# Patient Record
Sex: MALE | Race: WHITE | Employment: FULL TIME | URBAN - METROPOLITAN AREA
[De-identification: names, ages, dates, MRNs, and addresses within clinical notes are randomized per-mention and may not be internally consistent; named-entity substitution may affect disease eponyms.]

---

## 2021-01-01 ENCOUNTER — APPOINTMENT (OUTPATIENT)
Dept: GENERAL RADIOLOGY | Age: 66
DRG: 871 | End: 2021-01-01

## 2021-01-01 ENCOUNTER — HOSPITAL ENCOUNTER (INPATIENT)
Age: 66
LOS: 30 days | DRG: 871 | End: 2021-10-15
Attending: INTERNAL MEDICINE | Admitting: INTERNAL MEDICINE
Payer: OTHER GOVERNMENT

## 2021-01-01 ENCOUNTER — ANESTHESIA EVENT (OUTPATIENT)
Dept: CARDIOVASCULAR ICU | Age: 66
DRG: 871 | End: 2021-01-01

## 2021-01-01 ENCOUNTER — APPOINTMENT (OUTPATIENT)
Dept: CT IMAGING | Age: 66
DRG: 871 | End: 2021-01-01

## 2021-01-01 ENCOUNTER — ANESTHESIA (OUTPATIENT)
Dept: ICU | Age: 66
DRG: 871 | End: 2021-01-01

## 2021-01-01 ENCOUNTER — APPOINTMENT (OUTPATIENT)
Dept: ULTRASOUND IMAGING | Age: 66
DRG: 871 | End: 2021-01-01

## 2021-01-01 ENCOUNTER — APPOINTMENT (OUTPATIENT)
Dept: INTERVENTIONAL RADIOLOGY/VASCULAR | Age: 66
DRG: 871 | End: 2021-01-01

## 2021-01-01 ENCOUNTER — ANESTHESIA EVENT (OUTPATIENT)
Dept: ICU | Age: 66
DRG: 871 | End: 2021-01-01

## 2021-01-01 ENCOUNTER — ANESTHESIA (OUTPATIENT)
Dept: CARDIOVASCULAR ICU | Age: 66
DRG: 871 | End: 2021-01-01

## 2021-01-01 VITALS
DIASTOLIC BLOOD PRESSURE: 58 MMHG | OXYGEN SATURATION: 81 % | HEIGHT: 69 IN | RESPIRATION RATE: 15 BRPM | SYSTOLIC BLOOD PRESSURE: 82 MMHG | WEIGHT: 238.54 LBS | BODY MASS INDEX: 35.33 KG/M2 | TEMPERATURE: 95.7 F

## 2021-01-01 DIAGNOSIS — U07.1 COVID-19: Primary | ICD-10-CM

## 2021-01-01 DIAGNOSIS — J12.82 PNEUMONIA DUE TO COVID-19 VIRUS: ICD-10-CM

## 2021-01-01 DIAGNOSIS — U07.1 PNEUMONIA DUE TO COVID-19 VIRUS: ICD-10-CM

## 2021-01-01 DIAGNOSIS — R09.02 HYPOXIA: ICD-10-CM

## 2021-01-01 LAB
1,3 BETA-D-GLUCAN INTERP: POSITIVE
1,3 BETA-D-GLUCAN: 103 PG/ML
ALBUMIN SERPL-MCNC: 2.7 GM/DL (ref 3.4–5)
ALBUMIN SERPL-MCNC: 2.9 GM/DL (ref 3.4–5)
ALBUMIN SERPL-MCNC: 3 GM/DL (ref 3.4–5)
ALBUMIN SERPL-MCNC: 3 GM/DL (ref 3.4–5)
ALBUMIN SERPL-MCNC: 3.1 GM/DL (ref 3.4–5)
ALBUMIN SERPL-MCNC: 3.1 GM/DL (ref 3.4–5)
ALBUMIN SERPL-MCNC: 3.2 GM/DL (ref 3.4–5)
ALBUMIN SERPL-MCNC: 3.2 GM/DL (ref 3.4–5)
ALBUMIN SERPL-MCNC: 3.3 GM/DL (ref 3.4–5)
ALBUMIN SERPL-MCNC: 3.4 GM/DL (ref 3.4–5)
ALBUMIN SERPL-MCNC: 3.6 GM/DL (ref 3.4–5)
ALBUMIN SERPL-MCNC: 3.7 GM/DL (ref 3.4–5)
ALBUMIN SERPL-MCNC: 3.8 GM/DL (ref 3.4–5)
ALBUMIN SERPL-MCNC: 3.9 GM/DL (ref 3.4–5)
ALBUMIN SERPL-MCNC: 3.9 GM/DL (ref 3.4–5)
ALBUMIN SERPL-MCNC: 4 GM/DL (ref 3.4–5)
ALBUMIN SERPL-MCNC: 4 GM/DL (ref 3.4–5)
ALBUMIN SERPL-MCNC: 4.1 GM/DL (ref 3.4–5)
ALP BLD-CCNC: 106 IU/L (ref 40–129)
ALP BLD-CCNC: 108 IU/L (ref 40–129)
ALP BLD-CCNC: 118 IU/L (ref 40–128)
ALP BLD-CCNC: 122 IU/L (ref 40–128)
ALP BLD-CCNC: 128 IU/L (ref 40–128)
ALP BLD-CCNC: 135 IU/L (ref 40–128)
ALP BLD-CCNC: 159 IU/L (ref 40–129)
ALP BLD-CCNC: 160 IU/L (ref 40–129)
ALP BLD-CCNC: 164 IU/L (ref 40–129)
ALP BLD-CCNC: 171 IU/L (ref 40–129)
ALP BLD-CCNC: 174 IU/L (ref 40–128)
ALP BLD-CCNC: 177 IU/L (ref 40–128)
ALP BLD-CCNC: 178 IU/L (ref 40–128)
ALP BLD-CCNC: 179 IU/L (ref 40–129)
ALP BLD-CCNC: 180 IU/L (ref 40–128)
ALP BLD-CCNC: 181 IU/L (ref 40–128)
ALP BLD-CCNC: 182 IU/L (ref 40–128)
ALP BLD-CCNC: 183 IU/L (ref 40–129)
ALP BLD-CCNC: 185 IU/L (ref 40–128)
ALP BLD-CCNC: 186 IU/L (ref 40–128)
ALP BLD-CCNC: 189 IU/L (ref 40–129)
ALP BLD-CCNC: 190 IU/L (ref 40–128)
ALP BLD-CCNC: 192 IU/L (ref 40–128)
ALP BLD-CCNC: 195 IU/L (ref 40–128)
ALP BLD-CCNC: 196 IU/L (ref 40–129)
ALP BLD-CCNC: 197 IU/L (ref 40–128)
ALP BLD-CCNC: 204 IU/L (ref 40–128)
ALP BLD-CCNC: 210 IU/L (ref 40–129)
ALP BLD-CCNC: 214 IU/L (ref 40–128)
ALP BLD-CCNC: 229 IU/L (ref 40–128)
ALT SERPL-CCNC: 1024 U/L (ref 10–40)
ALT SERPL-CCNC: 1134 U/L (ref 10–40)
ALT SERPL-CCNC: 1164 U/L (ref 10–40)
ALT SERPL-CCNC: 1339 U/L (ref 10–40)
ALT SERPL-CCNC: 1441 U/L (ref 10–40)
ALT SERPL-CCNC: 1627 U/L (ref 10–40)
ALT SERPL-CCNC: 1686 U/L (ref 10–40)
ALT SERPL-CCNC: 1767 U/L (ref 10–40)
ALT SERPL-CCNC: 184 U/L (ref 10–40)
ALT SERPL-CCNC: 19 U/L (ref 10–40)
ALT SERPL-CCNC: 19 U/L (ref 10–40)
ALT SERPL-CCNC: 244 U/L (ref 10–40)
ALT SERPL-CCNC: 246 U/L (ref 10–40)
ALT SERPL-CCNC: 248 U/L (ref 10–40)
ALT SERPL-CCNC: 256 U/L (ref 10–40)
ALT SERPL-CCNC: 267 U/L (ref 10–40)
ALT SERPL-CCNC: 30 U/L (ref 10–40)
ALT SERPL-CCNC: 301 U/L (ref 10–40)
ALT SERPL-CCNC: 311 U/L (ref 10–40)
ALT SERPL-CCNC: 334 U/L (ref 10–40)
ALT SERPL-CCNC: 358 U/L (ref 10–40)
ALT SERPL-CCNC: 389 U/L (ref 10–40)
ALT SERPL-CCNC: 423 U/L (ref 10–40)
ALT SERPL-CCNC: 48 U/L (ref 10–40)
ALT SERPL-CCNC: 521 U/L (ref 10–40)
ALT SERPL-CCNC: 53 U/L (ref 10–40)
ALT SERPL-CCNC: 559 U/L (ref 10–40)
ALT SERPL-CCNC: 640 U/L (ref 10–40)
ALT SERPL-CCNC: 68 U/L (ref 10–40)
ALT SERPL-CCNC: 788 U/L (ref 10–40)
ANION GAP SERPL CALCULATED.3IONS-SCNC: 12 MMOL/L (ref 4–16)
ANION GAP SERPL CALCULATED.3IONS-SCNC: 13 MMOL/L (ref 4–16)
ANION GAP SERPL CALCULATED.3IONS-SCNC: 14 MMOL/L (ref 4–16)
ANION GAP SERPL CALCULATED.3IONS-SCNC: 15 MMOL/L (ref 4–16)
ANION GAP SERPL CALCULATED.3IONS-SCNC: 16 MMOL/L (ref 4–16)
ANION GAP SERPL CALCULATED.3IONS-SCNC: 17 MMOL/L (ref 4–16)
ANION GAP SERPL CALCULATED.3IONS-SCNC: 18 MMOL/L (ref 4–16)
ANION GAP SERPL CALCULATED.3IONS-SCNC: 19 MMOL/L (ref 4–16)
ANION GAP SERPL CALCULATED.3IONS-SCNC: 21 MMOL/L (ref 4–16)
ANISOCYTOSIS: ABNORMAL
ANISOCYTOSIS: ABNORMAL
ASPERGILLUS GALACTO AG: NEGATIVE
ASPERGILLUS GALACTO INDEX: 0.06
AST SERPL-CCNC: 100 IU/L (ref 15–37)
AST SERPL-CCNC: 104 IU/L (ref 15–37)
AST SERPL-CCNC: 116 IU/L (ref 15–37)
AST SERPL-CCNC: 116 IU/L (ref 15–37)
AST SERPL-CCNC: 128 IU/L (ref 15–37)
AST SERPL-CCNC: 165 IU/L (ref 15–37)
AST SERPL-CCNC: 167 IU/L (ref 15–37)
AST SERPL-CCNC: 204 IU/L (ref 15–37)
AST SERPL-CCNC: 223 IU/L (ref 15–37)
AST SERPL-CCNC: 236 IU/L (ref 15–37)
AST SERPL-CCNC: 306 IU/L (ref 15–37)
AST SERPL-CCNC: 338 IU/L (ref 15–37)
AST SERPL-CCNC: 34 IU/L (ref 15–37)
AST SERPL-CCNC: 343 IU/L (ref 15–37)
AST SERPL-CCNC: 39 IU/L (ref 15–37)
AST SERPL-CCNC: 401 IU/L (ref 15–37)
AST SERPL-CCNC: 423 IU/L (ref 15–37)
AST SERPL-CCNC: 503 IU/L (ref 15–37)
AST SERPL-CCNC: 528 IU/L (ref 15–37)
AST SERPL-CCNC: 53 IU/L (ref 15–37)
AST SERPL-CCNC: 53 IU/L (ref 15–37)
AST SERPL-CCNC: 54 IU/L (ref 15–37)
AST SERPL-CCNC: 57 IU/L (ref 15–37)
AST SERPL-CCNC: 80 IU/L (ref 15–37)
AST SERPL-CCNC: 81 IU/L (ref 15–37)
AST SERPL-CCNC: 84 IU/L (ref 15–37)
AST SERPL-CCNC: 85 IU/L (ref 15–37)
AST SERPL-CCNC: 88 IU/L (ref 15–37)
AST SERPL-CCNC: 93 IU/L (ref 15–37)
AST SERPL-CCNC: 95 IU/L (ref 15–37)
BACTERIA: ABNORMAL /HPF
BANDED NEUTROPHILS ABSOLUTE COUNT: 0.37 K/CU MM
BANDED NEUTROPHILS ABSOLUTE COUNT: 0.39 K/CU MM
BANDED NEUTROPHILS ABSOLUTE COUNT: 0.43 K/CU MM
BANDED NEUTROPHILS ABSOLUTE COUNT: 0.47 K/CU MM
BANDED NEUTROPHILS ABSOLUTE COUNT: 0.67 K/CU MM
BANDED NEUTROPHILS ABSOLUTE COUNT: 1.11 K/CU MM
BANDED NEUTROPHILS ABSOLUTE COUNT: 1.26 K/CU MM
BANDED NEUTROPHILS ABSOLUTE COUNT: 1.34 K/CU MM
BANDED NEUTROPHILS ABSOLUTE COUNT: 1.64 K/CU MM
BANDED NEUTROPHILS ABSOLUTE COUNT: 1.98 K/CU MM
BANDED NEUTROPHILS ABSOLUTE COUNT: 2.34 K/CU MM
BANDED NEUTROPHILS ABSOLUTE COUNT: 2.59 K/CU MM
BANDED NEUTROPHILS RELATIVE PERCENT: 10 % (ref 5–11)
BANDED NEUTROPHILS RELATIVE PERCENT: 12 % (ref 5–11)
BANDED NEUTROPHILS RELATIVE PERCENT: 13 % (ref 5–11)
BANDED NEUTROPHILS RELATIVE PERCENT: 16 % (ref 5–11)
BANDED NEUTROPHILS RELATIVE PERCENT: 2 % (ref 5–11)
BANDED NEUTROPHILS RELATIVE PERCENT: 3 % (ref 5–11)
BANDED NEUTROPHILS RELATIVE PERCENT: 3 % (ref 5–11)
BANDED NEUTROPHILS RELATIVE PERCENT: 4 % (ref 5–11)
BANDED NEUTROPHILS RELATIVE PERCENT: 5 % (ref 5–11)
BANDED NEUTROPHILS RELATIVE PERCENT: 8 % (ref 5–11)
BANDED NEUTROPHILS RELATIVE PERCENT: 8 % (ref 5–11)
BANDED NEUTROPHILS RELATIVE PERCENT: 9 % (ref 5–11)
BASE EXCESS MIXED: 0 (ref 0–1.2)
BASE EXCESS MIXED: 4 (ref 0–1.2)
BASE EXCESS: 4 (ref 0–3.3)
BASE EXCESS: 4 (ref 0–3.3)
BASE EXCESS: 5 (ref 0–3.3)
BASE EXCESS: 6 (ref 0–3.3)
BASE EXCESS: 7 (ref 0–3.3)
BASE EXCESS: 7 (ref 0–3.3)
BASE EXCESS: 8 (ref 0–3.3)
BASE EXCESS: 9 (ref 0–3.3)
BASE EXCESS: 9 (ref 0–3.3)
BASOPHILIC STIPPLING: PRESENT
BASOPHILS ABSOLUTE: 0 K/CU MM
BASOPHILS ABSOLUTE: 0.1 K/CU MM
BASOPHILS RELATIVE PERCENT: 0.1 % (ref 0–1)
BASOPHILS RELATIVE PERCENT: 0.2 % (ref 0–1)
BASOPHILS RELATIVE PERCENT: 0.3 % (ref 0–1)
BASOPHILS RELATIVE PERCENT: 0.6 % (ref 0–1)
BILIRUB SERPL-MCNC: 0.3 MG/DL (ref 0–1)
BILIRUB SERPL-MCNC: 0.5 MG/DL (ref 0–1)
BILIRUB SERPL-MCNC: 0.5 MG/DL (ref 0–1)
BILIRUB SERPL-MCNC: 0.6 MG/DL (ref 0–1)
BILIRUB SERPL-MCNC: 0.7 MG/DL (ref 0–1)
BILIRUB SERPL-MCNC: 0.8 MG/DL (ref 0–1)
BILIRUB SERPL-MCNC: 0.8 MG/DL (ref 0–1)
BILIRUB SERPL-MCNC: 0.9 MG/DL (ref 0–1)
BILIRUB SERPL-MCNC: 1 MG/DL (ref 0–1)
BILIRUB SERPL-MCNC: 1.1 MG/DL (ref 0–1)
BILIRUB SERPL-MCNC: 1.1 MG/DL (ref 0–1)
BILIRUB SERPL-MCNC: 1.2 MG/DL (ref 0–1)
BILIRUB SERPL-MCNC: 1.3 MG/DL (ref 0–1)
BILIRUB SERPL-MCNC: 1.4 MG/DL (ref 0–1)
BILIRUB SERPL-MCNC: 1.4 MG/DL (ref 0–1)
BILIRUB SERPL-MCNC: 1.5 MG/DL (ref 0–1)
BILIRUB SERPL-MCNC: 1.8 MG/DL (ref 0–1)
BILIRUB SERPL-MCNC: 2.5 MG/DL (ref 0–1)
BILIRUB SERPL-MCNC: 2.8 MG/DL (ref 0–1)
BILIRUBIN URINE: NEGATIVE MG/DL
BLOOD, URINE: ABNORMAL
BUN BLDV-MCNC: 101 MG/DL (ref 6–23)
BUN BLDV-MCNC: 12 MG/DL (ref 6–23)
BUN BLDV-MCNC: 120 MG/DL (ref 6–23)
BUN BLDV-MCNC: 121 MG/DL (ref 6–23)
BUN BLDV-MCNC: 13 MG/DL (ref 6–23)
BUN BLDV-MCNC: 21 MG/DL (ref 6–23)
BUN BLDV-MCNC: 23 MG/DL (ref 6–23)
BUN BLDV-MCNC: 27 MG/DL (ref 6–23)
BUN BLDV-MCNC: 31 MG/DL (ref 6–23)
BUN BLDV-MCNC: 31 MG/DL (ref 6–23)
BUN BLDV-MCNC: 34 MG/DL (ref 6–23)
BUN BLDV-MCNC: 34 MG/DL (ref 6–23)
BUN BLDV-MCNC: 37 MG/DL (ref 6–23)
BUN BLDV-MCNC: 37 MG/DL (ref 6–23)
BUN BLDV-MCNC: 38 MG/DL (ref 6–23)
BUN BLDV-MCNC: 38 MG/DL (ref 6–23)
BUN BLDV-MCNC: 39 MG/DL (ref 6–23)
BUN BLDV-MCNC: 39 MG/DL (ref 6–23)
BUN BLDV-MCNC: 42 MG/DL (ref 6–23)
BUN BLDV-MCNC: 44 MG/DL (ref 6–23)
BUN BLDV-MCNC: 44 MG/DL (ref 6–23)
BUN BLDV-MCNC: 45 MG/DL (ref 6–23)
BUN BLDV-MCNC: 45 MG/DL (ref 6–23)
BUN BLDV-MCNC: 47 MG/DL (ref 6–23)
BUN BLDV-MCNC: 48 MG/DL (ref 6–23)
BUN BLDV-MCNC: 50 MG/DL (ref 6–23)
BUN BLDV-MCNC: 51 MG/DL (ref 6–23)
BUN BLDV-MCNC: 56 MG/DL (ref 6–23)
BUN BLDV-MCNC: 57 MG/DL (ref 6–23)
BUN BLDV-MCNC: 59 MG/DL (ref 6–23)
BUN BLDV-MCNC: 63 MG/DL (ref 6–23)
BUN BLDV-MCNC: 82 MG/DL (ref 6–23)
C-REACTIVE PROTEIN, HIGH SENSITIVITY: 103.3 MG/L
C-REACTIVE PROTEIN, HIGH SENSITIVITY: 48.9 MG/L
CALCIUM IONIZED: 4.56 MG/DL (ref 4.48–5.28)
CALCIUM IONIZED: 4.68 MG/DL (ref 4.48–5.28)
CALCIUM IONIZED: 4.76 MG/DL (ref 4.48–5.28)
CALCIUM IONIZED: 4.84 MG/DL (ref 4.48–5.28)
CALCIUM SERPL-MCNC: 8.4 MG/DL (ref 8.3–10.6)
CALCIUM SERPL-MCNC: 8.4 MG/DL (ref 8.3–10.6)
CALCIUM SERPL-MCNC: 8.5 MG/DL (ref 8.3–10.6)
CALCIUM SERPL-MCNC: 8.5 MG/DL (ref 8.3–10.6)
CALCIUM SERPL-MCNC: 8.6 MG/DL (ref 8.3–10.6)
CALCIUM SERPL-MCNC: 8.6 MG/DL (ref 8.3–10.6)
CALCIUM SERPL-MCNC: 8.7 MG/DL (ref 8.3–10.6)
CALCIUM SERPL-MCNC: 8.9 MG/DL (ref 8.3–10.6)
CALCIUM SERPL-MCNC: 9 MG/DL (ref 8.3–10.6)
CALCIUM SERPL-MCNC: 9.1 MG/DL (ref 8.3–10.6)
CALCIUM SERPL-MCNC: 9.2 MG/DL (ref 8.3–10.6)
CALCIUM SERPL-MCNC: 9.3 MG/DL (ref 8.3–10.6)
CALCIUM SERPL-MCNC: 9.6 MG/DL (ref 8.3–10.6)
CARBON MONOXIDE, BLOOD: 1.8 % (ref 0–5)
CARBON MONOXIDE, BLOOD: 2 % (ref 0–5)
CARBON MONOXIDE, BLOOD: 2.1 % (ref 0–5)
CARBON MONOXIDE, BLOOD: 2.1 % (ref 0–5)
CARBON MONOXIDE, BLOOD: 2.2 % (ref 0–5)
CARBON MONOXIDE, BLOOD: 2.3 % (ref 0–5)
CARBON MONOXIDE, BLOOD: 2.3 % (ref 0–5)
CARBON MONOXIDE, BLOOD: 2.4 % (ref 0–5)
CARBON MONOXIDE, BLOOD: 2.5 % (ref 0–5)
CARBON MONOXIDE, BLOOD: 2.6 % (ref 0–5)
CHLORIDE BLD-SCNC: 100 MMOL/L (ref 99–110)
CHLORIDE BLD-SCNC: 101 MMOL/L (ref 99–110)
CHLORIDE BLD-SCNC: 102 MMOL/L (ref 99–110)
CHLORIDE BLD-SCNC: 103 MMOL/L (ref 99–110)
CHLORIDE BLD-SCNC: 104 MMOL/L (ref 99–110)
CHLORIDE BLD-SCNC: 104 MMOL/L (ref 99–110)
CHLORIDE BLD-SCNC: 108 MMOL/L (ref 99–110)
CHLORIDE BLD-SCNC: 108 MMOL/L (ref 99–110)
CHLORIDE BLD-SCNC: 89 MMOL/L (ref 99–110)
CHLORIDE BLD-SCNC: 93 MMOL/L (ref 99–110)
CHLORIDE BLD-SCNC: 94 MMOL/L (ref 99–110)
CHLORIDE BLD-SCNC: 95 MMOL/L (ref 99–110)
CHLORIDE BLD-SCNC: 95 MMOL/L (ref 99–110)
CHLORIDE BLD-SCNC: 96 MMOL/L (ref 99–110)
CHLORIDE BLD-SCNC: 97 MMOL/L (ref 99–110)
CHLORIDE BLD-SCNC: 98 MMOL/L (ref 99–110)
CHLORIDE BLD-SCNC: 98 MMOL/L (ref 99–110)
CHLORIDE BLD-SCNC: 99 MMOL/L (ref 99–110)
CHLORIDE URINE RANDOM: 10 MMOL/L (ref 43–210)
CLARITY: ABNORMAL
CMV DNA QUANTATATIVE INTERPRETATION: NOT DETECTED
CMV IGM: <8 AU/ML
CMV QUANT IU/ML: <227 IU/ML
CMV QUANT LOG IU/ML: <2.4 LOG IU/ML
CMV QUANT LOG IU/ML: <2.6 LOG CPY/ML
CMVQ COPY/ML: <390 CPY/ML
CO2 CONTENT: 17.7 MMOL/L (ref 19–24)
CO2 CONTENT: 19.7 MMOL/L (ref 19–24)
CO2 CONTENT: 20 MMOL/L (ref 19–24)
CO2 CONTENT: 20.7 MMOL/L (ref 19–24)
CO2 CONTENT: 23.3 MMOL/L (ref 19–24)
CO2 CONTENT: 23.4 MMOL/L (ref 19–24)
CO2 CONTENT: 23.5 MMOL/L (ref 19–24)
CO2 CONTENT: 25.2 MMOL/L (ref 19–24)
CO2 CONTENT: 25.4 MMOL/L (ref 19–24)
CO2 CONTENT: 28.7 MMOL/L (ref 19–24)
CO2: 16 MMOL/L (ref 21–32)
CO2: 16 MMOL/L (ref 21–32)
CO2: 17 MMOL/L (ref 21–32)
CO2: 17 MMOL/L (ref 21–32)
CO2: 18 MMOL/L (ref 21–32)
CO2: 19 MMOL/L (ref 21–32)
CO2: 20 MMOL/L (ref 21–32)
CO2: 21 MMOL/L (ref 21–32)
CO2: 22 MMOL/L (ref 21–32)
CO2: 23 MMOL/L (ref 21–32)
CO2: 24 MMOL/L (ref 21–32)
CO2: 24 MMOL/L (ref 21–32)
CO2: 25 MMOL/L (ref 21–32)
CO2: 25 MMOL/L (ref 21–32)
CO2: 27 MMOL/L (ref 21–32)
CO2: 27 MMOL/L (ref 21–32)
COLOR: YELLOW
COMMENT: ABNORMAL
CREAT SERPL-MCNC: 0.4 MG/DL (ref 0.9–1.3)
CREAT SERPL-MCNC: 0.5 MG/DL (ref 0.9–1.3)
CREAT SERPL-MCNC: 0.6 MG/DL (ref 0.9–1.3)
CREAT SERPL-MCNC: 0.7 MG/DL (ref 0.9–1.3)
CREAT SERPL-MCNC: 0.8 MG/DL (ref 0.9–1.3)
CREAT SERPL-MCNC: 1.1 MG/DL (ref 0.9–1.3)
CREAT SERPL-MCNC: 1.3 MG/DL (ref 0.9–1.3)
CREAT SERPL-MCNC: 1.3 MG/DL (ref 0.9–1.3)
CREAT SERPL-MCNC: 1.4 MG/DL (ref 0.9–1.3)
CREAT SERPL-MCNC: 1.6 MG/DL (ref 0.9–1.3)
CREAT SERPL-MCNC: 1.7 MG/DL (ref 0.9–1.3)
CREAT SERPL-MCNC: 2.1 MG/DL (ref 0.9–1.3)
CREAT SERPL-MCNC: 2.1 MG/DL (ref 0.9–1.3)
CREAT SERPL-MCNC: 2.7 MG/DL (ref 0.9–1.3)
CREATININE URINE: 69.4 MG/DL (ref 39–259)
CULTURE: NORMAL
CULTURE: NORMAL
CYTOMEGALOVIRUS IGG ANTIBODY: >10 U/ML
D DIMER: 307 NG/ML(DDU)
D DIMER: 332 NG/ML(DDU)
D DIMER: 615 NG/ML(DDU)
D DIMER: 686 NG/ML(DDU)
DIFFERENTIAL TYPE: ABNORMAL
DOHLE BODIES: PRESENT
DOHLE BODIES: PRESENT
DOSE AMOUNT: NORMAL
DOSE TIME: NORMAL
EKG ATRIAL RATE: 109 BPM
EKG ATRIAL RATE: 111 BPM
EKG ATRIAL RATE: 162 BPM
EKG ATRIAL RATE: 91 BPM
EKG ATRIAL RATE: 94 BPM
EKG DIAGNOSIS: NORMAL
EKG P AXIS: 12 DEGREES
EKG P AXIS: 25 DEGREES
EKG P AXIS: 3 DEGREES
EKG P AXIS: 52 DEGREES
EKG P-R INTERVAL: 144 MS
EKG P-R INTERVAL: 162 MS
EKG P-R INTERVAL: 202 MS
EKG P-R INTERVAL: 208 MS
EKG Q-T INTERVAL: 280 MS
EKG Q-T INTERVAL: 296 MS
EKG Q-T INTERVAL: 298 MS
EKG Q-T INTERVAL: 334 MS
EKG Q-T INTERVAL: 362 MS
EKG QRS DURATION: 102 MS
EKG QRS DURATION: 104 MS
EKG QRS DURATION: 106 MS
EKG QRS DURATION: 92 MS
EKG QRS DURATION: 94 MS
EKG QTC CALCULATION (BAZETT): 398 MS
EKG QTC CALCULATION (BAZETT): 410 MS
EKG QTC CALCULATION (BAZETT): 452 MS
EKG QTC CALCULATION (BAZETT): 459 MS
EKG QTC CALCULATION (BAZETT): 461 MS
EKG R AXIS: -10 DEGREES
EKG R AXIS: -13 DEGREES
EKG R AXIS: -14 DEGREES
EKG R AXIS: -14 DEGREES
EKG R AXIS: 19 DEGREES
EKG T AXIS: 175 DEGREES
EKG T AXIS: 178 DEGREES
EKG T AXIS: 184 DEGREES
EKG T AXIS: 185 DEGREES
EKG T AXIS: 6 DEGREES
EKG VENTRICULAR RATE: 109 BPM
EKG VENTRICULAR RATE: 144 BPM
EKG VENTRICULAR RATE: 162 BPM
EKG VENTRICULAR RATE: 91 BPM
EKG VENTRICULAR RATE: 94 BPM
EOSINOPHILS ABSOLUTE: 0 K/CU MM
EOSINOPHILS ABSOLUTE: 0.1 K/CU MM
EOSINOPHILS ABSOLUTE: 0.2 K/CU MM
EOSINOPHILS ABSOLUTE: 0.2 K/CU MM
EOSINOPHILS ABSOLUTE: 0.3 K/CU MM
EOSINOPHILS RELATIVE PERCENT: 0 % (ref 0–3)
EOSINOPHILS RELATIVE PERCENT: 0.1 % (ref 0–3)
EOSINOPHILS RELATIVE PERCENT: 0.2 % (ref 0–3)
EOSINOPHILS RELATIVE PERCENT: 0.2 % (ref 0–3)
EOSINOPHILS RELATIVE PERCENT: 0.3 % (ref 0–3)
EOSINOPHILS RELATIVE PERCENT: 0.4 % (ref 0–3)
EOSINOPHILS RELATIVE PERCENT: 0.4 % (ref 0–3)
EOSINOPHILS RELATIVE PERCENT: 1 % (ref 0–3)
EOSINOPHILS RELATIVE PERCENT: 1 % (ref 0–3)
EOSINOPHILS RELATIVE PERCENT: 1.4 % (ref 0–3)
EOSINOPHILS RELATIVE PERCENT: 1.5 % (ref 0–3)
EOSINOPHILS RELATIVE PERCENT: 2 % (ref 0–3)
EOSINOPHILS RELATIVE PERCENT: 2 % (ref 0–3)
ERYTHROCYTE SEDIMENTATION RATE: 53 MM/HR (ref 0–20)
FIBRINOGEN LEVEL: 582 MG/DL (ref 196.9–442.1)
GFR AFRICAN AMERICAN: 29 ML/MIN/1.73M2
GFR AFRICAN AMERICAN: 39 ML/MIN/1.73M2
GFR AFRICAN AMERICAN: 39 ML/MIN/1.73M2
GFR AFRICAN AMERICAN: 50 ML/MIN/1.73M2
GFR AFRICAN AMERICAN: 53 ML/MIN/1.73M2
GFR AFRICAN AMERICAN: >60 ML/MIN/1.73M2
GFR NON-AFRICAN AMERICAN: 24 ML/MIN/1.73M2
GFR NON-AFRICAN AMERICAN: 32 ML/MIN/1.73M2
GFR NON-AFRICAN AMERICAN: 32 ML/MIN/1.73M2
GFR NON-AFRICAN AMERICAN: 41 ML/MIN/1.73M2
GFR NON-AFRICAN AMERICAN: 44 ML/MIN/1.73M2
GFR NON-AFRICAN AMERICAN: 51 ML/MIN/1.73M2
GFR NON-AFRICAN AMERICAN: 56 ML/MIN/1.73M2
GFR NON-AFRICAN AMERICAN: 56 ML/MIN/1.73M2
GFR NON-AFRICAN AMERICAN: >60 ML/MIN/1.73M2
GIANT PLATELETS: PRESENT
GLUCOSE BLD-MCNC: 101 MG/DL (ref 70–99)
GLUCOSE BLD-MCNC: 102 MG/DL (ref 70–99)
GLUCOSE BLD-MCNC: 106 MG/DL (ref 70–99)
GLUCOSE BLD-MCNC: 116 MG/DL (ref 70–99)
GLUCOSE BLD-MCNC: 122 MG/DL (ref 70–99)
GLUCOSE BLD-MCNC: 125 MG/DL (ref 70–99)
GLUCOSE BLD-MCNC: 126 MG/DL (ref 70–99)
GLUCOSE BLD-MCNC: 126 MG/DL (ref 70–99)
GLUCOSE BLD-MCNC: 127 MG/DL (ref 70–99)
GLUCOSE BLD-MCNC: 127 MG/DL (ref 70–99)
GLUCOSE BLD-MCNC: 130 MG/DL (ref 70–99)
GLUCOSE BLD-MCNC: 132 MG/DL (ref 70–99)
GLUCOSE BLD-MCNC: 135 MG/DL (ref 70–99)
GLUCOSE BLD-MCNC: 140 MG/DL (ref 70–99)
GLUCOSE BLD-MCNC: 140 MG/DL (ref 70–99)
GLUCOSE BLD-MCNC: 148 MG/DL (ref 70–99)
GLUCOSE BLD-MCNC: 149 MG/DL (ref 70–99)
GLUCOSE BLD-MCNC: 151 MG/DL (ref 70–99)
GLUCOSE BLD-MCNC: 154 MG/DL (ref 70–99)
GLUCOSE BLD-MCNC: 157 MG/DL (ref 70–99)
GLUCOSE BLD-MCNC: 158 MG/DL (ref 70–99)
GLUCOSE BLD-MCNC: 168 MG/DL (ref 70–99)
GLUCOSE BLD-MCNC: 169 MG/DL (ref 70–99)
GLUCOSE BLD-MCNC: 169 MG/DL (ref 70–99)
GLUCOSE BLD-MCNC: 170 MG/DL (ref 70–99)
GLUCOSE BLD-MCNC: 170 MG/DL (ref 70–99)
GLUCOSE BLD-MCNC: 171 MG/DL (ref 70–99)
GLUCOSE BLD-MCNC: 180 MG/DL (ref 70–99)
GLUCOSE BLD-MCNC: 181 MG/DL (ref 70–99)
GLUCOSE BLD-MCNC: 206 MG/DL (ref 70–99)
GLUCOSE BLD-MCNC: 224 MG/DL (ref 70–99)
GLUCOSE BLD-MCNC: 385 MG/DL (ref 70–99)
GLUCOSE BLD-MCNC: 92 MG/DL (ref 70–99)
GLUCOSE, URINE: NEGATIVE MG/DL
HAV IGM SER IA-ACNC: NON REACTIVE
HBV SURFACE AB TITR SER: <3.5 {TITER}
HCO3 ARTERIAL: 16.7 MMOL/L (ref 18–23)
HCO3 ARTERIAL: 18.8 MMOL/L (ref 18–23)
HCO3 ARTERIAL: 18.8 MMOL/L (ref 18–23)
HCO3 ARTERIAL: 19.3 MMOL/L (ref 18–23)
HCO3 ARTERIAL: 21.4 MMOL/L (ref 18–23)
HCO3 ARTERIAL: 21.7 MMOL/L (ref 18–23)
HCO3 ARTERIAL: 21.9 MMOL/L (ref 18–23)
HCO3 ARTERIAL: 23.5 MMOL/L (ref 18–23)
HCO3 ARTERIAL: 23.8 MMOL/L (ref 18–23)
HCO3 ARTERIAL: 27.6 MMOL/L (ref 18–23)
HCO3 VENOUS: 24.8 MMOL/L (ref 19–25)
HCT VFR BLD CALC: 40.6 % (ref 42–52)
HCT VFR BLD CALC: 42.6 % (ref 42–52)
HCT VFR BLD CALC: 43 % (ref 42–52)
HCT VFR BLD CALC: 43.1 % (ref 42–52)
HCT VFR BLD CALC: 43.8 % (ref 42–52)
HCT VFR BLD CALC: 44.1 % (ref 42–52)
HCT VFR BLD CALC: 44.4 % (ref 42–52)
HCT VFR BLD CALC: 45.1 % (ref 42–52)
HCT VFR BLD CALC: 45.3 % (ref 42–52)
HCT VFR BLD CALC: 45.4 % (ref 42–52)
HCT VFR BLD CALC: 45.8 % (ref 42–52)
HCT VFR BLD CALC: 45.9 % (ref 42–52)
HCT VFR BLD CALC: 46.2 % (ref 42–52)
HCT VFR BLD CALC: 46.5 % (ref 42–52)
HCT VFR BLD CALC: 46.6 % (ref 42–52)
HCT VFR BLD CALC: 46.7 % (ref 42–52)
HCT VFR BLD CALC: 47 % (ref 42–52)
HCT VFR BLD CALC: 47.1 % (ref 42–52)
HCT VFR BLD CALC: 47.2 % (ref 42–52)
HCT VFR BLD CALC: 47.6 % (ref 42–52)
HCT VFR BLD CALC: 48.2 % (ref 42–52)
HCT VFR BLD CALC: 48.2 % (ref 42–52)
HCT VFR BLD CALC: 48.3 % (ref 42–52)
HCT VFR BLD CALC: 48.4 % (ref 42–52)
HCT VFR BLD CALC: 49 % (ref 42–52)
HCT VFR BLD CALC: 49.9 % (ref 42–52)
HCT VFR BLD CALC: 50 % (ref 42–52)
HCT VFR BLD CALC: 50.2 % (ref 42–52)
HCT VFR BLD CALC: 50.5 % (ref 42–52)
HCT VFR BLD CALC: 51 % (ref 42–52)
HCT VFR BLD CALC: 51.1 % (ref 42–52)
HCT VFR BLD CALC: 51.8 % (ref 42–52)
HCT VFR BLD CALC: 54.1 % (ref 42–52)
HCT VFR BLD CALC: 56.1 % (ref 42–52)
HEMOGLOBIN: 13.1 GM/DL (ref 13.5–18)
HEMOGLOBIN: 13.3 GM/DL (ref 13.5–18)
HEMOGLOBIN: 13.5 GM/DL (ref 13.5–18)
HEMOGLOBIN: 13.6 GM/DL (ref 13.5–18)
HEMOGLOBIN: 13.6 GM/DL (ref 13.5–18)
HEMOGLOBIN: 13.8 GM/DL (ref 13.5–18)
HEMOGLOBIN: 14.1 GM/DL (ref 13.5–18)
HEMOGLOBIN: 14.4 GM/DL (ref 13.5–18)
HEMOGLOBIN: 14.4 GM/DL (ref 13.5–18)
HEMOGLOBIN: 14.6 GM/DL (ref 13.5–18)
HEMOGLOBIN: 14.6 GM/DL (ref 13.5–18)
HEMOGLOBIN: 14.8 GM/DL (ref 13.5–18)
HEMOGLOBIN: 14.8 GM/DL (ref 13.5–18)
HEMOGLOBIN: 14.9 GM/DL (ref 13.5–18)
HEMOGLOBIN: 15 GM/DL (ref 13.5–18)
HEMOGLOBIN: 15 GM/DL (ref 13.5–18)
HEMOGLOBIN: 15.1 GM/DL (ref 13.5–18)
HEMOGLOBIN: 15.2 GM/DL (ref 13.5–18)
HEMOGLOBIN: 15.3 GM/DL (ref 13.5–18)
HEMOGLOBIN: 15.5 GM/DL (ref 13.5–18)
HEMOGLOBIN: 15.5 GM/DL (ref 13.5–18)
HEMOGLOBIN: 15.6 GM/DL (ref 13.5–18)
HEMOGLOBIN: 15.7 GM/DL (ref 13.5–18)
HEMOGLOBIN: 15.7 GM/DL (ref 13.5–18)
HEMOGLOBIN: 16 GM/DL (ref 13.5–18)
HEMOGLOBIN: 16 GM/DL (ref 13.5–18)
HEMOGLOBIN: 16.1 GM/DL (ref 13.5–18)
HEMOGLOBIN: 16.2 GM/DL (ref 13.5–18)
HEMOGLOBIN: 16.4 GM/DL (ref 13.5–18)
HEMOGLOBIN: 16.5 GM/DL (ref 13.5–18)
HEMOGLOBIN: 16.6 GM/DL (ref 13.5–18)
HEMOGLOBIN: 17.5 GM/DL (ref 13.5–18)
HEMOGLOBIN: 17.5 GM/DL (ref 13.5–18)
HEPATITIS B CORE IGM ANTIBODY: NON REACTIVE
HEPATITIS B SURFACE ANTIGEN: NON REACTIVE
HEPATITIS C ANTIBODY: NON REACTIVE
HIGH SENSITIVE C-REACTIVE PROTEIN: 100.8 MG/L
HIGH SENSITIVE C-REACTIVE PROTEIN: 102 MG/L
HIGH SENSITIVE C-REACTIVE PROTEIN: 32.1 MG/L
HIGH SENSITIVE C-REACTIVE PROTEIN: 39.2 MG/L
HIGH SENSITIVE C-REACTIVE PROTEIN: 60.7 MG/L
HIGH SENSITIVE C-REACTIVE PROTEIN: 66.7 MG/L
HSV 1 GLYCOPROTEIN G AB IGG: 48.6 IV
HSV 2 GLYCOPROTEIN G AB IGG: 2.5 IV
HSV I/II IGG: >22.4 IV
HSVI/II COMB AB IGM: 0.38 IV
HYALINE CASTS: 2 /LPF
HYPERSEGMENTED NEUTROPHILS: PRESENT
IGA: 296 MG/DL (ref 69–382)
IGG,SERUM: 1082 MG/DL (ref 723–1685)
IGM,SERUM: 66 MG/DL (ref 62–277)
IMMATURE NEUTROPHIL %: 0.5 % (ref 0–0.43)
IMMATURE NEUTROPHIL %: 0.6 % (ref 0–0.43)
IMMATURE NEUTROPHIL %: 0.7 % (ref 0–0.43)
IMMATURE NEUTROPHIL %: 0.7 % (ref 0–0.43)
IMMATURE NEUTROPHIL %: 1.1 % (ref 0–0.43)
IMMATURE NEUTROPHIL %: 1.4 % (ref 0–0.43)
IMMATURE NEUTROPHIL %: 1.6 % (ref 0–0.43)
IMMATURE NEUTROPHIL %: 1.7 % (ref 0–0.43)
IMMATURE NEUTROPHIL %: 1.8 % (ref 0–0.43)
IMMATURE NEUTROPHIL %: 1.8 % (ref 0–0.43)
IMMATURE NEUTROPHIL %: 2 % (ref 0–0.43)
IMMATURE NEUTROPHIL %: 2.1 % (ref 0–0.43)
IMMATURE NEUTROPHIL %: 2.4 % (ref 0–0.43)
IMMATURE NEUTROPHIL %: 2.7 % (ref 0–0.43)
IMMATURE NEUTROPHIL %: 2.7 % (ref 0–0.43)
IMMATURE NEUTROPHIL %: 3.7 % (ref 0–0.43)
INR BLD: 0.93 INDEX
INR BLD: 0.95 INDEX
INR BLD: 0.97 INDEX
INR BLD: 0.97 INDEX
IONIZED CA: 1.14 MMOL/L (ref 1.12–1.32)
IONIZED CA: 1.17 MMOL/L (ref 1.12–1.32)
IONIZED CA: 1.19 MMOL/L (ref 1.12–1.32)
IONIZED CA: 1.21 MMOL/L (ref 1.12–1.32)
KETONES, URINE: NEGATIVE MG/DL
LACTATE: 1.5 MMOL/L (ref 0.4–2)
LACTATE: 2.1 MMOL/L (ref 0.4–2)
LEGIONELLA URINARY AG: NEGATIVE
LEUKOCYTE ESTERASE, URINE: NEGATIVE
LIPASE: 35 IU/L (ref 13–60)
LYMPHOCYTES ABSOLUTE: 0.4 K/CU MM
LYMPHOCYTES ABSOLUTE: 0.5 K/CU MM
LYMPHOCYTES ABSOLUTE: 0.6 K/CU MM
LYMPHOCYTES ABSOLUTE: 0.7 K/CU MM
LYMPHOCYTES ABSOLUTE: 0.8 K/CU MM
LYMPHOCYTES ABSOLUTE: 0.9 K/CU MM
LYMPHOCYTES ABSOLUTE: 0.9 K/CU MM
LYMPHOCYTES ABSOLUTE: 1 K/CU MM
LYMPHOCYTES ABSOLUTE: 1 K/CU MM
LYMPHOCYTES ABSOLUTE: 1.1 K/CU MM
LYMPHOCYTES ABSOLUTE: 1.3 K/CU MM
LYMPHOCYTES ABSOLUTE: 1.6 K/CU MM
LYMPHOCYTES RELATIVE PERCENT: 10 % (ref 24–44)
LYMPHOCYTES RELATIVE PERCENT: 14.5 % (ref 24–44)
LYMPHOCYTES RELATIVE PERCENT: 15.4 % (ref 24–44)
LYMPHOCYTES RELATIVE PERCENT: 16.7 % (ref 24–44)
LYMPHOCYTES RELATIVE PERCENT: 2.4 % (ref 24–44)
LYMPHOCYTES RELATIVE PERCENT: 2.5 % (ref 24–44)
LYMPHOCYTES RELATIVE PERCENT: 2.8 % (ref 24–44)
LYMPHOCYTES RELATIVE PERCENT: 2.9 % (ref 24–44)
LYMPHOCYTES RELATIVE PERCENT: 3 % (ref 24–44)
LYMPHOCYTES RELATIVE PERCENT: 3.1 % (ref 24–44)
LYMPHOCYTES RELATIVE PERCENT: 3.5 % (ref 24–44)
LYMPHOCYTES RELATIVE PERCENT: 3.8 % (ref 24–44)
LYMPHOCYTES RELATIVE PERCENT: 4 % (ref 24–44)
LYMPHOCYTES RELATIVE PERCENT: 4.2 % (ref 24–44)
LYMPHOCYTES RELATIVE PERCENT: 4.3 % (ref 24–44)
LYMPHOCYTES RELATIVE PERCENT: 5 % (ref 24–44)
LYMPHOCYTES RELATIVE PERCENT: 5 % (ref 24–44)
LYMPHOCYTES RELATIVE PERCENT: 5.1 % (ref 24–44)
LYMPHOCYTES RELATIVE PERCENT: 5.6 % (ref 24–44)
LYMPHOCYTES RELATIVE PERCENT: 5.8 % (ref 24–44)
LYMPHOCYTES RELATIVE PERCENT: 6 % (ref 24–44)
LYMPHOCYTES RELATIVE PERCENT: 6.4 % (ref 24–44)
LYMPHOCYTES RELATIVE PERCENT: 6.6 % (ref 24–44)
LYMPHOCYTES RELATIVE PERCENT: 7 % (ref 24–44)
LYMPHOCYTES RELATIVE PERCENT: 8 % (ref 24–44)
LYMPHOCYTES RELATIVE PERCENT: 8 % (ref 24–44)
Lab: NORMAL
Lab: NORMAL
MAGNESIUM: 1.9 MG/DL (ref 1.8–2.4)
MAGNESIUM: 2 MG/DL (ref 1.8–2.4)
MAGNESIUM: 2.1 MG/DL (ref 1.8–2.4)
MAGNESIUM: 2.1 MG/DL (ref 1.8–2.4)
MAGNESIUM: 2.2 MG/DL (ref 1.8–2.4)
MAGNESIUM: 2.3 MG/DL (ref 1.8–2.4)
MAGNESIUM: 2.4 MG/DL (ref 1.8–2.4)
MAGNESIUM: 2.5 MG/DL (ref 1.8–2.4)
MAGNESIUM: 2.5 MG/DL (ref 1.8–2.4)
MAGNESIUM: 2.6 MG/DL (ref 1.8–2.4)
MAGNESIUM: 2.6 MG/DL (ref 1.8–2.4)
MAGNESIUM: 2.9 MG/DL (ref 1.8–2.4)
MCH RBC QN AUTO: 27.5 PG (ref 27–31)
MCH RBC QN AUTO: 27.9 PG (ref 27–31)
MCH RBC QN AUTO: 28 PG (ref 27–31)
MCH RBC QN AUTO: 28.1 PG (ref 27–31)
MCH RBC QN AUTO: 28.2 PG (ref 27–31)
MCH RBC QN AUTO: 28.3 PG (ref 27–31)
MCH RBC QN AUTO: 28.3 PG (ref 27–31)
MCH RBC QN AUTO: 28.4 PG (ref 27–31)
MCH RBC QN AUTO: 28.5 PG (ref 27–31)
MCH RBC QN AUTO: 28.6 PG (ref 27–31)
MCH RBC QN AUTO: 28.7 PG (ref 27–31)
MCH RBC QN AUTO: 28.7 PG (ref 27–31)
MCHC RBC AUTO-ENTMCNC: 31.1 % (ref 32–36)
MCHC RBC AUTO-ENTMCNC: 31.2 % (ref 32–36)
MCHC RBC AUTO-ENTMCNC: 31.3 % (ref 32–36)
MCHC RBC AUTO-ENTMCNC: 31.4 % (ref 32–36)
MCHC RBC AUTO-ENTMCNC: 31.7 % (ref 32–36)
MCHC RBC AUTO-ENTMCNC: 31.8 % (ref 32–36)
MCHC RBC AUTO-ENTMCNC: 31.9 % (ref 32–36)
MCHC RBC AUTO-ENTMCNC: 32 % (ref 32–36)
MCHC RBC AUTO-ENTMCNC: 32 % (ref 32–36)
MCHC RBC AUTO-ENTMCNC: 32.1 % (ref 32–36)
MCHC RBC AUTO-ENTMCNC: 32.2 % (ref 32–36)
MCHC RBC AUTO-ENTMCNC: 32.3 % (ref 32–36)
MCHC RBC AUTO-ENTMCNC: 32.4 % (ref 32–36)
MCHC RBC AUTO-ENTMCNC: 32.5 % (ref 32–36)
MCHC RBC AUTO-ENTMCNC: 32.7 % (ref 32–36)
MCHC RBC AUTO-ENTMCNC: 32.8 % (ref 32–36)
MCHC RBC AUTO-ENTMCNC: 32.8 % (ref 32–36)
MCV RBC AUTO: 85.5 FL (ref 78–100)
MCV RBC AUTO: 86.1 FL (ref 78–100)
MCV RBC AUTO: 86.5 FL (ref 78–100)
MCV RBC AUTO: 86.6 FL (ref 78–100)
MCV RBC AUTO: 86.8 FL (ref 78–100)
MCV RBC AUTO: 87 FL (ref 78–100)
MCV RBC AUTO: 87.1 FL (ref 78–100)
MCV RBC AUTO: 87.1 FL (ref 78–100)
MCV RBC AUTO: 87.2 FL (ref 78–100)
MCV RBC AUTO: 87.4 FL (ref 78–100)
MCV RBC AUTO: 87.4 FL (ref 78–100)
MCV RBC AUTO: 87.5 FL (ref 78–100)
MCV RBC AUTO: 87.6 FL (ref 78–100)
MCV RBC AUTO: 87.7 FL (ref 78–100)
MCV RBC AUTO: 87.8 FL (ref 78–100)
MCV RBC AUTO: 87.9 FL (ref 78–100)
MCV RBC AUTO: 87.9 FL (ref 78–100)
MCV RBC AUTO: 88.1 FL (ref 78–100)
MCV RBC AUTO: 88.2 FL (ref 78–100)
MCV RBC AUTO: 88.4 FL (ref 78–100)
MCV RBC AUTO: 88.8 FL (ref 78–100)
MCV RBC AUTO: 89.1 FL (ref 78–100)
MCV RBC AUTO: 89.2 FL (ref 78–100)
MCV RBC AUTO: 89.7 FL (ref 78–100)
MCV RBC AUTO: 90.7 FL (ref 78–100)
METAMYELOCYTES ABSOLUTE COUNT: 0.1 K/CU MM
METAMYELOCYTES ABSOLUTE COUNT: 0.14 K/CU MM
METAMYELOCYTES ABSOLUTE COUNT: 0.16 K/CU MM
METAMYELOCYTES ABSOLUTE COUNT: 0.17 K/CU MM
METAMYELOCYTES ABSOLUTE COUNT: 0.18 K/CU MM
METAMYELOCYTES ABSOLUTE COUNT: 0.33 K/CU MM
METAMYELOCYTES ABSOLUTE COUNT: 0.49 K/CU MM
METAMYELOCYTES ABSOLUTE COUNT: 0.53 K/CU MM
METAMYELOCYTES PERCENT: 1 %
METAMYELOCYTES PERCENT: 2 %
METAMYELOCYTES PERCENT: 3 %
METAMYELOCYTES PERCENT: 4 %
METHEMOGLOBIN ARTERIAL: 1 %
METHEMOGLOBIN ARTERIAL: 1.3 %
METHEMOGLOBIN ARTERIAL: 1.4 %
METHEMOGLOBIN ARTERIAL: 1.6 %
METHEMOGLOBIN ARTERIAL: 1.6 %
METHEMOGLOBIN ARTERIAL: 1.9 %
MONOCYTES ABSOLUTE: 0.2 K/CU MM
MONOCYTES ABSOLUTE: 0.2 K/CU MM
MONOCYTES ABSOLUTE: 0.3 K/CU MM
MONOCYTES ABSOLUTE: 0.4 K/CU MM
MONOCYTES ABSOLUTE: 0.5 K/CU MM
MONOCYTES ABSOLUTE: 0.6 K/CU MM
MONOCYTES ABSOLUTE: 0.7 K/CU MM
MONOCYTES ABSOLUTE: 0.7 K/CU MM
MONOCYTES ABSOLUTE: 0.9 K/CU MM
MONOCYTES ABSOLUTE: 1 K/CU MM
MONOCYTES ABSOLUTE: 1.1 K/CU MM
MONOCYTES RELATIVE PERCENT: 1 % (ref 0–4)
MONOCYTES RELATIVE PERCENT: 1 % (ref 0–4)
MONOCYTES RELATIVE PERCENT: 1.6 % (ref 0–4)
MONOCYTES RELATIVE PERCENT: 1.7 % (ref 0–4)
MONOCYTES RELATIVE PERCENT: 2 % (ref 0–4)
MONOCYTES RELATIVE PERCENT: 2.3 % (ref 0–4)
MONOCYTES RELATIVE PERCENT: 2.5 % (ref 0–4)
MONOCYTES RELATIVE PERCENT: 2.6 % (ref 0–4)
MONOCYTES RELATIVE PERCENT: 2.7 % (ref 0–4)
MONOCYTES RELATIVE PERCENT: 2.7 % (ref 0–4)
MONOCYTES RELATIVE PERCENT: 2.8 % (ref 0–4)
MONOCYTES RELATIVE PERCENT: 3 % (ref 0–4)
MONOCYTES RELATIVE PERCENT: 3 % (ref 0–4)
MONOCYTES RELATIVE PERCENT: 3.2 % (ref 0–4)
MONOCYTES RELATIVE PERCENT: 3.4 % (ref 0–4)
MONOCYTES RELATIVE PERCENT: 4 % (ref 0–4)
MONOCYTES RELATIVE PERCENT: 4.7 % (ref 0–4)
MONOCYTES RELATIVE PERCENT: 5 % (ref 0–4)
MONOCYTES RELATIVE PERCENT: 5.8 % (ref 0–4)
MONOCYTES RELATIVE PERCENT: 6 % (ref 0–4)
MONOCYTES RELATIVE PERCENT: 6 % (ref 0–4)
MONOCYTES RELATIVE PERCENT: 7 % (ref 0–4)
MONOCYTES RELATIVE PERCENT: 7.2 % (ref 0–4)
MONOCYTES RELATIVE PERCENT: 7.4 % (ref 0–4)
MONOCYTES RELATIVE PERCENT: 8.2 % (ref 0–4)
MONOCYTES RELATIVE PERCENT: 9.5 % (ref 0–4)
MONOCYTES RELATIVE PERCENT: 9.6 % (ref 0–4)
MUCUS: ABNORMAL HPF
MYELOCYTE PERCENT: 1 %
MYELOCYTE PERCENT: 1 %
MYELOCYTE PERCENT: 2 %
MYELOCYTES ABSOLUTE COUNT: 0.13 K/CU MM
MYELOCYTES ABSOLUTE COUNT: 0.17 K/CU MM
MYELOCYTES ABSOLUTE COUNT: 0.29 K/CU MM
NITRITE URINE, QUANTITATIVE: NEGATIVE
NUCLEATED RBC %: 0 %
NUCLEATED RBC %: 0.2 %
NUCLEATED RED BLOOD CELLS: 1
NUCLEATED RED BLOOD CELLS: 5
O2 SAT, VEN: 95.8 % (ref 50–70)
O2 SATURATION: 85 % (ref 96–97)
O2 SATURATION: 87.4 % (ref 96–97)
O2 SATURATION: 89.5 % (ref 96–97)
O2 SATURATION: 89.7 % (ref 96–97)
O2 SATURATION: 90.9 % (ref 96–97)
O2 SATURATION: 91.5 % (ref 96–97)
O2 SATURATION: 93.9 % (ref 96–97)
O2 SATURATION: 94.4 % (ref 96–97)
O2 SATURATION: 95 % (ref 96–97)
O2 SATURATION: 95.9 % (ref 96–97)
PCO2 ARTERIAL: 29 MMHG (ref 32–45)
PCO2 ARTERIAL: 34 MMHG (ref 32–45)
PCO2 ARTERIAL: 37 MMHG (ref 32–45)
PCO2 ARTERIAL: 39 MMHG (ref 32–45)
PCO2 ARTERIAL: 45 MMHG (ref 32–45)
PCO2 ARTERIAL: 50 MMHG (ref 32–45)
PCO2 ARTERIAL: 53 MMHG (ref 32–45)
PCO2 ARTERIAL: 53 MMHG (ref 32–45)
PCO2 ARTERIAL: 56 MMHG (ref 32–45)
PCO2 ARTERIAL: 69 MMHG (ref 32–45)
PCO2, VEN: 40 MMHG (ref 38–52)
PDW BLD-RTO: 14.6 % (ref 11.7–14.9)
PDW BLD-RTO: 14.6 % (ref 11.7–14.9)
PDW BLD-RTO: 14.7 % (ref 11.7–14.9)
PDW BLD-RTO: 14.7 % (ref 11.7–14.9)
PDW BLD-RTO: 14.8 % (ref 11.7–14.9)
PDW BLD-RTO: 14.9 % (ref 11.7–14.9)
PDW BLD-RTO: 14.9 % (ref 11.7–14.9)
PDW BLD-RTO: 15 % (ref 11.7–14.9)
PDW BLD-RTO: 15 % (ref 11.7–14.9)
PDW BLD-RTO: 15.1 % (ref 11.7–14.9)
PDW BLD-RTO: 15.2 % (ref 11.7–14.9)
PDW BLD-RTO: 15.2 % (ref 11.7–14.9)
PDW BLD-RTO: 15.3 % (ref 11.7–14.9)
PDW BLD-RTO: 15.3 % (ref 11.7–14.9)
PDW BLD-RTO: 15.4 % (ref 11.7–14.9)
PDW BLD-RTO: 15.4 % (ref 11.7–14.9)
PDW BLD-RTO: 15.8 % (ref 11.7–14.9)
PDW BLD-RTO: 15.9 % (ref 11.7–14.9)
PDW BLD-RTO: 15.9 % (ref 11.7–14.9)
PDW BLD-RTO: 16.5 % (ref 11.7–14.9)
PDW BLD-RTO: 17.5 % (ref 11.7–14.9)
PDW BLD-RTO: 17.6 % (ref 11.7–14.9)
PH BLOOD: 7.1 (ref 7.34–7.45)
PH BLOOD: 7.22 (ref 7.34–7.45)
PH BLOOD: 7.23 (ref 7.34–7.45)
PH BLOOD: 7.24 (ref 7.34–7.45)
PH BLOOD: 7.25 (ref 7.34–7.45)
PH BLOOD: 7.26 (ref 7.34–7.45)
PH BLOOD: 7.29 (ref 7.34–7.45)
PH BLOOD: 7.3 (ref 7.34–7.45)
PH BLOOD: 7.42 (ref 7.34–7.45)
PH BLOOD: 7.48 (ref 7.34–7.45)
PH VENOUS: 7.4 (ref 7.32–7.42)
PH, URINE: 5 (ref 5–8)
PHOSPHORUS: 3.4 MG/DL (ref 2.5–4.9)
PHOSPHORUS: 3.5 MG/DL (ref 2.5–4.9)
PHOSPHORUS: 4.3 MG/DL (ref 2.5–4.9)
PHOSPHORUS: 7.6 MG/DL (ref 2.5–4.9)
PLATELET # BLD: 141 K/CU MM (ref 140–440)
PLATELET # BLD: 154 K/CU MM (ref 140–440)
PLATELET # BLD: 160 K/CU MM (ref 140–440)
PLATELET # BLD: 165 K/CU MM (ref 140–440)
PLATELET # BLD: 172 K/CU MM (ref 140–440)
PLATELET # BLD: 174 K/CU MM (ref 140–440)
PLATELET # BLD: 175 K/CU MM (ref 140–440)
PLATELET # BLD: 176 K/CU MM (ref 140–440)
PLATELET # BLD: 185 K/CU MM (ref 140–440)
PLATELET # BLD: 185 K/CU MM (ref 140–440)
PLATELET # BLD: 197 K/CU MM (ref 140–440)
PLATELET # BLD: 200 K/CU MM (ref 140–440)
PLATELET # BLD: 200 K/CU MM (ref 140–440)
PLATELET # BLD: 206 K/CU MM (ref 140–440)
PLATELET # BLD: 215 K/CU MM (ref 140–440)
PLATELET # BLD: 223 K/CU MM (ref 140–440)
PLATELET # BLD: 259 K/CU MM (ref 140–440)
PLATELET # BLD: 270 K/CU MM (ref 140–440)
PLATELET # BLD: 279 K/CU MM (ref 140–440)
PLATELET # BLD: 289 K/CU MM (ref 140–440)
PLATELET # BLD: 302 K/CU MM (ref 140–440)
PLATELET # BLD: 304 K/CU MM (ref 140–440)
PLATELET # BLD: 311 K/CU MM (ref 140–440)
PLATELET # BLD: 311 K/CU MM (ref 140–440)
PLATELET # BLD: 337 K/CU MM (ref 140–440)
PLATELET # BLD: 345 K/CU MM (ref 140–440)
PLATELET # BLD: 346 K/CU MM (ref 140–440)
PLATELET # BLD: 367 K/CU MM (ref 140–440)
PLATELET # BLD: 381 K/CU MM (ref 140–440)
PLATELET # BLD: 386 K/CU MM (ref 140–440)
PLT MORPHOLOGY: ABNORMAL
PMV BLD AUTO: 11.3 FL (ref 7.5–11.1)
PMV BLD AUTO: 11.6 FL (ref 7.5–11.1)
PMV BLD AUTO: 11.8 FL (ref 7.5–11.1)
PMV BLD AUTO: 11.8 FL (ref 7.5–11.1)
PMV BLD AUTO: 11.9 FL (ref 7.5–11.1)
PMV BLD AUTO: 11.9 FL (ref 7.5–11.1)
PMV BLD AUTO: 12.3 FL (ref 7.5–11.1)
PMV BLD AUTO: 12.5 FL (ref 7.5–11.1)
PMV BLD AUTO: 12.7 FL (ref 7.5–11.1)
PMV BLD AUTO: 12.8 FL (ref 7.5–11.1)
PMV BLD AUTO: 12.9 FL (ref 7.5–11.1)
PMV BLD AUTO: 13 FL (ref 7.5–11.1)
PMV BLD AUTO: 13.2 FL (ref 7.5–11.1)
PMV BLD AUTO: 13.2 FL (ref 7.5–11.1)
PMV BLD AUTO: 13.4 FL (ref 7.5–11.1)
PMV BLD AUTO: 13.6 FL (ref 7.5–11.1)
PMV BLD AUTO: 13.7 FL (ref 7.5–11.1)
PMV BLD AUTO: 13.7 FL (ref 7.5–11.1)
PMV BLD AUTO: 13.9 FL (ref 7.5–11.1)
PMV BLD AUTO: 13.9 FL (ref 7.5–11.1)
PMV BLD AUTO: 14 FL (ref 7.5–11.1)
PMV BLD AUTO: 14 FL (ref 7.5–11.1)
PMV BLD AUTO: 14.3 FL (ref 7.5–11.1)
PMV BLD AUTO: 14.4 FL (ref 7.5–11.1)
PMV BLD AUTO: 14.8 FL (ref 7.5–11.1)
PMV BLD AUTO: 15.4 FL (ref 7.5–11.1)
PO2 ARTERIAL: 189 MMHG (ref 75–100)
PO2 ARTERIAL: 46 MMHG (ref 75–100)
PO2 ARTERIAL: 50 MMHG (ref 75–100)
PO2 ARTERIAL: 57 MMHG (ref 75–100)
PO2 ARTERIAL: 60 MMHG (ref 75–100)
PO2 ARTERIAL: 62 MMHG (ref 75–100)
PO2 ARTERIAL: 63 MMHG (ref 75–100)
PO2 ARTERIAL: 76 MMHG (ref 75–100)
PO2 ARTERIAL: 79 MMHG (ref 75–100)
PO2 ARTERIAL: 90 MMHG (ref 75–100)
PO2, VEN: 165 MMHG (ref 28–48)
POLYCHROMASIA: ABNORMAL
POLYCHROMASIA: ABNORMAL
POTASSIUM SERPL-SCNC: 3.7 MMOL/L (ref 3.5–5.1)
POTASSIUM SERPL-SCNC: 4 MMOL/L (ref 3.5–5.1)
POTASSIUM SERPL-SCNC: 4.3 MMOL/L (ref 3.5–5.1)
POTASSIUM SERPL-SCNC: 4.4 MMOL/L (ref 3.5–5.1)
POTASSIUM SERPL-SCNC: 4.4 MMOL/L (ref 3.5–5.1)
POTASSIUM SERPL-SCNC: 4.5 MMOL/L (ref 3.5–5.1)
POTASSIUM SERPL-SCNC: 4.7 MMOL/L (ref 3.5–5.1)
POTASSIUM SERPL-SCNC: 4.8 MMOL/L (ref 3.5–5.1)
POTASSIUM SERPL-SCNC: 4.9 MMOL/L (ref 3.5–5.1)
POTASSIUM SERPL-SCNC: 5 MMOL/L (ref 3.5–5.1)
POTASSIUM SERPL-SCNC: 5 MMOL/L (ref 3.5–5.1)
POTASSIUM SERPL-SCNC: 5.1 MMOL/L (ref 3.5–5.1)
POTASSIUM SERPL-SCNC: 5.2 MMOL/L (ref 3.5–5.1)
POTASSIUM SERPL-SCNC: 5.3 MMOL/L (ref 3.5–5.1)
POTASSIUM SERPL-SCNC: 5.3 MMOL/L (ref 3.5–5.1)
POTASSIUM SERPL-SCNC: 5.5 MMOL/L (ref 3.5–5.1)
POTASSIUM, UR: 38.3 MMOL/L (ref 22–119)
PRO-BNP: 356.9 PG/ML
PRO-BNP: 377.7 PG/ML
PRO-BNP: 401.9 PG/ML
PRO-BNP: 459.5 PG/ML
PRO-BNP: 657.1 PG/ML
PRO-BNP: 752.8 PG/ML
PROCALCITONIN: 0.19
PROCALCITONIN: 0.2
PROCALCITONIN: 0.24
PROCALCITONIN: 0.25
PROCALCITONIN: 0.38
PROCALCITONIN: 0.84
PROT/CREAT RATIO, UR: 0.9
PROTEIN UA: 30 MG/DL
PROTHROMBIN TIME: 12 SECONDS (ref 11.7–14.5)
PROTHROMBIN TIME: 12.2 SECONDS (ref 11.7–14.5)
PROTHROMBIN TIME: 12.5 SECONDS (ref 11.7–14.5)
PROTHROMBIN TIME: 12.5 SECONDS (ref 11.7–14.5)
QUANTI TB1 MINUS NIL: 0 IU/ML (ref 0–0.34)
QUANTI TB2 MINUS NIL: 0 IU/ML (ref 0–0.34)
QUANTIFERON (R) TB GOLD (INCUBATED): NEGATIVE IU/ML
QUANTIFERON MITOGEN MINUS NIL: 2.16 IU/ML
QUANTIFERON NIL: 0.03 IU/ML
RBC # BLD: 4.75 M/CU MM (ref 4.6–6.2)
RBC # BLD: 5.06 M/CU MM (ref 4.6–6.2)
RBC # BLD: 5.14 M/CU MM (ref 4.6–6.2)
RBC # BLD: 5.16 M/CU MM (ref 4.6–6.2)
RBC # BLD: 5.17 M/CU MM (ref 4.6–6.2)
RBC # BLD: 5.18 M/CU MM (ref 4.6–6.2)
RBC # BLD: 5.2 M/CU MM (ref 4.6–6.2)
RBC # BLD: 5.22 M/CU MM (ref 4.6–6.2)
RBC # BLD: 5.3 M/CU MM (ref 4.6–6.2)
RBC # BLD: 5.34 M/CU MM (ref 4.6–6.2)
RBC # BLD: 5.35 M/CU MM (ref 4.6–6.2)
RBC # BLD: 5.37 M/CU MM (ref 4.6–6.2)
RBC # BLD: 5.39 M/CU MM (ref 4.6–6.2)
RBC # BLD: 5.39 M/CU MM (ref 4.6–6.2)
RBC # BLD: 5.42 M/CU MM (ref 4.6–6.2)
RBC # BLD: 5.43 M/CU MM (ref 4.6–6.2)
RBC # BLD: 5.43 M/CU MM (ref 4.6–6.2)
RBC # BLD: 5.54 M/CU MM (ref 4.6–6.2)
RBC # BLD: 5.61 M/CU MM (ref 4.6–6.2)
RBC # BLD: 5.69 M/CU MM (ref 4.6–6.2)
RBC # BLD: 5.7 M/CU MM (ref 4.6–6.2)
RBC # BLD: 5.73 M/CU MM (ref 4.6–6.2)
RBC # BLD: 5.76 M/CU MM (ref 4.6–6.2)
RBC # BLD: 5.77 M/CU MM (ref 4.6–6.2)
RBC # BLD: 5.82 M/CU MM (ref 4.6–6.2)
RBC # BLD: 5.93 M/CU MM (ref 4.6–6.2)
RBC # BLD: 6.23 M/CU MM (ref 4.6–6.2)
RBC # BLD: 6.36 M/CU MM (ref 4.6–6.2)
RBC # BLD: ABNORMAL 10*6/UL
RBC URINE: 4 /HPF (ref 0–3)
SARS-COV-2, NAAT: DETECTED
SEGMENTED NEUTROPHILS ABSOLUTE COUNT: 11.1 K/CU MM
SEGMENTED NEUTROPHILS ABSOLUTE COUNT: 11.5 K/CU MM
SEGMENTED NEUTROPHILS ABSOLUTE COUNT: 11.7 K/CU MM
SEGMENTED NEUTROPHILS ABSOLUTE COUNT: 11.8 K/CU MM
SEGMENTED NEUTROPHILS ABSOLUTE COUNT: 12.2 K/CU MM
SEGMENTED NEUTROPHILS ABSOLUTE COUNT: 12.3 K/CU MM
SEGMENTED NEUTROPHILS ABSOLUTE COUNT: 12.5 K/CU MM
SEGMENTED NEUTROPHILS ABSOLUTE COUNT: 12.5 K/CU MM
SEGMENTED NEUTROPHILS ABSOLUTE COUNT: 12.6 K/CU MM
SEGMENTED NEUTROPHILS ABSOLUTE COUNT: 13 K/CU MM
SEGMENTED NEUTROPHILS ABSOLUTE COUNT: 13.1 K/CU MM
SEGMENTED NEUTROPHILS ABSOLUTE COUNT: 13.7 K/CU MM
SEGMENTED NEUTROPHILS ABSOLUTE COUNT: 14 K/CU MM
SEGMENTED NEUTROPHILS ABSOLUTE COUNT: 14 K/CU MM
SEGMENTED NEUTROPHILS ABSOLUTE COUNT: 14.2 K/CU MM
SEGMENTED NEUTROPHILS ABSOLUTE COUNT: 14.4 K/CU MM
SEGMENTED NEUTROPHILS ABSOLUTE COUNT: 14.5 K/CU MM
SEGMENTED NEUTROPHILS ABSOLUTE COUNT: 14.8 K/CU MM
SEGMENTED NEUTROPHILS ABSOLUTE COUNT: 15.4 K/CU MM
SEGMENTED NEUTROPHILS ABSOLUTE COUNT: 16 K/CU MM
SEGMENTED NEUTROPHILS ABSOLUTE COUNT: 16.6 K/CU MM
SEGMENTED NEUTROPHILS ABSOLUTE COUNT: 17.2 K/CU MM
SEGMENTED NEUTROPHILS ABSOLUTE COUNT: 2.6 K/CU MM
SEGMENTED NEUTROPHILS ABSOLUTE COUNT: 3.2 K/CU MM
SEGMENTED NEUTROPHILS ABSOLUTE COUNT: 4.6 K/CU MM
SEGMENTED NEUTROPHILS ABSOLUTE COUNT: 5.8 K/CU MM
SEGMENTED NEUTROPHILS ABSOLUTE COUNT: 8 K/CU MM
SEGMENTED NEUTROPHILS ABSOLUTE COUNT: 8.2 K/CU MM
SEGMENTED NEUTROPHILS ABSOLUTE COUNT: 8.8 K/CU MM
SEGMENTED NEUTROPHILS ABSOLUTE COUNT: 9.7 K/CU MM
SEGMENTED NEUTROPHILS RELATIVE PERCENT: 72.6 % (ref 36–66)
SEGMENTED NEUTROPHILS RELATIVE PERCENT: 73 % (ref 36–66)
SEGMENTED NEUTROPHILS RELATIVE PERCENT: 73 % (ref 36–66)
SEGMENTED NEUTROPHILS RELATIVE PERCENT: 74 % (ref 36–66)
SEGMENTED NEUTROPHILS RELATIVE PERCENT: 75.2 % (ref 36–66)
SEGMENTED NEUTROPHILS RELATIVE PERCENT: 76 % (ref 36–66)
SEGMENTED NEUTROPHILS RELATIVE PERCENT: 76 % (ref 36–66)
SEGMENTED NEUTROPHILS RELATIVE PERCENT: 76.3 % (ref 36–66)
SEGMENTED NEUTROPHILS RELATIVE PERCENT: 80 % (ref 36–66)
SEGMENTED NEUTROPHILS RELATIVE PERCENT: 80 % (ref 36–66)
SEGMENTED NEUTROPHILS RELATIVE PERCENT: 84 % (ref 36–66)
SEGMENTED NEUTROPHILS RELATIVE PERCENT: 84.6 % (ref 36–66)
SEGMENTED NEUTROPHILS RELATIVE PERCENT: 84.9 % (ref 36–66)
SEGMENTED NEUTROPHILS RELATIVE PERCENT: 86 % (ref 36–66)
SEGMENTED NEUTROPHILS RELATIVE PERCENT: 87 % (ref 36–66)
SEGMENTED NEUTROPHILS RELATIVE PERCENT: 87 % (ref 36–66)
SEGMENTED NEUTROPHILS RELATIVE PERCENT: 87.1 % (ref 36–66)
SEGMENTED NEUTROPHILS RELATIVE PERCENT: 87.6 % (ref 36–66)
SEGMENTED NEUTROPHILS RELATIVE PERCENT: 89.2 % (ref 36–66)
SEGMENTED NEUTROPHILS RELATIVE PERCENT: 89.3 % (ref 36–66)
SEGMENTED NEUTROPHILS RELATIVE PERCENT: 89.3 % (ref 36–66)
SEGMENTED NEUTROPHILS RELATIVE PERCENT: 90 % (ref 36–66)
SEGMENTED NEUTROPHILS RELATIVE PERCENT: 91.3 % (ref 36–66)
SEGMENTED NEUTROPHILS RELATIVE PERCENT: 91.4 % (ref 36–66)
SEGMENTED NEUTROPHILS RELATIVE PERCENT: 91.5 % (ref 36–66)
SEGMENTED NEUTROPHILS RELATIVE PERCENT: 91.8 % (ref 36–66)
SEGMENTED NEUTROPHILS RELATIVE PERCENT: 92.1 % (ref 36–66)
SEGMENTED NEUTROPHILS RELATIVE PERCENT: 92.9 % (ref 36–66)
SEGMENTED NEUTROPHILS RELATIVE PERCENT: 93 % (ref 36–66)
SEGMENTED NEUTROPHILS RELATIVE PERCENT: 94 % (ref 36–66)
SODIUM BLD-SCNC: 129 MMOL/L (ref 135–145)
SODIUM BLD-SCNC: 130 MMOL/L (ref 135–145)
SODIUM BLD-SCNC: 132 MMOL/L (ref 135–145)
SODIUM BLD-SCNC: 133 MMOL/L (ref 135–145)
SODIUM BLD-SCNC: 134 MMOL/L (ref 135–145)
SODIUM BLD-SCNC: 135 MMOL/L (ref 135–145)
SODIUM BLD-SCNC: 135 MMOL/L (ref 135–145)
SODIUM BLD-SCNC: 136 MMOL/L (ref 135–145)
SODIUM BLD-SCNC: 136 MMOL/L (ref 135–145)
SODIUM BLD-SCNC: 137 MMOL/L (ref 135–145)
SODIUM BLD-SCNC: 138 MMOL/L (ref 135–145)
SODIUM BLD-SCNC: 139 MMOL/L (ref 135–145)
SODIUM BLD-SCNC: 140 MMOL/L (ref 135–145)
SODIUM BLD-SCNC: 140 MMOL/L (ref 135–145)
SODIUM BLD-SCNC: 141 MMOL/L (ref 135–145)
SODIUM BLD-SCNC: 142 MMOL/L (ref 135–145)
SODIUM BLD-SCNC: 145 MMOL/L (ref 135–145)
SODIUM URINE: 18 MMOL/L (ref 35–167)
SOURCE: ABNORMAL
SPECIFIC GRAVITY UA: 1.02 (ref 1–1.03)
SPECIMEN: NORMAL
SPECIMEN: NORMAL
SQUAMOUS EPITHELIAL: <1 /HPF
STREP PNEUMONIAE ANTIGEN: NORMAL
TOTAL IMMATURE NEUTOROPHIL: 0.02 K/CU MM
TOTAL IMMATURE NEUTOROPHIL: 0.02 K/CU MM
TOTAL IMMATURE NEUTOROPHIL: 0.04 K/CU MM
TOTAL IMMATURE NEUTOROPHIL: 0.05 K/CU MM
TOTAL IMMATURE NEUTOROPHIL: 0.11 K/CU MM
TOTAL IMMATURE NEUTOROPHIL: 0.13 K/CU MM
TOTAL IMMATURE NEUTOROPHIL: 0.25 K/CU MM
TOTAL IMMATURE NEUTOROPHIL: 0.26 K/CU MM
TOTAL IMMATURE NEUTOROPHIL: 0.27 K/CU MM
TOTAL IMMATURE NEUTOROPHIL: 0.28 K/CU MM
TOTAL IMMATURE NEUTOROPHIL: 0.3 K/CU MM
TOTAL IMMATURE NEUTOROPHIL: 0.31 K/CU MM
TOTAL IMMATURE NEUTOROPHIL: 0.44 K/CU MM
TOTAL IMMATURE NEUTOROPHIL: 0.44 K/CU MM
TOTAL IMMATURE NEUTOROPHIL: 0.47 K/CU MM
TOTAL IMMATURE NEUTOROPHIL: 0.49 K/CU MM
TOTAL NUCLEATED RBC: 0 K/CU MM
TOTAL PROTEIN: 5.6 GM/DL (ref 6.4–8.2)
TOTAL PROTEIN: 6.2 GM/DL (ref 6.4–8.2)
TOTAL PROTEIN: 6.2 GM/DL (ref 6.4–8.2)
TOTAL PROTEIN: 6.3 GM/DL (ref 6.4–8.2)
TOTAL PROTEIN: 6.3 GM/DL (ref 6.4–8.2)
TOTAL PROTEIN: 6.4 GM/DL (ref 6.4–8.2)
TOTAL PROTEIN: 6.5 GM/DL (ref 6.4–8.2)
TOTAL PROTEIN: 6.6 GM/DL (ref 6.4–8.2)
TOTAL PROTEIN: 6.7 GM/DL (ref 6.4–8.2)
TOTAL PROTEIN: 6.8 GM/DL (ref 6.4–8.2)
TOTAL PROTEIN: 6.9 GM/DL (ref 6.4–8.2)
TOTAL PROTEIN: 7 GM/DL (ref 6.4–8.2)
TOTAL PROTEIN: 7 GM/DL (ref 6.4–8.2)
TOTAL PROTEIN: 7.1 GM/DL (ref 6.4–8.2)
TOTAL PROTEIN: 7.1 GM/DL (ref 6.4–8.2)
TOTAL PROTEIN: 7.2 GM/DL (ref 6.4–8.2)
TOTAL PROTEIN: 7.3 GM/DL (ref 6.4–8.2)
TOTAL PROTEIN: 7.3 GM/DL (ref 6.4–8.2)
TOTAL PROTEIN: 7.5 GM/DL (ref 6.4–8.2)
TOXIC GRANULATION: PRESENT
TRICHOMONAS: ABNORMAL /HPF
TROPONIN T: <0.01 NG/ML
URINE TOTAL PROTEIN: 64.1 MG/DL
UROBILINOGEN, URINE: NEGATIVE MG/DL (ref 0.2–1)
VANCOMYCIN RANDOM: 15.8 UG/ML
VANCOMYCIN RANDOM: 15.9 UG/ML
VANCOMYCIN RANDOM: 39.9 UG/ML
VANCOMYCIN RANDOM: 48.3 UG/ML
VANCOMYCIN RANDOM: 6.5 UG/ML
VANCOMYCIN TROUGH: 13.6 UG/ML (ref 10–20)
VITAMIN D 25-HYDROXY: 14.03 NG/ML
WBC # BLD: 10.3 K/CU MM (ref 4–10.5)
WBC # BLD: 13.3 K/CU MM (ref 4–10.5)
WBC # BLD: 13.3 K/CU MM (ref 4–10.5)
WBC # BLD: 13.4 K/CU MM (ref 4–10.5)
WBC # BLD: 13.6 K/CU MM (ref 4–10.5)
WBC # BLD: 13.9 K/CU MM (ref 4–10.5)
WBC # BLD: 14.3 K/CU MM (ref 4–10.5)
WBC # BLD: 14.7 K/CU MM (ref 4–10.5)
WBC # BLD: 14.9 K/CU MM (ref 4–10.5)
WBC # BLD: 15.2 K/CU MM (ref 4–10.5)
WBC # BLD: 15.3 K/CU MM (ref 4–10.5)
WBC # BLD: 15.6 K/CU MM (ref 4–10.5)
WBC # BLD: 15.8 K/CU MM (ref 4–10.5)
WBC # BLD: 15.9 K/CU MM (ref 4–10.5)
WBC # BLD: 15.9 K/CU MM (ref 4–10.5)
WBC # BLD: 16.2 K/CU MM (ref 4–10.5)
WBC # BLD: 16.2 K/CU MM (ref 4–10.5)
WBC # BLD: 16.4 K/CU MM (ref 4–10.5)
WBC # BLD: 16.5 K/CU MM (ref 4–10.5)
WBC # BLD: 17.2 K/CU MM (ref 4–10.5)
WBC # BLD: 17.5 K/CU MM (ref 4–10.5)
WBC # BLD: 18 K/CU MM (ref 4–10.5)
WBC # BLD: 18.2 K/CU MM (ref 4–10.5)
WBC # BLD: 18.3 K/CU MM (ref 4–10.5)
WBC # BLD: 3.6 K/CU MM (ref 4–10.5)
WBC # BLD: 4.3 K/CU MM (ref 4–10.5)
WBC # BLD: 6 K/CU MM (ref 4–10.5)
WBC # BLD: 6.9 K/CU MM (ref 4–10.5)
WBC # BLD: 9.1 K/CU MM (ref 4–10.5)
WBC # BLD: 9.8 K/CU MM (ref 4–10.5)
WBC # BLD: ABNORMAL 10*3/UL
WBC UA: 5 /HPF (ref 0–2)

## 2021-01-01 PROCEDURE — 85384 FIBRINOGEN ACTIVITY: CPT

## 2021-01-01 PROCEDURE — 99255 IP/OBS CONSLTJ NEW/EST HI 80: CPT | Performed by: INTERNAL MEDICINE

## 2021-01-01 PROCEDURE — 6360000002 HC RX W HCPCS: Performed by: INTERNAL MEDICINE

## 2021-01-01 PROCEDURE — 2500000003 HC RX 250 WO HCPCS: Performed by: INTERNAL MEDICINE

## 2021-01-01 PROCEDURE — 94640 AIRWAY INHALATION TREATMENT: CPT

## 2021-01-01 PROCEDURE — 6370000000 HC RX 637 (ALT 250 FOR IP): Performed by: INTERNAL MEDICINE

## 2021-01-01 PROCEDURE — 99233 SBSQ HOSP IP/OBS HIGH 50: CPT | Performed by: INTERNAL MEDICINE

## 2021-01-01 PROCEDURE — 85025 COMPLETE CBC W/AUTO DIFF WBC: CPT

## 2021-01-01 PROCEDURE — 71045 X-RAY EXAM CHEST 1 VIEW: CPT

## 2021-01-01 PROCEDURE — 83735 ASSAY OF MAGNESIUM: CPT

## 2021-01-01 PROCEDURE — 94660 CPAP INITIATION&MGMT: CPT

## 2021-01-01 PROCEDURE — 84145 PROCALCITONIN (PCT): CPT

## 2021-01-01 PROCEDURE — 6370000000 HC RX 637 (ALT 250 FOR IP): Performed by: NURSE PRACTITIONER

## 2021-01-01 PROCEDURE — 2580000003 HC RX 258: Performed by: NURSE PRACTITIONER

## 2021-01-01 PROCEDURE — 85007 BL SMEAR W/DIFF WBC COUNT: CPT

## 2021-01-01 PROCEDURE — 6360000002 HC RX W HCPCS: Performed by: OBSTETRICS & GYNECOLOGY

## 2021-01-01 PROCEDURE — 36415 COLL VENOUS BLD VENIPUNCTURE: CPT

## 2021-01-01 PROCEDURE — 2700000000 HC OXYGEN THERAPY PER DAY

## 2021-01-01 PROCEDURE — 85379 FIBRIN DEGRADATION QUANT: CPT

## 2021-01-01 PROCEDURE — 6360000002 HC RX W HCPCS: Performed by: NURSE PRACTITIONER

## 2021-01-01 PROCEDURE — 84133 ASSAY OF URINE POTASSIUM: CPT

## 2021-01-01 PROCEDURE — 93005 ELECTROCARDIOGRAM TRACING: CPT | Performed by: STUDENT IN AN ORGANIZED HEALTH CARE EDUCATION/TRAINING PROGRAM

## 2021-01-01 PROCEDURE — 76937 US GUIDE VASCULAR ACCESS: CPT

## 2021-01-01 PROCEDURE — 2000000000 HC ICU R&B

## 2021-01-01 PROCEDURE — 2580000003 HC RX 258: Performed by: INTERNAL MEDICINE

## 2021-01-01 PROCEDURE — 99231 SBSQ HOSP IP/OBS SF/LOW 25: CPT | Performed by: SPECIALIST

## 2021-01-01 PROCEDURE — 94761 N-INVAS EAR/PLS OXIMETRY MLT: CPT

## 2021-01-01 PROCEDURE — 83880 ASSAY OF NATRIURETIC PEPTIDE: CPT

## 2021-01-01 PROCEDURE — 76705 ECHO EXAM OF ABDOMEN: CPT

## 2021-01-01 PROCEDURE — 82803 BLOOD GASES ANY COMBINATION: CPT

## 2021-01-01 PROCEDURE — 89220 SPUTUM SPECIMEN COLLECTION: CPT

## 2021-01-01 PROCEDURE — 82306 VITAMIN D 25 HYDROXY: CPT

## 2021-01-01 PROCEDURE — 84100 ASSAY OF PHOSPHORUS: CPT

## 2021-01-01 PROCEDURE — 6370000000 HC RX 637 (ALT 250 FOR IP): Performed by: PHYSICIAN ASSISTANT

## 2021-01-01 PROCEDURE — 80053 COMPREHEN METABOLIC PANEL: CPT

## 2021-01-01 PROCEDURE — 6360000002 HC RX W HCPCS: Performed by: SPECIALIST

## 2021-01-01 PROCEDURE — 2500000003 HC RX 250 WO HCPCS: Performed by: NURSE PRACTITIONER

## 2021-01-01 PROCEDURE — 2500000003 HC RX 250 WO HCPCS: Performed by: STUDENT IN AN ORGANIZED HEALTH CARE EDUCATION/TRAINING PROGRAM

## 2021-01-01 PROCEDURE — 84300 ASSAY OF URINE SODIUM: CPT

## 2021-01-01 PROCEDURE — 36556 INSERT NON-TUNNEL CV CATH: CPT

## 2021-01-01 PROCEDURE — 96375 TX/PRO/DX INJ NEW DRUG ADDON: CPT

## 2021-01-01 PROCEDURE — 93005 ELECTROCARDIOGRAM TRACING: CPT | Performed by: NURSE PRACTITIONER

## 2021-01-01 PROCEDURE — 80069 RENAL FUNCTION PANEL: CPT

## 2021-01-01 PROCEDURE — 85027 COMPLETE CBC AUTOMATED: CPT

## 2021-01-01 PROCEDURE — 05HB33Z INSERTION OF INFUSION DEVICE INTO RIGHT BASILIC VEIN, PERCUTANEOUS APPROACH: ICD-10-PCS | Performed by: INTERNAL MEDICINE

## 2021-01-01 PROCEDURE — 36600 WITHDRAWAL OF ARTERIAL BLOOD: CPT

## 2021-01-01 PROCEDURE — 82330 ASSAY OF CALCIUM: CPT

## 2021-01-01 PROCEDURE — 87449 NOS EACH ORGANISM AG IA: CPT

## 2021-01-01 PROCEDURE — 82784 ASSAY IGA/IGD/IGG/IGM EACH: CPT

## 2021-01-01 PROCEDURE — 84484 ASSAY OF TROPONIN QUANT: CPT

## 2021-01-01 PROCEDURE — 83690 ASSAY OF LIPASE: CPT

## 2021-01-01 PROCEDURE — 86141 C-REACTIVE PROTEIN HS: CPT

## 2021-01-01 PROCEDURE — 37799 UNLISTED PX VASCULAR SURGERY: CPT

## 2021-01-01 PROCEDURE — 86480 TB TEST CELL IMMUN MEASURE: CPT

## 2021-01-01 PROCEDURE — 2060000000 HC ICU INTERMEDIATE R&B

## 2021-01-01 PROCEDURE — 99254 IP/OBS CNSLTJ NEW/EST MOD 60: CPT | Performed by: INTERNAL MEDICINE

## 2021-01-01 PROCEDURE — 2580000003 HC RX 258

## 2021-01-01 PROCEDURE — 85014 HEMATOCRIT: CPT

## 2021-01-01 PROCEDURE — 94003 VENT MGMT INPAT SUBQ DAY: CPT

## 2021-01-01 PROCEDURE — 6360000002 HC RX W HCPCS: Performed by: STUDENT IN AN ORGANIZED HEALTH CARE EDUCATION/TRAINING PROGRAM

## 2021-01-01 PROCEDURE — 2580000003 HC RX 258: Performed by: STUDENT IN AN ORGANIZED HEALTH CARE EDUCATION/TRAINING PROGRAM

## 2021-01-01 PROCEDURE — 83605 ASSAY OF LACTIC ACID: CPT

## 2021-01-01 PROCEDURE — 81001 URINALYSIS AUTO W/SCOPE: CPT

## 2021-01-01 PROCEDURE — 82570 ASSAY OF URINE CREATININE: CPT

## 2021-01-01 PROCEDURE — 94664 DEMO&/EVAL PT USE INHALER: CPT

## 2021-01-01 PROCEDURE — 85018 HEMOGLOBIN: CPT

## 2021-01-01 PROCEDURE — 86140 C-REACTIVE PROTEIN: CPT

## 2021-01-01 PROCEDURE — 99222 1ST HOSP IP/OBS MODERATE 55: CPT | Performed by: INTERNAL MEDICINE

## 2021-01-01 PROCEDURE — 36620 INSERTION CATHETER ARTERY: CPT

## 2021-01-01 PROCEDURE — 80202 ASSAY OF VANCOMYCIN: CPT

## 2021-01-01 PROCEDURE — 36620 INSERTION CATHETER ARTERY: CPT | Performed by: ANESTHESIOLOGY

## 2021-01-01 PROCEDURE — 70490 CT SOFT TISSUE NECK W/O DYE: CPT

## 2021-01-01 PROCEDURE — 93005 ELECTROCARDIOGRAM TRACING: CPT | Performed by: PHYSICIAN ASSISTANT

## 2021-01-01 PROCEDURE — C1894 INTRO/SHEATH, NON-LASER: HCPCS

## 2021-01-01 PROCEDURE — 6360000002 HC RX W HCPCS

## 2021-01-01 PROCEDURE — 99232 SBSQ HOSP IP/OBS MODERATE 35: CPT | Performed by: INTERNAL MEDICINE

## 2021-01-01 PROCEDURE — 85610 PROTHROMBIN TIME: CPT

## 2021-01-01 PROCEDURE — 86694 HERPES SIMPLEX NES ANTBDY: CPT

## 2021-01-01 PROCEDURE — 93010 ELECTROCARDIOGRAM REPORT: CPT | Performed by: INTERNAL MEDICINE

## 2021-01-01 PROCEDURE — C1752 CATH,HEMODIALYSIS,SHORT-TERM: HCPCS

## 2021-01-01 PROCEDURE — C1751 CATH, INF, PER/CENT/MIDLINE: HCPCS

## 2021-01-01 PROCEDURE — 71275 CT ANGIOGRAPHY CHEST: CPT

## 2021-01-01 PROCEDURE — 93308 TTE F-UP OR LMTD: CPT

## 2021-01-01 PROCEDURE — 6370000000 HC RX 637 (ALT 250 FOR IP): Performed by: HOSPITALIST

## 2021-01-01 PROCEDURE — 86696 HERPES SIMPLEX TYPE 2 TEST: CPT

## 2021-01-01 PROCEDURE — 2100000000 HC CCU R&B

## 2021-01-01 PROCEDURE — 74018 RADEX ABDOMEN 1 VIEW: CPT

## 2021-01-01 PROCEDURE — 87305 ASPERGILLUS AG IA: CPT

## 2021-01-01 PROCEDURE — 82962 GLUCOSE BLOOD TEST: CPT

## 2021-01-01 PROCEDURE — 31500 INSERT EMERGENCY AIRWAY: CPT | Performed by: ANESTHESIOLOGY

## 2021-01-01 PROCEDURE — 36410 VNPNXR 3YR/> PHY/QHP DX/THER: CPT

## 2021-01-01 PROCEDURE — APPSS60 APP SPLIT SHARED TIME 46-60 MINUTES: Performed by: NURSE PRACTITIONER

## 2021-01-01 PROCEDURE — 0BH17EZ INSERTION OF ENDOTRACHEAL AIRWAY INTO TRACHEA, VIA NATURAL OR ARTIFICIAL OPENING: ICD-10-PCS | Performed by: INTERNAL MEDICINE

## 2021-01-01 PROCEDURE — 87040 BLOOD CULTURE FOR BACTERIA: CPT

## 2021-01-01 PROCEDURE — C1769 GUIDE WIRE: HCPCS

## 2021-01-01 PROCEDURE — 2709999900 HC NON-CHARGEABLE SUPPLY

## 2021-01-01 PROCEDURE — 36569 INSJ PICC 5 YR+ W/O IMAGING: CPT

## 2021-01-01 PROCEDURE — C9113 INJ PANTOPRAZOLE SODIUM, VIA: HCPCS | Performed by: SPECIALIST

## 2021-01-01 PROCEDURE — 99285 EMERGENCY DEPT VISIT HI MDM: CPT

## 2021-01-01 PROCEDURE — 87899 AGENT NOS ASSAY W/OPTIC: CPT

## 2021-01-01 PROCEDURE — 99222 1ST HOSP IP/OBS MODERATE 55: CPT | Performed by: OBSTETRICS & GYNECOLOGY

## 2021-01-01 PROCEDURE — 3E1M39Z IRRIGATION OF PERITONEAL CAVITY USING DIALYSATE, PERCUTANEOUS APPROACH: ICD-10-PCS | Performed by: INTERNAL MEDICINE

## 2021-01-01 PROCEDURE — 2580000003 HC RX 258: Performed by: HOSPITALIST

## 2021-01-01 PROCEDURE — 2500000003 HC RX 250 WO HCPCS

## 2021-01-01 PROCEDURE — 6360000004 HC RX CONTRAST MEDICATION: Performed by: PHYSICIAN ASSISTANT

## 2021-01-01 PROCEDURE — P9045 ALBUMIN (HUMAN), 5%, 250 ML: HCPCS | Performed by: INTERNAL MEDICINE

## 2021-01-01 PROCEDURE — 80074 ACUTE HEPATITIS PANEL: CPT

## 2021-01-01 PROCEDURE — 96374 THER/PROPH/DIAG INJ IV PUSH: CPT

## 2021-01-01 PROCEDURE — 84156 ASSAY OF PROTEIN URINE: CPT

## 2021-01-01 PROCEDURE — 93005 ELECTROCARDIOGRAM TRACING: CPT | Performed by: INTERNAL MEDICINE

## 2021-01-01 PROCEDURE — 90945 DIALYSIS ONE EVALUATION: CPT

## 2021-01-01 PROCEDURE — 87635 SARS-COV-2 COVID-19 AMP PRB: CPT

## 2021-01-01 PROCEDURE — 86695 HERPES SIMPLEX TYPE 1 TEST: CPT

## 2021-01-01 PROCEDURE — XW0DXM6 INTRODUCTION OF BARICITINIB INTO MOUTH AND PHARYNX, EXTERNAL APPROACH, NEW TECHNOLOGY GROUP 6: ICD-10-PCS | Performed by: INTERNAL MEDICINE

## 2021-01-01 PROCEDURE — 86644 CMV ANTIBODY: CPT

## 2021-01-01 PROCEDURE — 82436 ASSAY OF URINE CHLORIDE: CPT

## 2021-01-01 PROCEDURE — 85652 RBC SED RATE AUTOMATED: CPT

## 2021-01-01 PROCEDURE — 02HV33Z INSERTION OF INFUSION DEVICE INTO SUPERIOR VENA CAVA, PERCUTANEOUS APPROACH: ICD-10-PCS | Performed by: INTERNAL MEDICINE

## 2021-01-01 PROCEDURE — 82805 BLOOD GASES W/O2 SATURATION: CPT

## 2021-01-01 PROCEDURE — 2580000003 HC RX 258: Performed by: PHYSICIAN ASSISTANT

## 2021-01-01 PROCEDURE — 94002 VENT MGMT INPAT INIT DAY: CPT

## 2021-01-01 PROCEDURE — 86645 CMV ANTIBODY IGM: CPT

## 2021-01-01 PROCEDURE — 5A1945Z RESPIRATORY VENTILATION, 24-96 CONSECUTIVE HOURS: ICD-10-PCS | Performed by: INTERNAL MEDICINE

## 2021-01-01 PROCEDURE — 86706 HEP B SURFACE ANTIBODY: CPT

## 2021-01-01 PROCEDURE — 6360000002 HC RX W HCPCS: Performed by: PHYSICIAN ASSISTANT

## 2021-01-01 RX ORDER — GUAIFENESIN/DEXTROMETHORPHAN 100-10MG/5
5 SYRUP ORAL EVERY 4 HOURS PRN
Status: DISCONTINUED | OUTPATIENT
Start: 2021-01-01 | End: 2021-01-01 | Stop reason: HOSPADM

## 2021-01-01 RX ORDER — ALBUMIN, HUMAN INJ 5% 5 %
25 SOLUTION INTRAVENOUS ONCE
Status: COMPLETED | OUTPATIENT
Start: 2021-01-01 | End: 2021-01-01

## 2021-01-01 RX ORDER — ASCORBIC ACID 500 MG
1000 TABLET ORAL 2 TIMES DAILY
Status: DISCONTINUED | OUTPATIENT
Start: 2021-01-01 | End: 2021-01-01 | Stop reason: HOSPADM

## 2021-01-01 RX ORDER — ONDANSETRON 4 MG/1
4 TABLET, ORALLY DISINTEGRATING ORAL EVERY 8 HOURS PRN
Status: DISCONTINUED | OUTPATIENT
Start: 2021-01-01 | End: 2021-01-01 | Stop reason: HOSPADM

## 2021-01-01 RX ORDER — MONTELUKAST SODIUM 10 MG/1
10 TABLET ORAL EVERY EVENING
Status: DISCONTINUED | OUTPATIENT
Start: 2021-01-01 | End: 2021-01-01 | Stop reason: HOSPADM

## 2021-01-01 RX ORDER — ACETAMINOPHEN 500 MG
1000 TABLET ORAL ONCE
Status: COMPLETED | OUTPATIENT
Start: 2021-01-01 | End: 2021-01-01

## 2021-01-01 RX ORDER — METHOCARBAMOL 500 MG/1
1 TABLET, FILM COATED ORAL EVERY 8 HOURS PRN
COMMUNITY
Start: 2021-01-01

## 2021-01-01 RX ORDER — ADENOSINE 3 MG/ML
INJECTION, SOLUTION INTRAVENOUS
Status: COMPLETED | OUTPATIENT
Start: 2021-01-01 | End: 2021-01-01

## 2021-01-01 RX ORDER — VITAMIN B COMPLEX
1000 TABLET ORAL DAILY
Status: DISCONTINUED | OUTPATIENT
Start: 2021-01-01 | End: 2021-01-01 | Stop reason: HOSPADM

## 2021-01-01 RX ORDER — LORAZEPAM 2 MG/ML
1 INJECTION INTRAMUSCULAR EVERY 6 HOURS PRN
Status: DISCONTINUED | OUTPATIENT
Start: 2021-01-01 | End: 2021-01-01 | Stop reason: HOSPADM

## 2021-01-01 RX ORDER — SODIUM CHLORIDE 9 MG/ML
25 INJECTION, SOLUTION INTRAVENOUS PRN
Status: DISCONTINUED | OUTPATIENT
Start: 2021-01-01 | End: 2021-01-01 | Stop reason: HOSPADM

## 2021-01-01 RX ORDER — DILTIAZEM HYDROCHLORIDE 5 MG/ML
25 INJECTION INTRAVENOUS ONCE
Status: COMPLETED | OUTPATIENT
Start: 2021-01-01 | End: 2021-01-01

## 2021-01-01 RX ORDER — SODIUM CHLORIDE 0.9 % (FLUSH) 0.9 %
5-40 SYRINGE (ML) INJECTION PRN
Status: DISCONTINUED | OUTPATIENT
Start: 2021-01-01 | End: 2021-01-01 | Stop reason: HOSPADM

## 2021-01-01 RX ORDER — DIVALPROEX SODIUM 250 MG/1
1 TABLET, EXTENDED RELEASE ORAL DAILY
COMMUNITY
Start: 2021-01-01

## 2021-01-01 RX ORDER — VANCOMYCIN 1.75 G/350ML
1250 INJECTION, SOLUTION INTRAVENOUS EVERY 12 HOURS
Status: DISCONTINUED | OUTPATIENT
Start: 2021-01-01 | End: 2021-01-01 | Stop reason: DRUGHIGH

## 2021-01-01 RX ORDER — LIDOCAINE HYDROCHLORIDE 10 MG/ML
5 INJECTION, SOLUTION EPIDURAL; INFILTRATION; INTRACAUDAL; PERINEURAL SEE ADMIN INSTRUCTIONS
Status: DISCONTINUED | OUTPATIENT
Start: 2021-01-01 | End: 2021-01-01 | Stop reason: HOSPADM

## 2021-01-01 RX ORDER — CALCIUM GLUCONATE 20 MG/ML
1000 INJECTION, SOLUTION INTRAVENOUS PRN
Status: DISCONTINUED | OUTPATIENT
Start: 2021-01-01 | End: 2021-01-01 | Stop reason: HOSPADM

## 2021-01-01 RX ORDER — PANTOPRAZOLE SODIUM 40 MG/10ML
40 INJECTION, POWDER, LYOPHILIZED, FOR SOLUTION INTRAVENOUS 2 TIMES DAILY
Status: DISCONTINUED | OUTPATIENT
Start: 2021-01-01 | End: 2021-01-01 | Stop reason: HOSPADM

## 2021-01-01 RX ORDER — DILTIAZEM HYDROCHLORIDE 5 MG/ML
25 INJECTION INTRAVENOUS
Status: ACTIVE | OUTPATIENT
Start: 2021-01-01 | End: 2021-01-01

## 2021-01-01 RX ORDER — MAGNESIUM CARB/ALUMINUM HYDROX 105-160MG
30 TABLET,CHEWABLE ORAL ONCE
Status: COMPLETED | OUTPATIENT
Start: 2021-01-01 | End: 2021-01-01

## 2021-01-01 RX ORDER — DEXAMETHASONE SODIUM PHOSPHATE 10 MG/ML
10 INJECTION, SOLUTION INTRAMUSCULAR; INTRAVENOUS ONCE
Status: COMPLETED | OUTPATIENT
Start: 2021-01-01 | End: 2021-01-01

## 2021-01-01 RX ORDER — FUROSEMIDE 10 MG/ML
40 INJECTION INTRAMUSCULAR; INTRAVENOUS 2 TIMES DAILY
Status: DISCONTINUED | OUTPATIENT
Start: 2021-01-01 | End: 2021-01-01

## 2021-01-01 RX ORDER — VANCOMYCIN 1.75 G/350ML
1250 INJECTION, SOLUTION INTRAVENOUS ONCE
Status: COMPLETED | OUTPATIENT
Start: 2021-01-01 | End: 2021-01-01

## 2021-01-01 RX ORDER — SODIUM CHLORIDE 0.9 % (FLUSH) 0.9 %
5-40 SYRINGE (ML) INJECTION EVERY 12 HOURS SCHEDULED
Status: DISCONTINUED | OUTPATIENT
Start: 2021-01-01 | End: 2021-01-01 | Stop reason: HOSPADM

## 2021-01-01 RX ORDER — DEXAMETHASONE 4 MG/1
6 TABLET ORAL EVERY 12 HOURS SCHEDULED
Status: DISCONTINUED | OUTPATIENT
Start: 2021-01-01 | End: 2021-01-01

## 2021-01-01 RX ORDER — MORPHINE SULFATE 2 MG/ML
2 INJECTION, SOLUTION INTRAMUSCULAR; INTRAVENOUS EVERY 4 HOURS PRN
Status: DISCONTINUED | OUTPATIENT
Start: 2021-01-01 | End: 2021-01-01 | Stop reason: HOSPADM

## 2021-01-01 RX ORDER — METHYLPREDNISOLONE SODIUM SUCCINATE 40 MG/ML
40 INJECTION, POWDER, LYOPHILIZED, FOR SOLUTION INTRAMUSCULAR; INTRAVENOUS EVERY 12 HOURS
Status: DISCONTINUED | OUTPATIENT
Start: 2021-01-01 | End: 2021-01-01

## 2021-01-01 RX ORDER — BACITRACIN, NEOMYCIN, POLYMYXIN B 400; 3.5; 5 [USP'U]/G; MG/G; [USP'U]/G
OINTMENT TOPICAL 2 TIMES DAILY
Status: DISCONTINUED | OUTPATIENT
Start: 2021-01-01 | End: 2021-01-01 | Stop reason: HOSPADM

## 2021-01-01 RX ORDER — DIVALPROEX SODIUM 250 MG/1
250 TABLET, EXTENDED RELEASE ORAL DAILY
Status: DISCONTINUED | OUTPATIENT
Start: 2021-01-01 | End: 2021-01-01 | Stop reason: HOSPADM

## 2021-01-01 RX ORDER — METHOCARBAMOL 500 MG/1
500 TABLET, FILM COATED ORAL EVERY 8 HOURS PRN
Status: DISCONTINUED | OUTPATIENT
Start: 2021-01-01 | End: 2021-01-01 | Stop reason: HOSPADM

## 2021-01-01 RX ORDER — BUSPIRONE HYDROCHLORIDE 5 MG/1
5 TABLET ORAL 2 TIMES DAILY
Status: DISCONTINUED | OUTPATIENT
Start: 2021-01-01 | End: 2021-01-01 | Stop reason: HOSPADM

## 2021-01-01 RX ORDER — CARVEDILOL 12.5 MG/1
0.5 TABLET ORAL 2 TIMES DAILY
COMMUNITY
Start: 2021-01-01

## 2021-01-01 RX ORDER — FUROSEMIDE 10 MG/ML
20 INJECTION INTRAMUSCULAR; INTRAVENOUS 2 TIMES DAILY
Status: DISCONTINUED | OUTPATIENT
Start: 2021-01-01 | End: 2021-01-01

## 2021-01-01 RX ORDER — ALBUTEROL SULFATE 90 UG/1
2 AEROSOL, METERED RESPIRATORY (INHALATION) 4 TIMES DAILY
Status: DISCONTINUED | OUTPATIENT
Start: 2021-01-01 | End: 2021-01-01 | Stop reason: HOSPADM

## 2021-01-01 RX ORDER — DICLOFENAC SODIUM AND MISOPROSTOL 75; 200 MG/1; UG/1
1 TABLET, DELAYED RELEASE ORAL 2 TIMES DAILY
Status: DISCONTINUED | OUTPATIENT
Start: 2021-01-01 | End: 2021-01-01 | Stop reason: HOSPADM

## 2021-01-01 RX ORDER — FUROSEMIDE 10 MG/ML
40 INJECTION INTRAMUSCULAR; INTRAVENOUS DAILY
Status: DISCONTINUED | OUTPATIENT
Start: 2021-01-01 | End: 2021-01-01

## 2021-01-01 RX ORDER — GUAIFENESIN/DEXTROMETHORPHAN 100-10MG/5
5 SYRUP ORAL ONCE
Status: COMPLETED | OUTPATIENT
Start: 2021-01-01 | End: 2021-01-01

## 2021-01-01 RX ORDER — SENNA PLUS 8.6 MG/1
1 TABLET ORAL 2 TIMES DAILY PRN
Status: DISCONTINUED | OUTPATIENT
Start: 2021-01-01 | End: 2021-01-01 | Stop reason: HOSPADM

## 2021-01-01 RX ORDER — ERGOCALCIFEROL 1.25 MG/1
50000 CAPSULE ORAL WEEKLY
Status: DISCONTINUED | OUTPATIENT
Start: 2021-01-01 | End: 2021-01-01 | Stop reason: HOSPADM

## 2021-01-01 RX ORDER — ACETAMINOPHEN 650 MG/1
650 SUPPOSITORY RECTAL EVERY 6 HOURS PRN
Status: DISCONTINUED | OUTPATIENT
Start: 2021-01-01 | End: 2021-01-01 | Stop reason: HOSPADM

## 2021-01-01 RX ORDER — 0.9 % SODIUM CHLORIDE 0.9 %
1000 INTRAVENOUS SOLUTION INTRAVENOUS ONCE
Status: COMPLETED | OUTPATIENT
Start: 2021-01-01 | End: 2021-01-01

## 2021-01-01 RX ORDER — DICLOFENAC SODIUM AND MISOPROSTOL 75; 200 MG/1; UG/1
1 TABLET, DELAYED RELEASE ORAL 2 TIMES DAILY
Status: DISCONTINUED | OUTPATIENT
Start: 2021-01-01 | End: 2021-01-01 | Stop reason: CLARIF

## 2021-01-01 RX ORDER — PRAVASTATIN SODIUM 40 MG
1 TABLET ORAL
COMMUNITY
Start: 2021-01-01

## 2021-01-01 RX ORDER — DILTIAZEM HYDROCHLORIDE 5 MG/ML
INJECTION INTRAVENOUS
Status: COMPLETED
Start: 2021-01-01 | End: 2021-01-01

## 2021-01-01 RX ORDER — ACETAMINOPHEN AND CODEINE PHOSPHATE 300; 30 MG/1; MG/1
1 TABLET ORAL 2 TIMES DAILY
Status: DISCONTINUED | OUTPATIENT
Start: 2021-01-01 | End: 2021-01-01 | Stop reason: CLARIF

## 2021-01-01 RX ORDER — DICLOFENAC SODIUM AND MISOPROSTOL 75; 200 MG/1; UG/1
1 TABLET, DELAYED RELEASE ORAL 2 TIMES DAILY
COMMUNITY
Start: 2021-01-01

## 2021-01-01 RX ORDER — VANCOMYCIN 1.5 G/300ML
1500 INJECTION, SOLUTION INTRAVENOUS EVERY 12 HOURS
Status: DISCONTINUED | OUTPATIENT
Start: 2021-01-01 | End: 2021-01-01

## 2021-01-01 RX ORDER — MONTELUKAST SODIUM 10 MG/1
1 TABLET ORAL EVERY EVENING
COMMUNITY
Start: 2021-01-01

## 2021-01-01 RX ORDER — METOPROLOL TARTRATE 5 MG/5ML
2.5 INJECTION INTRAVENOUS ONCE
Status: COMPLETED | OUTPATIENT
Start: 2021-01-01 | End: 2021-01-01

## 2021-01-01 RX ORDER — MAGNESIUM SULFATE 1 G/100ML
1000 INJECTION INTRAVENOUS PRN
Status: DISCONTINUED | OUTPATIENT
Start: 2021-01-01 | End: 2021-01-01 | Stop reason: HOSPADM

## 2021-01-01 RX ORDER — METOPROLOL TARTRATE 5 MG/5ML
2.5 INJECTION INTRAVENOUS EVERY 4 HOURS PRN
Status: DISCONTINUED | OUTPATIENT
Start: 2021-01-01 | End: 2021-01-01

## 2021-01-01 RX ORDER — BENZONATATE 100 MG/1
100 CAPSULE ORAL 3 TIMES DAILY PRN
Status: DISCONTINUED | OUTPATIENT
Start: 2021-01-01 | End: 2021-01-01 | Stop reason: HOSPADM

## 2021-01-01 RX ORDER — FUROSEMIDE 10 MG/ML
40 INJECTION INTRAMUSCULAR; INTRAVENOUS ONCE
Status: DISCONTINUED | OUTPATIENT
Start: 2021-01-01 | End: 2021-01-01

## 2021-01-01 RX ORDER — HYDROCODONE BITARTRATE AND ACETAMINOPHEN 5; 325 MG/1; MG/1
1 TABLET ORAL EVERY 12 HOURS
Status: DISCONTINUED | OUTPATIENT
Start: 2021-01-01 | End: 2021-01-01

## 2021-01-01 RX ORDER — SODIUM CHLORIDE 9 MG/ML
INJECTION, SOLUTION INTRAVENOUS CONTINUOUS
Status: DISCONTINUED | OUTPATIENT
Start: 2021-01-01 | End: 2021-01-01

## 2021-01-01 RX ORDER — DEXAMETHASONE 4 MG/1
6 TABLET ORAL DAILY
Status: DISCONTINUED | OUTPATIENT
Start: 2021-01-01 | End: 2021-01-01

## 2021-01-01 RX ORDER — DEXAMETHASONE SODIUM PHOSPHATE 10 MG/ML
10 INJECTION, SOLUTION INTRAMUSCULAR; INTRAVENOUS DAILY
Status: DISCONTINUED | OUTPATIENT
Start: 2021-01-01 | End: 2021-01-01

## 2021-01-01 RX ORDER — CALCIUM GLUCONATE 20 MG/ML
2000 INJECTION, SOLUTION INTRAVENOUS PRN
Status: DISCONTINUED | OUTPATIENT
Start: 2021-01-01 | End: 2021-01-01 | Stop reason: HOSPADM

## 2021-01-01 RX ORDER — LISINOPRIL 5 MG/1
5 TABLET ORAL DAILY
Status: DISCONTINUED | OUTPATIENT
Start: 2021-01-01 | End: 2021-01-01

## 2021-01-01 RX ORDER — ACETAMINOPHEN 325 MG/1
650 TABLET ORAL EVERY 6 HOURS PRN
Status: DISCONTINUED | OUTPATIENT
Start: 2021-01-01 | End: 2021-01-01 | Stop reason: HOSPADM

## 2021-01-01 RX ORDER — PANTOPRAZOLE SODIUM 40 MG/1
40 TABLET, DELAYED RELEASE ORAL
Status: DISCONTINUED | OUTPATIENT
Start: 2021-01-01 | End: 2021-01-01

## 2021-01-01 RX ORDER — NOREPINEPHRINE BIT/0.9 % NACL 16MG/250ML
INFUSION BOTTLE (ML) INTRAVENOUS
Status: COMPLETED
Start: 2021-01-01 | End: 2021-01-01

## 2021-01-01 RX ORDER — DIGOXIN 0.25 MG/ML
250 INJECTION INTRAMUSCULAR; INTRAVENOUS ONCE
Status: COMPLETED | OUTPATIENT
Start: 2021-01-01 | End: 2021-01-01

## 2021-01-01 RX ORDER — PROPOFOL 10 MG/ML
5-50 INJECTION, EMULSION INTRAVENOUS
Status: DISCONTINUED | OUTPATIENT
Start: 2021-01-01 | End: 2021-01-01 | Stop reason: HOSPADM

## 2021-01-01 RX ORDER — FLUTICASONE PROPIONATE 110 UG/1
2 AEROSOL, METERED RESPIRATORY (INHALATION) 2 TIMES DAILY
Status: DISCONTINUED | OUTPATIENT
Start: 2021-01-01 | End: 2021-01-01 | Stop reason: HOSPADM

## 2021-01-01 RX ORDER — ZINC SULFATE 50(220)MG
50 CAPSULE ORAL 2 TIMES DAILY
Status: DISCONTINUED | OUTPATIENT
Start: 2021-01-01 | End: 2021-01-01 | Stop reason: HOSPADM

## 2021-01-01 RX ORDER — DEXAMETHASONE SODIUM PHOSPHATE 10 MG/ML
6 INJECTION, SOLUTION INTRAMUSCULAR; INTRAVENOUS DAILY
Status: DISCONTINUED | OUTPATIENT
Start: 2021-01-01 | End: 2021-01-01 | Stop reason: HOSPADM

## 2021-01-01 RX ORDER — LORAZEPAM 1 MG/1
1 TABLET ORAL EVERY 6 HOURS PRN
Status: DISCONTINUED | OUTPATIENT
Start: 2021-01-01 | End: 2021-01-01 | Stop reason: HOSPADM

## 2021-01-01 RX ORDER — BUMETANIDE 0.25 MG/ML
0.5 INJECTION, SOLUTION INTRAMUSCULAR; INTRAVENOUS 2 TIMES DAILY
Status: DISCONTINUED | OUTPATIENT
Start: 2021-01-01 | End: 2021-01-01

## 2021-01-01 RX ORDER — ONDANSETRON 2 MG/ML
4 INJECTION INTRAMUSCULAR; INTRAVENOUS EVERY 6 HOURS PRN
Status: DISCONTINUED | OUTPATIENT
Start: 2021-01-01 | End: 2021-01-01 | Stop reason: HOSPADM

## 2021-01-01 RX ORDER — ALBUTEROL SULFATE 90 UG/1
2 AEROSOL, METERED RESPIRATORY (INHALATION) 4 TIMES DAILY
Status: DISCONTINUED | OUTPATIENT
Start: 2021-01-01 | End: 2021-01-01

## 2021-01-01 RX ORDER — METHYLPREDNISOLONE SODIUM SUCCINATE 125 MG/2ML
80 INJECTION, POWDER, LYOPHILIZED, FOR SOLUTION INTRAMUSCULAR; INTRAVENOUS DAILY
Status: DISCONTINUED | OUTPATIENT
Start: 2021-01-01 | End: 2021-01-01

## 2021-01-01 RX ORDER — SODIUM POLYSTYRENE SULFONATE 15 G/60ML
15 SUSPENSION ORAL; RECTAL ONCE
Status: COMPLETED | OUTPATIENT
Start: 2021-01-01 | End: 2021-01-01

## 2021-01-01 RX ORDER — KETOROLAC TROMETHAMINE 30 MG/ML
30 INJECTION, SOLUTION INTRAMUSCULAR; INTRAVENOUS ONCE
Status: COMPLETED | OUTPATIENT
Start: 2021-01-01 | End: 2021-01-01

## 2021-01-01 RX ORDER — ALBUTEROL SULFATE 90 UG/1
2 AEROSOL, METERED RESPIRATORY (INHALATION) 4 TIMES DAILY
COMMUNITY
Start: 2021-01-01

## 2021-01-01 RX ORDER — POLYVINYL ALCOHOL 14 MG/ML
1 SOLUTION/ DROPS OPHTHALMIC
Status: DISCONTINUED | OUTPATIENT
Start: 2021-01-01 | End: 2021-01-01 | Stop reason: HOSPADM

## 2021-01-01 RX ORDER — NOREPINEPHRINE BIT/0.9 % NACL 16MG/250ML
2-100 INFUSION BOTTLE (ML) INTRAVENOUS CONTINUOUS
Status: DISCONTINUED | OUTPATIENT
Start: 2021-01-01 | End: 2021-01-01 | Stop reason: HOSPADM

## 2021-01-01 RX ORDER — OMEPRAZOLE 20 MG/1
1 CAPSULE, DELAYED RELEASE ORAL DAILY
COMMUNITY
Start: 2021-01-01

## 2021-01-01 RX ORDER — FUROSEMIDE 20 MG/1
20 TABLET ORAL DAILY
Status: DISCONTINUED | OUTPATIENT
Start: 2021-01-01 | End: 2021-01-01

## 2021-01-01 RX ORDER — ONDANSETRON 2 MG/ML
4 INJECTION INTRAMUSCULAR; INTRAVENOUS ONCE
Status: COMPLETED | OUTPATIENT
Start: 2021-01-01 | End: 2021-01-01

## 2021-01-01 RX ORDER — POLYETHYLENE GLYCOL 3350 17 G/17G
17 POWDER, FOR SOLUTION ORAL DAILY PRN
Status: DISCONTINUED | OUTPATIENT
Start: 2021-01-01 | End: 2021-01-01 | Stop reason: HOSPADM

## 2021-01-01 RX ORDER — CARVEDILOL 6.25 MG/1
6.25 TABLET ORAL 2 TIMES DAILY
Status: DISCONTINUED | OUTPATIENT
Start: 2021-01-01 | End: 2021-01-01

## 2021-01-01 RX ORDER — POTASSIUM CHLORIDE 29.8 MG/ML
20 INJECTION INTRAVENOUS PRN
Status: DISCONTINUED | OUTPATIENT
Start: 2021-01-01 | End: 2021-01-01 | Stop reason: HOSPADM

## 2021-01-01 RX ORDER — LISINOPRIL 2.5 MG/1
2.5 TABLET ORAL DAILY
Status: DISCONTINUED | OUTPATIENT
Start: 2021-01-01 | End: 2021-01-01

## 2021-01-01 RX ORDER — PRAVASTATIN SODIUM 40 MG
40 TABLET ORAL
Status: DISCONTINUED | OUTPATIENT
Start: 2021-01-01 | End: 2021-01-01 | Stop reason: HOSPADM

## 2021-01-01 RX ORDER — ALBUTEROL SULFATE 90 UG/1
2 AEROSOL, METERED RESPIRATORY (INHALATION) EVERY 4 HOURS PRN
Status: DISCONTINUED | OUTPATIENT
Start: 2021-01-01 | End: 2021-01-01 | Stop reason: HOSPADM

## 2021-01-01 RX ORDER — ALBUTEROL SULFATE 90 UG/1
2 AEROSOL, METERED RESPIRATORY (INHALATION) ONCE
Status: COMPLETED | OUTPATIENT
Start: 2021-01-01 | End: 2021-01-01

## 2021-01-01 RX ORDER — FUROSEMIDE 20 MG/1
1 TABLET ORAL DAILY
COMMUNITY
Start: 2021-01-01

## 2021-01-01 RX ADMIN — SODIUM CHLORIDE 25 ML: 9 INJECTION, SOLUTION INTRAVENOUS at 00:00

## 2021-01-01 RX ADMIN — AMIODARONE HYDROCHLORIDE 0.5 MG/MIN: 50 INJECTION, SOLUTION INTRAVENOUS at 09:04

## 2021-01-01 RX ADMIN — CARVEDILOL 6.25 MG: 6.25 TABLET, FILM COATED ORAL at 09:41

## 2021-01-01 RX ADMIN — Medication 15 MCG/MIN: at 07:31

## 2021-01-01 RX ADMIN — FLUCONAZOLE 100 MG: 2 INJECTION, SOLUTION INTRAVENOUS at 15:23

## 2021-01-01 RX ADMIN — CEFEPIME HYDROCHLORIDE 2000 MG: 2 INJECTION, POWDER, FOR SOLUTION INTRAVENOUS at 04:34

## 2021-01-01 RX ADMIN — FLUCONAZOLE 100 MG: 2 INJECTION, SOLUTION INTRAVENOUS at 16:44

## 2021-01-01 RX ADMIN — ALBUTEROL SULFATE 2 PUFF: 90 AEROSOL, METERED RESPIRATORY (INHALATION) at 16:15

## 2021-01-01 RX ADMIN — AMIODARONE HYDROCHLORIDE 1 MG/MIN: 50 INJECTION, SOLUTION INTRAVENOUS at 03:28

## 2021-01-01 RX ADMIN — MONTELUKAST 10 MG: 10 TABLET, FILM COATED ORAL at 21:58

## 2021-01-01 RX ADMIN — ALBUTEROL SULFATE 2 PUFF: 90 AEROSOL, METERED RESPIRATORY (INHALATION) at 08:40

## 2021-01-01 RX ADMIN — ALBUTEROL SULFATE 2 PUFF: 90 AEROSOL, METERED RESPIRATORY (INHALATION) at 21:25

## 2021-01-01 RX ADMIN — Medication 16 MCG/MIN: at 11:42

## 2021-01-01 RX ADMIN — ZINC SULFATE 220 MG (50 MG) CAPSULE 50 MG: CAPSULE at 08:35

## 2021-01-01 RX ADMIN — SODIUM CHLORIDE, PRESERVATIVE FREE 10 ML: 5 INJECTION INTRAVENOUS at 10:26

## 2021-01-01 RX ADMIN — Medication 2 PUFF: at 07:23

## 2021-01-01 RX ADMIN — FUROSEMIDE 40 MG: 10 INJECTION, SOLUTION INTRAMUSCULAR; INTRAVENOUS at 17:16

## 2021-01-01 RX ADMIN — CARVEDILOL 6.25 MG: 6.25 TABLET, FILM COATED ORAL at 09:45

## 2021-01-01 RX ADMIN — Medication: at 10:52

## 2021-01-01 RX ADMIN — SODIUM CHLORIDE, PRESERVATIVE FREE 10 ML: 5 INJECTION INTRAVENOUS at 21:17

## 2021-01-01 RX ADMIN — MONTELUKAST 10 MG: 10 TABLET, FILM COATED ORAL at 20:14

## 2021-01-01 RX ADMIN — DIVALPROEX SODIUM 250 MG: 250 TABLET, FILM COATED, EXTENDED RELEASE ORAL at 08:07

## 2021-01-01 RX ADMIN — MONTELUKAST 10 MG: 10 TABLET, FILM COATED ORAL at 20:19

## 2021-01-01 RX ADMIN — TIOTROPIUM BROMIDE AND OLODATEROL 2 PUFF: 3.124; 2.736 SPRAY, METERED RESPIRATORY (INHALATION) at 20:30

## 2021-01-01 RX ADMIN — SODIUM CHLORIDE, PRESERVATIVE FREE 10 ML: 5 INJECTION INTRAVENOUS at 10:13

## 2021-01-01 RX ADMIN — Medication 50 MCG/HR: at 11:50

## 2021-01-01 RX ADMIN — Medication 2 PUFF: at 19:39

## 2021-01-01 RX ADMIN — DIVALPROEX SODIUM 250 MG: 250 TABLET, FILM COATED, EXTENDED RELEASE ORAL at 08:34

## 2021-01-01 RX ADMIN — ZINC SULFATE 220 MG (50 MG) CAPSULE 50 MG: CAPSULE at 21:26

## 2021-01-01 RX ADMIN — LORAZEPAM 1 MG: 2 INJECTION INTRAMUSCULAR; INTRAVENOUS at 02:53

## 2021-01-01 RX ADMIN — FUROSEMIDE 40 MG: 10 INJECTION, SOLUTION INTRAMUSCULAR; INTRAVENOUS at 17:21

## 2021-01-01 RX ADMIN — PANTOPRAZOLE SODIUM 40 MG: 40 TABLET, DELAYED RELEASE ORAL at 08:25

## 2021-01-01 RX ADMIN — Medication 1000 UNITS: at 15:59

## 2021-01-01 RX ADMIN — ENOXAPARIN SODIUM 30 MG: 30 INJECTION SUBCUTANEOUS at 08:33

## 2021-01-01 RX ADMIN — OXYCODONE HYDROCHLORIDE AND ACETAMINOPHEN 1000 MG: 500 TABLET ORAL at 16:00

## 2021-01-01 RX ADMIN — CARVEDILOL 6.25 MG: 6.25 TABLET, FILM COATED ORAL at 08:08

## 2021-01-01 RX ADMIN — PANTOPRAZOLE SODIUM 40 MG: 40 TABLET, DELAYED RELEASE ORAL at 06:07

## 2021-01-01 RX ADMIN — CARVEDILOL 6.25 MG: 6.25 TABLET, FILM COATED ORAL at 19:38

## 2021-01-01 RX ADMIN — ALBUTEROL SULFATE 2 PUFF: 90 AEROSOL, METERED RESPIRATORY (INHALATION) at 15:34

## 2021-01-01 RX ADMIN — SODIUM CHLORIDE 25 ML: 9 INJECTION, SOLUTION INTRAVENOUS at 04:52

## 2021-01-01 RX ADMIN — SODIUM CHLORIDE, PRESERVATIVE FREE 10 ML: 5 INJECTION INTRAVENOUS at 20:45

## 2021-01-01 RX ADMIN — ZINC SULFATE 220 MG (50 MG) CAPSULE 50 MG: CAPSULE at 22:04

## 2021-01-01 RX ADMIN — VASOPRESSIN 0.05 UNITS/MIN: 20 INJECTION INTRAVENOUS at 02:10

## 2021-01-01 RX ADMIN — ENOXAPARIN SODIUM 30 MG: 30 INJECTION SUBCUTANEOUS at 20:51

## 2021-01-01 RX ADMIN — Medication 75 MCG/HR: at 02:25

## 2021-01-01 RX ADMIN — Medication 2 PUFF: at 08:28

## 2021-01-01 RX ADMIN — OXYCODONE HYDROCHLORIDE AND ACETAMINOPHEN 1000 MG: 500 TABLET ORAL at 19:45

## 2021-01-01 RX ADMIN — ALBUTEROL SULFATE 2 PUFF: 90 AEROSOL, METERED RESPIRATORY (INHALATION) at 07:40

## 2021-01-01 RX ADMIN — HYDROCODONE BITARTRATE AND ACETAMINOPHEN 1 TABLET: 5; 325 TABLET ORAL at 09:10

## 2021-01-01 RX ADMIN — CARVEDILOL 6.25 MG: 6.25 TABLET, FILM COATED ORAL at 08:32

## 2021-01-01 RX ADMIN — DILTIAZEM HYDROCHLORIDE 25 MG: 5 INJECTION INTRAVENOUS at 23:45

## 2021-01-01 RX ADMIN — ZINC SULFATE 220 MG (50 MG) CAPSULE 50 MG: CAPSULE at 15:09

## 2021-01-01 RX ADMIN — GUAIFENESIN SYRUP AND DEXTROMETHORPHAN 5 ML: 100; 10 SYRUP ORAL at 10:04

## 2021-01-01 RX ADMIN — ENOXAPARIN SODIUM 30 MG: 30 INJECTION SUBCUTANEOUS at 09:18

## 2021-01-01 RX ADMIN — SODIUM CHLORIDE, PRESERVATIVE FREE 10 ML: 5 INJECTION INTRAVENOUS at 08:37

## 2021-01-01 RX ADMIN — Medication 30 MCG/MIN: at 20:17

## 2021-01-01 RX ADMIN — ENOXAPARIN SODIUM 30 MG: 30 INJECTION SUBCUTANEOUS at 21:52

## 2021-01-01 RX ADMIN — Medication 2 PUFF: at 07:20

## 2021-01-01 RX ADMIN — ALBUTEROL SULFATE 2 PUFF: 90 AEROSOL, METERED RESPIRATORY (INHALATION) at 12:41

## 2021-01-01 RX ADMIN — LORAZEPAM 1 MG: 2 INJECTION INTRAMUSCULAR; INTRAVENOUS at 10:07

## 2021-01-01 RX ADMIN — MONTELUKAST 10 MG: 10 TABLET, FILM COATED ORAL at 20:54

## 2021-01-01 RX ADMIN — SODIUM CHLORIDE, PRESERVATIVE FREE 10 ML: 5 INJECTION INTRAVENOUS at 20:28

## 2021-01-01 RX ADMIN — ALBUTEROL SULFATE 2 PUFF: 90 AEROSOL, METERED RESPIRATORY (INHALATION) at 20:38

## 2021-01-01 RX ADMIN — Medication 1000 UNITS: at 18:49

## 2021-01-01 RX ADMIN — LORAZEPAM 1 MG: 2 INJECTION INTRAMUSCULAR; INTRAVENOUS at 10:58

## 2021-01-01 RX ADMIN — ACYCLOVIR SODIUM 700 MG: 50 INJECTION, SOLUTION INTRAVENOUS at 03:09

## 2021-01-01 RX ADMIN — ADENOSINE 12 MG: 3 INJECTION, SOLUTION INTRAVENOUS at 23:30

## 2021-01-01 RX ADMIN — ALBUTEROL SULFATE 2 PUFF: 90 AEROSOL, METERED RESPIRATORY (INHALATION) at 19:21

## 2021-01-01 RX ADMIN — Medication 2 PUFF: at 08:14

## 2021-01-01 RX ADMIN — ALBUTEROL SULFATE 2 PUFF: 90 AEROSOL, METERED RESPIRATORY (INHALATION) at 11:51

## 2021-01-01 RX ADMIN — OXYCODONE HYDROCHLORIDE AND ACETAMINOPHEN 1000 MG: 500 TABLET ORAL at 09:28

## 2021-01-01 RX ADMIN — Medication 28 MCG/MIN: at 20:34

## 2021-01-01 RX ADMIN — CARVEDILOL 6.25 MG: 6.25 TABLET, FILM COATED ORAL at 10:23

## 2021-01-01 RX ADMIN — DIVALPROEX SODIUM 250 MG: 250 TABLET, FILM COATED, EXTENDED RELEASE ORAL at 09:18

## 2021-01-01 RX ADMIN — Medication 1000 UNITS: at 09:44

## 2021-01-01 RX ADMIN — PROPOFOL 25 MCG/KG/MIN: 10 INJECTION, EMULSION INTRAVENOUS at 01:05

## 2021-01-01 RX ADMIN — ENOXAPARIN SODIUM 30 MG: 30 INJECTION SUBCUTANEOUS at 10:00

## 2021-01-01 RX ADMIN — FUROSEMIDE 40 MG: 10 INJECTION, SOLUTION INTRAMUSCULAR; INTRAVENOUS at 18:01

## 2021-01-01 RX ADMIN — ACYCLOVIR SODIUM 700 MG: 50 INJECTION, SOLUTION INTRAVENOUS at 11:38

## 2021-01-01 RX ADMIN — TIOTROPIUM BROMIDE AND OLODATEROL 2 PUFF: 3.124; 2.736 SPRAY, METERED RESPIRATORY (INHALATION) at 07:51

## 2021-01-01 RX ADMIN — MONTELUKAST 10 MG: 10 TABLET, FILM COATED ORAL at 21:44

## 2021-01-01 RX ADMIN — VANCOMYCIN 1500 MG: 1.5 INJECTION, SOLUTION INTRAVENOUS at 04:33

## 2021-01-01 RX ADMIN — MONTELUKAST 10 MG: 10 TABLET, FILM COATED ORAL at 20:45

## 2021-01-01 RX ADMIN — DEXAMETHASONE SODIUM PHOSPHATE 10 MG: 10 INJECTION INTRAMUSCULAR; INTRAVENOUS at 08:55

## 2021-01-01 RX ADMIN — SODIUM CHLORIDE, PRESERVATIVE FREE 10 ML: 5 INJECTION INTRAVENOUS at 09:34

## 2021-01-01 RX ADMIN — BISACODYL 5 MG: 5 TABLET, COATED ORAL at 09:45

## 2021-01-01 RX ADMIN — VANCOMYCIN 1250 MG: 1.75 INJECTION, SOLUTION INTRAVENOUS at 20:10

## 2021-01-01 RX ADMIN — ALBUTEROL SULFATE 2 PUFF: 90 AEROSOL, METERED RESPIRATORY (INHALATION) at 12:40

## 2021-01-01 RX ADMIN — ALBUTEROL SULFATE 2 PUFF: 90 AEROSOL, METERED RESPIRATORY (INHALATION) at 12:12

## 2021-01-01 RX ADMIN — Medication: at 06:20

## 2021-01-01 RX ADMIN — DIVALPROEX SODIUM 250 MG: 250 TABLET, FILM COATED, EXTENDED RELEASE ORAL at 08:54

## 2021-01-01 RX ADMIN — OXYCODONE HYDROCHLORIDE AND ACETAMINOPHEN 1000 MG: 500 TABLET ORAL at 20:54

## 2021-01-01 RX ADMIN — FUROSEMIDE 20 MG: 20 TABLET ORAL at 08:07

## 2021-01-01 RX ADMIN — Medication 2 PUFF: at 20:17

## 2021-01-01 RX ADMIN — DEXAMETHASONE SODIUM PHOSPHATE 10 MG: 10 INJECTION INTRAMUSCULAR; INTRAVENOUS at 10:00

## 2021-01-01 RX ADMIN — PANTOPRAZOLE SODIUM 40 MG: 40 TABLET, DELAYED RELEASE ORAL at 08:44

## 2021-01-01 RX ADMIN — CARVEDILOL 6.25 MG: 6.25 TABLET, FILM COATED ORAL at 21:16

## 2021-01-01 RX ADMIN — SODIUM BICARBONATE: 84 INJECTION, SOLUTION INTRAVENOUS at 07:45

## 2021-01-01 RX ADMIN — CARVEDILOL 6.25 MG: 6.25 TABLET, FILM COATED ORAL at 20:19

## 2021-01-01 RX ADMIN — CEFEPIME HYDROCHLORIDE 2000 MG: 2 INJECTION, POWDER, FOR SOLUTION INTRAVENOUS at 04:45

## 2021-01-01 RX ADMIN — FUROSEMIDE 40 MG: 10 INJECTION, SOLUTION INTRAMUSCULAR; INTRAVENOUS at 08:19

## 2021-01-01 RX ADMIN — ENOXAPARIN SODIUM 30 MG: 30 INJECTION SUBCUTANEOUS at 22:06

## 2021-01-01 RX ADMIN — CARVEDILOL 6.25 MG: 6.25 TABLET, FILM COATED ORAL at 19:45

## 2021-01-01 RX ADMIN — ALBUTEROL SULFATE 2 PUFF: 90 AEROSOL, METERED RESPIRATORY (INHALATION) at 19:23

## 2021-01-01 RX ADMIN — OXYCODONE HYDROCHLORIDE AND ACETAMINOPHEN 1000 MG: 500 TABLET ORAL at 09:25

## 2021-01-01 RX ADMIN — OXYCODONE HYDROCHLORIDE AND ACETAMINOPHEN 1000 MG: 500 TABLET ORAL at 21:16

## 2021-01-01 RX ADMIN — BISACODYL 5 MG: 5 TABLET, COATED ORAL at 08:34

## 2021-01-01 RX ADMIN — TIOTROPIUM BROMIDE AND OLODATEROL 2 PUFF: 3.124; 2.736 SPRAY, METERED RESPIRATORY (INHALATION) at 07:47

## 2021-01-01 RX ADMIN — ALBUTEROL SULFATE 2 PUFF: 90 AEROSOL, METERED RESPIRATORY (INHALATION) at 16:25

## 2021-01-01 RX ADMIN — METOPROLOL TARTRATE 5 MG: 5 INJECTION INTRAVENOUS at 05:34

## 2021-01-01 RX ADMIN — DICLOFENAC SODIUM AND MISOPROSTOL 1 TABLET: 75; 200 TABLET, DELAYED RELEASE ORAL at 10:37

## 2021-01-01 RX ADMIN — DILTIAZEM HYDROCHLORIDE 30 MG: 30 TABLET, FILM COATED ORAL at 15:11

## 2021-01-01 RX ADMIN — Medication 2 PUFF: at 21:21

## 2021-01-01 RX ADMIN — DICLOFENAC SODIUM AND MISOPROSTOL 1 TABLET: 75; 200 TABLET, DELAYED RELEASE ORAL at 09:19

## 2021-01-01 RX ADMIN — BENZONATATE 100 MG: 100 CAPSULE ORAL at 18:31

## 2021-01-01 RX ADMIN — BISACODYL 5 MG: 5 TABLET, COATED ORAL at 08:20

## 2021-01-01 RX ADMIN — METOPROLOL TARTRATE 5 MG: 5 INJECTION INTRAVENOUS at 10:03

## 2021-01-01 RX ADMIN — OXYCODONE HYDROCHLORIDE AND ACETAMINOPHEN 1000 MG: 500 TABLET ORAL at 08:36

## 2021-01-01 RX ADMIN — ENOXAPARIN SODIUM 30 MG: 30 INJECTION SUBCUTANEOUS at 10:24

## 2021-01-01 RX ADMIN — BUSPIRONE HYDROCHLORIDE 5 MG: 5 TABLET ORAL at 09:08

## 2021-01-01 RX ADMIN — ALBUTEROL SULFATE 2 PUFF: 90 AEROSOL, METERED RESPIRATORY (INHALATION) at 21:17

## 2021-01-01 RX ADMIN — Medication 2 PUFF: at 19:24

## 2021-01-01 RX ADMIN — ZINC SULFATE 220 MG (50 MG) CAPSULE 50 MG: CAPSULE at 08:07

## 2021-01-01 RX ADMIN — METHYLPREDNISOLONE SODIUM SUCCINATE 40 MG: 40 INJECTION, POWDER, LYOPHILIZED, FOR SOLUTION INTRAMUSCULAR; INTRAVENOUS at 02:11

## 2021-01-01 RX ADMIN — Medication 40 MCG/MIN: at 23:26

## 2021-01-01 RX ADMIN — MONTELUKAST 10 MG: 10 TABLET, FILM COATED ORAL at 22:30

## 2021-01-01 RX ADMIN — Medication 30 MCG/MIN: at 06:31

## 2021-01-01 RX ADMIN — ALBUTEROL SULFATE 2 PUFF: 90 AEROSOL, METERED RESPIRATORY (INHALATION) at 11:50

## 2021-01-01 RX ADMIN — VANCOMYCIN 1250 MG: 1.75 INJECTION, SOLUTION INTRAVENOUS at 09:49

## 2021-01-01 RX ADMIN — ENOXAPARIN SODIUM 30 MG: 30 INJECTION SUBCUTANEOUS at 09:41

## 2021-01-01 RX ADMIN — FUROSEMIDE 40 MG: 10 INJECTION, SOLUTION INTRAMUSCULAR; INTRAVENOUS at 09:26

## 2021-01-01 RX ADMIN — OXYCODONE HYDROCHLORIDE AND ACETAMINOPHEN 1000 MG: 500 TABLET ORAL at 08:56

## 2021-01-01 RX ADMIN — PANTOPRAZOLE SODIUM 40 MG: 40 TABLET, DELAYED RELEASE ORAL at 06:32

## 2021-01-01 RX ADMIN — OXYCODONE HYDROCHLORIDE AND ACETAMINOPHEN 1000 MG: 500 TABLET ORAL at 20:36

## 2021-01-01 RX ADMIN — METOPROLOL TARTRATE 2.5 MG: 1 INJECTION, SOLUTION INTRAVENOUS at 23:22

## 2021-01-01 RX ADMIN — SODIUM CHLORIDE, PRESERVATIVE FREE 10 ML: 5 INJECTION INTRAVENOUS at 10:04

## 2021-01-01 RX ADMIN — CARVEDILOL 6.25 MG: 6.25 TABLET, FILM COATED ORAL at 20:51

## 2021-01-01 RX ADMIN — SODIUM CHLORIDE, PRESERVATIVE FREE 10 ML: 5 INJECTION INTRAVENOUS at 22:31

## 2021-01-01 RX ADMIN — Medication 1000 UNITS: at 09:18

## 2021-01-01 RX ADMIN — ALBUTEROL SULFATE 2 PUFF: 90 AEROSOL, METERED RESPIRATORY (INHALATION) at 11:59

## 2021-01-01 RX ADMIN — ALBUTEROL SULFATE 2 PUFF: 90 AEROSOL, METERED RESPIRATORY (INHALATION) at 16:06

## 2021-01-01 RX ADMIN — CEFEPIME HYDROCHLORIDE 2000 MG: 2 INJECTION, POWDER, FOR SOLUTION INTRAVENOUS at 22:56

## 2021-01-01 RX ADMIN — ENOXAPARIN SODIUM 30 MG: 30 INJECTION SUBCUTANEOUS at 20:36

## 2021-01-01 RX ADMIN — ALBUTEROL SULFATE 2 PUFF: 90 AEROSOL, METERED RESPIRATORY (INHALATION) at 09:16

## 2021-01-01 RX ADMIN — METOPROLOL TARTRATE 2.5 MG: 1 INJECTION, SOLUTION INTRAVENOUS at 18:12

## 2021-01-01 RX ADMIN — CEFEPIME HYDROCHLORIDE 2000 MG: 2 INJECTION, POWDER, FOR SOLUTION INTRAVENOUS at 22:01

## 2021-01-01 RX ADMIN — CARVEDILOL 6.25 MG: 6.25 TABLET, FILM COATED ORAL at 08:20

## 2021-01-01 RX ADMIN — ENOXAPARIN SODIUM 30 MG: 30 INJECTION SUBCUTANEOUS at 20:49

## 2021-01-01 RX ADMIN — MONTELUKAST 10 MG: 10 TABLET, FILM COATED ORAL at 22:14

## 2021-01-01 RX ADMIN — OXYCODONE HYDROCHLORIDE AND ACETAMINOPHEN 1000 MG: 500 TABLET ORAL at 20:07

## 2021-01-01 RX ADMIN — SODIUM CHLORIDE, PRESERVATIVE FREE 10 ML: 5 INJECTION INTRAVENOUS at 20:33

## 2021-01-01 RX ADMIN — METHYLPREDNISOLONE SODIUM SUCCINATE 40 MG: 40 INJECTION, POWDER, LYOPHILIZED, FOR SOLUTION INTRAMUSCULAR; INTRAVENOUS at 08:32

## 2021-01-01 RX ADMIN — ZINC SULFATE 220 MG (50 MG) CAPSULE 50 MG: CAPSULE at 08:39

## 2021-01-01 RX ADMIN — BISACODYL 5 MG: 5 TABLET, COATED ORAL at 15:12

## 2021-01-01 RX ADMIN — CARVEDILOL 6.25 MG: 6.25 TABLET, FILM COATED ORAL at 20:54

## 2021-01-01 RX ADMIN — ALBUTEROL SULFATE 2 PUFF: 90 AEROSOL, METERED RESPIRATORY (INHALATION) at 19:24

## 2021-01-01 RX ADMIN — METOPROLOL TARTRATE 5 MG: 5 INJECTION INTRAVENOUS at 04:40

## 2021-01-01 RX ADMIN — CARVEDILOL 6.25 MG: 6.25 TABLET, FILM COATED ORAL at 20:32

## 2021-01-01 RX ADMIN — ENOXAPARIN SODIUM 30 MG: 30 INJECTION SUBCUTANEOUS at 20:30

## 2021-01-01 RX ADMIN — ALBUTEROL SULFATE 2 PUFF: 90 AEROSOL, METERED RESPIRATORY (INHALATION) at 11:54

## 2021-01-01 RX ADMIN — CEFEPIME HYDROCHLORIDE 2000 MG: 2 INJECTION, POWDER, FOR SOLUTION INTRAVENOUS at 16:53

## 2021-01-01 RX ADMIN — TIOTROPIUM BROMIDE AND OLODATEROL 2 PUFF: 3.124; 2.736 SPRAY, METERED RESPIRATORY (INHALATION) at 19:44

## 2021-01-01 RX ADMIN — ALBUTEROL SULFATE 2 PUFF: 90 AEROSOL, METERED RESPIRATORY (INHALATION) at 15:38

## 2021-01-01 RX ADMIN — ENOXAPARIN SODIUM 30 MG: 30 INJECTION SUBCUTANEOUS at 21:44

## 2021-01-01 RX ADMIN — SODIUM CHLORIDE 25 ML: 9 INJECTION, SOLUTION INTRAVENOUS at 17:13

## 2021-01-01 RX ADMIN — PANTOPRAZOLE SODIUM 40 MG: 40 TABLET, DELAYED RELEASE ORAL at 06:58

## 2021-01-01 RX ADMIN — ENOXAPARIN SODIUM 30 MG: 30 INJECTION SUBCUTANEOUS at 21:39

## 2021-01-01 RX ADMIN — ZINC SULFATE 220 MG (50 MG) CAPSULE 50 MG: CAPSULE at 09:24

## 2021-01-01 RX ADMIN — FUROSEMIDE 20 MG: 20 TABLET ORAL at 10:27

## 2021-01-01 RX ADMIN — CARVEDILOL 6.25 MG: 6.25 TABLET, FILM COATED ORAL at 20:30

## 2021-01-01 RX ADMIN — ENOXAPARIN SODIUM 30 MG: 30 INJECTION SUBCUTANEOUS at 21:56

## 2021-01-01 RX ADMIN — BUSPIRONE HYDROCHLORIDE 5 MG: 5 TABLET ORAL at 08:02

## 2021-01-01 RX ADMIN — TIOTROPIUM BROMIDE AND OLODATEROL 2 PUFF: 3.124; 2.736 SPRAY, METERED RESPIRATORY (INHALATION) at 21:46

## 2021-01-01 RX ADMIN — DICLOFENAC SODIUM AND MISOPROSTOL 1 TABLET: 75; 200 TABLET, DELAYED RELEASE ORAL at 10:12

## 2021-01-01 RX ADMIN — CEFEPIME HYDROCHLORIDE 2000 MG: 2 INJECTION, POWDER, FOR SOLUTION INTRAVENOUS at 04:51

## 2021-01-01 RX ADMIN — ACYCLOVIR SODIUM 700 MG: 50 INJECTION, SOLUTION INTRAVENOUS at 15:29

## 2021-01-01 RX ADMIN — TIOTROPIUM BROMIDE AND OLODATEROL 2 PUFF: 3.124; 2.736 SPRAY, METERED RESPIRATORY (INHALATION) at 09:07

## 2021-01-01 RX ADMIN — ALBUTEROL SULFATE 2 PUFF: 90 AEROSOL, METERED RESPIRATORY (INHALATION) at 15:25

## 2021-01-01 RX ADMIN — Medication 2 PUFF: at 07:39

## 2021-01-01 RX ADMIN — FLUCONAZOLE 400 MG: 2 INJECTION, SOLUTION INTRAVENOUS at 16:56

## 2021-01-01 RX ADMIN — ALBUTEROL SULFATE 2 PUFF: 90 AEROSOL, METERED RESPIRATORY (INHALATION) at 07:59

## 2021-01-01 RX ADMIN — ALBUTEROL SULFATE 2 PUFF: 90 AEROSOL, METERED RESPIRATORY (INHALATION) at 20:00

## 2021-01-01 RX ADMIN — SODIUM ZIRCONIUM CYCLOSILICATE 5 G: 5 POWDER, FOR SUSPENSION ORAL at 09:14

## 2021-01-01 RX ADMIN — FUROSEMIDE 20 MG: 20 TABLET ORAL at 09:25

## 2021-01-01 RX ADMIN — BARICITINIB 4 MG: 2 TABLET, FILM COATED ORAL at 09:52

## 2021-01-01 RX ADMIN — ADENOSINE 6 MG: 3 INJECTION, SOLUTION INTRAVENOUS at 23:28

## 2021-01-01 RX ADMIN — SODIUM CHLORIDE, PRESERVATIVE FREE 10 ML: 5 INJECTION INTRAVENOUS at 22:03

## 2021-01-01 RX ADMIN — ALBUTEROL SULFATE 2 PUFF: 90 AEROSOL, METERED RESPIRATORY (INHALATION) at 11:20

## 2021-01-01 RX ADMIN — ZINC SULFATE 220 MG (50 MG) CAPSULE 50 MG: CAPSULE at 22:11

## 2021-01-01 RX ADMIN — FUROSEMIDE 40 MG: 10 INJECTION, SOLUTION INTRAMUSCULAR; INTRAVENOUS at 09:58

## 2021-01-01 RX ADMIN — ENOXAPARIN SODIUM 30 MG: 30 INJECTION SUBCUTANEOUS at 21:27

## 2021-01-01 RX ADMIN — MONTELUKAST 10 MG: 10 TABLET, FILM COATED ORAL at 20:52

## 2021-01-01 RX ADMIN — ALBUMIN (HUMAN) 25 G: 12.5 INJECTION, SOLUTION INTRAVENOUS at 15:13

## 2021-01-01 RX ADMIN — VANCOMYCIN 1250 MG: 1.75 INJECTION, SOLUTION INTRAVENOUS at 09:23

## 2021-01-01 RX ADMIN — ENOXAPARIN SODIUM 30 MG: 30 INJECTION SUBCUTANEOUS at 20:32

## 2021-01-01 RX ADMIN — CEFEPIME HYDROCHLORIDE 2000 MG: 2 INJECTION, POWDER, FOR SOLUTION INTRAVENOUS at 16:28

## 2021-01-01 RX ADMIN — METOPROLOL TARTRATE 2.5 MG: 5 INJECTION INTRAVENOUS at 08:56

## 2021-01-01 RX ADMIN — Medication 2 PUFF: at 21:25

## 2021-01-01 RX ADMIN — METHYLPREDNISOLONE SODIUM SUCCINATE 40 MG: 40 INJECTION, POWDER, LYOPHILIZED, FOR SOLUTION INTRAMUSCULAR; INTRAVENOUS at 20:30

## 2021-01-01 RX ADMIN — CARVEDILOL 6.25 MG: 6.25 TABLET, FILM COATED ORAL at 22:26

## 2021-01-01 RX ADMIN — ALBUTEROL SULFATE 2 PUFF: 90 AEROSOL, METERED RESPIRATORY (INHALATION) at 16:34

## 2021-01-01 RX ADMIN — PANTOPRAZOLE SODIUM 40 MG: 40 INJECTION, POWDER, FOR SOLUTION INTRAVENOUS at 19:03

## 2021-01-01 RX ADMIN — Medication 2 PUFF: at 08:19

## 2021-01-01 RX ADMIN — ZINC SULFATE 220 MG (50 MG) CAPSULE 50 MG: CAPSULE at 21:45

## 2021-01-01 RX ADMIN — ZINC SULFATE 220 MG (50 MG) CAPSULE 50 MG: CAPSULE at 20:38

## 2021-01-01 RX ADMIN — TIOTROPIUM BROMIDE AND OLODATEROL 2 PUFF: 3.124; 2.736 SPRAY, METERED RESPIRATORY (INHALATION) at 08:29

## 2021-01-01 RX ADMIN — Medication 2 PUFF: at 08:00

## 2021-01-01 RX ADMIN — CARVEDILOL 6.25 MG: 6.25 TABLET, FILM COATED ORAL at 08:37

## 2021-01-01 RX ADMIN — VANCOMYCIN 1250 MG: 1.75 INJECTION, SOLUTION INTRAVENOUS at 20:23

## 2021-01-01 RX ADMIN — Medication 2 PUFF: at 08:09

## 2021-01-01 RX ADMIN — ENOXAPARIN SODIUM 30 MG: 30 INJECTION SUBCUTANEOUS at 08:36

## 2021-01-01 RX ADMIN — ALBUTEROL SULFATE 2 PUFF: 90 AEROSOL, METERED RESPIRATORY (INHALATION) at 08:34

## 2021-01-01 RX ADMIN — ERGOCALCIFEROL 50000 UNITS: 1.25 CAPSULE ORAL at 11:47

## 2021-01-01 RX ADMIN — SODIUM CHLORIDE, PRESERVATIVE FREE 10 ML: 5 INJECTION INTRAVENOUS at 09:19

## 2021-01-01 RX ADMIN — ZINC SULFATE 220 MG (50 MG) CAPSULE 50 MG: CAPSULE at 09:10

## 2021-01-01 RX ADMIN — BUMETANIDE 0.5 MG: 0.25 INJECTION, SOLUTION INTRAMUSCULAR; INTRAVENOUS at 10:26

## 2021-01-01 RX ADMIN — ALBUTEROL SULFATE 2 PUFF: 90 AEROSOL, METERED RESPIRATORY (INHALATION) at 07:52

## 2021-01-01 RX ADMIN — ENOXAPARIN SODIUM 30 MG: 30 INJECTION SUBCUTANEOUS at 09:25

## 2021-01-01 RX ADMIN — Medication 4 MCG/MIN: at 16:44

## 2021-01-01 RX ADMIN — DEXAMETHASONE SODIUM PHOSPHATE 6 MG: 10 INJECTION INTRAMUSCULAR; INTRAVENOUS at 12:09

## 2021-01-01 RX ADMIN — CEFEPIME HYDROCHLORIDE 2000 MG: 2 INJECTION, POWDER, FOR SOLUTION INTRAVENOUS at 14:48

## 2021-01-01 RX ADMIN — Medication 1000 UNITS: at 10:40

## 2021-01-01 RX ADMIN — PROPOFOL 25 MCG/KG/MIN: 10 INJECTION, EMULSION INTRAVENOUS at 21:33

## 2021-01-01 RX ADMIN — ZINC SULFATE 220 MG (50 MG) CAPSULE 50 MG: CAPSULE at 21:24

## 2021-01-01 RX ADMIN — PROPOFOL 20 MCG/KG/MIN: 10 INJECTION, EMULSION INTRAVENOUS at 10:42

## 2021-01-01 RX ADMIN — CARVEDILOL 6.25 MG: 6.25 TABLET, FILM COATED ORAL at 21:45

## 2021-01-01 RX ADMIN — Medication 1000 UNITS: at 08:20

## 2021-01-01 RX ADMIN — FLUCONAZOLE 200 MG: 200 INJECTION, SOLUTION INTRAVENOUS at 18:19

## 2021-01-01 RX ADMIN — MONTELUKAST 10 MG: 10 TABLET, FILM COATED ORAL at 21:25

## 2021-01-01 RX ADMIN — ZINC SULFATE 220 MG (50 MG) CAPSULE 50 MG: CAPSULE at 09:28

## 2021-01-01 RX ADMIN — METHYLPREDNISOLONE SODIUM SUCCINATE 40 MG: 40 INJECTION, POWDER, LYOPHILIZED, FOR SOLUTION INTRAMUSCULAR; INTRAVENOUS at 10:08

## 2021-01-01 RX ADMIN — ZINC SULFATE 220 MG (50 MG) CAPSULE 50 MG: CAPSULE at 20:29

## 2021-01-01 RX ADMIN — Medication 1000 UNITS: at 09:29

## 2021-01-01 RX ADMIN — ALBUTEROL SULFATE 2 PUFF: 90 AEROSOL, METERED RESPIRATORY (INHALATION) at 15:19

## 2021-01-01 RX ADMIN — FLUCONAZOLE 200 MG: 200 INJECTION, SOLUTION INTRAVENOUS at 17:46

## 2021-01-01 RX ADMIN — BACITRACIN, NEOMYCIN, POLYMYXIN B: 400; 3.5; 5 OINTMENT TOPICAL at 23:35

## 2021-01-01 RX ADMIN — METHYLPREDNISOLONE SODIUM SUCCINATE 40 MG: 40 INJECTION, POWDER, LYOPHILIZED, FOR SOLUTION INTRAMUSCULAR; INTRAVENOUS at 21:45

## 2021-01-01 RX ADMIN — METHYLPREDNISOLONE SODIUM SUCCINATE 40 MG: 40 INJECTION, POWDER, LYOPHILIZED, FOR SOLUTION INTRAMUSCULAR; INTRAVENOUS at 20:38

## 2021-01-01 RX ADMIN — ALBUTEROL SULFATE 2 PUFF: 90 AEROSOL, METERED RESPIRATORY (INHALATION) at 12:35

## 2021-01-01 RX ADMIN — FUROSEMIDE 20 MG: 20 TABLET ORAL at 08:39

## 2021-01-01 RX ADMIN — SODIUM CHLORIDE, PRESERVATIVE FREE 10 ML: 5 INJECTION INTRAVENOUS at 08:39

## 2021-01-01 RX ADMIN — TIOTROPIUM BROMIDE AND OLODATEROL 2 PUFF: 3.124; 2.736 SPRAY, METERED RESPIRATORY (INHALATION) at 21:10

## 2021-01-01 RX ADMIN — OXYCODONE HYDROCHLORIDE AND ACETAMINOPHEN 1000 MG: 500 TABLET ORAL at 22:11

## 2021-01-01 RX ADMIN — SODIUM CHLORIDE, PRESERVATIVE FREE 10 ML: 5 INJECTION INTRAVENOUS at 08:02

## 2021-01-01 RX ADMIN — CARVEDILOL 6.25 MG: 6.25 TABLET, FILM COATED ORAL at 09:08

## 2021-01-01 RX ADMIN — FUROSEMIDE 20 MG: 20 TABLET ORAL at 09:18

## 2021-01-01 RX ADMIN — ALBUTEROL SULFATE 2 PUFF: 90 AEROSOL, METERED RESPIRATORY (INHALATION) at 16:10

## 2021-01-01 RX ADMIN — BISACODYL 5 MG: 5 TABLET, COATED ORAL at 20:52

## 2021-01-01 RX ADMIN — SODIUM CHLORIDE, PRESERVATIVE FREE 10 ML: 5 INJECTION INTRAVENOUS at 20:29

## 2021-01-01 RX ADMIN — ERGOCALCIFEROL 50000 UNITS: 1.25 CAPSULE ORAL at 09:47

## 2021-01-01 RX ADMIN — Medication 2 PUFF: at 07:52

## 2021-01-01 RX ADMIN — IOPAMIDOL 83 ML: 755 INJECTION, SOLUTION INTRAVENOUS at 16:19

## 2021-01-01 RX ADMIN — FLUCONAZOLE 100 MG: 2 INJECTION, SOLUTION INTRAVENOUS at 17:20

## 2021-01-01 RX ADMIN — POLYETHYLENE GLYCOL (3350) 17 G: 17 POWDER, FOR SOLUTION ORAL at 13:14

## 2021-01-01 RX ADMIN — TIOTROPIUM BROMIDE AND OLODATEROL 2 PUFF: 3.124; 2.736 SPRAY, METERED RESPIRATORY (INHALATION) at 20:26

## 2021-01-01 RX ADMIN — CARVEDILOL 6.25 MG: 6.25 TABLET, FILM COATED ORAL at 21:26

## 2021-01-01 RX ADMIN — DEXTROSE MONOHYDRATE 5 MG/HR: 50 INJECTION, SOLUTION INTRAVENOUS at 06:58

## 2021-01-01 RX ADMIN — ALBUTEROL SULFATE 2 PUFF: 90 AEROSOL, METERED RESPIRATORY (INHALATION) at 19:39

## 2021-01-01 RX ADMIN — AZITHROMYCIN MONOHYDRATE 500 MG: 500 INJECTION, POWDER, LYOPHILIZED, FOR SOLUTION INTRAVENOUS at 20:54

## 2021-01-01 RX ADMIN — ZINC SULFATE 220 MG (50 MG) CAPSULE 50 MG: CAPSULE at 19:45

## 2021-01-01 RX ADMIN — ZINC SULFATE 220 MG (50 MG) CAPSULE 50 MG: CAPSULE at 21:58

## 2021-01-01 RX ADMIN — TIOTROPIUM BROMIDE AND OLODATEROL 2 PUFF: 3.124; 2.736 SPRAY, METERED RESPIRATORY (INHALATION) at 20:38

## 2021-01-01 RX ADMIN — ENOXAPARIN SODIUM 30 MG: 30 INJECTION SUBCUTANEOUS at 20:14

## 2021-01-01 RX ADMIN — PHENYLEPHRINE HYDROCHLORIDE 30 MCG/MIN: 10 INJECTION INTRAVENOUS at 23:42

## 2021-01-01 RX ADMIN — CARVEDILOL 6.25 MG: 6.25 TABLET, FILM COATED ORAL at 08:56

## 2021-01-01 RX ADMIN — PANTOPRAZOLE SODIUM 40 MG: 40 INJECTION, POWDER, FOR SOLUTION INTRAVENOUS at 08:02

## 2021-01-01 RX ADMIN — OXYCODONE HYDROCHLORIDE AND ACETAMINOPHEN 1000 MG: 500 TABLET ORAL at 22:13

## 2021-01-01 RX ADMIN — ZINC SULFATE 220 MG (50 MG) CAPSULE 50 MG: CAPSULE at 20:48

## 2021-01-01 RX ADMIN — CEFEPIME HYDROCHLORIDE 2000 MG: 2 INJECTION, POWDER, FOR SOLUTION INTRAVENOUS at 21:22

## 2021-01-01 RX ADMIN — TIOTROPIUM BROMIDE AND OLODATEROL 2 PUFF: 3.124; 2.736 SPRAY, METERED RESPIRATORY (INHALATION) at 20:06

## 2021-01-01 RX ADMIN — OXYCODONE HYDROCHLORIDE AND ACETAMINOPHEN 1000 MG: 500 TABLET ORAL at 20:32

## 2021-01-01 RX ADMIN — Medication: at 18:41

## 2021-01-01 RX ADMIN — Medication 1000 UNITS: at 08:06

## 2021-01-01 RX ADMIN — ALBUTEROL SULFATE 2 PUFF: 90 AEROSOL, METERED RESPIRATORY (INHALATION) at 16:24

## 2021-01-01 RX ADMIN — Medication 1000 UNITS: at 08:44

## 2021-01-01 RX ADMIN — BUSPIRONE HYDROCHLORIDE 5 MG: 5 TABLET ORAL at 12:23

## 2021-01-01 RX ADMIN — TIOTROPIUM BROMIDE AND OLODATEROL 2 PUFF: 3.124; 2.736 SPRAY, METERED RESPIRATORY (INHALATION) at 20:18

## 2021-01-01 RX ADMIN — SODIUM CHLORIDE, PRESERVATIVE FREE 10 ML: 5 INJECTION INTRAVENOUS at 21:45

## 2021-01-01 RX ADMIN — Medication 2 PUFF: at 08:41

## 2021-01-01 RX ADMIN — ZINC SULFATE 220 MG (50 MG) CAPSULE 50 MG: CAPSULE at 21:53

## 2021-01-01 RX ADMIN — DEXAMETHASONE SODIUM PHOSPHATE 10 MG: 10 INJECTION INTRAMUSCULAR; INTRAVENOUS at 16:25

## 2021-01-01 RX ADMIN — ALBUTEROL SULFATE 2 PUFF: 90 AEROSOL, METERED RESPIRATORY (INHALATION) at 21:08

## 2021-01-01 RX ADMIN — BISACODYL 5 MG: 5 TABLET, COATED ORAL at 09:41

## 2021-01-01 RX ADMIN — DEXTROSE MONOHYDRATE 5 MG/HR: 50 INJECTION, SOLUTION INTRAVENOUS at 14:59

## 2021-01-01 RX ADMIN — CARVEDILOL 6.25 MG: 6.25 TABLET, FILM COATED ORAL at 20:36

## 2021-01-01 RX ADMIN — DIGOXIN 250 MCG: 0.25 INJECTION INTRAMUSCULAR; INTRAVENOUS at 14:53

## 2021-01-01 RX ADMIN — LORAZEPAM 1 MG: 1 TABLET ORAL at 21:28

## 2021-01-01 RX ADMIN — METHYLPREDNISOLONE SODIUM SUCCINATE 40 MG: 40 INJECTION, POWDER, LYOPHILIZED, FOR SOLUTION INTRAMUSCULAR; INTRAVENOUS at 08:39

## 2021-01-01 RX ADMIN — CEFEPIME HYDROCHLORIDE 2000 MG: 2 INJECTION, POWDER, FOR SOLUTION INTRAVENOUS at 16:36

## 2021-01-01 RX ADMIN — Medication 2 PUFF: at 07:59

## 2021-01-01 RX ADMIN — ENOXAPARIN SODIUM 30 MG: 30 INJECTION SUBCUTANEOUS at 20:46

## 2021-01-01 RX ADMIN — LORAZEPAM 1 MG: 2 INJECTION INTRAMUSCULAR; INTRAVENOUS at 03:09

## 2021-01-01 RX ADMIN — ACYCLOVIR SODIUM 700 MG: 50 INJECTION, SOLUTION INTRAVENOUS at 03:37

## 2021-01-01 RX ADMIN — SODIUM CHLORIDE, PRESERVATIVE FREE 10 ML: 5 INJECTION INTRAVENOUS at 09:48

## 2021-01-01 RX ADMIN — FUROSEMIDE 40 MG: 10 INJECTION, SOLUTION INTRAMUSCULAR; INTRAVENOUS at 17:17

## 2021-01-01 RX ADMIN — Medication 1000 UNITS: at 08:37

## 2021-01-01 RX ADMIN — SODIUM CHLORIDE, PRESERVATIVE FREE 10 ML: 5 INJECTION INTRAVENOUS at 10:02

## 2021-01-01 RX ADMIN — ZINC SULFATE 220 MG (50 MG) CAPSULE 50 MG: CAPSULE at 20:07

## 2021-01-01 RX ADMIN — ENOXAPARIN SODIUM 30 MG: 30 INJECTION SUBCUTANEOUS at 21:21

## 2021-01-01 RX ADMIN — OXYCODONE HYDROCHLORIDE AND ACETAMINOPHEN 1000 MG: 500 TABLET ORAL at 08:19

## 2021-01-01 RX ADMIN — OXYCODONE HYDROCHLORIDE AND ACETAMINOPHEN 1000 MG: 500 TABLET ORAL at 09:10

## 2021-01-01 RX ADMIN — ALBUTEROL SULFATE 2 PUFF: 90 AEROSOL, METERED RESPIRATORY (INHALATION) at 15:16

## 2021-01-01 RX ADMIN — SODIUM CHLORIDE, PRESERVATIVE FREE 10 ML: 5 INJECTION INTRAVENOUS at 09:42

## 2021-01-01 RX ADMIN — ALBUTEROL SULFATE 2 PUFF: 90 AEROSOL, METERED RESPIRATORY (INHALATION) at 16:13

## 2021-01-01 RX ADMIN — DICLOFENAC SODIUM AND MISOPROSTOL 1 TABLET: 75; 200 TABLET, DELAYED RELEASE ORAL at 10:44

## 2021-01-01 RX ADMIN — PRAVASTATIN SODIUM 40 MG: 40 TABLET ORAL at 17:19

## 2021-01-01 RX ADMIN — BUMETANIDE 0.5 MG: 0.25 INJECTION, SOLUTION INTRAMUSCULAR; INTRAVENOUS at 22:11

## 2021-01-01 RX ADMIN — CARVEDILOL 6.25 MG: 6.25 TABLET, FILM COATED ORAL at 08:54

## 2021-01-01 RX ADMIN — OXYCODONE HYDROCHLORIDE AND ACETAMINOPHEN 1000 MG: 500 TABLET ORAL at 22:07

## 2021-01-01 RX ADMIN — FLUCONAZOLE 200 MG: 200 INJECTION, SOLUTION INTRAVENOUS at 17:13

## 2021-01-01 RX ADMIN — Medication 2 PUFF: at 07:46

## 2021-01-01 RX ADMIN — ACYCLOVIR SODIUM 700 MG: 50 INJECTION, SOLUTION INTRAVENOUS at 15:03

## 2021-01-01 RX ADMIN — SODIUM CHLORIDE 25 ML: 9 INJECTION, SOLUTION INTRAVENOUS at 22:25

## 2021-01-01 RX ADMIN — LORAZEPAM 1 MG: 1 TABLET ORAL at 10:05

## 2021-01-01 RX ADMIN — ENOXAPARIN SODIUM 30 MG: 30 INJECTION SUBCUTANEOUS at 08:21

## 2021-01-01 RX ADMIN — FUROSEMIDE 40 MG: 10 INJECTION, SOLUTION INTRAMUSCULAR; INTRAVENOUS at 09:18

## 2021-01-01 RX ADMIN — ZINC SULFATE 220 MG (50 MG) CAPSULE 50 MG: CAPSULE at 10:02

## 2021-01-01 RX ADMIN — ZINC SULFATE 220 MG (50 MG) CAPSULE 50 MG: CAPSULE at 08:34

## 2021-01-01 RX ADMIN — ALBUTEROL SULFATE 2 PUFF: 90 AEROSOL, METERED RESPIRATORY (INHALATION) at 19:54

## 2021-01-01 RX ADMIN — PANTOPRAZOLE SODIUM 40 MG: 40 TABLET, DELAYED RELEASE ORAL at 07:53

## 2021-01-01 RX ADMIN — DEXAMETHASONE 6 MG: 4 TABLET ORAL at 08:36

## 2021-01-01 RX ADMIN — ALBUTEROL SULFATE 2 PUFF: 90 AEROSOL, METERED RESPIRATORY (INHALATION) at 23:00

## 2021-01-01 RX ADMIN — Medication 1000 UNITS: at 08:50

## 2021-01-01 RX ADMIN — Medication 2 PUFF: at 23:14

## 2021-01-01 RX ADMIN — ALBUTEROL SULFATE 2 PUFF: 90 AEROSOL, METERED RESPIRATORY (INHALATION) at 08:19

## 2021-01-01 RX ADMIN — DICLOFENAC SODIUM AND MISOPROSTOL 1 TABLET: 75; 200 TABLET, DELAYED RELEASE ORAL at 20:39

## 2021-01-01 RX ADMIN — ALBUTEROL SULFATE 2 PUFF: 90 AEROSOL, METERED RESPIRATORY (INHALATION) at 11:43

## 2021-01-01 RX ADMIN — METHYLPREDNISOLONE SODIUM SUCCINATE 80 MG: 125 INJECTION, POWDER, FOR SOLUTION INTRAMUSCULAR; INTRAVENOUS at 14:15

## 2021-01-01 RX ADMIN — Medication 2 PUFF: at 20:22

## 2021-01-01 RX ADMIN — SODIUM CHLORIDE, PRESERVATIVE FREE 10 ML: 5 INJECTION INTRAVENOUS at 20:11

## 2021-01-01 RX ADMIN — ENOXAPARIN SODIUM 30 MG: 30 INJECTION SUBCUTANEOUS at 20:55

## 2021-01-01 RX ADMIN — LORAZEPAM 1 MG: 2 INJECTION INTRAMUSCULAR; INTRAVENOUS at 12:21

## 2021-01-01 RX ADMIN — DICLOFENAC SODIUM AND MISOPROSTOL 1 TABLET: 75; 200 TABLET, DELAYED RELEASE ORAL at 20:44

## 2021-01-01 RX ADMIN — ALBUTEROL SULFATE 2 PUFF: 90 AEROSOL, METERED RESPIRATORY (INHALATION) at 15:28

## 2021-01-01 RX ADMIN — SODIUM CHLORIDE, PRESERVATIVE FREE 10 ML: 5 INJECTION INTRAVENOUS at 10:09

## 2021-01-01 RX ADMIN — DICLOFENAC SODIUM AND MISOPROSTOL 1 TABLET: 75; 200 TABLET, DELAYED RELEASE ORAL at 22:08

## 2021-01-01 RX ADMIN — AZITHROMYCIN MONOHYDRATE 500 MG: 500 INJECTION, POWDER, LYOPHILIZED, FOR SOLUTION INTRAVENOUS at 10:40

## 2021-01-01 RX ADMIN — TIOTROPIUM BROMIDE AND OLODATEROL 2 PUFF: 3.124; 2.736 SPRAY, METERED RESPIRATORY (INHALATION) at 07:25

## 2021-01-01 RX ADMIN — BISACODYL 5 MG: 5 TABLET, COATED ORAL at 08:07

## 2021-01-01 RX ADMIN — OXYCODONE HYDROCHLORIDE AND ACETAMINOPHEN 1000 MG: 500 TABLET ORAL at 08:32

## 2021-01-01 RX ADMIN — ENOXAPARIN SODIUM 30 MG: 30 INJECTION SUBCUTANEOUS at 08:08

## 2021-01-01 RX ADMIN — HYDROCODONE BITARTRATE AND ACETAMINOPHEN 1 TABLET: 5; 325 TABLET ORAL at 10:27

## 2021-01-01 RX ADMIN — VANCOMYCIN 1250 MG: 1.75 INJECTION, SOLUTION INTRAVENOUS at 09:07

## 2021-01-01 RX ADMIN — SODIUM CHLORIDE, PRESERVATIVE FREE 10 ML: 5 INJECTION INTRAVENOUS at 20:21

## 2021-01-01 RX ADMIN — DIVALPROEX SODIUM 250 MG: 250 TABLET, FILM COATED, EXTENDED RELEASE ORAL at 10:26

## 2021-01-01 RX ADMIN — OXYCODONE HYDROCHLORIDE AND ACETAMINOPHEN 1000 MG: 500 TABLET ORAL at 20:28

## 2021-01-01 RX ADMIN — Medication 2 PUFF: at 08:34

## 2021-01-01 RX ADMIN — OXYCODONE HYDROCHLORIDE AND ACETAMINOPHEN 1000 MG: 500 TABLET ORAL at 15:18

## 2021-01-01 RX ADMIN — Medication 2 PUFF: at 08:06

## 2021-01-01 RX ADMIN — ZINC SULFATE 220 MG (50 MG) CAPSULE 50 MG: CAPSULE at 16:00

## 2021-01-01 RX ADMIN — FUROSEMIDE 40 MG: 10 INJECTION, SOLUTION INTRAMUSCULAR; INTRAVENOUS at 09:09

## 2021-01-01 RX ADMIN — FLUCONAZOLE 100 MG: 2 INJECTION, SOLUTION INTRAVENOUS at 16:53

## 2021-01-01 RX ADMIN — CARVEDILOL 6.25 MG: 6.25 TABLET, FILM COATED ORAL at 10:27

## 2021-01-01 RX ADMIN — BISACODYL 5 MG: 5 TABLET, COATED ORAL at 09:33

## 2021-01-01 RX ADMIN — TIOTROPIUM BROMIDE AND OLODATEROL 2 PUFF: 3.124; 2.736 SPRAY, METERED RESPIRATORY (INHALATION) at 19:36

## 2021-01-01 RX ADMIN — METHYLPREDNISOLONE SODIUM SUCCINATE 40 MG: 40 INJECTION, POWDER, LYOPHILIZED, FOR SOLUTION INTRAMUSCULAR; INTRAVENOUS at 21:44

## 2021-01-01 RX ADMIN — LORAZEPAM 1 MG: 1 TABLET ORAL at 09:14

## 2021-01-01 RX ADMIN — ALBUTEROL SULFATE 2 PUFF: 90 AEROSOL, METERED RESPIRATORY (INHALATION) at 07:18

## 2021-01-01 RX ADMIN — CEFEPIME HYDROCHLORIDE 2000 MG: 2 INJECTION, POWDER, FOR SOLUTION INTRAVENOUS at 15:21

## 2021-01-01 RX ADMIN — ENOXAPARIN SODIUM 30 MG: 30 INJECTION SUBCUTANEOUS at 20:45

## 2021-01-01 RX ADMIN — MINERAL OIL 30 ML: 1000 LIQUID ORAL at 20:21

## 2021-01-01 RX ADMIN — ENOXAPARIN SODIUM 30 MG: 30 INJECTION SUBCUTANEOUS at 08:42

## 2021-01-01 RX ADMIN — Medication 2 PUFF: at 19:21

## 2021-01-01 RX ADMIN — ENOXAPARIN SODIUM 30 MG: 30 INJECTION SUBCUTANEOUS at 19:45

## 2021-01-01 RX ADMIN — MONTELUKAST 10 MG: 10 TABLET, FILM COATED ORAL at 20:38

## 2021-01-01 RX ADMIN — Medication 20 MCG/MIN: at 04:20

## 2021-01-01 RX ADMIN — Medication: at 21:30

## 2021-01-01 RX ADMIN — HYDROCODONE BITARTRATE AND ACETAMINOPHEN 1 TABLET: 5; 325 TABLET ORAL at 21:56

## 2021-01-01 RX ADMIN — EPINEPHRINE 1 MCG/MIN: 1 INJECTION INTRAMUSCULAR; INTRAVENOUS; SUBCUTANEOUS at 01:36

## 2021-01-01 RX ADMIN — CEFEPIME HYDROCHLORIDE 2000 MG: 2 INJECTION, POWDER, FOR SOLUTION INTRAVENOUS at 04:17

## 2021-01-01 RX ADMIN — Medication 1000 UNITS: at 09:12

## 2021-01-01 RX ADMIN — FUROSEMIDE 20 MG: 20 TABLET ORAL at 10:41

## 2021-01-01 RX ADMIN — OXYCODONE HYDROCHLORIDE AND ACETAMINOPHEN 1000 MG: 500 TABLET ORAL at 09:41

## 2021-01-01 RX ADMIN — ALBUTEROL SULFATE 2 PUFF: 90 AEROSOL, METERED RESPIRATORY (INHALATION) at 08:12

## 2021-01-01 RX ADMIN — FUROSEMIDE 40 MG: 10 INJECTION, SOLUTION INTRAMUSCULAR; INTRAVENOUS at 10:00

## 2021-01-01 RX ADMIN — BARICITINIB 4 MG: 2 TABLET, FILM COATED ORAL at 09:10

## 2021-01-01 RX ADMIN — SODIUM CHLORIDE 25 ML: 9 INJECTION, SOLUTION INTRAVENOUS at 05:46

## 2021-01-01 RX ADMIN — ZINC SULFATE 220 MG (50 MG) CAPSULE 50 MG: CAPSULE at 09:59

## 2021-01-01 RX ADMIN — KETOROLAC TROMETHAMINE 30 MG: 30 INJECTION, SOLUTION INTRAMUSCULAR; INTRAVENOUS at 18:36

## 2021-01-01 RX ADMIN — MONTELUKAST 10 MG: 10 TABLET, FILM COATED ORAL at 20:29

## 2021-01-01 RX ADMIN — PANTOPRAZOLE SODIUM 40 MG: 40 INJECTION, POWDER, FOR SOLUTION INTRAVENOUS at 21:54

## 2021-01-01 RX ADMIN — MONTELUKAST 10 MG: 10 TABLET, FILM COATED ORAL at 21:16

## 2021-01-01 RX ADMIN — ALBUTEROL SULFATE 2 PUFF: 90 AEROSOL, METERED RESPIRATORY (INHALATION) at 12:00

## 2021-01-01 RX ADMIN — ENOXAPARIN SODIUM 30 MG: 30 INJECTION SUBCUTANEOUS at 21:17

## 2021-01-01 RX ADMIN — GUAIFENESIN AND DEXTROMETHORPHAN 5 ML: 100; 10 SYRUP ORAL at 14:17

## 2021-01-01 RX ADMIN — ENOXAPARIN SODIUM 30 MG: 30 INJECTION SUBCUTANEOUS at 09:08

## 2021-01-01 RX ADMIN — ZINC SULFATE 220 MG (50 MG) CAPSULE 50 MG: CAPSULE at 10:28

## 2021-01-01 RX ADMIN — OXYCODONE HYDROCHLORIDE AND ACETAMINOPHEN 1000 MG: 500 TABLET ORAL at 08:07

## 2021-01-01 RX ADMIN — ALBUTEROL SULFATE 2 PUFF: 90 AEROSOL, METERED RESPIRATORY (INHALATION) at 08:23

## 2021-01-01 RX ADMIN — ALBUTEROL SULFATE 2 PUFF: 90 AEROSOL, METERED RESPIRATORY (INHALATION) at 15:20

## 2021-01-01 RX ADMIN — ENOXAPARIN SODIUM 30 MG: 30 INJECTION SUBCUTANEOUS at 08:20

## 2021-01-01 RX ADMIN — METHYLPREDNISOLONE SODIUM SUCCINATE 40 MG: 40 INJECTION, POWDER, LYOPHILIZED, FOR SOLUTION INTRAMUSCULAR; INTRAVENOUS at 19:45

## 2021-01-01 RX ADMIN — BISACODYL 5 MG: 5 TABLET, COATED ORAL at 09:28

## 2021-01-01 RX ADMIN — SODIUM CHLORIDE, PRESERVATIVE FREE 10 ML: 5 INJECTION INTRAVENOUS at 21:24

## 2021-01-01 RX ADMIN — DEXAMETHASONE 6 MG: 4 TABLET ORAL at 20:28

## 2021-01-01 RX ADMIN — ZINC SULFATE 220 MG (50 MG) CAPSULE 50 MG: CAPSULE at 08:32

## 2021-01-01 RX ADMIN — BARICITINIB 4 MG: 2 TABLET, FILM COATED ORAL at 08:37

## 2021-01-01 RX ADMIN — OXYCODONE HYDROCHLORIDE AND ACETAMINOPHEN 1000 MG: 500 TABLET ORAL at 21:45

## 2021-01-01 RX ADMIN — FUROSEMIDE 20 MG: 20 TABLET ORAL at 09:11

## 2021-01-01 RX ADMIN — ZINC SULFATE 220 MG (50 MG) CAPSULE 50 MG: CAPSULE at 09:42

## 2021-01-01 RX ADMIN — ZINC SULFATE 220 MG (50 MG) CAPSULE 50 MG: CAPSULE at 20:28

## 2021-01-01 RX ADMIN — DILTIAZEM HYDROCHLORIDE 25 MG: 5 INJECTION INTRAVENOUS at 06:56

## 2021-01-01 RX ADMIN — TIOTROPIUM BROMIDE AND OLODATEROL 2 PUFF: 3.124; 2.736 SPRAY, METERED RESPIRATORY (INHALATION) at 08:24

## 2021-01-01 RX ADMIN — HYDROCODONE BITARTRATE AND ACETAMINOPHEN 1 TABLET: 5; 325 TABLET ORAL at 22:10

## 2021-01-01 RX ADMIN — ACYCLOVIR SODIUM 700 MG: 50 INJECTION, SOLUTION INTRAVENOUS at 19:51

## 2021-01-01 RX ADMIN — CARVEDILOL 6.25 MG: 6.25 TABLET, FILM COATED ORAL at 08:34

## 2021-01-01 RX ADMIN — ALBUTEROL SULFATE 2 PUFF: 90 AEROSOL, METERED RESPIRATORY (INHALATION) at 19:45

## 2021-01-01 RX ADMIN — ZINC SULFATE 220 MG (50 MG) CAPSULE 50 MG: CAPSULE at 22:14

## 2021-01-01 RX ADMIN — ALBUTEROL SULFATE 2 PUFF: 90 AEROSOL, METERED RESPIRATORY (INHALATION) at 07:25

## 2021-01-01 RX ADMIN — ALBUTEROL SULFATE 2 PUFF: 90 AEROSOL, METERED RESPIRATORY (INHALATION) at 11:36

## 2021-01-01 RX ADMIN — ENOXAPARIN SODIUM 30 MG: 30 INJECTION SUBCUTANEOUS at 20:38

## 2021-01-01 RX ADMIN — ZINC SULFATE 220 MG (50 MG) CAPSULE 50 MG: CAPSULE at 10:39

## 2021-01-01 RX ADMIN — TIOTROPIUM BROMIDE AND OLODATEROL 2 PUFF: 3.124; 2.736 SPRAY, METERED RESPIRATORY (INHALATION) at 09:15

## 2021-01-01 RX ADMIN — METHYLPREDNISOLONE SODIUM SUCCINATE 40 MG: 40 INJECTION, POWDER, LYOPHILIZED, FOR SOLUTION INTRAMUSCULAR; INTRAVENOUS at 21:42

## 2021-01-01 RX ADMIN — Medication 2 PUFF: at 19:23

## 2021-01-01 RX ADMIN — ENOXAPARIN SODIUM 30 MG: 30 INJECTION SUBCUTANEOUS at 08:03

## 2021-01-01 RX ADMIN — FUROSEMIDE 40 MG: 10 INJECTION, SOLUTION INTRAMUSCULAR; INTRAVENOUS at 08:33

## 2021-01-01 RX ADMIN — LORAZEPAM 1 MG: 2 INJECTION INTRAMUSCULAR; INTRAVENOUS at 18:13

## 2021-01-01 RX ADMIN — ALBUTEROL SULFATE 2 PUFF: 90 AEROSOL, METERED RESPIRATORY (INHALATION) at 20:28

## 2021-01-01 RX ADMIN — CEFEPIME HYDROCHLORIDE 2000 MG: 2 INJECTION, POWDER, FOR SOLUTION INTRAVENOUS at 04:33

## 2021-01-01 RX ADMIN — HYDROCODONE BITARTRATE AND ACETAMINOPHEN 1 TABLET: 5; 325 TABLET ORAL at 20:29

## 2021-01-01 RX ADMIN — PANTOPRAZOLE SODIUM 40 MG: 40 TABLET, DELAYED RELEASE ORAL at 05:34

## 2021-01-01 RX ADMIN — FUROSEMIDE 20 MG: 20 TABLET ORAL at 09:33

## 2021-01-01 RX ADMIN — ZINC SULFATE 220 MG (50 MG) CAPSULE 50 MG: CAPSULE at 09:32

## 2021-01-01 RX ADMIN — ALBUTEROL SULFATE 2 PUFF: 90 AEROSOL, METERED RESPIRATORY (INHALATION) at 11:24

## 2021-01-01 RX ADMIN — CARVEDILOL 6.25 MG: 6.25 TABLET, FILM COATED ORAL at 20:38

## 2021-01-01 RX ADMIN — OXYCODONE HYDROCHLORIDE AND ACETAMINOPHEN 1000 MG: 500 TABLET ORAL at 08:20

## 2021-01-01 RX ADMIN — TIOTROPIUM BROMIDE AND OLODATEROL 2 PUFF: 3.124; 2.736 SPRAY, METERED RESPIRATORY (INHALATION) at 19:39

## 2021-01-01 RX ADMIN — LORAZEPAM 1 MG: 2 INJECTION INTRAMUSCULAR; INTRAVENOUS at 20:20

## 2021-01-01 RX ADMIN — PRAVASTATIN SODIUM 40 MG: 40 TABLET ORAL at 17:03

## 2021-01-01 RX ADMIN — PROPOFOL 10 MCG/KG/MIN: 10 INJECTION, EMULSION INTRAVENOUS at 11:22

## 2021-01-01 RX ADMIN — HYDROCODONE BITARTRATE AND ACETAMINOPHEN 1 TABLET: 5; 325 TABLET ORAL at 08:07

## 2021-01-01 RX ADMIN — METOPROLOL TARTRATE 2.5 MG: 1 INJECTION, SOLUTION INTRAVENOUS at 07:09

## 2021-01-01 RX ADMIN — Medication 2 PUFF: at 07:50

## 2021-01-01 RX ADMIN — Medication 2 PUFF: at 07:36

## 2021-01-01 RX ADMIN — OXYCODONE HYDROCHLORIDE AND ACETAMINOPHEN 1000 MG: 500 TABLET ORAL at 22:04

## 2021-01-01 RX ADMIN — SODIUM CHLORIDE, PRESERVATIVE FREE 10 ML: 5 INJECTION INTRAVENOUS at 09:26

## 2021-01-01 RX ADMIN — OXYCODONE HYDROCHLORIDE AND ACETAMINOPHEN 1000 MG: 500 TABLET ORAL at 22:26

## 2021-01-01 RX ADMIN — CEFEPIME HYDROCHLORIDE 2000 MG: 2 INJECTION, POWDER, FOR SOLUTION INTRAVENOUS at 17:17

## 2021-01-01 RX ADMIN — CARVEDILOL 6.25 MG: 6.25 TABLET, FILM COATED ORAL at 10:02

## 2021-01-01 RX ADMIN — ALBUTEROL SULFATE 2 PUFF: 90 AEROSOL, METERED RESPIRATORY (INHALATION) at 12:45

## 2021-01-01 RX ADMIN — BUSPIRONE HYDROCHLORIDE 5 MG: 5 TABLET ORAL at 15:12

## 2021-01-01 RX ADMIN — ZINC SULFATE 220 MG (50 MG) CAPSULE 50 MG: CAPSULE at 20:32

## 2021-01-01 RX ADMIN — ALBUTEROL SULFATE 2 PUFF: 90 AEROSOL, METERED RESPIRATORY (INHALATION) at 08:09

## 2021-01-01 RX ADMIN — BISACODYL 5 MG: 5 TABLET, COATED ORAL at 09:42

## 2021-01-01 RX ADMIN — SODIUM CHLORIDE, PRESERVATIVE FREE 10 ML: 5 INJECTION INTRAVENOUS at 10:41

## 2021-01-01 RX ADMIN — PANTOPRAZOLE SODIUM 40 MG: 40 TABLET, DELAYED RELEASE ORAL at 05:22

## 2021-01-01 RX ADMIN — Medication: at 22:15

## 2021-01-01 RX ADMIN — SODIUM CHLORIDE, PRESERVATIVE FREE 10 ML: 5 INJECTION INTRAVENOUS at 21:55

## 2021-01-01 RX ADMIN — METHYLPREDNISOLONE SODIUM SUCCINATE 40 MG: 40 INJECTION, POWDER, LYOPHILIZED, FOR SOLUTION INTRAMUSCULAR; INTRAVENOUS at 20:36

## 2021-01-01 RX ADMIN — CEFEPIME HYDROCHLORIDE 2000 MG: 2 INJECTION, POWDER, FOR SOLUTION INTRAVENOUS at 12:38

## 2021-01-01 RX ADMIN — CEFEPIME HYDROCHLORIDE 2000 MG: 2 INJECTION, POWDER, FOR SOLUTION INTRAVENOUS at 04:21

## 2021-01-01 RX ADMIN — SODIUM CHLORIDE 1000 ML: 9 INJECTION, SOLUTION INTRAVENOUS at 14:18

## 2021-01-01 RX ADMIN — ENOXAPARIN SODIUM 30 MG: 30 INJECTION SUBCUTANEOUS at 22:05

## 2021-01-01 RX ADMIN — SODIUM CHLORIDE, PRESERVATIVE FREE 10 ML: 5 INJECTION INTRAVENOUS at 08:21

## 2021-01-01 RX ADMIN — DEXAMETHASONE SODIUM PHOSPHATE 6 MG: 10 INJECTION INTRAMUSCULAR; INTRAVENOUS at 15:01

## 2021-01-01 RX ADMIN — METHYLPREDNISOLONE SODIUM SUCCINATE 40 MG: 40 INJECTION, POWDER, LYOPHILIZED, FOR SOLUTION INTRAMUSCULAR; INTRAVENOUS at 20:49

## 2021-01-01 RX ADMIN — OXYCODONE HYDROCHLORIDE AND ACETAMINOPHEN 1000 MG: 500 TABLET ORAL at 10:28

## 2021-01-01 RX ADMIN — ENOXAPARIN SODIUM 30 MG: 30 INJECTION SUBCUTANEOUS at 19:37

## 2021-01-01 RX ADMIN — SODIUM CHLORIDE, PRESERVATIVE FREE 10 ML: 5 INJECTION INTRAVENOUS at 20:46

## 2021-01-01 RX ADMIN — VASOPRESSIN 0.04 UNITS/MIN: 20 INJECTION INTRAVENOUS at 09:09

## 2021-01-01 RX ADMIN — ENOXAPARIN SODIUM 30 MG: 30 INJECTION SUBCUTANEOUS at 10:27

## 2021-01-01 RX ADMIN — Medication: at 02:54

## 2021-01-01 RX ADMIN — Medication 2 PUFF: at 20:37

## 2021-01-01 RX ADMIN — METHYLPREDNISOLONE SODIUM SUCCINATE 40 MG: 40 INJECTION, POWDER, LYOPHILIZED, FOR SOLUTION INTRAMUSCULAR; INTRAVENOUS at 09:28

## 2021-01-01 RX ADMIN — Medication 1000 UNITS: at 09:55

## 2021-01-01 RX ADMIN — Medication 2 PUFF: at 19:36

## 2021-01-01 RX ADMIN — DEXAMETHASONE 6 MG: 4 TABLET ORAL at 08:35

## 2021-01-01 RX ADMIN — METHYLPREDNISOLONE SODIUM SUCCINATE 40 MG: 40 INJECTION, POWDER, LYOPHILIZED, FOR SOLUTION INTRAMUSCULAR; INTRAVENOUS at 20:54

## 2021-01-01 RX ADMIN — Medication 2 PUFF: at 19:42

## 2021-01-01 RX ADMIN — ZINC SULFATE 220 MG (50 MG) CAPSULE 50 MG: CAPSULE at 20:44

## 2021-01-01 RX ADMIN — ALBUTEROL SULFATE 2 PUFF: 90 AEROSOL, METERED RESPIRATORY (INHALATION) at 15:23

## 2021-01-01 RX ADMIN — BUSPIRONE HYDROCHLORIDE 5 MG: 5 TABLET ORAL at 21:53

## 2021-01-01 RX ADMIN — ACYCLOVIR SODIUM 700 MG: 50 INJECTION, SOLUTION INTRAVENOUS at 05:48

## 2021-01-01 RX ADMIN — CARVEDILOL 6.25 MG: 6.25 TABLET, FILM COATED ORAL at 21:44

## 2021-01-01 RX ADMIN — LORAZEPAM 1 MG: 2 INJECTION INTRAMUSCULAR; INTRAVENOUS at 22:06

## 2021-01-01 RX ADMIN — ALBUTEROL SULFATE 2 PUFF: 90 AEROSOL, METERED RESPIRATORY (INHALATION) at 15:30

## 2021-01-01 RX ADMIN — OXYCODONE HYDROCHLORIDE AND ACETAMINOPHEN 1000 MG: 500 TABLET ORAL at 21:26

## 2021-01-01 RX ADMIN — MONTELUKAST 10 MG: 10 TABLET, FILM COATED ORAL at 20:32

## 2021-01-01 RX ADMIN — MONTELUKAST 10 MG: 10 TABLET, FILM COATED ORAL at 20:07

## 2021-01-01 RX ADMIN — ENOXAPARIN SODIUM 30 MG: 30 INJECTION SUBCUTANEOUS at 08:32

## 2021-01-01 RX ADMIN — OXYCODONE HYDROCHLORIDE AND ACETAMINOPHEN 1000 MG: 500 TABLET ORAL at 08:39

## 2021-01-01 RX ADMIN — OXYCODONE HYDROCHLORIDE AND ACETAMINOPHEN 1000 MG: 500 TABLET ORAL at 12:59

## 2021-01-01 RX ADMIN — DEXAMETHASONE SODIUM PHOSPHATE 10 MG: 10 INJECTION INTRAMUSCULAR; INTRAVENOUS at 10:34

## 2021-01-01 RX ADMIN — DEXAMETHASONE SODIUM PHOSPHATE 10 MG: 10 INJECTION INTRAMUSCULAR; INTRAVENOUS at 09:41

## 2021-01-01 RX ADMIN — LORAZEPAM 1 MG: 2 INJECTION INTRAMUSCULAR; INTRAVENOUS at 12:08

## 2021-01-01 RX ADMIN — BISACODYL 5 MG: 5 TABLET, COATED ORAL at 10:41

## 2021-01-01 RX ADMIN — BISACODYL 5 MG: 5 TABLET, COATED ORAL at 10:27

## 2021-01-01 RX ADMIN — Medication 1000 UNITS: at 07:53

## 2021-01-01 RX ADMIN — ZINC SULFATE 220 MG (50 MG) CAPSULE 50 MG: CAPSULE at 19:38

## 2021-01-01 RX ADMIN — Medication 1000 UNITS: at 10:16

## 2021-01-01 RX ADMIN — ENOXAPARIN SODIUM 30 MG: 30 INJECTION SUBCUTANEOUS at 08:55

## 2021-01-01 RX ADMIN — DEXAMETHASONE SODIUM PHOSPHATE 10 MG: 10 INJECTION INTRAMUSCULAR; INTRAVENOUS at 09:50

## 2021-01-01 RX ADMIN — SODIUM CHLORIDE, PRESERVATIVE FREE 10 ML: 5 INJECTION INTRAVENOUS at 09:12

## 2021-01-01 RX ADMIN — Medication 35 MCG/MIN: at 15:46

## 2021-01-01 RX ADMIN — Medication 2 PUFF: at 07:26

## 2021-01-01 RX ADMIN — Medication 2 PUFF: at 20:29

## 2021-01-01 RX ADMIN — OXYCODONE HYDROCHLORIDE AND ACETAMINOPHEN 1000 MG: 500 TABLET ORAL at 12:34

## 2021-01-01 RX ADMIN — ALBUTEROL SULFATE 2 PUFF: 90 AEROSOL, METERED RESPIRATORY (INHALATION) at 07:56

## 2021-01-01 RX ADMIN — ALBUTEROL SULFATE 2 PUFF: 90 AEROSOL, METERED RESPIRATORY (INHALATION) at 12:26

## 2021-01-01 RX ADMIN — ALBUTEROL SULFATE 2 PUFF: 90 AEROSOL, METERED RESPIRATORY (INHALATION) at 07:21

## 2021-01-01 RX ADMIN — FUROSEMIDE 40 MG: 10 INJECTION, SOLUTION INTRAMUSCULAR; INTRAVENOUS at 09:42

## 2021-01-01 RX ADMIN — DEXAMETHASONE 6 MG: 4 TABLET ORAL at 08:54

## 2021-01-01 RX ADMIN — MONTELUKAST 10 MG: 10 TABLET, FILM COATED ORAL at 21:27

## 2021-01-01 RX ADMIN — BISACODYL 5 MG: 5 TABLET, COATED ORAL at 10:02

## 2021-01-01 RX ADMIN — CARVEDILOL 6.25 MG: 6.25 TABLET, FILM COATED ORAL at 20:49

## 2021-01-01 RX ADMIN — FUROSEMIDE 40 MG: 10 INJECTION, SOLUTION INTRAMUSCULAR; INTRAVENOUS at 10:23

## 2021-01-01 RX ADMIN — Medication 2 PUFF: at 09:16

## 2021-01-01 RX ADMIN — CARVEDILOL 6.25 MG: 6.25 TABLET, FILM COATED ORAL at 20:14

## 2021-01-01 RX ADMIN — FUROSEMIDE 20 MG: 20 TABLET ORAL at 08:32

## 2021-01-01 RX ADMIN — DICLOFENAC SODIUM AND MISOPROSTOL 1 TABLET: 75; 200 TABLET, DELAYED RELEASE ORAL at 10:36

## 2021-01-01 RX ADMIN — PROPOFOL 25 MCG/KG/MIN: 10 INJECTION, EMULSION INTRAVENOUS at 04:23

## 2021-01-01 RX ADMIN — ALBUTEROL SULFATE 2 PUFF: 90 AEROSOL, METERED RESPIRATORY (INHALATION) at 08:00

## 2021-01-01 RX ADMIN — ALBUTEROL SULFATE 2 PUFF: 90 AEROSOL, METERED RESPIRATORY (INHALATION) at 16:33

## 2021-01-01 RX ADMIN — ZINC SULFATE 220 MG (50 MG) CAPSULE 50 MG: CAPSULE at 20:36

## 2021-01-01 RX ADMIN — ALBUTEROL SULFATE 2 PUFF: 90 AEROSOL, METERED RESPIRATORY (INHALATION) at 20:24

## 2021-01-01 RX ADMIN — DEXAMETHASONE SODIUM PHOSPHATE 6 MG: 10 INJECTION INTRAMUSCULAR; INTRAVENOUS at 08:56

## 2021-01-01 RX ADMIN — CARVEDILOL 6.25 MG: 6.25 TABLET, FILM COATED ORAL at 22:04

## 2021-01-01 RX ADMIN — MONTELUKAST 10 MG: 10 TABLET, FILM COATED ORAL at 20:44

## 2021-01-01 RX ADMIN — CARVEDILOL 6.25 MG: 6.25 TABLET, FILM COATED ORAL at 20:44

## 2021-01-01 RX ADMIN — TIOTROPIUM BROMIDE AND OLODATEROL 2 PUFF: 3.124; 2.736 SPRAY, METERED RESPIRATORY (INHALATION) at 20:37

## 2021-01-01 RX ADMIN — SODIUM CHLORIDE, PRESERVATIVE FREE 10 ML: 5 INJECTION INTRAVENOUS at 09:29

## 2021-01-01 RX ADMIN — LORAZEPAM 1 MG: 2 INJECTION INTRAMUSCULAR; INTRAVENOUS at 21:36

## 2021-01-01 RX ADMIN — BISACODYL 5 MG: 5 TABLET, COATED ORAL at 09:10

## 2021-01-01 RX ADMIN — ALBUTEROL SULFATE 2 PUFF: 90 AEROSOL, METERED RESPIRATORY (INHALATION) at 21:45

## 2021-01-01 RX ADMIN — ZINC SULFATE 220 MG (50 MG) CAPSULE 50 MG: CAPSULE at 08:19

## 2021-01-01 RX ADMIN — ALBUTEROL SULFATE 2 PUFF: 90 AEROSOL, METERED RESPIRATORY (INHALATION) at 20:04

## 2021-01-01 RX ADMIN — ALBUTEROL SULFATE 2 PUFF: 90 AEROSOL, METERED RESPIRATORY (INHALATION) at 12:33

## 2021-01-01 RX ADMIN — Medication: at 17:09

## 2021-01-01 RX ADMIN — ZINC SULFATE 220 MG (50 MG) CAPSULE 50 MG: CAPSULE at 07:57

## 2021-01-01 RX ADMIN — Medication 2 PUFF: at 21:30

## 2021-01-01 RX ADMIN — VANCOMYCIN 1250 MG: 1.75 INJECTION, SOLUTION INTRAVENOUS at 00:04

## 2021-01-01 RX ADMIN — Medication: at 07:38

## 2021-01-01 RX ADMIN — TIOTROPIUM BROMIDE AND OLODATEROL 2 PUFF: 3.124; 2.736 SPRAY, METERED RESPIRATORY (INHALATION) at 08:28

## 2021-01-01 RX ADMIN — ENOXAPARIN SODIUM 30 MG: 30 INJECTION SUBCUTANEOUS at 21:45

## 2021-01-01 RX ADMIN — ALBUTEROL SULFATE 2 PUFF: 90 AEROSOL, METERED RESPIRATORY (INHALATION) at 15:05

## 2021-01-01 RX ADMIN — SODIUM CHLORIDE, PRESERVATIVE FREE 10 ML: 5 INJECTION INTRAVENOUS at 20:39

## 2021-01-01 RX ADMIN — CARVEDILOL 6.25 MG: 6.25 TABLET, FILM COATED ORAL at 10:11

## 2021-01-01 RX ADMIN — ZINC SULFATE 220 MG (50 MG) CAPSULE 50 MG: CAPSULE at 08:02

## 2021-01-01 RX ADMIN — OXYCODONE HYDROCHLORIDE AND ACETAMINOPHEN 1000 MG: 500 TABLET ORAL at 10:02

## 2021-01-01 RX ADMIN — PROPOFOL 45 MCG/KG/MIN: 10 INJECTION, EMULSION INTRAVENOUS at 22:30

## 2021-01-01 RX ADMIN — METHYLPREDNISOLONE SODIUM SUCCINATE 40 MG: 40 INJECTION, POWDER, LYOPHILIZED, FOR SOLUTION INTRAMUSCULAR; INTRAVENOUS at 07:47

## 2021-01-01 RX ADMIN — BUSPIRONE HYDROCHLORIDE 5 MG: 5 TABLET ORAL at 22:04

## 2021-01-01 RX ADMIN — SODIUM CHLORIDE, PRESERVATIVE FREE 10 ML: 5 INJECTION INTRAVENOUS at 21:21

## 2021-01-01 RX ADMIN — Medication 2 PUFF: at 20:25

## 2021-01-01 RX ADMIN — ALBUTEROL SULFATE 2 PUFF: 90 AEROSOL, METERED RESPIRATORY (INHALATION) at 11:15

## 2021-01-01 RX ADMIN — FUROSEMIDE 40 MG: 10 INJECTION, SOLUTION INTRAMUSCULAR; INTRAVENOUS at 07:50

## 2021-01-01 RX ADMIN — CEFEPIME HYDROCHLORIDE 2000 MG: 2 INJECTION, POWDER, FOR SOLUTION INTRAVENOUS at 04:53

## 2021-01-01 RX ADMIN — OXYCODONE HYDROCHLORIDE AND ACETAMINOPHEN 1000 MG: 500 TABLET ORAL at 21:40

## 2021-01-01 RX ADMIN — METHYLPREDNISOLONE SODIUM SUCCINATE 40 MG: 40 INJECTION, POWDER, LYOPHILIZED, FOR SOLUTION INTRAMUSCULAR; INTRAVENOUS at 20:32

## 2021-01-01 RX ADMIN — BISACODYL 5 MG: 5 TABLET, COATED ORAL at 08:56

## 2021-01-01 RX ADMIN — SODIUM CHLORIDE, PRESERVATIVE FREE 8 ML: 5 INJECTION INTRAVENOUS at 09:20

## 2021-01-01 RX ADMIN — ZINC SULFATE 220 MG (50 MG) CAPSULE 50 MG: CAPSULE at 09:47

## 2021-01-01 RX ADMIN — Medication 1000 UNITS: at 10:26

## 2021-01-01 RX ADMIN — ALBUTEROL SULFATE 2 PUFF: 90 AEROSOL, METERED RESPIRATORY (INHALATION) at 07:57

## 2021-01-01 RX ADMIN — OXYCODONE HYDROCHLORIDE AND ACETAMINOPHEN 1000 MG: 500 TABLET ORAL at 20:48

## 2021-01-01 RX ADMIN — Medication 2 PUFF: at 08:07

## 2021-01-01 RX ADMIN — ALBUTEROL SULFATE 2 PUFF: 90 AEROSOL, METERED RESPIRATORY (INHALATION) at 20:21

## 2021-01-01 RX ADMIN — ALBUTEROL SULFATE 2 PUFF: 90 AEROSOL, METERED RESPIRATORY (INHALATION) at 16:35

## 2021-01-01 RX ADMIN — SODIUM CHLORIDE, PRESERVATIVE FREE 10 ML: 5 INJECTION INTRAVENOUS at 08:56

## 2021-01-01 RX ADMIN — DEXAMETHASONE SODIUM PHOSPHATE 10 MG: 10 INJECTION INTRAMUSCULAR; INTRAVENOUS at 09:26

## 2021-01-01 RX ADMIN — Medication 16 MCG/MIN: at 15:00

## 2021-01-01 RX ADMIN — BUSPIRONE HYDROCHLORIDE 5 MG: 5 TABLET ORAL at 09:59

## 2021-01-01 RX ADMIN — ENOXAPARIN SODIUM 30 MG: 30 INJECTION SUBCUTANEOUS at 21:25

## 2021-01-01 RX ADMIN — Medication 2 PUFF: at 08:16

## 2021-01-01 RX ADMIN — LISINOPRIL 2.5 MG: 2.5 TABLET ORAL at 09:08

## 2021-01-01 RX ADMIN — SODIUM CHLORIDE, PRESERVATIVE FREE 10 ML: 5 INJECTION INTRAVENOUS at 21:30

## 2021-01-01 RX ADMIN — VANCOMYCIN 1250 MG: 1.75 INJECTION, SOLUTION INTRAVENOUS at 13:11

## 2021-01-01 RX ADMIN — LISINOPRIL 2.5 MG: 2.5 TABLET ORAL at 10:23

## 2021-01-01 RX ADMIN — BARICITINIB 4 MG: 2 TABLET, FILM COATED ORAL at 16:56

## 2021-01-01 RX ADMIN — PRAVASTATIN SODIUM 40 MG: 40 TABLET ORAL at 16:57

## 2021-01-01 RX ADMIN — BISACODYL 5 MG: 5 TABLET, COATED ORAL at 10:03

## 2021-01-01 RX ADMIN — ENOXAPARIN SODIUM 30 MG: 30 INJECTION SUBCUTANEOUS at 10:02

## 2021-01-01 RX ADMIN — ALBUTEROL SULFATE 2 PUFF: 90 AEROSOL, METERED RESPIRATORY (INHALATION) at 21:20

## 2021-01-01 RX ADMIN — SODIUM CHLORIDE, PRESERVATIVE FREE 10 ML: 5 INJECTION INTRAVENOUS at 09:27

## 2021-01-01 RX ADMIN — DICLOFENAC SODIUM AND MISOPROSTOL 1 TABLET: 75; 200 TABLET, DELAYED RELEASE ORAL at 20:33

## 2021-01-01 RX ADMIN — MONTELUKAST 10 MG: 10 TABLET, FILM COATED ORAL at 21:40

## 2021-01-01 RX ADMIN — SODIUM CHLORIDE, PRESERVATIVE FREE 10 ML: 5 INJECTION INTRAVENOUS at 20:49

## 2021-01-01 RX ADMIN — TIOTROPIUM BROMIDE AND OLODATEROL 2 PUFF: 3.124; 2.736 SPRAY, METERED RESPIRATORY (INHALATION) at 08:19

## 2021-01-01 RX ADMIN — OXYCODONE HYDROCHLORIDE AND ACETAMINOPHEN 1000 MG: 500 TABLET ORAL at 08:34

## 2021-01-01 RX ADMIN — ENOXAPARIN SODIUM 30 MG: 30 INJECTION SUBCUTANEOUS at 08:54

## 2021-01-01 RX ADMIN — FUROSEMIDE 20 MG: 20 TABLET ORAL at 08:35

## 2021-01-01 RX ADMIN — ONDANSETRON 4 MG: 4 TABLET, ORALLY DISINTEGRATING ORAL at 20:44

## 2021-01-01 RX ADMIN — FLUCONAZOLE 100 MG: 2 INJECTION, SOLUTION INTRAVENOUS at 15:50

## 2021-01-01 RX ADMIN — ALBUTEROL SULFATE 2 PUFF: 90 AEROSOL, METERED RESPIRATORY (INHALATION) at 20:37

## 2021-01-01 RX ADMIN — ALBUTEROL SULFATE 2 PUFF: 90 AEROSOL, METERED RESPIRATORY (INHALATION) at 08:46

## 2021-01-01 RX ADMIN — MONTELUKAST 10 MG: 10 TABLET, FILM COATED ORAL at 22:03

## 2021-01-01 RX ADMIN — ACYCLOVIR SODIUM 700 MG: 50 INJECTION, SOLUTION INTRAVENOUS at 04:16

## 2021-01-01 RX ADMIN — SODIUM CHLORIDE, PRESERVATIVE FREE 10 ML: 5 INJECTION INTRAVENOUS at 20:40

## 2021-01-01 RX ADMIN — PANTOPRAZOLE SODIUM 40 MG: 40 TABLET, DELAYED RELEASE ORAL at 06:06

## 2021-01-01 RX ADMIN — METHYLPREDNISOLONE SODIUM SUCCINATE 40 MG: 40 INJECTION, POWDER, LYOPHILIZED, FOR SOLUTION INTRAMUSCULAR; INTRAVENOUS at 10:01

## 2021-01-01 RX ADMIN — SODIUM CHLORIDE, PRESERVATIVE FREE 10 ML: 5 INJECTION INTRAVENOUS at 08:20

## 2021-01-01 RX ADMIN — BISACODYL 5 MG: 5 TABLET, COATED ORAL at 09:17

## 2021-01-01 RX ADMIN — ENOXAPARIN SODIUM 30 MG: 30 INJECTION SUBCUTANEOUS at 20:28

## 2021-01-01 RX ADMIN — BISACODYL 5 MG: 5 TABLET, COATED ORAL at 10:11

## 2021-01-01 RX ADMIN — ENOXAPARIN SODIUM 30 MG: 30 INJECTION SUBCUTANEOUS at 09:33

## 2021-01-01 RX ADMIN — OXYCODONE HYDROCHLORIDE AND ACETAMINOPHEN 1000 MG: 500 TABLET ORAL at 14:21

## 2021-01-01 RX ADMIN — SODIUM CHLORIDE, PRESERVATIVE FREE 10 ML: 5 INJECTION INTRAVENOUS at 10:01

## 2021-01-01 RX ADMIN — FLUCONAZOLE 400 MG: 2 INJECTION, SOLUTION INTRAVENOUS at 16:42

## 2021-01-01 RX ADMIN — CARVEDILOL 6.25 MG: 6.25 TABLET, FILM COATED ORAL at 08:39

## 2021-01-01 RX ADMIN — METHYLPREDNISOLONE SODIUM SUCCINATE 40 MG: 40 INJECTION, POWDER, LYOPHILIZED, FOR SOLUTION INTRAMUSCULAR; INTRAVENOUS at 09:25

## 2021-01-01 RX ADMIN — FUROSEMIDE 20 MG: 20 TABLET ORAL at 08:54

## 2021-01-01 RX ADMIN — CARVEDILOL 6.25 MG: 6.25 TABLET, FILM COATED ORAL at 21:25

## 2021-01-01 RX ADMIN — ALBUTEROL SULFATE 2 PUFF: 90 AEROSOL, METERED RESPIRATORY (INHALATION) at 21:30

## 2021-01-01 RX ADMIN — OXYCODONE HYDROCHLORIDE AND ACETAMINOPHEN 1000 MG: 500 TABLET ORAL at 21:44

## 2021-01-01 RX ADMIN — SODIUM CHLORIDE, PRESERVATIVE FREE 10 ML: 5 INJECTION INTRAVENOUS at 22:01

## 2021-01-01 RX ADMIN — PROPOFOL 45 MCG/KG/MIN: 10 INJECTION, EMULSION INTRAVENOUS at 17:25

## 2021-01-01 RX ADMIN — DIVALPROEX SODIUM 250 MG: 250 TABLET, FILM COATED, EXTENDED RELEASE ORAL at 08:37

## 2021-01-01 RX ADMIN — BARICITINIB 4 MG: 2 TABLET, FILM COATED ORAL at 09:32

## 2021-01-01 RX ADMIN — METHYLPREDNISOLONE SODIUM SUCCINATE 40 MG: 40 INJECTION, POWDER, LYOPHILIZED, FOR SOLUTION INTRAMUSCULAR; INTRAVENOUS at 09:22

## 2021-01-01 RX ADMIN — FUROSEMIDE 40 MG: 10 INJECTION, SOLUTION INTRAMUSCULAR; INTRAVENOUS at 17:55

## 2021-01-01 RX ADMIN — SODIUM CHLORIDE, PRESERVATIVE FREE 10 ML: 5 INJECTION INTRAVENOUS at 20:56

## 2021-01-01 RX ADMIN — Medication 2 PUFF: at 08:46

## 2021-01-01 RX ADMIN — BISACODYL 5 MG: 5 TABLET, COATED ORAL at 09:25

## 2021-01-01 RX ADMIN — ALBUTEROL SULFATE 2 PUFF: 90 AEROSOL, METERED RESPIRATORY (INHALATION) at 08:30

## 2021-01-01 RX ADMIN — ZINC SULFATE 220 MG (50 MG) CAPSULE 50 MG: CAPSULE at 10:40

## 2021-01-01 RX ADMIN — OXYCODONE HYDROCHLORIDE AND ACETAMINOPHEN 1000 MG: 500 TABLET ORAL at 21:53

## 2021-01-01 RX ADMIN — DIVALPROEX SODIUM 250 MG: 250 TABLET, FILM COATED, EXTENDED RELEASE ORAL at 09:32

## 2021-01-01 RX ADMIN — METHYLPREDNISOLONE SODIUM SUCCINATE 40 MG: 40 INJECTION, POWDER, LYOPHILIZED, FOR SOLUTION INTRAMUSCULAR; INTRAVENOUS at 10:40

## 2021-01-01 RX ADMIN — CEFEPIME HYDROCHLORIDE 2000 MG: 2 INJECTION, POWDER, FOR SOLUTION INTRAVENOUS at 14:30

## 2021-01-01 RX ADMIN — ENOXAPARIN SODIUM 30 MG: 30 INJECTION SUBCUTANEOUS at 10:41

## 2021-01-01 RX ADMIN — PANTOPRAZOLE SODIUM 40 MG: 40 TABLET, DELAYED RELEASE ORAL at 10:18

## 2021-01-01 RX ADMIN — TIOTROPIUM BROMIDE AND OLODATEROL 2 PUFF: 3.124; 2.736 SPRAY, METERED RESPIRATORY (INHALATION) at 20:23

## 2021-01-01 RX ADMIN — PANTOPRAZOLE SODIUM 40 MG: 40 TABLET, DELAYED RELEASE ORAL at 06:43

## 2021-01-01 RX ADMIN — Medication: at 13:55

## 2021-01-01 RX ADMIN — ALBUTEROL SULFATE 2 PUFF: 90 AEROSOL, METERED RESPIRATORY (INHALATION) at 19:36

## 2021-01-01 RX ADMIN — TIOTROPIUM BROMIDE AND OLODATEROL 2 PUFF: 3.124; 2.736 SPRAY, METERED RESPIRATORY (INHALATION) at 21:25

## 2021-01-01 RX ADMIN — ENOXAPARIN SODIUM 30 MG: 30 INJECTION SUBCUTANEOUS at 22:12

## 2021-01-01 RX ADMIN — DICLOFENAC SODIUM AND MISOPROSTOL 1 TABLET: 75; 200 TABLET, DELAYED RELEASE ORAL at 09:32

## 2021-01-01 RX ADMIN — Medication 2 PUFF: at 19:34

## 2021-01-01 RX ADMIN — SODIUM CHLORIDE, PRESERVATIVE FREE 10 ML: 5 INJECTION INTRAVENOUS at 22:06

## 2021-01-01 RX ADMIN — PROPOFOL 30 MCG/KG/MIN: 10 INJECTION, EMULSION INTRAVENOUS at 11:48

## 2021-01-01 RX ADMIN — OXYCODONE HYDROCHLORIDE AND ACETAMINOPHEN 1000 MG: 500 TABLET ORAL at 19:38

## 2021-01-01 RX ADMIN — ALBUTEROL SULFATE 2 PUFF: 90 AEROSOL, METERED RESPIRATORY (INHALATION) at 12:38

## 2021-01-01 RX ADMIN — TIOTROPIUM BROMIDE AND OLODATEROL 2 PUFF: 3.124; 2.736 SPRAY, METERED RESPIRATORY (INHALATION) at 07:58

## 2021-01-01 RX ADMIN — SODIUM CHLORIDE, PRESERVATIVE FREE 10 ML: 5 INJECTION INTRAVENOUS at 22:30

## 2021-01-01 RX ADMIN — TIOTROPIUM BROMIDE AND OLODATEROL 2 PUFF: 3.124; 2.736 SPRAY, METERED RESPIRATORY (INHALATION) at 08:16

## 2021-01-01 RX ADMIN — Medication: at 17:46

## 2021-01-01 RX ADMIN — ZINC SULFATE 220 MG (50 MG) CAPSULE 50 MG: CAPSULE at 22:00

## 2021-01-01 RX ADMIN — DEXAMETHASONE SODIUM PHOSPHATE 10 MG: 10 INJECTION INTRAMUSCULAR; INTRAVENOUS at 09:42

## 2021-01-01 RX ADMIN — ALBUTEROL SULFATE 2 PUFF: 90 AEROSOL, METERED RESPIRATORY (INHALATION) at 16:07

## 2021-01-01 RX ADMIN — CEFEPIME HYDROCHLORIDE 2000 MG: 2 INJECTION, POWDER, FOR SOLUTION INTRAVENOUS at 17:49

## 2021-01-01 RX ADMIN — ALBUTEROL SULFATE 2 PUFF: 90 AEROSOL, METERED RESPIRATORY (INHALATION) at 16:09

## 2021-01-01 RX ADMIN — ALBUTEROL SULFATE 2 PUFF: 90 AEROSOL, METERED RESPIRATORY (INHALATION) at 08:07

## 2021-01-01 RX ADMIN — BUSPIRONE HYDROCHLORIDE 5 MG: 5 TABLET ORAL at 08:20

## 2021-01-01 RX ADMIN — SODIUM CHLORIDE, PRESERVATIVE FREE 10 ML: 5 INJECTION INTRAVENOUS at 17:16

## 2021-01-01 RX ADMIN — ALBUTEROL SULFATE 2 PUFF: 90 AEROSOL, METERED RESPIRATORY (INHALATION) at 07:47

## 2021-01-01 RX ADMIN — CARVEDILOL 6.25 MG: 6.25 TABLET, FILM COATED ORAL at 09:33

## 2021-01-01 RX ADMIN — OXYCODONE HYDROCHLORIDE AND ACETAMINOPHEN 1000 MG: 500 TABLET ORAL at 09:08

## 2021-01-01 RX ADMIN — DIVALPROEX SODIUM 250 MG: 250 TABLET, FILM COATED, EXTENDED RELEASE ORAL at 09:12

## 2021-01-01 RX ADMIN — TIOTROPIUM BROMIDE AND OLODATEROL 2 PUFF: 3.124; 2.736 SPRAY, METERED RESPIRATORY (INHALATION) at 08:11

## 2021-01-01 RX ADMIN — CEFEPIME HYDROCHLORIDE 2000 MG: 2 INJECTION, POWDER, FOR SOLUTION INTRAVENOUS at 04:25

## 2021-01-01 RX ADMIN — Medication 2 PUFF: at 07:58

## 2021-01-01 RX ADMIN — PANTOPRAZOLE SODIUM 40 MG: 40 INJECTION, POWDER, FOR SOLUTION INTRAVENOUS at 22:13

## 2021-01-01 RX ADMIN — OXYCODONE HYDROCHLORIDE AND ACETAMINOPHEN 1000 MG: 500 TABLET ORAL at 09:52

## 2021-01-01 RX ADMIN — PANTOPRAZOLE SODIUM 40 MG: 40 TABLET, DELAYED RELEASE ORAL at 05:49

## 2021-01-01 RX ADMIN — Medication 1000 UNITS: at 09:47

## 2021-01-01 RX ADMIN — BACITRACIN, NEOMYCIN, POLYMYXIN B: 400; 3.5; 5 OINTMENT TOPICAL at 10:03

## 2021-01-01 RX ADMIN — FUROSEMIDE 20 MG: 20 TABLET ORAL at 08:36

## 2021-01-01 RX ADMIN — VANCOMYCIN 1250 MG: 1.75 INJECTION, SOLUTION INTRAVENOUS at 18:21

## 2021-01-01 RX ADMIN — FUROSEMIDE 20 MG: 20 TABLET ORAL at 18:36

## 2021-01-01 RX ADMIN — GUAIFENESIN SYRUP AND DEXTROMETHORPHAN 5 ML: 100; 10 SYRUP ORAL at 20:45

## 2021-01-01 RX ADMIN — PANTOPRAZOLE SODIUM 40 MG: 40 TABLET, DELAYED RELEASE ORAL at 05:05

## 2021-01-01 RX ADMIN — OXYCODONE HYDROCHLORIDE AND ACETAMINOPHEN 1000 MG: 500 TABLET ORAL at 09:32

## 2021-01-01 RX ADMIN — FLUCONAZOLE 100 MG: 2 INJECTION, SOLUTION INTRAVENOUS at 16:31

## 2021-01-01 RX ADMIN — Medication: at 04:42

## 2021-01-01 RX ADMIN — MONTELUKAST 10 MG: 10 TABLET, FILM COATED ORAL at 19:45

## 2021-01-01 RX ADMIN — Medication 2 PUFF: at 19:54

## 2021-01-01 RX ADMIN — LISINOPRIL 2.5 MG: 2.5 TABLET ORAL at 08:56

## 2021-01-01 RX ADMIN — CARVEDILOL 6.25 MG: 6.25 TABLET, FILM COATED ORAL at 21:58

## 2021-01-01 RX ADMIN — Medication 2 PUFF: at 21:17

## 2021-01-01 RX ADMIN — MONTELUKAST 10 MG: 10 TABLET, FILM COATED ORAL at 22:26

## 2021-01-01 RX ADMIN — SODIUM CHLORIDE 25 ML: 9 INJECTION, SOLUTION INTRAVENOUS at 09:49

## 2021-01-01 RX ADMIN — METHOCARBAMOL 500 MG: 500 TABLET ORAL at 20:45

## 2021-01-01 RX ADMIN — OXYCODONE HYDROCHLORIDE AND ACETAMINOPHEN 1000 MG: 500 TABLET ORAL at 09:59

## 2021-01-01 RX ADMIN — ENOXAPARIN SODIUM 30 MG: 30 INJECTION SUBCUTANEOUS at 20:07

## 2021-01-01 RX ADMIN — BISACODYL 5 MG: 5 TABLET, COATED ORAL at 07:54

## 2021-01-01 RX ADMIN — BUMETANIDE 0.5 MG: 0.25 INJECTION, SOLUTION INTRAMUSCULAR; INTRAVENOUS at 12:06

## 2021-01-01 RX ADMIN — BUSPIRONE HYDROCHLORIDE 5 MG: 5 TABLET ORAL at 20:21

## 2021-01-01 RX ADMIN — ALBUTEROL SULFATE 2 PUFF: 90 AEROSOL, METERED RESPIRATORY (INHALATION) at 08:05

## 2021-01-01 RX ADMIN — ALBUTEROL SULFATE 2 PUFF: 90 AEROSOL, METERED RESPIRATORY (INHALATION) at 11:52

## 2021-01-01 RX ADMIN — ZINC SULFATE 220 MG (50 MG) CAPSULE 50 MG: CAPSULE at 21:39

## 2021-01-01 RX ADMIN — VANCOMYCIN 1500 MG: 1.5 INJECTION, SOLUTION INTRAVENOUS at 15:38

## 2021-01-01 RX ADMIN — OXYCODONE HYDROCHLORIDE AND ACETAMINOPHEN 1000 MG: 500 TABLET ORAL at 09:42

## 2021-01-01 RX ADMIN — ZINC SULFATE 220 MG (50 MG) CAPSULE 50 MG: CAPSULE at 10:11

## 2021-01-01 RX ADMIN — ZINC SULFATE 220 MG (50 MG) CAPSULE 50 MG: CAPSULE at 09:41

## 2021-01-01 RX ADMIN — HYDROCODONE BITARTRATE AND ACETAMINOPHEN 1 TABLET: 5; 325 TABLET ORAL at 09:17

## 2021-01-01 RX ADMIN — ZINC SULFATE 220 MG (50 MG) CAPSULE 50 MG: CAPSULE at 08:56

## 2021-01-01 RX ADMIN — SODIUM CHLORIDE, PRESERVATIVE FREE 10 ML: 5 INJECTION INTRAVENOUS at 08:58

## 2021-01-01 RX ADMIN — ENOXAPARIN SODIUM 30 MG: 30 INJECTION SUBCUTANEOUS at 10:08

## 2021-01-01 RX ADMIN — Medication 2 PUFF: at 08:10

## 2021-01-01 RX ADMIN — SODIUM CHLORIDE, PRESERVATIVE FREE 10 ML: 5 INJECTION INTRAVENOUS at 22:11

## 2021-01-01 RX ADMIN — PRAVASTATIN SODIUM 40 MG: 40 TABLET ORAL at 20:52

## 2021-01-01 RX ADMIN — ENOXAPARIN SODIUM 30 MG: 30 INJECTION SUBCUTANEOUS at 22:00

## 2021-01-01 RX ADMIN — ALBUTEROL SULFATE 2 PUFF: 90 AEROSOL, METERED RESPIRATORY (INHALATION) at 09:07

## 2021-01-01 RX ADMIN — ALBUTEROL SULFATE 2 PUFF: 90 AEROSOL, METERED RESPIRATORY (INHALATION) at 21:23

## 2021-01-01 RX ADMIN — TIOTROPIUM BROMIDE AND OLODATEROL 2 PUFF: 3.124; 2.736 SPRAY, METERED RESPIRATORY (INHALATION) at 08:20

## 2021-01-01 RX ADMIN — BISACODYL 5 MG: 5 TABLET, COATED ORAL at 08:32

## 2021-01-01 RX ADMIN — DEXAMETHASONE SODIUM PHOSPHATE 6 MG: 10 INJECTION INTRAMUSCULAR; INTRAVENOUS at 08:55

## 2021-01-01 RX ADMIN — Medication: at 23:45

## 2021-01-01 RX ADMIN — ZINC SULFATE 220 MG (50 MG) CAPSULE 50 MG: CAPSULE at 22:26

## 2021-01-01 RX ADMIN — Medication 2 PUFF: at 09:08

## 2021-01-01 RX ADMIN — PRAVASTATIN SODIUM 40 MG: 40 TABLET ORAL at 19:17

## 2021-01-01 RX ADMIN — ENOXAPARIN SODIUM 30 MG: 30 INJECTION SUBCUTANEOUS at 07:45

## 2021-01-01 RX ADMIN — MONTELUKAST 10 MG: 10 TABLET, FILM COATED ORAL at 19:38

## 2021-01-01 RX ADMIN — ALBUTEROL SULFATE 2 PUFF: 90 AEROSOL, METERED RESPIRATORY (INHALATION) at 10:49

## 2021-01-01 RX ADMIN — LISINOPRIL 2.5 MG: 2.5 TABLET ORAL at 08:19

## 2021-01-01 RX ADMIN — OXYCODONE HYDROCHLORIDE AND ACETAMINOPHEN 1000 MG: 500 TABLET ORAL at 20:14

## 2021-01-01 RX ADMIN — SODIUM CHLORIDE, PRESERVATIVE FREE 10 ML: 5 INJECTION INTRAVENOUS at 22:02

## 2021-01-01 RX ADMIN — OXYCODONE HYDROCHLORIDE AND ACETAMINOPHEN 1000 MG: 500 TABLET ORAL at 20:29

## 2021-01-01 RX ADMIN — Medication 1000 UNITS: at 10:03

## 2021-01-01 RX ADMIN — CARVEDILOL 6.25 MG: 6.25 TABLET, FILM COATED ORAL at 20:28

## 2021-01-01 RX ADMIN — CARVEDILOL 6.25 MG: 6.25 TABLET, FILM COATED ORAL at 20:46

## 2021-01-01 RX ADMIN — ALBUTEROL SULFATE 2 PUFF: 90 AEROSOL, METERED RESPIRATORY (INHALATION) at 08:16

## 2021-01-01 RX ADMIN — OXYCODONE HYDROCHLORIDE AND ACETAMINOPHEN 1000 MG: 500 TABLET ORAL at 20:45

## 2021-01-01 RX ADMIN — Medication 2 PUFF: at 20:01

## 2021-01-01 RX ADMIN — ALBUTEROL SULFATE 2 PUFF: 90 AEROSOL, METERED RESPIRATORY (INHALATION) at 23:13

## 2021-01-01 RX ADMIN — CARVEDILOL 6.25 MG: 6.25 TABLET, FILM COATED ORAL at 07:53

## 2021-01-01 RX ADMIN — ALBUTEROL SULFATE 2 PUFF: 90 AEROSOL, METERED RESPIRATORY (INHALATION) at 20:16

## 2021-01-01 RX ADMIN — METOPROLOL TARTRATE 2.5 MG: 5 INJECTION INTRAVENOUS at 18:52

## 2021-01-01 RX ADMIN — ZINC SULFATE 220 MG (50 MG) CAPSULE 50 MG: CAPSULE at 20:54

## 2021-01-01 RX ADMIN — Medication 2 PUFF: at 08:20

## 2021-01-01 RX ADMIN — Medication 1000 UNITS: at 09:37

## 2021-01-01 RX ADMIN — Medication: at 01:27

## 2021-01-01 RX ADMIN — ENOXAPARIN SODIUM 30 MG: 30 INJECTION SUBCUTANEOUS at 09:27

## 2021-01-01 RX ADMIN — BUSPIRONE HYDROCHLORIDE 5 MG: 5 TABLET ORAL at 22:14

## 2021-01-01 RX ADMIN — Medication 25 MCG/HR: at 12:05

## 2021-01-01 RX ADMIN — SODIUM CHLORIDE, PRESERVATIVE FREE 10 ML: 5 INJECTION INTRAVENOUS at 20:14

## 2021-01-01 RX ADMIN — ACYCLOVIR SODIUM 700 MG: 50 INJECTION, SOLUTION INTRAVENOUS at 22:26

## 2021-01-01 RX ADMIN — CEFEPIME HYDROCHLORIDE 2000 MG: 2 INJECTION, POWDER, FOR SOLUTION INTRAVENOUS at 20:54

## 2021-01-01 RX ADMIN — ALBUTEROL SULFATE 2 PUFF: 90 AEROSOL, METERED RESPIRATORY (INHALATION) at 12:04

## 2021-01-01 RX ADMIN — OXYCODONE HYDROCHLORIDE AND ACETAMINOPHEN 1000 MG: 500 TABLET ORAL at 15:13

## 2021-01-01 RX ADMIN — Medication 1000 UNITS: at 08:39

## 2021-01-01 RX ADMIN — DEXAMETHASONE 6 MG: 4 TABLET ORAL at 22:26

## 2021-01-01 RX ADMIN — BARICITINIB 4 MG: 2 TABLET, FILM COATED ORAL at 08:55

## 2021-01-01 RX ADMIN — PRAVASTATIN SODIUM 40 MG: 40 TABLET ORAL at 16:59

## 2021-01-01 RX ADMIN — CARVEDILOL 6.25 MG: 6.25 TABLET, FILM COATED ORAL at 09:28

## 2021-01-01 RX ADMIN — SODIUM CHLORIDE, PRESERVATIVE FREE 10 ML: 5 INJECTION INTRAVENOUS at 20:53

## 2021-01-01 RX ADMIN — OXYCODONE HYDROCHLORIDE AND ACETAMINOPHEN 1000 MG: 500 TABLET ORAL at 20:44

## 2021-01-01 RX ADMIN — BARICITINIB 4 MG: 2 TABLET, FILM COATED ORAL at 17:02

## 2021-01-01 RX ADMIN — BISACODYL 5 MG: 5 TABLET, COATED ORAL at 10:24

## 2021-01-01 RX ADMIN — PANTOPRAZOLE SODIUM 40 MG: 40 TABLET, DELAYED RELEASE ORAL at 06:35

## 2021-01-01 RX ADMIN — ZINC SULFATE 220 MG (50 MG) CAPSULE 50 MG: CAPSULE at 09:17

## 2021-01-01 RX ADMIN — Medication 2 PUFF: at 08:29

## 2021-01-01 RX ADMIN — CARVEDILOL 6.25 MG: 6.25 TABLET, FILM COATED ORAL at 09:17

## 2021-01-01 RX ADMIN — ALBUTEROL SULFATE 2 PUFF: 90 AEROSOL, METERED RESPIRATORY (INHALATION) at 19:40

## 2021-01-01 RX ADMIN — SODIUM CHLORIDE: 9 INJECTION, SOLUTION INTRAVENOUS at 19:08

## 2021-01-01 RX ADMIN — ALBUTEROL SULFATE 2 PUFF: 90 AEROSOL, METERED RESPIRATORY (INHALATION) at 12:28

## 2021-01-01 RX ADMIN — OXYCODONE HYDROCHLORIDE AND ACETAMINOPHEN 1000 MG: 500 TABLET ORAL at 08:02

## 2021-01-01 RX ADMIN — DIVALPROEX SODIUM 250 MG: 250 TABLET, FILM COATED, EXTENDED RELEASE ORAL at 20:52

## 2021-01-01 RX ADMIN — SODIUM CHLORIDE, PRESERVATIVE FREE 10 ML: 5 INJECTION INTRAVENOUS at 21:42

## 2021-01-01 RX ADMIN — BARICITINIB 4 MG: 2 TABLET, FILM COATED ORAL at 10:28

## 2021-01-01 RX ADMIN — ENOXAPARIN SODIUM 30 MG: 30 INJECTION SUBCUTANEOUS at 20:19

## 2021-01-01 RX ADMIN — PHENYLEPHRINE HYDROCHLORIDE 300 MCG/MIN: 10 INJECTION INTRAVENOUS at 06:22

## 2021-01-01 RX ADMIN — DEXAMETHASONE 6 MG: 4 TABLET ORAL at 21:26

## 2021-01-01 RX ADMIN — Medication 1000 UNITS: at 09:08

## 2021-01-01 RX ADMIN — ACYCLOVIR SODIUM 700 MG: 50 INJECTION, SOLUTION INTRAVENOUS at 16:27

## 2021-01-01 RX ADMIN — PANTOPRAZOLE SODIUM 40 MG: 40 TABLET, DELAYED RELEASE ORAL at 09:32

## 2021-01-01 RX ADMIN — CARVEDILOL 6.25 MG: 6.25 TABLET, FILM COATED ORAL at 21:39

## 2021-01-01 RX ADMIN — Medication 2 PUFF: at 08:23

## 2021-01-01 RX ADMIN — Medication 2 PUFF: at 08:36

## 2021-01-01 RX ADMIN — DEXAMETHASONE 6 MG: 4 TABLET ORAL at 09:32

## 2021-01-01 RX ADMIN — Medication 1000 UNITS: at 08:21

## 2021-01-01 RX ADMIN — LORAZEPAM 1 MG: 1 TABLET ORAL at 08:20

## 2021-01-01 RX ADMIN — HYDROCODONE BITARTRATE AND ACETAMINOPHEN 1 TABLET: 5; 325 TABLET ORAL at 20:49

## 2021-01-01 RX ADMIN — ENOXAPARIN SODIUM 30 MG: 30 INJECTION SUBCUTANEOUS at 22:25

## 2021-01-01 RX ADMIN — PROPOFOL 20 MCG/KG/MIN: 10 INJECTION, EMULSION INTRAVENOUS at 04:54

## 2021-01-01 RX ADMIN — VASOPRESSIN 0.02 UNITS/MIN: 20 INJECTION INTRAVENOUS at 23:57

## 2021-01-01 RX ADMIN — PHENYLEPHRINE HYDROCHLORIDE 300 MCG/MIN: 10 INJECTION INTRAVENOUS at 03:06

## 2021-01-01 RX ADMIN — OXYCODONE HYDROCHLORIDE AND ACETAMINOPHEN 1000 MG: 500 TABLET ORAL at 21:24

## 2021-01-01 RX ADMIN — ZINC SULFATE 220 MG (50 MG) CAPSULE 50 MG: CAPSULE at 20:14

## 2021-01-01 RX ADMIN — PANTOPRAZOLE SODIUM 40 MG: 40 INJECTION, POWDER, FOR SOLUTION INTRAVENOUS at 08:55

## 2021-01-01 RX ADMIN — ALBUTEROL SULFATE 2 PUFF: 90 AEROSOL, METERED RESPIRATORY (INHALATION) at 19:25

## 2021-01-01 RX ADMIN — SODIUM CHLORIDE, PRESERVATIVE FREE 10 ML: 5 INJECTION INTRAVENOUS at 10:23

## 2021-01-01 RX ADMIN — LISINOPRIL 5 MG: 5 TABLET ORAL at 07:54

## 2021-01-01 RX ADMIN — ALBUTEROL SULFATE 2 PUFF: 90 AEROSOL, METERED RESPIRATORY (INHALATION) at 07:36

## 2021-01-01 RX ADMIN — CEFEPIME HYDROCHLORIDE 2000 MG: 2 INJECTION, POWDER, FOR SOLUTION INTRAVENOUS at 16:50

## 2021-01-01 RX ADMIN — SODIUM CHLORIDE, PRESERVATIVE FREE 10 ML: 5 INJECTION INTRAVENOUS at 22:04

## 2021-01-01 RX ADMIN — ALBUTEROL SULFATE 2 PUFF: 90 AEROSOL, METERED RESPIRATORY (INHALATION) at 15:35

## 2021-01-01 RX ADMIN — CEFEPIME HYDROCHLORIDE 2000 MG: 2 INJECTION, POWDER, FOR SOLUTION INTRAVENOUS at 06:46

## 2021-01-01 RX ADMIN — ACYCLOVIR SODIUM 700 MG: 50 INJECTION, SOLUTION INTRAVENOUS at 15:27

## 2021-01-01 RX ADMIN — ENOXAPARIN SODIUM 30 MG: 30 INJECTION SUBCUTANEOUS at 22:11

## 2021-01-01 RX ADMIN — SODIUM CHLORIDE, PRESERVATIVE FREE 10 ML: 5 INJECTION INTRAVENOUS at 07:48

## 2021-01-01 RX ADMIN — Medication 2 PUFF: at 21:09

## 2021-01-01 RX ADMIN — ALBUTEROL SULFATE 2 PUFF: 90 AEROSOL, METERED RESPIRATORY (INHALATION) at 15:08

## 2021-01-01 RX ADMIN — ZINC SULFATE 220 MG (50 MG) CAPSULE 50 MG: CAPSULE at 08:20

## 2021-01-01 RX ADMIN — OXYCODONE HYDROCHLORIDE AND ACETAMINOPHEN 1000 MG: 500 TABLET ORAL at 07:54

## 2021-01-01 RX ADMIN — TIOTROPIUM BROMIDE AND OLODATEROL 2 PUFF: 3.124; 2.736 SPRAY, METERED RESPIRATORY (INHALATION) at 08:36

## 2021-01-01 RX ADMIN — PANTOPRAZOLE SODIUM 40 MG: 40 TABLET, DELAYED RELEASE ORAL at 06:15

## 2021-01-01 RX ADMIN — LORAZEPAM 1 MG: 2 INJECTION INTRAMUSCULAR; INTRAVENOUS at 14:14

## 2021-01-01 RX ADMIN — ALBUTEROL SULFATE 2 PUFF: 90 AEROSOL, METERED RESPIRATORY (INHALATION) at 19:34

## 2021-01-01 RX ADMIN — Medication 2 PUFF: at 21:24

## 2021-01-01 RX ADMIN — MONTELUKAST 10 MG: 10 TABLET, FILM COATED ORAL at 22:11

## 2021-01-01 RX ADMIN — PANTOPRAZOLE SODIUM 40 MG: 40 TABLET, DELAYED RELEASE ORAL at 06:08

## 2021-01-01 RX ADMIN — ALBUTEROL SULFATE 2 PUFF: 90 AEROSOL, METERED RESPIRATORY (INHALATION) at 08:36

## 2021-01-01 RX ADMIN — VANCOMYCIN 1250 MG: 1.75 INJECTION, SOLUTION INTRAVENOUS at 08:23

## 2021-01-01 RX ADMIN — CARVEDILOL 6.25 MG: 6.25 TABLET, FILM COATED ORAL at 20:07

## 2021-01-01 RX ADMIN — OXYCODONE HYDROCHLORIDE AND ACETAMINOPHEN 1000 MG: 500 TABLET ORAL at 20:38

## 2021-01-01 RX ADMIN — METOPROLOL TARTRATE 5 MG: 5 INJECTION INTRAVENOUS at 22:14

## 2021-01-01 RX ADMIN — Medication 2 PUFF: at 20:05

## 2021-01-01 RX ADMIN — HYDROCODONE BITARTRATE AND ACETAMINOPHEN 1 TABLET: 5; 325 TABLET ORAL at 09:47

## 2021-01-01 RX ADMIN — Medication 1000 UNITS: at 10:08

## 2021-01-01 RX ADMIN — ACETAMINOPHEN 1000 MG: 500 TABLET ORAL at 14:16

## 2021-01-01 RX ADMIN — ALBUTEROL SULFATE 2 PUFF: 90 AEROSOL, METERED RESPIRATORY (INHALATION) at 15:21

## 2021-01-01 RX ADMIN — DICLOFENAC SODIUM AND MISOPROSTOL 1 TABLET: 75; 200 TABLET, DELAYED RELEASE ORAL at 21:29

## 2021-01-01 RX ADMIN — DEXTROSE MONOHYDRATE 5 MG/HR: 50 INJECTION, SOLUTION INTRAVENOUS at 00:51

## 2021-01-01 RX ADMIN — VASOPRESSIN 0.04 UNITS/MIN: 20 INJECTION INTRAVENOUS at 17:50

## 2021-01-01 RX ADMIN — DEXTROSE MONOHYDRATE 10 MG/HR: 50 INJECTION, SOLUTION INTRAVENOUS at 11:50

## 2021-01-01 RX ADMIN — LORAZEPAM 1 MG: 1 TABLET ORAL at 18:10

## 2021-01-01 RX ADMIN — PANTOPRAZOLE SODIUM 40 MG: 40 TABLET, DELAYED RELEASE ORAL at 05:36

## 2021-01-01 RX ADMIN — SODIUM CHLORIDE 25 ML: 9 INJECTION, SOLUTION INTRAVENOUS at 10:01

## 2021-01-01 RX ADMIN — CARVEDILOL 6.25 MG: 6.25 TABLET, FILM COATED ORAL at 09:25

## 2021-01-01 RX ADMIN — METHYLPREDNISOLONE SODIUM SUCCINATE 40 MG: 40 INJECTION, POWDER, LYOPHILIZED, FOR SOLUTION INTRAMUSCULAR; INTRAVENOUS at 10:27

## 2021-01-01 RX ADMIN — Medication 1000 UNITS: at 09:33

## 2021-01-01 RX ADMIN — ENOXAPARIN SODIUM 30 MG: 30 INJECTION SUBCUTANEOUS at 09:11

## 2021-01-01 RX ADMIN — TIOTROPIUM BROMIDE AND OLODATEROL 2 PUFF: 3.124; 2.736 SPRAY, METERED RESPIRATORY (INHALATION) at 08:06

## 2021-01-01 RX ADMIN — DEXAMETHASONE SODIUM PHOSPHATE 10 MG: 10 INJECTION INTRAMUSCULAR; INTRAVENOUS at 08:20

## 2021-01-01 RX ADMIN — SODIUM BICARBONATE: 84 INJECTION, SOLUTION INTRAVENOUS at 22:02

## 2021-01-01 RX ADMIN — DEXAMETHASONE 6 MG: 4 TABLET ORAL at 08:08

## 2021-01-01 RX ADMIN — LORAZEPAM 1 MG: 2 INJECTION INTRAMUSCULAR; INTRAVENOUS at 10:01

## 2021-01-01 RX ADMIN — Medication: at 23:56

## 2021-01-01 RX ADMIN — ZINC SULFATE 220 MG (50 MG) CAPSULE 50 MG: CAPSULE at 21:16

## 2021-01-01 RX ADMIN — OXYCODONE HYDROCHLORIDE AND ACETAMINOPHEN 1000 MG: 500 TABLET ORAL at 09:46

## 2021-01-01 RX ADMIN — AMIODARONE HYDROCHLORIDE 150 MG: 50 INJECTION, SOLUTION INTRAVENOUS at 03:17

## 2021-01-01 RX ADMIN — METOPROLOL TARTRATE 5 MG: 5 INJECTION INTRAVENOUS at 22:25

## 2021-01-01 RX ADMIN — FUROSEMIDE 40 MG: 10 INJECTION, SOLUTION INTRAMUSCULAR; INTRAVENOUS at 09:28

## 2021-01-01 RX ADMIN — PROPOFOL 30 MCG/KG/MIN: 10 INJECTION, EMULSION INTRAVENOUS at 18:09

## 2021-01-01 RX ADMIN — BISACODYL 5 MG: 5 TABLET, COATED ORAL at 08:39

## 2021-01-01 RX ADMIN — Medication 1000 UNITS: at 09:59

## 2021-01-01 RX ADMIN — Medication 50 MCG/HR: at 07:05

## 2021-01-01 RX ADMIN — BACITRACIN, NEOMYCIN, POLYMYXIN B: 400; 3.5; 5 OINTMENT TOPICAL at 21:55

## 2021-01-01 RX ADMIN — PANTOPRAZOLE SODIUM 40 MG: 40 TABLET, DELAYED RELEASE ORAL at 05:47

## 2021-01-01 RX ADMIN — Medication 2 PUFF: at 19:45

## 2021-01-01 RX ADMIN — ZINC SULFATE 220 MG (50 MG) CAPSULE 50 MG: CAPSULE at 08:36

## 2021-01-01 RX ADMIN — MONTELUKAST 10 MG: 10 TABLET, FILM COATED ORAL at 20:48

## 2021-01-01 RX ADMIN — Medication 2 PUFF: at 21:45

## 2021-01-01 RX ADMIN — LORAZEPAM 1 MG: 2 INJECTION INTRAMUSCULAR; INTRAVENOUS at 00:12

## 2021-01-01 RX ADMIN — FUROSEMIDE 20 MG: 20 TABLET ORAL at 10:02

## 2021-01-01 RX ADMIN — PROPOFOL 20 MCG/KG/MIN: 10 INJECTION, EMULSION INTRAVENOUS at 19:20

## 2021-01-01 RX ADMIN — Medication 1000 UNITS: at 09:14

## 2021-01-01 RX ADMIN — SODIUM ZIRCONIUM CYCLOSILICATE 5 G: 5 POWDER, FOR SUSPENSION ORAL at 20:29

## 2021-01-01 RX ADMIN — SODIUM CHLORIDE, PRESERVATIVE FREE 10 ML: 5 INJECTION INTRAVENOUS at 19:37

## 2021-01-01 RX ADMIN — CARVEDILOL 6.25 MG: 6.25 TABLET, FILM COATED ORAL at 22:11

## 2021-01-01 RX ADMIN — ENOXAPARIN SODIUM 30 MG: 30 INJECTION SUBCUTANEOUS at 08:56

## 2021-01-01 RX ADMIN — Medication 1000 UNITS: at 08:58

## 2021-01-01 RX ADMIN — HYDROCODONE BITARTRATE AND ACETAMINOPHEN 1 TABLET: 5; 325 TABLET ORAL at 22:00

## 2021-01-01 RX ADMIN — DEXAMETHASONE 6 MG: 4 TABLET ORAL at 21:58

## 2021-01-01 RX ADMIN — FUROSEMIDE 20 MG: 20 TABLET ORAL at 10:11

## 2021-01-01 RX ADMIN — ALBUTEROL SULFATE 2 PUFF: 90 AEROSOL, METERED RESPIRATORY (INHALATION) at 19:22

## 2021-01-01 RX ADMIN — MONTELUKAST 10 MG: 10 TABLET, FILM COATED ORAL at 20:36

## 2021-01-01 RX ADMIN — CARVEDILOL 6.25 MG: 6.25 TABLET, FILM COATED ORAL at 10:41

## 2021-01-01 RX ADMIN — MONTELUKAST 10 MG: 10 TABLET, FILM COATED ORAL at 21:45

## 2021-01-01 RX ADMIN — OXYCODONE HYDROCHLORIDE AND ACETAMINOPHEN 1000 MG: 500 TABLET ORAL at 22:00

## 2021-01-01 RX ADMIN — CARVEDILOL 6.25 MG: 6.25 TABLET, FILM COATED ORAL at 09:42

## 2021-01-01 RX ADMIN — Medication 1000 UNITS: at 15:11

## 2021-01-01 RX ADMIN — ENOXAPARIN SODIUM 30 MG: 30 INJECTION SUBCUTANEOUS at 09:26

## 2021-01-01 RX ADMIN — Medication 45 MCG/MIN: at 06:21

## 2021-01-01 RX ADMIN — ENOXAPARIN SODIUM 30 MG: 30 INJECTION SUBCUTANEOUS at 09:46

## 2021-01-01 RX ADMIN — METHYLPREDNISOLONE SODIUM SUCCINATE 40 MG: 40 INJECTION, POWDER, LYOPHILIZED, FOR SOLUTION INTRAMUSCULAR; INTRAVENOUS at 08:33

## 2021-01-01 RX ADMIN — ALBUTEROL SULFATE 2 PUFF: 90 AEROSOL, METERED RESPIRATORY (INHALATION) at 08:28

## 2021-01-01 RX ADMIN — SODIUM POLYSTYRENE SULFONATE 15 G: 15 SUSPENSION ORAL; RECTAL at 06:32

## 2021-01-01 RX ADMIN — ZINC SULFATE 220 MG (50 MG) CAPSULE 50 MG: CAPSULE at 20:46

## 2021-01-01 RX ADMIN — SODIUM CHLORIDE, PRESERVATIVE FREE 10 ML: 5 INJECTION INTRAVENOUS at 22:13

## 2021-01-01 RX ADMIN — DEXAMETHASONE 6 MG: 4 TABLET ORAL at 09:17

## 2021-01-01 RX ADMIN — LORAZEPAM 1 MG: 2 INJECTION INTRAMUSCULAR; INTRAVENOUS at 00:44

## 2021-01-01 RX ADMIN — LISINOPRIL 2.5 MG: 2.5 TABLET ORAL at 09:42

## 2021-01-01 RX ADMIN — CARVEDILOL 6.25 MG: 6.25 TABLET, FILM COATED ORAL at 22:00

## 2021-01-01 RX ADMIN — ONDANSETRON 4 MG: 2 INJECTION INTRAMUSCULAR; INTRAVENOUS at 14:17

## 2021-01-01 RX ADMIN — BUMETANIDE 0.5 MG: 0.25 INJECTION, SOLUTION INTRAMUSCULAR; INTRAVENOUS at 21:21

## 2021-01-01 RX ADMIN — SALINE NASAL SPRAY 1 SPRAY: 1.5 SOLUTION NASAL at 17:25

## 2021-01-01 ASSESSMENT — PULMONARY FUNCTION TESTS
PIF_VALUE: 48
PIF_VALUE: 53
PIF_VALUE: 32
PIF_VALUE: 32
PIF_VALUE: 49
PIF_VALUE: 49
PIF_VALUE: 31
PIF_VALUE: 29
PIF_VALUE: 30
PIF_VALUE: 26
PIF_VALUE: 40
PIF_VALUE: 31
PIF_VALUE: 28
PIF_VALUE: 26
PIF_VALUE: 51
PIF_VALUE: 20
PIF_VALUE: 37
PIF_VALUE: 35
PIF_VALUE: 50
PIF_VALUE: 24
PIF_VALUE: 26
PIF_VALUE: 30
PIF_VALUE: 31
PIF_VALUE: 19
PIF_VALUE: 35
PIF_VALUE: 32
PIF_VALUE: 52
PIF_VALUE: 38
PIF_VALUE: 29
PIF_VALUE: 24
PIF_VALUE: 28
PIF_VALUE: 43
PIF_VALUE: 28
PIF_VALUE: 34
PIF_VALUE: 28
PIF_VALUE: 27
PIF_VALUE: 34
PIF_VALUE: 39
PIF_VALUE: 56
PIF_VALUE: 54
PIF_VALUE: 44
PIF_VALUE: 32
PIF_VALUE: 25
PIF_VALUE: 30
PIF_VALUE: 29
PIF_VALUE: 55
PIF_VALUE: 33
PIF_VALUE: 26
PIF_VALUE: 25
PIF_VALUE: 28
PIF_VALUE: 44
PIF_VALUE: 52
PIF_VALUE: 36
PIF_VALUE: 31
PIF_VALUE: 29
PIF_VALUE: 31
PIF_VALUE: 22
PIF_VALUE: 24
PIF_VALUE: 20
PIF_VALUE: 30
PIF_VALUE: 52
PIF_VALUE: 52
PIF_VALUE: 22
PIF_VALUE: 26
PIF_VALUE: 53
PIF_VALUE: 52
PIF_VALUE: 54
PIF_VALUE: 24
PIF_VALUE: 27
PIF_VALUE: 46
PIF_VALUE: 21
PIF_VALUE: 40
PIF_VALUE: 21
PIF_VALUE: 26
PIF_VALUE: 41
PIF_VALUE: 32
PIF_VALUE: 32
PIF_VALUE: 25
PIF_VALUE: 44
PIF_VALUE: 27
PIF_VALUE: 23
PIF_VALUE: 22
PIF_VALUE: 32
PIF_VALUE: 31
PIF_VALUE: 45
PIF_VALUE: 53
PIF_VALUE: 36
PIF_VALUE: 27
PIF_VALUE: 31
PIF_VALUE: 30
PIF_VALUE: 30
PIF_VALUE: 52
PIF_VALUE: 27
PIF_VALUE: 30
PIF_VALUE: 54
PIF_VALUE: 34
PIF_VALUE: 26
PIF_VALUE: 37
PIF_VALUE: 26
PIF_VALUE: 24
PIF_VALUE: 35
PIF_VALUE: 34
PIF_VALUE: 26
PIF_VALUE: 54
PIF_VALUE: 30
PIF_VALUE: 49
PIF_VALUE: 31
PIF_VALUE: 28
PIF_VALUE: 25
PIF_VALUE: 53
PIF_VALUE: 23
PIF_VALUE: 26
PIF_VALUE: 25
PIF_VALUE: 29
PIF_VALUE: 33
PIF_VALUE: 25
PIF_VALUE: 30
PIF_VALUE: 32
PIF_VALUE: 31
PIF_VALUE: 40
PIF_VALUE: 31
PIF_VALUE: 26
PIF_VALUE: 24
PIF_VALUE: 24
PIF_VALUE: 49
PIF_VALUE: 31
PIF_VALUE: 23
PIF_VALUE: 46
PIF_VALUE: 31
PIF_VALUE: 50
PIF_VALUE: 50
PIF_VALUE: 29
PIF_VALUE: 27
PIF_VALUE: 29
PIF_VALUE: 27
PIF_VALUE: 27
PIF_VALUE: 31
PIF_VALUE: 33
PIF_VALUE: 43
PIF_VALUE: 21
PIF_VALUE: 44
PIF_VALUE: 24
PIF_VALUE: 31
PIF_VALUE: 31
PIF_VALUE: 25
PIF_VALUE: 30
PIF_VALUE: 52
PIF_VALUE: 52
PIF_VALUE: 27
PIF_VALUE: 51
PIF_VALUE: 20
PIF_VALUE: 27
PIF_VALUE: 55
PIF_VALUE: 29
PIF_VALUE: 35
PIF_VALUE: 33
PIF_VALUE: 25
PIF_VALUE: 37
PIF_VALUE: 37
PIF_VALUE: 42

## 2021-01-01 ASSESSMENT — PAIN SCALES - GENERAL
PAINLEVEL_OUTOF10: 0
PAINLEVEL_OUTOF10: 6
PAINLEVEL_OUTOF10: 0
PAINLEVEL_OUTOF10: 5
PAINLEVEL_OUTOF10: 0
PAINLEVEL_OUTOF10: 3
PAINLEVEL_OUTOF10: 0
PAINLEVEL_OUTOF10: 7
PAINLEVEL_OUTOF10: 0
PAINLEVEL_OUTOF10: 4
PAINLEVEL_OUTOF10: 0
PAINLEVEL_OUTOF10: 0
PAINLEVEL_OUTOF10: 7
PAINLEVEL_OUTOF10: 0
PAINLEVEL_OUTOF10: 2
PAINLEVEL_OUTOF10: 0
PAINLEVEL_OUTOF10: 0
PAINLEVEL_OUTOF10: 7
PAINLEVEL_OUTOF10: 0
PAINLEVEL_OUTOF10: 4
PAINLEVEL_OUTOF10: 0
PAINLEVEL_OUTOF10: 7
PAINLEVEL_OUTOF10: 0
PAINLEVEL_OUTOF10: 3
PAINLEVEL_OUTOF10: 0
PAINLEVEL_OUTOF10: 4
PAINLEVEL_OUTOF10: 0
PAINLEVEL_OUTOF10: 0
PAINLEVEL_OUTOF10: 7
PAINLEVEL_OUTOF10: 0
PAINLEVEL_OUTOF10: 4
PAINLEVEL_OUTOF10: 0
PAINLEVEL_OUTOF10: 3
PAINLEVEL_OUTOF10: 0
PAINLEVEL_OUTOF10: 5

## 2021-01-01 ASSESSMENT — PAIN DESCRIPTION - PAIN TYPE
TYPE: ACUTE PAIN
TYPE: ACUTE PAIN
TYPE: CHRONIC PAIN
TYPE: CHRONIC PAIN
TYPE: ACUTE PAIN

## 2021-01-01 ASSESSMENT — ENCOUNTER SYMPTOMS
COUGH: 1
SHORTNESS OF BREATH: 1
VOMITING: 0
SHORTNESS OF BREATH: 1
WHEEZING: 1
EYES NEGATIVE: 1
NAUSEA: 1
SHORTNESS OF BREATH: 1
SHORTNESS OF BREATH: 1
COUGH: 1
ABDOMINAL DISTENTION: 0
BACK PAIN: 1
SHORTNESS OF BREATH: 0
CONSTIPATION: 0
SORE THROAT: 0
ABDOMINAL PAIN: 0

## 2021-01-01 ASSESSMENT — PAIN DESCRIPTION - LOCATION
LOCATION: NECK
LOCATION: BACK
LOCATION: BACK
LOCATION: HEAD
LOCATION: BACK

## 2021-01-01 ASSESSMENT — PAIN DESCRIPTION - FREQUENCY
FREQUENCY: CONTINUOUS
FREQUENCY: CONTINUOUS

## 2021-01-01 ASSESSMENT — PAIN DESCRIPTION - PROGRESSION

## 2021-01-01 ASSESSMENT — PAIN DESCRIPTION - ORIENTATION: ORIENTATION: LOWER;MID

## 2021-01-01 ASSESSMENT — PAIN DESCRIPTION - DESCRIPTORS
DESCRIPTORS: ACHING;DISCOMFORT;CONSTANT
DESCRIPTORS: ACHING

## 2021-01-01 ASSESSMENT — PAIN - FUNCTIONAL ASSESSMENT: PAIN_FUNCTIONAL_ASSESSMENT: ACTIVITIES ARE NOT PREVENTED

## 2021-01-01 ASSESSMENT — PAIN DESCRIPTION - ONSET: ONSET: ON-GOING

## 2021-09-14 PROBLEM — J20.9 ACUTE BRONCHITIS WITH CHRONIC OBSTRUCTIVE PULMONARY DISEASE (COPD) (HCC): Status: ACTIVE | Noted: 2021-01-01

## 2021-09-14 PROBLEM — E78.5 HYPERLIPIDEMIA: Status: ACTIVE | Noted: 2021-01-01

## 2021-09-14 PROBLEM — U07.1 COVID-19: Status: ACTIVE | Noted: 2021-01-01

## 2021-09-14 PROBLEM — I25.10 ARTERIOSCLEROSIS OF CORONARY ARTERY: Status: ACTIVE | Noted: 2021-01-01

## 2021-09-14 PROBLEM — I71.20 ANEURYSM OF THORACIC AORTA: Status: ACTIVE | Noted: 2021-01-01

## 2021-09-14 PROBLEM — I42.9 CARDIOMYOPATHY (HCC): Status: ACTIVE | Noted: 2021-01-01

## 2021-09-14 PROBLEM — J44.0 ACUTE BRONCHITIS WITH CHRONIC OBSTRUCTIVE PULMONARY DISEASE (COPD) (HCC): Status: ACTIVE | Noted: 2021-01-01

## 2021-09-14 PROBLEM — E66.9 OBESITY: Status: ACTIVE | Noted: 2021-01-01

## 2021-09-14 PROBLEM — I25.119 ATHEROSCLEROSIS OF NATIVE CORONARY ARTERY OF NATIVE HEART WITH ANGINA PECTORIS (HCC): Status: ACTIVE | Noted: 2021-01-01

## 2021-09-14 PROBLEM — G47.33 OSA (OBSTRUCTIVE SLEEP APNEA): Status: ACTIVE | Noted: 2021-01-01

## 2021-09-14 PROBLEM — J44.9 COPD (CHRONIC OBSTRUCTIVE PULMONARY DISEASE) (HCC): Status: ACTIVE | Noted: 2021-01-01

## 2021-09-14 PROBLEM — F17.201 TOBACCO DEPENDENCE IN REMISSION: Status: ACTIVE | Noted: 2021-01-01

## 2021-09-14 NOTE — H&P
Provider, MD   omeprazole (PRILOSEC) 20 MG delayed release capsule Take 1 capsule by mouth daily 6/2/21  Yes Historical Provider, MD   montelukast (SINGULAIR) 10 MG tablet Take 1 tablet by mouth every evening 6/2/21  Yes Historical Provider, MD   mometasone [facility]) 220 MCG/INH inhaler Inhale 2 puffs into the lungs 2 times daily 6/2/21  Yes Historical Provider, MD   pravastatin (PRAVACHOL) 40 MG tablet Take 1 tablet by mouth Daily with supper 6/2/21  Yes Historical Provider, MD   tiotropium-olodaterol (STIOLTO) 2.5-2.5 MCG/ACT AERS Inhale 2 puffs into the lungs 2 times daily 5/5/21  Yes Historical Provider, MD   furosemide (LASIX) 20 MG tablet Take 1 tablet by mouth daily 7/1/21   Historical Provider, MD        Allergies   Percocet [oxycodone-acetaminophen]    Social History     Social History     Tobacco History     Smoking Status  Former Smoker Smoking Start Date  12/23/1970 Quit date  12/23/2004 Smoking Frequency  0.5 packs/day    Smoking Tobacco Type  Cigarettes    Smokeless Tobacco Use  Never Used          Alcohol History     Alcohol Use Status  No          Drug Use     Drug Use Status  No          Sexual Activity     Sexually Active  Yes Partners  Female                Family History   History reviewed. No pertinent family history. Review of Systems   Review of Systems   Constitutional: Positive for chills, fatigue and fever. HENT: Positive for postnasal drip. Negative for congestion and sore throat. Eyes: Negative. Respiratory: Positive for cough. Negative for shortness of breath. Cardiovascular: Negative for chest pain and leg swelling. Gastrointestinal: Positive for nausea. Negative for abdominal pain, constipation and vomiting. Endocrine: Negative. Genitourinary: Negative for dysuria and hematuria. Musculoskeletal: Positive for back pain and myalgias. Negative for neck pain. Skin: Negative for wound. Neurological: Negative for dizziness and light-headedness.    Hematological: Negative. Psychiatric/Behavioral: Negative. All other systems reviewed and are negative. Physical Exam   /68   Pulse 89   Temp 99.5 °F (37.5 °C) (Oral)   Resp 20   Ht 5' 9\" (1.753 m)   Wt 283 lb (128.4 kg)   SpO2 97%   BMI 41.79 kg/m²     Physical Exam  Vitals and nursing note reviewed. Constitutional:       General: He is not in acute distress. Appearance: Normal appearance. He is not toxic-appearing. HENT:      Head: Normocephalic. Nose: Congestion present. Mouth/Throat:      Mouth: Mucous membranes are moist.   Cardiovascular:      Pulses: Normal pulses. Pulmonary:      Effort: Pulmonary effort is normal. No respiratory distress. Breath sounds: Examination of the right-upper field reveals wheezing. Examination of the left-upper field reveals wheezing. Examination of the right-lower field reveals rhonchi. Examination of the left-lower field reveals rhonchi. Wheezing and rhonchi present. Abdominal:      General: Abdomen is flat. Bowel sounds are normal. There is no distension. Musculoskeletal:         General: Normal range of motion. Skin:     General: Skin is warm and dry. Capillary Refill: Capillary refill takes 2 to 3 seconds. Neurological:      General: No focal deficit present. Mental Status: He is alert and oriented to person, place, and time.          Labs      Recent Results (from the past 24 hour(s))   CBC Auto Differential    Collection Time: 09/14/21  1:30 PM   Result Value Ref Range    WBC 4.3 4.0 - 10.5 K/CU MM    RBC 5.39 4.6 - 6.2 M/CU MM    Hemoglobin 15.3 13.5 - 18.0 GM/DL    Hematocrit 46.6 42 - 52 %    MCV 86.5 78 - 100 FL    MCH 28.4 27 - 31 PG    MCHC 32.8 32.0 - 36.0 %    RDW 15.0 (H) 11.7 - 14.9 %    Platelets 846 520 - 028 K/CU MM    MPV 11.3 (H) 7.5 - 11.1 FL    Differential Type AUTOMATED DIFFERENTIAL     Segs Relative 75.2 (H) 36 - 66 %    Lymphocytes % 14.5 (L) 24 - 44 %    Monocytes % 9.6 (H) 0 - 4 %    Eosinophils % 0.0 0 - 3 %    Basophils % 0.2 0 - 1 %    Segs Absolute 3.2 K/CU MM    Lymphocytes Absolute 0.6 K/CU MM    Monocytes Absolute 0.4 K/CU MM    Eosinophils Absolute 0.0 K/CU MM    Basophils Absolute 0.0 K/CU MM    Nucleated RBC % 0.0 %    Total Nucleated RBC 0.0 K/CU MM    Total Immature Neutrophil 0.02 K/CU MM    Immature Neutrophil % 0.5 (H) 0 - 0.43 %   Comprehensive Metabolic Panel    Collection Time: 09/14/21  1:30 PM   Result Value Ref Range    Sodium 132 (L) 135 - 145 MMOL/L    Potassium 3.7 3.5 - 5.1 MMOL/L    Chloride 94 (L) 99 - 110 mMol/L    CO2 20 (L) 21 - 32 MMOL/L    BUN 12 6 - 23 MG/DL    CREATININE 0.6 (L) 0.9 - 1.3 MG/DL    Glucose 106 (H) 70 - 99 MG/DL    Calcium 8.6 8.3 - 10.6 MG/DL    Albumin 4.1 3.4 - 5.0 GM/DL    Total Protein 7.2 6.4 - 8.2 GM/DL    Total Bilirubin 0.5 0.0 - 1.0 MG/DL    ALT 19 10 - 40 U/L    AST 34 15 - 37 IU/L    Alkaline Phosphatase 108 40 - 129 IU/L    GFR Non-African American >60 >60 mL/min/1.73m2    GFR African American >60 >60 mL/min/1.73m2    Anion Gap 18 (H) 4 - 16   Troponin    Collection Time: 09/14/21  1:30 PM   Result Value Ref Range    Troponin T <0.010 <0.01 NG/ML   Brain Natriuretic Peptide    Collection Time: 09/14/21  1:30 PM   Result Value Ref Range    Pro-.8 (H) <300 PG/ML   Lactic Acid, Plasma    Collection Time: 09/14/21  1:30 PM   Result Value Ref Range    Lactate 1.5 0.4 - 2.0 mMOL/L   Procalcitonin    Collection Time: 09/14/21  1:30 PM   Result Value Ref Range    Procalcitonin 0.245    Lipase    Collection Time: 09/14/21  1:30 PM   Result Value Ref Range    Lipase 35 13 - 60 IU/L   D-dimer, Quantitative    Collection Time: 09/14/21  1:30 PM   Result Value Ref Range    D-Dimer, Quant 332 (H) <230 NG/mL(DDU)   EKG 12 Lead    Collection Time: 09/14/21  1:48 PM   Result Value Ref Range    Ventricular Rate 94 BPM    Atrial Rate 94 BPM    P-R Interval 162 ms    QRS Duration 92 ms    Q-T Interval 362 ms    QTc Calculation (Bazett) 452 ms    P Axis 52 degrees R Axis 19 degrees    T Axis 6 degrees    Diagnosis       Normal sinus rhythm  Possible Inferior infarct (cited on or before 23-DEC-2016)  Abnormal ECG  When compared with ECG of 23-DEC-2016 14:58,  T wave inversion less evident in Inferior leads  T wave amplitude has increased in Lateral leads  Confirmed by KALA Mitchell ([id number]) on 9/14/2021 5:22:26 PM     COVID-19, Rapid    Collection Time: 09/14/21  2:18 PM    Specimen: Nasopharyngeal   Result Value Ref Range    Source UNKNOWN     SARS-CoV-2, NAAT DETECTED (A) NOT DETECTED   Blood Gas, Venous    Collection Time: 09/14/21  4:00 PM   Result Value Ref Range    pH, Panda 7.40 7.32 - 7.42    pCO2, Panda 40 38 - 52 mmHG    pO2, Panda 165 (H) 28 - 48 mmHG    Base Exc, Mixed 0 0 - 1.2    HCO3, Venous 24.8 19 - 25 MMOL/L    O2 Sat, Panda 95.8 (H) 50 - 70 %    Comment VENOUS         Imaging/Diagnostics Last 24 Hours   XR CHEST PORTABLE    Result Date: 9/14/2021  EXAMINATION: ONE XRAY VIEW OF THE CHEST 9/14/2021 1:52 pm COMPARISON: Chest x-ray December 23, 2016 HISTORY: ORDERING SYSTEM PROVIDED HISTORY: cough SOB chest pain TECHNOLOGIST PROVIDED HISTORY: Reason for exam:->cough SOB chest pain Reason for Exam: nausea   fever    sob   rectal bleeding Acuity: Unknown Type of Exam: Unknown Relevant Medical/Surgical History: copd FINDINGS: Cardiac silhouette is enlarged. Chronic interstitial changes of the lungs with slightly increased interstitial opacities compared with previous exam. Superimposed edema or atypical/viral infectious process cannot be excluded in the appropriate clinical setting. Nonspecific fullness of the right hilar region which is new when compared with previous exam.  Findings could reflect infiltrate or lymphadenopathy. Underlying mass cannot entirely be excluded on radiograph.      1. Increased interstitial opacities bilaterally when compared with previous exam.  Mild pulmonary edema or atypical/viral infectious process cannot be excluded in the appropriate clinical setting. 2. Nonspecific fullness of the right hilar region which is new compared with previous exam.  Findings could reflect infiltrate or possible lymphadenopathy. Underlying mass lesion can also not be excluded on radiograph. Recommend further evaluation with dedicated CT chest with contrast if clinically feasible. CTA PULMONARY W CONTRAST    Result Date: 9/14/2021  EXAMINATION: CTA OF THE CHEST 9/14/2021 3:46 pm TECHNIQUE: CTA of the chest was performed after the administration of intravenous contrast.  Multiplanar reformatted images are provided for review. MIP images are provided for review. Dose modulation, iterative reconstruction, and/or weight based adjustment of the mA/kV was utilized to reduce the radiation dose to as low as reasonably achievable. COMPARISON: None. HISTORY: ORDERING SYSTEM PROVIDED HISTORY: abnormal cxr elevated d dimer TECHNOLOGIST PROVIDED HISTORY: Reason for exam:->abnormal cxr elevated d dimer Decision Support Exception - unselect if not a suspected or confirmed emergency medical condition->Emergency Medical Condition (MA) Reason for Exam: abnormal cxr elevated d dimer Acuity: Acute Type of Exam: Initial Additional signs and symptoms: Nausea; Fever; Shortness of Breath; Rectal Bleeding FINDINGS: Pulmonary Arteries: Pulmonary arteries are adequately opacified for evaluation. No evidence of intraluminal filling defect to suggest pulmonary embolism. Main pulmonary artery is normal in caliber. Mediastinum: No evidence of mediastinal lymphadenopathy. The heart and pericardium demonstrate no acute abnormality. There is no acute abnormality of the thoracic aorta. Lungs/pleura: Predominately peripheral ground-glass opacities seen in both lungs. No pneumothorax. No pleural effusion. Central airways are patent. Upper Abdomen: Limited images of the upper abdomen are unremarkable. Soft Tissues/Bones: No acute bone or soft tissue abnormality.      Negative for acute pulmonary embolism Predominately peripheral ground-glass opacities in both lungs could represent COVID pneumonia in the proper clinical setting       Assessment      Hospital Problems         Last Modified POA    Obstructive sleep apnea syndrome 9/14/2021 Yes    COVID-19 9/14/2021 Yes    Tobacco dependence in remission 9/14/2021 Yes    Obesity 9/14/2021 Yes    Hyperlipidemia 9/14/2021 Yes          Plan   #COVID 19 with concern for pneumonia  #COPD exacerbation  #Obstructive sleep apnea  #Acute respiratory failure with hypoxia   -Admit to inpatient, COVID-19 unit   -Chest x-ray/CT scan with predominantly peripheral groundglass opacities in both lungs could represent COVID-19 pneumonia. -Start azithromycin 500 mg IV , consult to infectious diseases   -Consult pharmacy to start remdesivir   -Pancultured in ED x2, check UA check strep antigens in urine, trend lactic acid procalcitonin and CK daily   -Received dexamethasone in ED continue Decadron 6 mg oral daily starting tomorrow.   -Continue duo nebs scheduled albuterol inhaler, and home Flovent   -CPAP while sleeping and at night due to history of sleep obstructive sleep apnea and CPAP use at home    #Rectal bleeding   -Reported to ED provider black diarrhea x3 days we will check occult stool  Continue home medications for chronic medical conditions unless otherwise noted above including and not limited to coronary artery disease, tobacco dependence in remission, obesity, hyperlipidemia, and aneurysm of thoracic aorta,          Diet ADULT DIET;  Regular     DVT Prophylaxis [x] Lovenox, []  Heparin, [] SCDs, [] Ambulation   GI Prophylaxis [x] PPI,  [] H2 Blocker,  [] Carafate,  [] Diet/Tube Feeds   Code Status Full Code   Disposition Patient requires continued admission due to [facility] SWIFT [] Low, [] Moderate,[x]  High  Patient's risk as above      Patient discussed with and evaluated by Dr Kiran Sadler  who agrees with the above diagnosis, treatment and disposition.           Consultations Ordered:  IP CONSULT TO HOSPITALIST  IP CONSULT TO PHARMACY  IP CONSULT TO INFECTIOUS DISEASES    Electronically signed by DANIELLE Aceves CNP on 9/14/21 at 6:16 PM EDT

## 2021-09-14 NOTE — ED NOTES
Bed: ED-06  Expected date:   Expected time:   Means of arrival:   Comments:  EMS- 54's M shortness of breath     Xochilt Page  09/14/21 [id number]

## 2021-09-14 NOTE — ED PROVIDER NOTES
EMERGENCY DEPARTMENT ENCOUNTER      PCP: No primary care provider on file. CHIEF COMPLAINT    Chief Complaint   Patient presents with    Nausea     has not eaten in 5 days    Fever     headache, eye pain    Shortness of Breath    Rectal Bleeding     black diarrhea for 3 days       This patient was not evaluated by the attending physician. I have independently evaluated this patient. HPI    Jayjay Esposito is a 63 y.o. male who presents with cough, body aches, fever, nausea and dry heaves. Onset 5 days ago. Patient states he is noticed black diarrhea for the past 3 days. Patient has associated shortness of breath, chest pain. Patient denies sick contacts. Patient did not receive Covid vaccine. Patient has history of asthma and COPD. Patient denies any significant abdominal pain. Patient denies history of gastric ulcers, frequent NSAID use or alcohol use.       REVIEW OF SYSTEMS    Constitutional: See HPI  HENT: See HPI  Cardiovascular: See HPI  Respiratory: See HPI  GI: See HPI denies abdominal pain  :  Denies any urinary symptoms   Musculoskeletal: + body aches  Skin:  Denies rash  Neurologic:  Denies focal weakness or sensory changes   Lymphatic:  Denies swollen glands     All other review of systems are negative  See HPI and nursing notes for additional information     PAST MEDICAL AND SURGICAL HISTORY    Past Medical History:   Diagnosis Date    Asthma     COPD (chronic obstructive pulmonary disease) ([facility] [address]      Past Surgical History:   Procedure Laterality Date    CARDIAC CATHETERIZATION  2013    HERNIA REPAIR      WRIST SURGERY Left 1976       CURRENT MEDICATIONS        ALLERGIES    Allergies   Allergen Reactions    Percocet [Oxycodone-Acetaminophen] Nausea And Vomiting       SOCIAL AND FAMILY HISTORY    Social History     Socioeconomic History    Marital status: Single     Spouse name: None    Number of children: None    Years of education: None    Highest education level: None Occupational History    None   Tobacco Use    Smoking status: Former Smoker     Packs/day: 0.50     Types: Cigarettes     Start date: 1970     Quit date: 2004     Years since quittin.7    Smokeless tobacco: Never Used   Vaping Use    Vaping Use: Never used   Substance and Sexual Activity    Alcohol use: No    Drug use: No    Sexual activity: Yes     Partners: Female   Other Topics Concern    None   Social History Narrative    None     Social Determinants of Health     Financial Resource Strain:     Difficulty of Paying Living Expenses:    Food Insecurity:     Worried About Running Out of Food in the Last Year:     Ran Out of Food in the Last Year:    Transportation Needs:     Lack of Transportation (Medical):  Lack of Transportation (Non-Medical):    Physical Activity:     Days of Exercise per Week:     Minutes of Exercise per Session:    Stress:     Feeling of Stress :    Social Connections:     Frequency of Communication with Friends and Family:     Frequency of Social Gatherings with Friends and Family:     Attends Voodoo Services:     Active Member of Clubs or Organizations:     Attends Club or Organization Meetings:     Marital Status:    Intimate Partner Violence:     Fear of Current or Ex-Partner:     Emotionally Abused:     Physically Abused:     Sexually Abused:      History reviewed. No pertinent family history. PHYSICAL EXAM    VITAL SIGNS: /68   Pulse 89   Temp 99.5 °F (37.5 °C) (Oral)   Resp 20   Ht 5' 9\" (1.753 m)   Wt 283 lb (128.4 kg)   SpO2 97%   BMI 41.79 kg/m²    Constitutional: Ill-appearing male patient  HENT:  Normocephalic, Atraumatic, PERRL. Oropharynx is clear  Neck/Lymphatics: supple, no JVD, no swollen nodes  Cardiovascular:  Normal heart rate, Normal rhythm  Respiratory:  Nonlabored breathing. Mildly decreased lung sounds bilaterally with occasional wheezes  Abdomen:   Bowel sounds normal, Soft, No tenderness, no >60 mL/min/1.73m2    GFR African American >60 >60 mL/min/1.73m2    Anion Gap 18 (H) 4 - 16   Troponin   Result Value Ref Range    Troponin T <0.010 <0.01 NG/ML   Brain Natriuretic Peptide   Result Value Ref Range    Pro-.8 (H) <300 PG/ML   Lactic Acid, Plasma   Result Value Ref Range    Lactate 1.5 0.4 - 2.0 mMOL/L   Procalcitonin   Result Value Ref Range    Procalcitonin 0.245    Lipase   Result Value Ref Range    Lipase 35 13 - 60 IU/L   D-dimer, Quantitative   Result Value Ref Range    D-Dimer, Quant 332 (H) <230 NG/mL(DDU)   Blood Gas, Venous   Result Value Ref Range    pH, Panda 7.40 7.32 - 7.42    pCO2, Panda 40 38 - 52 mmHG    pO2, Panda 165 (H) 28 - 48 mmHG    Base Exc, Mixed 0 0 - 1.2    HCO3, Venous 24.8 19 - 25 MMOL/L    O2 Sat, Panda 95.8 (H) 50 - 70 %    Comment VENOUS    EKG 12 Lead   Result Value Ref Range    Ventricular Rate 94 BPM    Atrial Rate 94 BPM    P-R Interval 162 ms    QRS Duration 92 ms    Q-T Interval 362 ms    QTc Calculation (Bazett) 452 ms    P Axis 52 degrees    R Axis 19 degrees    T Axis 6 degrees    Diagnosis       Normal sinus rhythm  Possible Inferior infarct (cited on or before 23-DEC-2016)  Abnormal ECG  When compared with ECG of 23-DEC-2016 14:58,  T wave inversion less evident in Inferior leads  T wave amplitude has increased in Lateral leads  Confirmed by KALA Maldonado ([id number]) on 9/14/2021 5:22:26 PM             EKG      EKG Interpretation  Please see ED physician's note for EKG interpretation        RADIOLOGY    CTA PULMONARY W CONTRAST   Final Result   Negative for acute pulmonary embolism      Predominately peripheral ground-glass opacities in both lungs could represent   COVID pneumonia in the proper clinical setting         XR CHEST PORTABLE   Final Result   1. Increased interstitial opacities bilaterally when compared with previous   exam.  Mild pulmonary edema or atypical/viral infectious process cannot be   excluded in the appropriate clinical setting.    2. Nonspecific fullness of the right hilar region which is new compared with   previous exam.  Findings could reflect infiltrate or possible   lymphadenopathy. Underlying mass lesion can also not be excluded on   radiograph. Recommend further evaluation with dedicated CT chest with   contrast if clinically feasible. ED COURSE & MEDICAL DECISION MAKING      Patient presents as above. While moving in bed patient does desat into the 80s. Oxygen is ordered. Stool guaiac is negative with positive quality check, suspect change in stool color was due to patient stating he took Pepto-Bismol. Rapid Covid is positive. CBC within normal limits. CMP shows sodium 132, CL of 94, CO2 of 20, glucose 106, anion gap of 18. Troponin negative. . Lactic acid is 1.5. Procalcitonin is 0.245. Lipase 35. D-dimer is 332. Chest x-ray shows increased interstitial opacities bilaterally when compared with previous exam, mild pulmonary edema or atypical viral infectious process cannot be excluded, nonspecific fullness of right hilar region which is new compared with previous exam with recommendation for dedicated CT chest with contrast.  Patient provided IV Decadron due to positive Covid and hypoxia. CTA chest shows no evidence for PE, predominately peripheral groundglass opacities in both lungs represent Covid pneumonia. Consult hospitalist to accept admission. Clinical  IMPRESSION    1. COVID-19    2. Hypoxia    3. Pneumonia due to COVID-19 virus        Patient admitted    Comment: Please note this report has been produced using speech recognition software and may contain errors related to that system including errors in grammar, punctuation, and spelling, as well as words and phrases that may be inappropriate. If there are any questions or concerns please feel free to contact the dictating provider for clarification.             Dante Cuello PA-C  09/14/21 [id number]

## 2021-09-15 NOTE — ED NOTES
Report given to [facility] [address] for room 2022 and denies further questions.      Catie Hendrickson RN  09/15/21 [id number]

## 2021-09-15 NOTE — ED NOTES
Pt doing well. Cpap removed by pt and off. Nasal cannula replaced to face at 3Lpm. VS obtained. Pt then ambulatory to BR across chahal from room. N95 mask worn and hand hygiene performed.       Maurice Singh RN  09/15/21 [id number]

## 2021-09-15 NOTE — PROGRESS NOTES
Pharmacy Consult for Baricitinib Initiation per Dr. Mario Knox:    Criteria per [facility] P&T Committee  **All criteria need to be met to receive   Baricitinib for COVID-19 patients**   Age    >5years old     Yes   Laboratory Results    Confirmed positive COVID-19     PLUS    ANY elevation in biomarkers   (CRP, D-dimer, LDH, ferritin)       Yes  D-dimer elevated @ 332   Concomitant Therapy    Receiving systemic steroids for treatment of COVID 19     Yes   Oxygen Status        Requiring supplemental oxygen   (>1 L NC, HFNC, Heated Vapotherm, biPAP, mechanical ventilation)        Yes  3L/min   Special Considerations    Pregnancy  Severe Hepatic Impairment  Chronic/Recurrent Infections   Hemoglobin <8         Clarify risk vs. benefit with provider if criteria met     Contraindications    1. Invasive active mycobacterial or fungal infection  2. Lymphocyte count <200  3. Neutrophil count, ANC <500   4. Significant immunosuppression    ?     None     Dosing Recommendations:    Baricitinib 4 mg PO daily x 14 days (or until hospital discharge)    Renal dose adjustment:  -eGFR > 60: no adjustment necessary   -eGFR 30 - 60: 2 mg PO daily   -eGFR 15 - 30: 1 mg PO daily   -eGFR <15: not recommended   - HD, PD, CRRT: no recommendations at this time     Thank you for the consult,    Margo Pantoja RPh  9/15/2021  3:28 PM

## 2021-09-15 NOTE — ED NOTES
Patient oxygen 88% on monitor at nurse's station. This RN entered room. Patient attempting to reposition in bed. Assisted in positioning. Oxygen still not improving. Nasal cannula turned up to 4L, increased oxygen saturation to 90%. Nasal cannula then increased to 6L, RT called to come assist. RT states she will bring high flow cannula to increase oxygen rate. Patient increased to 92%. Updated patient on plan of care and bed assignment of 2022.       Radu Parsons RN  09/15/21 [id number]

## 2021-09-15 NOTE — PROGRESS NOTES
bruits  Abdomen: soft, non-tender, non-distended, normal bowel sounds, no masses or organomegaly  Extremities: no cyanosis, no clubbing and no edema  Neurologic: no cranial nerve deficit and speech normal        Recent Labs     09/14/21  1330   *   K 3.7   CL 94*   CO2 20*   BUN 12   CREATININE 0.6*   GLUCOSE 106*   CALCIUM 8.6       Recent Labs     09/14/21  1330   WBC 4.3   RBC 5.39   HGB 15.3   HCT 46.6   MCV 86.5   MCH 28.4   MCHC 32.8   RDW 15.0*      MPV 11.3*       Assessment:    Active Problems:    Obstructive sleep apnea syndrome    COVID-19    Tobacco dependence in remission    Obesity    Hyperlipidemia  Resolved Problems:    * No resolved hospital problems. *      Plan:  COVID-19 pneumonia  Acute respiratory failure with hypoxia  -continue decadron  -inflammatory markers pending. Resume supplements  -procalcitonin low, dc azithromycin  -wean down oxygen as tolerated    COPD with ?acute exacerbation  -continue steroids and bronchodilators    ?melena: hb stable. Trend. Occult blood. Ok to continue dvt ppx    BEV   -Resume cpap    Continue home medications for chronic medical conditions unless otherwise noted above including and not limited to coronary artery disease, tobacco dependence in remission, obesity, hyperlipidemia, and aneurysm of thoracic aorta,     DVT ppx lovenox  NOTE: This report was transcribed using voice recognition software. Every effort was made to ensure accuracy; however, inadvertent computerized transcription errors may be present.   Electronically signed by Christelle Michaud MD on 9/15/2021 at 2:12 PM

## 2021-09-15 NOTE — ED NOTES
Pt resting without distress. Denies need and denies pain at this time. Does admit is having diffifulty sleeping in hospital setting. Reassurance and kindness priovided.       Tyesha Ford RN  09/15/21 [id number]

## 2021-09-15 NOTE — ED NOTES
Pt switched back to nasal canula with 3L and pt tolerating well. Will cont to monitor.      Adina Montiel RN  09/15/21 [id number]

## 2021-09-15 NOTE — PROGRESS NOTES
From: [facility]do Gar[facility] ROUTINE RE: Felix Garcia Was consulted to verify if patient is a canidate for remdesivir. Elevated biomarker (d-dimer) indicates that patient most likely would not benefit from treatment.  Do not recommend remdesivir at this time

## 2021-09-15 NOTE — ED NOTES
Pt switched to nonrebreather mask with 6L dt satin 88-92% with nasal canula on 4L. Pt is sating above 95% at this time.      Bethanie Capps RN  09/15/21 [id number]

## 2021-09-16 NOTE — PROGRESS NOTES
[facility] Progress Note    Admitting Date and Time: 9/14/2021  1:13 PM  Admit Dx: Hypoxia [R09.02]  Pneumonia due to COVID-19 virus [U07.1, J12.82]  COVID-19 [U07.1]    Subjective:  Patient is being followed for Hypoxia [R09.02]  Pneumonia due to COVID-19 virus [U07.1, J12.82]  COVID-19 [U07.1]   tachypneic and short of breath when seen. Oxygen was on 5 L and his saturation was 85 to 86%. Increased up to 8 L with slow improvement in his oxygen. ROS: denies fever, chills, cp, sob, n/v, HA unless stated above.       vitamin C  1,000 mg Oral BID    zinc sulfate  50 mg Oral BID    Vitamin D  1,000 Units Oral Daily    baricitinib  4 mg Oral Daily    sodium chloride flush  5-40 mL IntraVENous 2 times per day    enoxaparin  30 mg SubCUTAneous BID    bisacodyl  5 mg Oral Daily    dexamethasone  6 mg Oral Daily    albuterol sulfate HFA  2 puff Inhalation 4x daily    carvedilol  6.25 mg Oral BID    divalproex  250 mg Oral Daily    furosemide  20 mg Oral Daily    pantoprazole  40 mg Oral QAM AC    montelukast  10 mg Oral QPM    fluticasone  2 puff Inhalation BID    pravastatin  40 mg Oral Dinner    tiotropium-olodaterol  2 puff Inhalation BID    diclofenac-miSOPROStol  1 tablet Oral BID    azithromycin  500 mg IntraVENous Daily     benzonatate, 100 mg, TID PRN  sodium chloride flush, 5-40 mL, PRN  sodium chloride, 25 mL, PRN  ondansetron, 4 mg, Q8H PRN   Or  ondansetron, 4 mg, Q6H PRN  polyethylene glycol, 17 g, Daily PRN  acetaminophen, 650 mg, Q6H PRN   Or  acetaminophen, 650 mg, Q6H PRN  guaiFENesin-dextromethorphan, 5 mL, Q4H PRN  methocarbamol, 500 mg, Q8H PRN       Objective:    /70   Pulse 85   Temp 98.2 °F (36.8 °C) (Oral)   Resp 29   Ht 5' 9\" (1.753 m)   Wt 271 lb 6.2 oz (123.1 kg)   SpO2 (!) 87%   BMI 40.08 kg/m²     General Appearance: alert and oriented to person, place and time and tachypneic  Skin: warm and dry  Head: normocephalic and atraumatic  Eyes: pupils equal, round, and reactive to light, extraocular eye movements intact, conjunctivae normal  Neck: neck supple and non tender without mass   Pulmonary/Chest: clear to auscultation bilaterally- no wheezes, rales or rhonchi, normal air movement, no respiratory distress  Cardiovascular: normal rate, normal S1 and S2 and no carotid bruits  Abdomen: soft, non-tender, non-distended, normal bowel sounds, no masses or organomegaly  Extremities: no cyanosis, no clubbing and no edema  Neurologic: no cranial nerve deficit and speech normal        Recent Labs     09/14/21  1330 09/15/21  0300   * 133*   K 3.7 4.3   CL 94* 98*   CO2 20* 23   BUN 12 13   CREATININE 0.6* 0.6*   GLUCOSE 106* 151*   CALCIUM 8.6 8.5       Recent Labs     09/14/21  1330 09/15/21  0300   WBC 4.3 3.6*   RBC 5.39 5.54   HGB 15.3 15.7   HCT 46.6 48.3   MCV 86.5 87.2   MCH 28.4 28.3   MCHC 32.8 32.5   RDW 15.0* 15.1*    175   MPV 11.3* 11.9*       Assessment:    Active Problems:    Obstructive sleep apnea syndrome    COVID-19    Tobacco dependence in remission    Obesity    Hyperlipidemia  Resolved Problems:    * No resolved hospital problems. *      Plan:  COVID-19 pneumonia  Acute respiratory failure with hypoxia  -continue decadron  -inflammatory markers pending. Resume supplements  -procalcitonin low, dc azithromycin  -wean down oxygen as tolerated    COPD with ?acute exacerbation  -continue steroids and bronchodilators    ?melena: hb stable, pending from today. Trend. Occult blood. Ok to continue dvt ppx    BEV   -Resume cpap    Continue home medications for chronic medical conditions unless otherwise noted above including and not limited to coronary artery disease, tobacco dependence in remission, obesity, hyperlipidemia, and aneurysm of thoracic aorta,     DVT ppx lovenox  NOTE: This report was transcribed using voice recognition software.  Every effort was made to ensure accuracy; however, inadvertent computerized transcription errors may be present.   Electronically signed by Sandrine Ball MD on 9/16/2021 at 10:29 AM

## 2021-09-16 NOTE — CARE COORDINATION
CM spoke with the patient on the telephone to initiate discharge planning. Patient lives at home with his Jasmyn Mejia, has insurance with Rx coverage & [facility] ([facility]), stated that he was independent with ADL's prior to admission, and is able to drive. Patient stated that he did not require the use of any assistive devices prior to admission. Patient does wear a CPAP at bedtime with 2 liters of oxygen bed into his machine. Patient does not require the use of oxygen during the day but does use inhalers and a nebulizer machine at home. Patient plans to return home upon discharge and is unable to identify any needs at this time. CM available if needs arise.

## 2021-09-16 NOTE — PROGRESS NOTES
Comprehensive Nutrition Assessment    Type and Reason for Visit:  Initial, Positive Nutrition Screen    Nutrition Recommendations/Plan:   Continue current diet as ordered  Add high protein oral nutrition supplement   Encourage po intake as able  Please document all po intake  Will monitor po intake, weight trends, poc    Nutrition Assessment:  Pt admitted for hypoxia, pneumonia due to covid-19, PMH: COPD, positive nutrition screen for poor appetite/weight loss, pt currently on regular diet, no po intake documented since admission, no weight hx available for review, pt is at high nutrition risk    Malnutrition Assessment:  Malnutrition Status:  Insufficient data    Context:  Acute Illness       Estimated Daily Nutrient Needs:  Energy (kcal):  2576-5956 (Osvaldo Ng w/ stress factor 1.0-1.2); Weight Used for Energy Requirements:  Current     Protein (g):   (1.2-1.4 g/kg); Weight Used for Protein Requirements:  Ideal        Fluid (ml/day):  2000; Method Used for Fluid Requirements:  1 ml/kcal      Nutrition Related Findings:  Meds: Vitamin C, Vitamin D, Zinc Sulfate      Wounds:  None       Current Nutrition Therapies:    ADULT DIET;  Regular    Anthropometric Measures:  · Height: 5' 9.02\" (175.3 cm)  · Current Body Weight: 271 lb 6.2 oz (123.1 kg)   · Admission Body Weight: 272 lb 4.3 oz (123.5 kg) (admit weight)    · Ideal Body Weight: 160 lbs; % Ideal Body Weight 169.6 %   · BMI: 40.1  · BMI Categories: Obese Class 3 (BMI 40.0 or greater)       Nutrition Diagnosis:   · Inadequate oral intake related to lack of appetite as evidenced by poor intake prior to admission    Nutrition Interventions:   Food and/or Nutrient Delivery:  Continue Current Diet, Start Oral Nutrition Supplement  Nutrition Education/Counseling:  No recommendation at this time   Coordination of Nutrition Care:  Continue to monitor while inpatient    Goals:  Pt will consume greater than 50% of meals and supplements       Nutrition Monitoring and Evaluation:   Behavioral-Environmental Outcomes:  None Identified   Food/Nutrient Intake Outcomes:  Supplement Intake, Food and Nutrient Intake  Physical Signs/Symptoms Outcomes:  Biochemical Data, Weight, GI Status, Fluid Status or Edema, Hemodynamic Status     Discharge Planning:     Too soon to determine     Electronically signed by Cassandra Velasco MS, RD, LD on 9/16/21 at 12:35 PM EDT    Contact: [phone]

## 2021-09-16 NOTE — PLAN OF CARE
Problem: Airway Clearance - Ineffective  Goal: Achieve or maintain patent airway  9/16/2021 1152 by John Reyes RN  Outcome: Ongoing  9/16/2021 0056 by Uriel Clark RN  Outcome: Ongoing     Problem: Gas Exchange - Impaired  Goal: Absence of hypoxia  9/16/2021 1152 by John Reyes RN  Outcome: Ongoing  9/16/2021 0056 by Uriel Clark RN  Outcome: Ongoing  Goal: Promote optimal lung function  9/16/2021 1152 by John Reyes RN  Outcome: Ongoing  9/16/2021 0056 by Uriel Clark RN  Outcome: Ongoing     Problem: Breathing Pattern - Ineffective  Goal: Ability to achieve and maintain a regular respiratory rate  9/16/2021 1152 by John Reyes RN  Outcome: Ongoing  9/16/2021 0056 by Uriel Clark RN  Outcome: Ongoing     Problem:  Body Temperature -  Risk of, Imbalanced  Goal: Ability to maintain a body temperature within defined limits  9/16/2021 1152 by John Reeys RN  Outcome: Ongoing  9/16/2021 0056 by Uriel Clark RN  Outcome: Ongoing  Goal: Will regain or maintain usual level of consciousness  9/16/2021 1152 by John Reyes RN  Outcome: Ongoing  9/16/2021 0056 by Uriel Clark RN  Outcome: Ongoing  Goal: Complications related to the disease process, condition or treatment will be avoided or minimized  9/16/2021 1152 by John Reyes RN  Outcome: Ongoing  9/16/2021 0056 by Uriel Clark RN  Outcome: Ongoing     Problem: Isolation Precautions - Risk of Spread of Infection  Goal: Prevent transmission of infection  9/16/2021 1152 by John Reyes RN  Outcome: Ongoing  9/16/2021 0056 by Uriel Clark RN  Outcome: Ongoing     Problem: Nutrition Deficits  Goal: Optimize nutritional status  9/16/2021 1152 by John Reyes RN  Outcome: Ongoing  9/16/2021 0056 by Uriel Clark RN  Outcome: Ongoing     Problem: Risk for Fluid Volume Deficit  Goal: Maintain normal heart rhythm  9/16/2021 1152 by John Reyes RN  Outcome: Ongoing  9/16/2021 0056 by Uriel Clark RN  Outcome: Ongoing  Goal: Maintain absence of muscle cramping  9/16/2021 1152 by Armond Brown RN  Outcome: Ongoing  9/16/2021 0056 by Patti Swartz RN  Outcome: Ongoing  Goal: Maintain normal serum potassium, sodium, calcium, phosphorus, and pH  9/16/2021 1152 by Armond Brown RN  Outcome: Ongoing  9/16/2021 0056 by Patti Swartz RN  Outcome: Ongoing     Problem: Loneliness or Risk for Loneliness  Goal: Demonstrate positive use of time alone when socialization is not possible  9/16/2021 1152 by Armond Brown RN  Outcome: Ongoing  9/16/2021 0056 by Patti Swartz RN  Outcome: Ongoing     Problem: Fatigue  Goal: Verbalize increase energy and improved vitality  9/16/2021 1152 by Armond Brown RN  Outcome: Ongoing  9/16/2021 0056 by Patti Swartz RN  Outcome: Ongoing     Problem: Patient Education: Go to Patient Education Activity  Goal: Patient/Family Education  9/16/2021 1152 by Armond Brown RN  Outcome: Ongoing  9/16/2021 0056 by Patti Swartz RN  Outcome: Ongoing     Problem: Falls - Risk of:  Goal: Will remain free from falls  Description: Will remain free from falls  9/16/2021 1152 by Armond Brown RN  Outcome: Ongoing  9/16/2021 0056 by Patti Swartz RN  Outcome: Ongoing  Goal: Absence of physical injury  Description: Absence of physical injury  9/16/2021 1152 by Armond Brown RN  Outcome: Ongoing  9/16/2021 0056 by Patti Swartz RN  Outcome: Ongoing

## 2021-09-16 NOTE — CONSULTS
Infectious Disease Consult Note  2021   Patient Name: Melyssa Kaye : 1955   Impression  Severe COVID-19 pneumonia with acute hypoxic respiratory failure. Vaccinated:no  Date of symptom onset:21  Date of positive SARS-CoV-2 NAAT/PCR:2021  Exposure:  unknown  Oxygen supplementation: HFNC  Bacterial infection  BMI: 40 kg/m2  Vitamin D deficiency   Multi-morbidity: per PMHx Asthma  Plan:   Therapeutic: on dexamethasone, Olumiant   Diagnostic: Trend CRP and procalcitonin. Recommend Quantiferon-Tb test   F/u test:     Thank you for allowing me to consult in the care of this patient.  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  male with obesity, hLD, thoracic aorta aneurysm who was admitted 2021 for further evaluation and management of sob, cough, fever, chills, anorexia for 5 days prior to admission. Denies sick contact. SARS-CoV-2 NAAT positive, CTA chest showed peripheral GGO. Diagnosed with severe COVID-19 and required oxygen. Treated with dexamethasone and baricitinib. ? Infectious diseases service was consulted to evaluate the pt, and recommend further investigative and therapeutic measures. Review and summary of old records:  ROS: Other systems reviewed Including eyes, ENT, respiratory, cardiovascular, GI, , dermatologic, neurologic, psych, hem/lymphatic, musculoskeletal and endocrine were negative other than what is mentioned above.      Patient Active Problem List    Diagnosis Date Noted    Obstructive sleep apnea syndrome 2021    Arteriosclerosis of coronary artery 2021    Acute bronchitis with chronic obstructive pulmonary disease (COPD) ([facility] [address] 2021    COVID-19 2021    Tobacco dependence in remission 2021    Obesity 2021    Hyperlipidemia 2021    Cardiomyopathy ([facility] [address] 2021    Aneurysm of thoracic aorta ([facility] 2021     Past Medical History:   Diagnosis Date    Asthma     [facility] disease) [facility] [address]     COVID-19 2021      Past Surgical History:   Procedure Laterality Date    CARDIAC CATHETERIZATION  2013    HERNIA REPAIR      WRIST SURGERY Left 1976      History reviewed. No pertinent family history. Infectious disease related family history - not contibutory. SOCIAL HISTORY  Social History     Tobacco Use    Smoking status: Former Smoker     Packs/day: 0.50     Types: Cigarettes     Start date: 1970     Quit date: 2004     Years since quittin.7    Smokeless tobacco: Never Used   Substance Use Topics    Alcohol use: No       Born:   Lived   Occupation:   No recent travel of significance.  No recent unusual exposures   ? ALLERGIES  Allergies   Allergen Reactions    Percocet [Oxycodone-Acetaminophen] Nausea And Vomiting      MEDICATIONS  Reviewed and are per the chart/EMR. IMMUNIZATION HISTORY    There is no immunization history on file for this patient. ? Antibiotics: Azithromycin  ?  -------------------------------------------------------------------------------------------------------------------    Vital Signs:  Vitals:    21 1155   BP:    Pulse:    Resp:    Temp:    SpO2: (!) 88%         Exam:    VS: noted; wt 123.1 kg  Gen: alert and oriented X3, no distress  Skin: no stigmata of endocarditis  Wounds: C/D/I  HEMT: AT/NC Oropharynx pink, moist, and without lesions or exudates; dentition in good state of repair  Eyes: PERRLA, EOMI, conjunctiva pink, sclera anicteric. Neck: Supple. Trachea midline. No LAD. Chest: deferred  Heart: RRR and no MRG. Abd: soft, non-distended, no tenderness, no hepatomegaly. Normoactive bowel sounds. Ext: no clubbing, cyanosis, or edema  Catheter Site: without erythema or tenderness  LDA:   Neuro: Mental status intact. CN 2-12 intact and no focal sensory or motor deficits    ? Diagnostic Studies: reviewed  ? ?   I have examined this patient and available medical records on this date and have made the above observations, conclusions and recommendations.   Electronically signed by: Electronically signed by Pepper Christopher MD on 9/16/2021 at 1:51 PM

## 2021-09-18 PROBLEM — J96.01 ACUTE RESPIRATORY FAILURE WITH HYPOXIA (HCC): Status: ACTIVE | Noted: 2021-01-01

## 2021-09-18 NOTE — PROGRESS NOTES
Infectious Disease Progress Note  2021   Patient Name: Chay Gabriel : 1955       Reason for visit: F/u COVID-19 pneumonia, acute respiratory failure? History:? Interval history noted  Denies n/v/d/f or untoward effects of antimicrobials  Physical Exam:  Vital Signs: BP (!) 146/86   Pulse 78   Temp 97.6 °F (36.4 °C) (Oral)   Resp 29   Ht 5' 9.02\" (1.753 m)   Wt 270 lb 1 oz (122.5 kg)   SpO2 (!) 89%   BMI 39.86 kg/m²     Gen: alert and oriented X3  Skin: no stigmata of endocarditis  Wounds: C/D/I  HEMT: AT/NC Oropharynx pink, moist, and without lesions or exudates; dentition in good state of repair  Eyes: PERRLA, EOMI, conjunctiva pink, sclera anicteric. Neck: Supple. Trachea midline. No LAD. Chest: deferred  Heart: RRR  Abd: soft, non-distended, no tenderness, no hepatomegaly. Normoactive bowel sounds. Ext: no clubbing, cyanosis, or edema  Catheter Site: without erythema or tenderness  LDA: CVC:  Neuro: Mental status intact. CN 2-12 intact and no focal sensory or motor deficits     Radiologic / Imaging / TESTING  No results found.      Labs:    Recent Results (from the past 24 hour(s))   Comprehensive metabolic panel    Collection Time: 21  1:09 PM   Result Value Ref Range    Sodium 141 135 - 145 MMOL/L    Potassium 4.3 3.5 - 5.1 MMOL/L    Chloride 104 99 - 110 mMol/L    CO2 22 21 - 32 MMOL/L    BUN 21 6 - 23 MG/DL    CREATININE 0.8 (L) 0.9 - 1.3 MG/DL    Glucose 168 (H) 70 - 99 MG/DL    Calcium 8.6 8.3 - 10.6 MG/DL    Albumin 3.9 3.4 - 5.0 GM/DL    Total Protein 6.9 6.4 - 8.2 GM/DL    Total Bilirubin 0.5 0.0 - 1.0 MG/DL    ALT 30 10 - 40 U/L    AST 53 (H) 15 - 37 IU/L    Alkaline Phosphatase 118 40 - 128 IU/L    GFR Non-African American >60 >60 mL/min/1.73m2    GFR African American >60 >60 mL/min/1.73m2    Anion Gap 15 4 - 16   D-dimer, quantitative    Collection Time: 21  1:09 PM   Result Value Ref Range    D-Dimer, Quant 307 (H) <230 NG/mL(DDU)   High sensitivity CRP Collection Time: 09/17/21  1:09 PM   Result Value Ref Range    CRP High Sensitivity 48.9 (H) <5.0 mg/L   Fibrinogen    Collection Time: 09/17/21  1:09 PM   Result Value Ref Range    Fibrinogen 582 (H) 196.9 - 442.1 MG/DL   CBC auto differential    Collection Time: 09/18/21  7:20 AM   Result Value Ref Range    WBC 6.9 4.0 - 10.5 K/CU MM    RBC 5.54 4.6 - 6.2 M/CU MM    Hemoglobin 15.6 13.5 - 18.0 GM/DL    Hematocrit 48.4 42 - 52 %    MCV 87.4 78 - 100 FL    MCH 28.2 27 - 31 PG    MCHC 32.2 32.0 - 36.0 %    RDW 15.4 (H) 11.7 - 14.9 %    Platelets 810 259 - 030 K/CU MM    MPV 11.6 (H) 7.5 - 11.1 FL    Differential Type AUTOMATED DIFFERENTIAL     Segs Relative 84.6 (H) 36 - 66 %    Lymphocytes % 6.4 (L) 24 - 44 %    Monocytes % 8.2 (H) 0 - 4 %    Eosinophils % 0.0 0 - 3 %    Basophils % 0.1 0 - 1 %    Segs Absolute 5.8 K/CU MM    Lymphocytes Absolute 0.4 K/CU MM    Monocytes Absolute 0.6 K/CU MM    Eosinophils Absolute 0.0 K/CU MM    Basophils Absolute 0.0 K/CU MM    Nucleated RBC % 0.0 %    Total Nucleated RBC 0.0 K/CU MM    Total Immature Neutrophil 0.05 K/CU MM    Immature Neutrophil % 0.7 (H) 0 - 0.43 %     CULTURE results: Invalid input(s): BLOOD CULTURE,  URINE CULTURE, SURGICAL CULTURE    Diagnosis:  Patient Active Problem List   Diagnosis    Obstructive sleep apnea syndrome    Arteriosclerosis of coronary artery    [facility]    COVID-19    Tobacco dependence in remission    Obesity    Hyperlipidemia    Cardiomyopathy ([facility] [address]    Aneurysm of thoracic aorta ([facility])       Active Problems  Active Problems:    Obstructive sleep apnea syndrome    COVID-19    Tobacco dependence in remission    Obesity    Hyperlipidemia  Resolved Problems:    * No resolved hospital problems. *      Impression and plan   Summary and rationale: Patient is a 63 y.o.   male with a medical hx of obesity, HLD , thoracic aorta aneurysm admitted on 9/14/2021 with SOB, cough, fever, chills, anorexia for 5 days prior to admission.  Diagnosed with severe covid-19 pneumonia and respiratory failure   COVID-19 symptom onset: 9/9/2021   COVID-19 test date: 9/14/2021   Expected discontinuation of isolation precautions: 9/29/2021   Clinical status: worsening on HFNC, immune pathology progressing   Therapeutic:  o Ongoing antibiotics:none  o Immunomodulators:  - Dexamethasone:  - Baracitinib: 9/15 (14 day therapy)  o Completed antibiotics: azithromycin  o Other agents:    Diagnostic: trend CRP and pct   F/u:   Other: consider switching to Solumedrol 1mg/kg/day (IBW)      Electronically signed by: Electronically signed by Johanne Ayala MD on 9/18/2021 at 1:05 PM

## 2021-09-18 NOTE — PROGRESS NOTES
Tried pt on his Home CPAP with 10L 02 bled inline. Pt unable to maintain proper SATS. Placed pt back on Airvo. 60L 92%. SATS coming back up.  Currently 87%

## 2021-09-18 NOTE — PROGRESS NOTES
Complains of le pains no swelling noted , will add codeine that should help his cough as well                            Internal Medicine Daily Progress Note @todate@                    Date of Admission: 2021  Allergies  Percocet [oxycodone-acetaminophen]    Assessment/Plan    1. Acute on chronic respiratory failure. Patient uses a CPAP at home however cannot maintain his oxygenation at night switch to airvo 60% and is maintaining oxygen. Patient denies any acute issues has some cough continue present management pulmonary consult requested. Oxygenation is 90% on present management. 2. COVID-19 pneumonia on steroid and bosutinib breathing treatment as needed. CT PE is negative ID on board  3. COPD history of smoking distant past. Continue present management. On inhalers. Patient Active Problem List    Diagnosis Date Noted    Obstructive sleep apnea syndrome 2021    Arteriosclerosis of coronary artery 2021    Acute bronchitis with chronic obstructive pulmonary disease (COPD) ([facility] 2021    COVID-19 2021    Tobacco dependence in remission 2021    Obesity 2021    Hyperlipidemia 2021    Cardiomyopathy ([facility] [facility] 2021    Aneurysm of thoracic aorta ([facility] [facility] 2021                      Subjective:     Chief Complaint   Patient presents with    Nausea     has not eaten in 5 days    Fever     headache, eye pain    Shortness of Breath    Rectal Bleeding     black diarrhea for 3 days     Mr. Dominic Shi of I had some cough overall feeling much better on present oxygenation. Denies any chest pain nausea vomiting or diarrhea that is poor. No fever chills. No other complaints. Tries to get up with help but still gets short of breath with minimal exertion. Objective:   TEMPERATURE:  Current - Temp: 98.4 °F (36.9 °C);  Max - Temp  Av.5 °F (36.9 °C)  Min: 98.2 °F (36.8 °C)  Max: 98.8 °F (37.1 °C)  RESPIRATIONS RANGE: Resp  Av.3  Min: 20  Max: 26  PULSE RANGE: Pulse  Av.3  Min: 79  Max: 84  BLOOD PRESSURE RANGE:  Systolic (70FDU), PYA:580 , Min:127 , RVW:186   ; Diastolic (27CMK), FLV:24, Min:82, Max:88    PULSE OXIMETRY RANGE: SpO2  Av %  Min: 86 %  Max: 96 %  24HR INTAKE/OUTPUT:      Intake/Output Summary (Last 24 hours) at 2021 3664  Last data filed at 2021 0502  Gross per 24 hour   Intake --   Output 725 ml   Net -725 ml       Intake/Output Summary (Last 24 hours) at 2021 6299  Last data filed at 2021 0502  Gross per 24 hour   Intake --   Output 725 ml   Net -725 ml              Physical Exam:  eneral appearance: No acute distress able to speak without stopping no accessory muscle involved. HEENT Normal cephalic, atraumatic without obvious deformity. Pupils equal, round, and reactive to light. Extra ocular muscles intact. Conjunctivae/corneas clear. Neck: No neck pain. Supple, No jugular venous distention/bruits. Trachea midline without thyromegaly or adenopathy with full range of motion. Lungs: Essentially clear to auscultation air entry is equal both sides. Heart: Regular rate and rhythm with Normal S1/S2 without  murmurs, rubs or gallops, point of maximum impulse non-displaced  Abdomen: Obese nontender popliteal positive. Extremities: No clubbing, cyanosis, or edema bilaterally. Full range of motion without deformity and normal gait intact. Skin: Skin color, texture, turgor normal. No rashes or lesions. Neurologic: Alert and oriented X 3,  neurovascularly intact with sensory/motor intact upper extremities/lower extremities, bilaterally. Cranial nerves:II-XII intact, grossly non-focal.  Mental status: Alert, oriented, thought content appropriate.     Labs:  CBC:   Lab Results   Component Value Date    WBC 6.9 2021    RBC 5.54 2021    HGB 15.6 2021    HCT 48.4 2021    MCV 87.4 2021    MCH 28.2 2021    MCHC 32.2 2021    RDW 15.4 2021     2021    ANTHONY 11.6 09/18/2021     CMP:    Lab Results   Component Value Date     09/17/2021    K 4.3 09/17/2021     09/17/2021    CO2 22 09/17/2021    BUN 21 09/17/2021    CREATININE 0.8 09/17/2021    GFRAA >60 09/17/2021    LABGLOM >60 09/17/2021    GLUCOSE 168 09/17/2021    PROT 6.9 09/17/2021    LABALBU 3.9 09/17/2021    CALCIUM 8.6 09/17/2021    BILITOT 0.5 09/17/2021    ALKPHOS 118 09/17/2021    AST 53 09/17/2021    ALT 30 09/17/2021     No results for input(s): TROPONINT in the last 72 hours. No results found for: [facility]     Scheduled Med:   dexamethasone  6 mg Oral 2 times per day    vitamin C  1,000 mg Oral BID    zinc sulfate  50 mg Oral BID    Vitamin D  1,000 Units Oral Daily    baricitinib  4 mg Oral Daily    sodium chloride flush  5-40 mL IntraVENous 2 times per day    enoxaparin  30 mg SubCUTAneous BID    bisacodyl  5 mg Oral Daily    albuterol sulfate HFA  2 puff Inhalation 4x daily    carvedilol  6.25 mg Oral BID    divalproex  250 mg Oral Daily    furosemide  20 mg Oral Daily    pantoprazole  40 mg Oral QAM AC    montelukast  10 mg Oral QPM    fluticasone  2 puff Inhalation BID    pravastatin  40 mg Oral Dinner    tiotropium-olodaterol  2 puff Inhalation BID    diclofenac-miSOPROStol  1 tablet Oral BID         Infusion :   sodium chloride                 Consultants:    IP CONSULT TO HOSPITALIST  IP CONSULT TO PHARMACY  IP CONSULT TO INFECTIOUS DISEASES  IP CONSULT TO PHARMACY  IP CONSULT TO PULMONOLOGY    Code Status: Full Code      I have explained to the patient and discussed with him/her the treatment plan. Electronically signed by Hesham Ravi MD on 9/18/2021 at 8:22 AM    Time spent roughly >30 minute in interviewing patient performing medical examination, communicating with relevant medical staff and consultants including documentation .

## 2021-09-18 NOTE — PROGRESS NOTES
Wife brought in pt meds    Daughter updated over phone.  Pt gave permission for daughter to have information and code

## 2021-09-19 NOTE — PROGRESS NOTES
pulmonary      SUBJECTIVE: worsening clinica;l status and increased resp requirement     OBJECTIVE    VITALS:  /76   Pulse 86   Temp 97.5 °F (36.4 °C) (Axillary)   Resp 20   Ht 5' 9.02\" (1.753 m)   Wt 264 lb 12.4 oz (120.1 kg)   SpO2 (!) 86%   BMI 39.08 kg/m²   HEAD AND FACE EXAM:  No throat injection, no active exudate,no thrush  NECK EXAM;No JVD, no masses, symmetrical  CHEST EXAM; Expansion equal and symmetrical, no masses  LUNG EXAM; Good breath sounds bilaterally. There are expiratory wheezes both lungs, there are crackles at both lung bases  CARDIOVASCULAR EXAM: Positive S1 and S2, no S3 or S4, no clicks ,no murmurs  RIGHT AND LEFT LOWER EXTRIMITY EXAM: No edema, no swelling, no inflamation  CNS EXAM: Alert and oriented X3          LABS   Lab Results   Component Value Date    WBC 6.9 09/18/2021    HGB 15.6 09/18/2021    HCT 48.4 09/18/2021    MCV 87.4 09/18/2021     09/18/2021     Lab Results   Component Value Date    CREATININE 0.7 (L) 09/18/2021    BUN 23 09/18/2021     09/18/2021    K 4.8 09/18/2021     09/18/2021    CO2 25 09/18/2021     No results found for: INR, PROTIME     No results found for: PHOS   No results for input(s): PH, PO2ART, VOM7COX, HCO3, BEART, O2SAT in the last 72 hours. Wt Readings from Last 3 Encounters:   09/19/21 264 lb 12.4 oz (120.1 kg)   12/23/16 263 lb (119.3 kg)               ASSESMENT  Ac on ch resp failure  covid pneumonia  copd      PLAN  1. On bipap 15/6 and 100 %,resp therapist can adjust  2. On decadron  3. Cont baricitinab  4.  Bd rx    9/19/2021  Chevy Leon MD, M.D.

## 2021-09-19 NOTE — PROGRESS NOTES
PS to Dr. Yolanda Goodson requesting bipap. maxed on airvo and SpO2 mid 80's. Order per Dr. Yolanda Goodson: start bipap at [address] 100% and RT to adjust PRN.

## 2021-09-19 NOTE — PROGRESS NOTES
PS to Dr. Lake Sinclair as pt is anxious with increased resp rate. spO2 97%, but RR 36. Order for ativan 1 mg every 6 hrs PRN.

## 2021-09-19 NOTE — PROGRESS NOTES
Internal Medicine Daily Progress Note @todate@                    Date of Admission: 9/14/2021  Allergies  Percocet [oxycodone-acetaminophen]    Assessment/Plan    1. Acute on chronic respiratory failure patient is on 60% able maintaining oxygen Apt. 90 percent. No increased shortness of breath cough has improved considerably. Otherwise feels fine occasionally feels some anxious however patient feels that patient was not deteriorated. Continue present management. Adjust oxygenation as needed. 2.  COVID-19 pneumonia on immunomodulator at present time stable continue present management excessive breathing treatment closely watch may need BiPAP however at the present time seems to be stable. 3.  COPD history of smoking continue inhalers. Overall patient seems to be stable at the present time respiration and oxygenation is being maintained at the present level. Results are pending. Patient Active Problem List    Diagnosis Date Noted    Acute respiratory failure with hypoxia ([facility] 09/18/2021    Obstructive sleep apnea syndrome 09/14/2021    Arteriosclerosis of coronary artery 09/14/2021    [facility] bronch[facility] pulmonary disease [facility] 09/14/2021    COVID-19 09/14/2021    Tobacco dependence in remission 09/14/2021    Obesity 09/14/2021    Hyperlipidemia 09/14/2021    Cardiomyopathy ([facility] [address] 09/14/2021    Aneurysm of thoracic aorta ([facility] 09/14/2021                      Subjective:     Chief Complaint   Patient presents with    Nausea     has not eaten in 5 days    Fever     headache, eye pain    Shortness of Breath    Rectal Bleeding     black diarrhea for 3 days     Mr. Aleyda Kurtz of I think I am feeling better with present management and use of aIRVO. Denies any cough chest pain nausea vomiting diarrhea abdominal discomfort or any weakness in the. Overall he feels that he is improving very slowly.   There are some episodes of anxiety in between however most of the time that is well controlled. Does not feel that he needs any medication for his anxiety. Objective:   TEMPERATURE:  Current - Temp: 97.4 °F (36.3 °C); Max - Temp  Av.5 °F (36.4 °C)  Min: 96.4 °F (35.8 °C)  Max: 98.1 °F (36.7 °C)  RESPIRATIONS RANGE: Resp  Av  Min: 21  Max: 29  PULSE RANGE: Pulse  Av  Min: 78  Max: 86  BLOOD PRESSURE RANGE:  Systolic (44ULC), KCH:102 , Min:130 , RXR:365   ; Diastolic (52QIX), SKR:12, Min:74, Max:86    PULSE OXIMETRY RANGE: SpO2  Av %  Min: 86 %  Max: 89 %  24HR INTAKE/OUTPUT:      Intake/Output Summary (Last 24 hours) at 2021 0653  Last data filed at 2021 0034  Gross per 24 hour   Intake 120 ml   Output 800 ml   Net -680 ml       Intake/Output Summary (Last 24 hours) at 2021 0653  Last data filed at 2021 0034  Gross per 24 hour   Intake 120 ml   Output 800 ml   Net -680 ml              Physical Exam:  eneral appearance: No acute distress seems stable no suspicious lesions. HEENT remarkable  Neck: Supple, No jugular venous distention/bruits. Trachea midline without thyromegaly or adenopathy with full range of motion. Lungs: Centrally clear   no wheezes rhonchi is noted. Heart: Regular rate and rhythm with Normal S1/S2 without  murmurs, rubs or gallops, point of maximum impulse non-displaced  Abdomen: Soft, non-tender or non-distended without rigidity or guarding and positive bowel sounds all four quadrants. Extremities: No clubbing, cyanosis, or edema bilaterally. Full range of motion without deformity and normal gait intact. Skin: Skin color, texture, turgor normal. No rashes or lesions. Neurologic: Alert. Cooperative nonfocal.    Mental status: Alert, oriented, thought content appropriate.     Labs:  CBC:   Lab Results   Component Value Date    WBC 6.9 2021    RBC 5.54 2021    HGB 15.6 2021    HCT 48.4 2021    MCV 87.4 2021    MCH 28.2 2021    MCHC 32.2 2021 RDW 15.4 09/18/2021     09/18/2021    MPV 11.6 09/18/2021     BMP:    Lab Results   Component Value Date     09/18/2021    K 4.8 09/18/2021     09/18/2021    CO2 25 09/18/2021    BUN 23 09/18/2021    LABALBU 3.7 09/18/2021    CREATININE 0.7 09/18/2021    CALCIUM 8.7 09/18/2021    GFRAA >60 09/18/2021    LABGLOM >60 09/18/2021    GLUCOSE 127 09/18/2021     No results for input(s): TROPONINT in the last 72 hours. No results found for: [facility]     Scheduled Med:   HYDROcodone 5 mg - acetaminophen  1 tablet Oral Q12H    dexamethasone  6 mg Oral 2 times per day    vitamin C  1,000 mg Oral BID    zinc sulfate  50 mg Oral BID    Vitamin D  1,000 Units Oral Daily    baricitinib  4 mg Oral Daily    sodium chloride flush  5-40 mL IntraVENous 2 times per day    enoxaparin  30 mg SubCUTAneous BID    bisacodyl  5 mg Oral Daily    albuterol sulfate HFA  2 puff Inhalation 4x daily    carvedilol  6.25 mg Oral BID    divalproex  250 mg Oral Daily    furosemide  20 mg Oral Daily    pantoprazole  40 mg Oral QAM AC    montelukast  10 mg Oral QPM    fluticasone  2 puff Inhalation BID    pravastatin  40 mg Oral Dinner    tiotropium-olodaterol  2 puff Inhalation BID    diclofenac-miSOPROStol  1 tablet Oral BID         Infusion :   sodium chloride                 Consultants:    IP CONSULT TO HOSPITALIST  IP CONSULT TO PHARMACY  IP CONSULT TO INFECTIOUS DISEASES  IP CONSULT TO PHARMACY  IP CONSULT TO PULMONOLOGY    Code Status: Full Code      I have explained to the patient and discussed with him/her the treatment plan. Electronically signed by Laura Souza MD on 9/19/2021 at 6:53 AM    Time spent roughly >30 minute in interviewing patient performing medical examination, communicating with relevant medical staff and consultants including documentation .

## 2021-09-20 PROBLEM — E43 SEVERE MALNUTRITION (HCC): Status: ACTIVE | Noted: 2021-01-01

## 2021-09-20 NOTE — PROGRESS NOTES
Comprehensive Nutrition Assessment    Type and Reason for Visit:  Reassess    Nutrition Recommendations/Plan:   · Add frozen and high protein oral nutrition supplements  · Continue current diet as ordered  · If pt unable to increase po intake to meet greater than 50% of estimated nutrient needs in the next 24-48, recommend initiating EN to provide adequate nutrition  · Please document all po intake so that nutrition status can be accurately assessed  · Will monitor po intake, weight trends, nutrition status, poc    Nutrition Assessment:  noted pt on bipap 100% fio2, pt on regular diet, 1 meal of 1-25% documented in the past 72 hr, pt meets criteria for malnutrition, pt remains at high nutrition risk    Malnutrition Assessment:  Malnutrition Status:  Severe malnutrition    Context:  Acute Illness     Findings of the 6 clinical characteristics of malnutrition:  Energy Intake:  7 - 50% or less of estimated energy requirements for 5 or more days  Weight Loss:  7 - Greater than 2% over 1 week     Body Fat Loss:  Unable to assess     Muscle Mass Loss:  Unable to assess    Fluid Accumulation:  Unable to assess     Strength:  Not Performed    Estimated Daily Nutrient Needs:  Energy (kcal):  5633-3915 (Danielle Hoe w/ stress factor 1.0-1.2); Weight Used for Energy Requirements:  Current     Protein (g):   (1.2-1.4 g/kg); Weight Used for Protein Requirements:  Ideal        Fluid (ml/day):  2000; Method Used for Fluid Requirements:  1 ml/kcal      Nutrition Related Findings:  Meds: Vitamin D, Vitamin C, Zinc      Wounds:  None       Current Nutrition Therapies:    ADULT DIET;  Regular    Anthropometric Measures:  · Height: 5' 9.02\" (175.3 cm)  · Current Body Weight: 264 lb 12.4 oz (120.1 kg)   · Admission Body Weight: 272 lb 4.3 oz (123.5 kg) (first measured weight)    · Ideal Body Weight: 160 lbs; % Ideal Body Weight 165.5 %   · BMI: 39.1  · BMI Categories: Obese Class 2 (BMI 35.0 -39.9)       Nutrition Diagnosis:   · Severe malnutrition, In context of acute illness or injury related to inadequate protein-energy intake as evidenced by  (~2.9% wt loss since admission, pt meeting less than 50% of estimated energy needs for 5 days)    Nutrition Interventions:   Food and/or Nutrient Delivery:  Continue Current Diet, Start Oral Nutrition Supplement  Nutrition Education/Counseling:  No recommendation at this time   Coordination of Nutrition Care:  Continue to monitor while inpatient    Goals:  Pt will meet greater than 50% of meals and supplements       Nutrition Monitoring and Evaluation:   Behavioral-Environmental Outcomes:  None Identified   Food/Nutrient Intake Outcomes:  Food and Nutrient Intake, Supplement Intake  Physical Signs/Symptoms Outcomes:  Biochemical Data, Weight, GI Status, Hemodynamic Status, Fluid Status or Edema     Discharge Planning:     Too soon to determine     Electronically signed by Brant Alanis, MS, RD, LD on 9/20/21 at 1:57 PM EDT    Contact: [phone]

## 2021-09-20 NOTE — PROGRESS NOTES
pulmonary      SUBJECTIVE: he remains on bipap and 100% fio2     OBJECTIVE    VITALS:  /80   Pulse 87   Temp 98.5 °F (36.9 °C) (Axillary)   Resp 30   Ht 5' 9.02\" (1.753 m)   Wt 264 lb 12.4 oz (120.1 kg)   SpO2 92%   BMI 39.08 kg/m²   HEAD AND FACE EXAM:  No throat injection, no active exudate,no thrush  NECK EXAM;No JVD, no masses, symmetrical  CHEST EXAM; Expansion equal and symmetrical, no masses  LUNG EXAM; Good breath sounds bilaterally. There are expiratory wheezes both lungs, there are crackles at both lung bases  CARDIOVASCULAR EXAM: Positive S1 and S2, no S3 or S4, no clicks ,no murmurs  RIGHT AND LEFT LOWER EXTRIMITY EXAM: No edema, no swelling, no inflamation  CNS EXAM: Alert and oriented X3          LABS   Lab Results   Component Value Date    WBC 9.1 09/20/2021    HGB 16.6 09/20/2021    HCT 51.8 09/20/2021    MCV 87.4 09/20/2021     09/20/2021     Lab Results   Component Value Date    CREATININE 0.7 (L) 09/20/2021    BUN 31 (H) 09/20/2021     09/20/2021    K 5.2 (H) 09/20/2021     09/20/2021    CO2 23 09/20/2021     No results found for: INR, PROTIME       Lab Results   Component Value Date    PHOS 3.4 09/19/2021      No results for input(s): PH, PO2ART, CAS4UFW, HCO3, BEART, O2SAT in the last 72 hours. Wt Readings from Last 3 Encounters:   09/19/21 264 lb 12.4 oz (120.1 kg)   12/23/16 263 lb (119.3 kg)               ASSESMENT  Ac resp failure  covid pneumonia   copd        PLAN  1. D/w pt  2. Cont bipap with o2  3.  Observe closely as may need vent support  4. cpm    9/20/2021  Siomara Shelton MD, M.D.

## 2021-09-20 NOTE — PROGRESS NOTES
Infectious Disease Progress Note  2021   Patient Name: Jessica Valdez : 1955       Reason for visit: F/u COVID-19 pneumonia, acute respiratory failure? History:? Interval history noted  Denies n/v/d/f or untoward effects of antimicrobials  Physical Exam:  Vital Signs: /78   Pulse 83   Temp 97.7 °F (36.5 °C) (Axillary)   Resp (!) 32   Ht 5' 9.02\" (1.753 m)   Wt 264 lb 12.4 oz (120.1 kg)   SpO2 90%   BMI 39.08 kg/m²     Gen: alert and oriented X3  Skin: no stigmata of endocarditis  Wounds: C/D/I  HEMT: AT/NC Oropharynx pink, moist, and without lesions or exudates; dentition in good state of repair  Eyes: PERRLA, EOMI, conjunctiva pink, sclera anicteric. Neck: Supple. Trachea midline. No LAD. Chest: deferred  Heart: RRR  Abd: soft, non-distended, no tenderness, no hepatomegaly. Normoactive bowel sounds. Ext: no clubbing, cyanosis, or edema  Catheter Site: without erythema or tenderness  LDA: CVC:  Neuro: Mental status intact. CN 2-12 intact and no focal sensory or motor deficits     Radiologic / Imaging / TESTING  No results found.      Labs:    Recent Results (from the past 24 hour(s))   Comprehensive metabolic panel    Collection Time: 21  1:00 PM   Result Value Ref Range    Sodium 139 135 - 145 MMOL/L    Potassium 5.1 3.5 - 5.1 MMOL/L    Chloride 102 99 - 110 mMol/L    CO2 22 21 - 32 MMOL/L    BUN 27 (H) 6 - 23 MG/DL    CREATININE 0.7 (L) 0.9 - 1.3 MG/DL    Glucose 127 (H) 70 - 99 MG/DL    Calcium 9.0 8.3 - 10.6 MG/DL    Albumin 3.9 3.4 - 5.0 GM/DL    Total Protein 6.8 6.4 - 8.2 GM/DL    Total Bilirubin 0.7 0.0 - 1.0 MG/DL    ALT 53 (H) 10 - 40 U/L    AST 53 (H) 15 - 37 IU/L    Alkaline Phosphatase 128 40 - 128 IU/L    GFR Non-African American >60 >60 mL/min/1.73m2    GFR African American >60 >60 mL/min/1.73m2    Anion Gap 15 4 - 16   CBC auto differential    Collection Time: 21  1:00 PM   Result Value Ref Range    WBC 10.3 4.0 - 10.5 K/CU MM    RBC 5.76 4.6 - 6.2 M/CU MM Hemoglobin 16.4 13.5 - 18.0 GM/DL    Hematocrit 50.5 42 - 52 %    MCV 87.7 78 - 100 FL    MCH 28.5 27 - 31 PG    MCHC 32.5 32.0 - 36.0 %    RDW 15.1 (H) 11.7 - 14.9 %    Platelets 483 588 - 477 K/CU MM    MPV 11.8 (H) 7.5 - 11.1 FL    Differential Type AUTOMATED DIFFERENTIAL     Segs Relative 84.9 (H) 36 - 66 %    Lymphocytes % 6.6 (L) 24 - 44 %    Monocytes % 7.2 (H) 0 - 4 %    Eosinophils % 0.0 0 - 3 %    Basophils % 0.2 0 - 1 %    Segs Absolute 8.8 K/CU MM    Lymphocytes Absolute 0.7 K/CU MM    Monocytes Absolute 0.7 K/CU MM    Eosinophils Absolute 0.0 K/CU MM    Basophils Absolute 0.0 K/CU MM    Nucleated RBC % 0.0 %    Total Nucleated RBC 0.0 K/CU MM    Total Immature Neutrophil 0.11 K/CU MM    Immature Neutrophil % 1.1 (H) 0 - 0.43 %   C-reactive protein    Collection Time: 09/19/21  1:00 PM   Result Value Ref Range    CRP, High Sensitivity 32.1 mg/L   Phosphorus    Collection Time: 09/19/21  1:00 PM   Result Value Ref Range    Phosphorus 3.4 2.5 - 4.9 MG/DL   Comprehensive metabolic panel    Collection Time: 09/20/21  6:00 AM   Result Value Ref Range    Sodium 139 135 - 145 MMOL/L    Potassium 5.2 (H) 3.5 - 5.1 MMOL/L    Chloride 102 99 - 110 mMol/L    CO2 23 21 - 32 MMOL/L    BUN 31 (H) 6 - 23 MG/DL    CREATININE 0.7 (L) 0.9 - 1.3 MG/DL    Glucose 125 (H) 70 - 99 MG/DL    Calcium 8.9 8.3 - 10.6 MG/DL    Albumin 3.7 3.4 - 5.0 GM/DL    Total Protein 7.0 6.4 - 8.2 GM/DL    Total Bilirubin 0.9 0.0 - 1.0 MG/DL    ALT 68 (H) 10 - 40 U/L    AST 57 (H) 15 - 37 IU/L    Alkaline Phosphatase 135 (H) 40 - 128 IU/L    GFR Non-African American >60 >60 mL/min/1.73m2    GFR African American >60 >60 mL/min/1.73m2    Anion Gap 14 4 - 16   CBC auto differential    Collection Time: 09/20/21  6:00 AM   Result Value Ref Range    WBC 9.1 4.0 - 10.5 K/CU MM    RBC 5.93 4.6 - 6.2 M/CU MM    Hemoglobin 16.6 13.5 - 18.0 GM/DL    Hematocrit 51.8 42 - 52 %    MCV 87.4 78 - 100 FL    MCH 28.0 27 - 31 PG    MCHC 32.0 32.0 - 36.0 % RDW 15.1 (H) 11.7 - 14.9 %    Platelets 779 448 - 277 K/CU MM    MPV 11.8 (H) 7.5 - 11.1 FL    Differential Type AUTOMATED DIFFERENTIAL     Segs Relative 87.6 (H) 36 - 66 %    Lymphocytes % 5.1 (L) 24 - 44 %    Monocytes % 5.8 (H) 0 - 4 %    Eosinophils % 0.0 0 - 3 %    Basophils % 0.1 0 - 1 %    Segs Absolute 8.0 K/CU MM    Lymphocytes Absolute 0.5 K/CU MM    Monocytes Absolute 0.5 K/CU MM    Eosinophils Absolute 0.0 K/CU MM    Basophils Absolute 0.0 K/CU MM    Nucleated RBC % 0.0 %    Total Nucleated RBC 0.0 K/CU MM    Total Immature Neutrophil 0.13 K/CU MM    Immature Neutrophil % 1.4 (H) 0 - 0.43 %     CULTURE results: Invalid input(s): BLOOD CULTURE,  URINE CULTURE, SURGICAL CULTURE    Diagnosis:  Patient Active Problem List   Diagnosis    Obstructive sleep apnea syndrome    Arteriosclerosis of coronary artery    Acute bronchitis with chronic obstructive pulmonary disease (COPD) (HCC)    COVID-19    Tobacco dependence in remission    Obesity    Hyperlipidemia    Cardiomyopathy ([address]    Aneurysm of thoracic aorta (HCC)    Acute respiratory failure with hypoxia (HCC)       Active Problems  Active Problems:    Obstructive sleep apnea syndrome    COVID-19    Tobacco dependence in remission    Obesity    Hyperlipidemia    Acute respiratory failure with hypoxia (HCC)  Resolved Problems:    * No resolved hospital problems. *      Impression and plan   Summary and rationale: Patient is a 63 y.o.  male with a medical hx of obesity, HLD , thoracic aorta aneurysm admitted on 9/14/2021 with SOB, cough, fever, chills, anorexia for 5 days prior to admission.  Diagnosed with severe covid-19 pneumonia and respiratory failure   COVID-19 symptom onset: 9/9/2021   COVID-19 test date: 9/14/2021   Expected discontinuation of isolation precautions: 9/29/2021   Clinical status: worsening on BiPAP,  immune pathology progressing   Therapeutic:  o Ongoing antibiotics:none  o Immunomodulators:  - Dexamethasone:  - Baracitinib: 9/15 (14 day therapy)  o Completed antibiotics: azithromycin  o Other agents:    Diagnostic: trend CRP and pct   F/u:   Other: consider switching to Solumedrol 1mg/kg/day (IBW)      Electronically signed by: Electronically signed by Mary Etienne MD on 9/20/2021 at 10:24 AM

## 2021-09-20 NOTE — PLAN OF CARE
Nutrition Problem #1: Severe malnutrition, In context of acute illness or injury  Intervention: Food and/or Nutrient Delivery: Continue Current Diet, Start Oral Nutrition Supplement  Nutritional Goals: Pt will meet greater than 50% of meals and supplements

## 2021-09-21 PROBLEM — E55.9 VITAMIN D DEFICIENCY: Status: ACTIVE | Noted: 2021-01-01

## 2021-09-21 NOTE — PROGRESS NOTES
Infectious Disease Progress Note  2021   Patient Name: Jayjay Esposito : 1955       Reason for visit: F/u COVID-19 pneumonia, acute respiratory failure? History:? Interval history noted  Denies n/v/d/f or untoward effects of antimicrobials  Physical Exam:  Vital Signs: BP (!) 150/88   Pulse 81   Temp 97.6 °F (36.4 °C) (Axillary)   Resp (!) 33   Ht 5' 9.02\" (1.753 m)   Wt 265 lb 14 oz (120.6 kg)   SpO2 95%   BMI 39.24 kg/m²     Gen: alert and oriented X3  Skin: no stigmata of endocarditis  Wounds: C/D/I  HEMT: AT/NC Oropharynx pink, moist, and without lesions or exudates; dentition in good state of repair  Eyes: PERRLA, EOMI, conjunctiva pink, sclera anicteric. Neck: Supple. Trachea midline. No LAD. Chest: deferred  Heart: RRR  Abd: soft, non-distended, no tenderness, no hepatomegaly. Normoactive bowel sounds. Ext: no clubbing, cyanosis, or edema  Catheter Site: without erythema or tenderness  LDA: CVC:  Neuro: Mental status intact. CN 2-12 intact and no focal sensory or motor deficits     Radiologic / Imaging / TESTING  ONE XRAY VIEW OF THE CHEST     2021 1:46 pm     COMPARISON:   2021     HISTORY:   ORDERING SYSTEM PROVIDED HISTORY: acute resp failure, covid pneumonia   TECHNOLOGIST PROVIDED HISTORY:   Reason for exam:->acute resp failure, covid pneumonia   Reason for Exam: acute resp failure, covid pneumonia   Acuity: Acute   Type of Exam: Initial   Additional signs and symptoms: na   Relevant Medical/Surgical History: na     FINDINGS:   Mild pulmonary vascular congestion. There is no effusion or pneumothorax. The cardiomediastinal silhouette is without acute process. The osseous   structures are without acute process. Impression:   Mild pulmonary vascular congestion. Labs:    No results found for this or any previous visit (from the past 24 hour(s)).   CULTURE results: Invalid input(s): BLOOD CULTURE,  URINE CULTURE, SURGICAL CULTURE    Diagnosis:  Patient Active Problem List   Diagnosis    Obstructive sleep apnea syndrome    Arteriosclerosis of coronary artery    [facility] with [facility]D) [facility]    COVID-19    Tobacco dependence in remission    Obesity    Hyperlipidemia    Cardiomyopathy ([facility] [facility]    Aneurysm of thoracic aorta (HCC)    Acute respiratory failure with hypoxia (HCC)    Severe malnutrition (HCC)    Vitamin D deficiency       Active Problems  Active Problems:    Obstructive sleep apnea syndrome    COVID-19    Tobacco dependence in remission    Obesity    Hyperlipidemia    Acute respiratory failure with hypoxia (HCC)    Severe malnutrition (HCC)    Vitamin D deficiency  Resolved Problems:    * No resolved hospital problems. *      Impression and plan   Summary and rationale: Patient is a 63 y.o.  male with a medical hx of obesity, HLD , thoracic aorta aneurysm admitted on 9/14/2021 with SOB, cough, fever, chills, anorexia for 5 days prior to admission. Diagnosed with severe covid-19 pneumonia and respiratory failure   Vitamin D deficiency   COVID-19 symptom onset: 9/9/2021   COVID-19 test date: 9/14/2021   Expected discontinuation of isolation precautions: 9/29/2021   Clinical status: slight improvement, lymphopenia.  Therapeutic:  o Ongoing antibiotics:none  o Immunomodulators:  - Solumedrol  - Baracitinib: 9/15 (14 day therapy)  o Completed antibiotics: azithromycin  o Other agents:    Diagnostic: trend CRP and pct   F/u:   Other: consider switching to Solumedrol 1mg/kg/day (IBW) in 2 divided doses, ie 40 mg q 12h for 5 days, monitor improvement.       Electronically signed by: Electronically signed by Felix Gan MD on 9/21/2021 at 8:08 AM

## 2021-09-21 NOTE — PROGRESS NOTES
09/21/21 1543   NIV Type   NIV Started/Stopped On   Equipment Type v60   Mode Bilevel   Mask Type Full face mask   Mask Size Large   Settings/Measurements   IPAP 18 cmH20   CPAP/EPAP 8 cmH2O   Rate Ordered 12   Resp 30   Insp Rise Time (%) 3 %   FiO2  60 %   I Time/ I Time % 1.25 s   Vt Exhaled 746 mL   Minute Volume 22.1 Liters   Mask Leak (lpm) 86 lpm   Comfort Level Good   Using Accessory Muscles No   SpO2 96   Alarm Settings   Alarms On Y   Press Low Alarm 3 cmH2O   High Pressure Alarm 25 cmH2O   Delay Alarm 20 sec(s)   Apnea (secs) 20 secs   Resp Rate Low Alarm 13   High Respiratory Rate 40 br/min

## 2021-09-21 NOTE — PROGRESS NOTES
pulmonary      SUBJECTIVE:  Remains on bipap and high fio2     OBJECTIVE    VITALS:  BP (!) 150/88   Pulse 81   Temp 97.6 °F (36.4 °C) (Axillary)   Resp (!) 33   Ht 5' 9.02\" (1.753 m)   Wt 265 lb 14 oz (120.6 kg)   SpO2 95%   BMI 39.24 kg/m²   HEAD AND FACE EXAM:  No throat injection, no active exudate,no thrush  NECK EXAM;No JVD, no masses, symmetrical  CHEST EXAM; Expansion equal and symmetrical, no masses  LUNG EXAM; Good breath sounds bilaterally. There are expiratory wheezes both lungs, there are crackles at both lung bases  CARDIOVASCULAR EXAM: Positive S1 and S2, no S3 or S4, no clicks ,no murmurs  RIGHT AND LEFT LOWER EXTRIMITY EXAM: No edema, no swelling, no inflamation            LABS   Lab Results   Component Value Date    WBC 9.1 09/20/2021    HGB 16.6 09/20/2021    HCT 51.8 09/20/2021    MCV 87.4 09/20/2021     09/20/2021     Lab Results   Component Value Date    CREATININE 0.7 (L) 09/20/2021    BUN 31 (H) 09/20/2021     09/20/2021    K 5.2 (H) 09/20/2021     09/20/2021    CO2 23 09/20/2021     No results found for: INR, PROTIME       Lab Results   Component Value Date    PHOS 3.4 09/19/2021      No results for input(s): PH, PO2ART, NNZ9PXA, HCO3, BEART, O2SAT in the last 72 hours. Wt Readings from Last 3 Encounters:   09/21/21 265 lb 14 oz (120.6 kg)   12/23/16 263 lb (119.3 kg)               ASSESMENT  Ac on ch resp failure  covid pneumonia  copd        PLAN  1. Bd rx  2. o2 adm  3. Cont bipap  4. Observe closely  5.  On decadron and baricitinab    9/21/2021  Kelsey Mendoza MD, M.D.

## 2021-09-21 NOTE — RT PROTOCOL NOTE
bronchodilator order(s) to equivalent RT Bronchodilator order with Frequency of every 4 hours PRN for wheezing or increased work of breathing using Per Protocol order mode. 4-6 - enter or revise RT Bronchodilator order(s) to two equivalent RT bronchodilator orders with one order with BID Frequency and one order with Frequency of every 4 hours PRN wheezing or increased work of breathing using Per Protocol order mode. 7-10 - enter or revise RT Bronchodilator order(s) to two equivalent RT bronchodilator orders with one order with TID Frequency and one order with Frequency of every 4 hours PRN wheezing or increased work of breathing using Per Protocol order mode. 11-13 - enter or revise RT Bronchodilator order(s) to one equivalent RT bronchodilator order with QID Frequency and an Albuterol order with Frequency of every 4 hours PRN wheezing or increased work of breathing using Per Protocol order mode. Greater than 13 - enter or revise RT Bronchodilator order(s) to one equivalent RT bronchodilator order with every 4 hours Frequency and an Albuterol order with Frequency of every 2 hours PRN wheezing or increased work of breathing using Per Protocol order mode. RT to enter RT Home Evaluation for COPD & MDI Assessment order using Per Protocol order mode.     Electronically signed by Angelo Wasserman RCP on 9/21/2021 at 2:44 PM

## 2021-09-21 NOTE — PROGRESS NOTES
Hospitalist Progress Note      PCP: No primary care provider on file. Date of Admission: 2021    Chief Complaint on Admission: SOB    Pt Seen/Examined and Chart Reviewed. Admitting dx covid pneumonia    SUBJECTIVE:     Feels that his breathing is about the same, no improvement or worsening  Still on bipap. OBJECTIVE:   Vitals    TEMPERATURE:  Current - Temp: 97.6 °F (36.4 °C); Max - Temp  Av.9 °F (36.6 °C)  Min: 97.3 °F (36.3 °C)  Max: 98.5 °F (36.9 °C)  RESPIRATIONS RANGE: Resp  Av.5  Min: 18  Max: 43  PULSE RANGE: Pulse  Av.6  Min: 78  Max: 102  BLOOD PRESSURE RANGE:  Systolic (97DJW), PTS:907 , Min:132 , NP   ; Diastolic (80OBA), LKX:31, Min:80, Max:90    PULSE OXIMETRY RANGE: SpO2  Av.7 %  Min: 62 %  Max: 100 %  24HR INTAKE/OUTPUT:      Intake/Output Summary (Last 24 hours) at 2021 0807  Last data filed at 2021 2000  Gross per 24 hour   Intake 240 ml   Output 1200 ml   Net -960 ml       Exam:    General appearance: no acute distress, on bipap  HEENT NCAT no discharge. Neck: Supple, No jugular venous distention/bruits. Trachea midline   Lungs: on bipap, decrease breath sounds in bases  Heart: RRR  Abdomen: soft nt nd  Extremities: No clubbing, cyanosis, or edema bilaterally. Full range of motion without deformity  Skin: no gross lesions  Neurologic: aao, no gross deficits  Mental status: Alert, oriented, thought content appropriate. Data    Recent Labs     21  0600   WBC 10.3 9.1   HGB 16.4 16.6   HCT 50.5 51.8    279      Recent Labs     21  16321  0600    139 139   K 4.8 5.1 5.2*    102 102   CO2 25 22 23   PHOS  --  3.4  --    BUN 23 27* 31*   CREATININE 0.7* 0.7* 0.7*     Recent Labs     21  16321  1300 21  0600   AST 54* 53* 57*   ALT 48* 53* 68*   BILITOT 0.6 0.7 0.9   ALKPHOS 122 128 135*     No results for input(s): INR in the last 72 hours.   No results for input(s): CKTOTAL, CKMB, CKMBINDEX, TROPONINI in the last 72 hours.     Imaging/Test Results            Consults:     IP CONSULT TO HOSPITALIST  IP CONSULT TO PHARMACY  IP CONSULT TO INFECTIOUS DISEASES  IP CONSULT TO PHARMACY  IP CONSULT TO PULMONOLOGY    Allergies  Percocet [oxycodone-acetaminophen]    Medications      Scheduled Meds:   methylPREDNISolone  40 mg IntraVENous Q12H    HYDROcodone 5 mg - acetaminophen  1 tablet Oral Q12H    vitamin C  1,000 mg Oral BID    zinc sulfate  50 mg Oral BID    Vitamin D  1,000 Units Oral Daily    baricitinib  4 mg Oral Daily    sodium chloride flush  5-40 mL IntraVENous 2 times per day    enoxaparin  30 mg SubCUTAneous BID    bisacodyl  5 mg Oral Daily    albuterol sulfate HFA  2 puff Inhalation 4x daily    carvedilol  6.25 mg Oral BID    divalproex  250 mg Oral Daily    furosemide  20 mg Oral Daily    pantoprazole  40 mg Oral QAM AC    montelukast  10 mg Oral QPM    fluticasone  2 puff Inhalation BID    pravastatin  40 mg Oral Dinner    tiotropium-olodaterol  2 puff Inhalation BID    diclofenac-miSOPROStol  1 tablet Oral BID       Infusions:   sodium chloride         PRN Meds:  LORazepam, benzonatate, sodium chloride flush, sodium chloride, ondansetron **OR** ondansetron, polyethylene glycol, acetaminophen **OR** acetaminophen, guaiFENesin-dextromethorphan, methocarbamol    ASSESSMENT AND PLAN      Active Hospital Problems    Diagnosis Date Noted    Vitamin D deficiency [E55.9] 09/21/2021    Severe malnutrition ([facility] [address] [E43] 09/20/2021    Acute respiratory failure with hypoxia (HCC) [J96.01] 09/18/2021    COVID-19 [U07.1] 09/14/2021    Obstructive sleep apnea syndrome [G47.33] 09/14/2021    Tobacco dependence in remission [F17.201] 09/14/2021    Obesity [E66.9] 09/14/2021    Hyperlipidemia [E78.5] 09/14/2021       Acute on chronic respiratory failure  --severe, pulm following, due to covid pneumonia, repeat CXR ordered, previous chest CT no PE  -continue to try to wean off BiPAP. CXR: Mild pulmonary vascular congestion. Check BNP. 2.  severe COVID-19 pneumonia   on immunomodulator - baricitinib started 9/15 for 14 days, pulm and ID following, isolation ends 9/29, completed azithro, was on decadron,  -Solu-Medrol twice daily for 5 days. On vitamin D.    3.  COPD, history of smoking   continue inhalers. Severe malnutrition  -Diet and frozen high-protein oral nutrition supplements as per dietitian. Vitamin D deficiency  -On oral vitamin D.    BEV-stable    Morbid obesity--bmi 39    HLD--stable    PT/OT Eval Status: deferred    DVT Prophylaxis: SQ lovenox  Diet: ADULT DIET; Regular  Adult Oral Nutrition Supplement; Frozen Oral Supplement  Adult Oral Nutrition Supplement; Low Calorie/High Protein Oral Supplement  Code Status: Full Code      Dispo-need breathing to improve.     Pratima Parrish MD

## 2021-09-22 NOTE — PROGRESS NOTES
Infectious Disease Progress Note  2021   Patient Name: Kailee Gonsales : 1955       Reason for visit: F/u COVID-19 pneumonia, acute respiratory failure? History:? Interval history noted  Denies n/v/d/f or untoward effects of antimicrobials  Physical Exam:  Vital Signs: BP (!) 150/97   Pulse 89   Temp 96 °F (35.6 °C) (Axillary)   Resp 20   Ht 5' 9.02\" (1.753 m)   Wt 261 lb 14.5 oz (118.8 kg)   SpO2 93%   BMI 38.66 kg/m²     Gen: alert and oriented X3  Skin: no stigmata of endocarditis  Wounds: C/D/I  HEMT: AT/NC Oropharynx pink, moist, and without lesions or exudates; dentition in good state of repair  Eyes: PERRLA, EOMI, conjunctiva pink, sclera anicteric. Neck: Supple. Trachea midline. No LAD. Chest: deferred  Heart: RRR  Abd: soft, non-distended, no tenderness, no hepatomegaly. Normoactive bowel sounds. Ext: no clubbing, cyanosis, or edema  Catheter Site: without erythema or tenderness  LDA: CVC:  Neuro: Mental status intact. CN 2-12 intact and no focal sensory or motor deficits     Radiologic / Imaging / TESTING  ONE XRAY VIEW OF THE CHEST     2021 1:46 pm     COMPARISON:   2021     HISTORY:   ORDERING SYSTEM PROVIDED HISTORY: acute resp failure, covid pneumonia   TECHNOLOGIST PROVIDED HISTORY:   Reason for exam:->acute resp failure, covid pneumonia   Reason for Exam: acute resp failure, covid pneumonia   Acuity: Acute   Type of Exam: Initial   Additional signs and symptoms: na   Relevant Medical/Surgical History: na     FINDINGS:   Mild pulmonary vascular congestion. There is no effusion or pneumothorax. The cardiomediastinal silhouette is without acute process. The osseous   structures are without acute process. Impression:   Mild pulmonary vascular congestion.         Labs:    Recent Results (from the past 24 hour(s))   Comprehensive metabolic panel    Collection Time: 21 12:12 PM   Result Value Ref Range    Sodium 140 135 - 145 MMOL/L    Potassium 5.3 (H) 3.5 - 5. 1 MMOL/L    Chloride 101 99 - 110 mMol/L    CO2 21 21 - 32 MMOL/L    BUN 45 (H) 6 - 23 MG/DL    CREATININE 0.6 (L) 0.9 - 1.3 MG/DL    Glucose 116 (H) 70 - 99 MG/DL    Calcium 9.1 8.3 - 10.6 MG/DL    Albumin 4.0 3.4 - 5.0 GM/DL    Total Protein 7.3 6.4 - 8.2 GM/DL    Total Bilirubin 2.5 (H) 0.0 - 1.0 MG/DL     (H) 10 - 40 U/L     (H) 15 - 37 IU/L    Alkaline Phosphatase 159 (H) 40 - 129 IU/L    GFR Non-African American >60 >60 mL/min/1.73m2    GFR African American >60 >60 mL/min/1.73m2    Anion Gap 18 (H) 4 - 16   CBC auto differential    Collection Time: 09/22/21 12:12 PM   Result Value Ref Range    WBC 14.7 (H) 4.0 - 10.5 K/CU MM    RBC 6.23 (H) 4.6 - 6.2 M/CU MM    Hemoglobin 17.5 13.5 - 18.0 GM/DL    Hematocrit 54.1 (H) 42 - 52 %    MCV 86.8 78 - 100 FL    MCH 28.1 27 - 31 PG    MCHC 32.3 32.0 - 36.0 %    RDW 15.4 (H) 11.7 - 14.9 %    Platelets 302 969 - 929 K/CU MM    MPV 12.3 (H) 7.5 - 11.1 FL    Differential Type AUTOMATED DIFFERENTIAL     Segs Relative 89.2 (H) 36 - 66 %    Lymphocytes % 3.8 (L) 24 - 44 %    Monocytes % 4.7 (H) 0 - 4 %    Eosinophils % 0.1 0 - 3 %    Basophils % 0.1 0 - 1 %    Segs Absolute 13.1 K/CU MM    Lymphocytes Absolute 0.6 K/CU MM    Monocytes Absolute 0.7 K/CU MM    Eosinophils Absolute 0.0 K/CU MM    Basophils Absolute 0.0 K/CU MM    Nucleated RBC % 0.0 %    Total Nucleated RBC 0.0 K/CU MM    Total Immature Neutrophil 0.31 K/CU MM    Immature Neutrophil % 2.1 (H) 0 - 0.43 %   Brain Natriuretic Peptide    Collection Time: 09/22/21 12:12 PM   Result Value Ref Range    Pro-.1 (H) <300 PG/ML     CULTURE results: Invalid input(s): BLOOD CULTURE,  URINE CULTURE, SURGICAL CULTURE    Diagnosis:  Patient Active Problem List   Diagnosis    Obstructive sleep apnea syndrome    Arteriosclerosis of coronary artery    [facility] bronch[facility] with [facility] disease [facility]D) [facility]    COVID-19    Tobacco dependence in remission    Obesity    Hyperlipidemia    Cardiomyopathy ([address]    Aneurysm of thoracic aorta ([address]    Acute respiratory failure with hypoxia (HCC)    Severe malnutrition (HCC)    Vitamin D deficiency       Active Problems  Active Problems:    Obstructive sleep apnea syndrome    COVID-19    Tobacco dependence in remission    Obesity    Hyperlipidemia    Acute respiratory failure with hypoxia (HCC)    Severe malnutrition (HCC)    Vitamin D deficiency  Resolved Problems:    * No resolved hospital problems. *      Impression and plan   Summary and rationale: Patient is a 63 y.o.  male with a medical hx of obesity, HLD , thoracic aorta aneurysm admitted on 9/14/2021 with SOB, cough, fever, chills, anorexia for 5 days prior to admission. Diagnosed with severe covid-19 pneumonia and respiratory failure   Vitamin D deficiency   COVID-19 symptom onset: 9/9/2021   COVID-19 test date: 9/14/2021   Expected discontinuation of isolation precautions: 9/29/2021   Clinical status: slight improvement, lymphopenia.     Therapeutic:  o Ongoing antibiotics:none  o Immunomodulators:  - Solumedrol 40 mg q 12 for 5 days, and then consider switching to 20 mg IV q 12 h  - Baracitinib: 9/15 (14 day therapy)  o Completed antibiotics: azithromycin  o Other agents:    Diagnostic: trend CRP and pct   F/u:   Other:       Electronically signed by: Electronically signed by Johanne Ayala MD on 9/22/2021 at 2:16 PM

## 2021-09-22 NOTE — PROGRESS NOTES
Hospitalist Progress Note      PCP: No primary care provider on file. Date of Admission: 2021    Chief Complaint on Admission: SOB    Pt Seen/Examined and Chart Reviewed. Admitting dx covid pneumonia    SUBJECTIVE:     -does not feel that he has changed much  -still on bipap, on 45%    OBJECTIVE:   Vitals    TEMPERATURE:  Current - Temp: 96 °F (35.6 °C); Max - Temp  Av.2 °F (36.2 °C)  Min: 96 °F (35.6 °C)  Max: 97.8 °F (36.6 °C)  RESPIRATIONS RANGE: Resp  Av.3  Min: 21  Max: 30  PULSE RANGE: Pulse  Av.5  Min: 82  Max: 94  BLOOD PRESSURE RANGE:  Systolic (65PSC), FVM:850 , Min:127 , FTU:006   ; Diastolic (37PQC), KLY:05, Min:82, Max:97    PULSE OXIMETRY RANGE: SpO2  Av %  Min: 93 %  Max: 100 %  24HR INTAKE/OUTPUT:      Intake/Output Summary (Last 24 hours) at 2021 0845  Last data filed at 2021 0443  Gross per 24 hour   Intake 180 ml   Output --   Net 180 ml       Exam:    General appearance: no acute distress, on bipap  HEENT NCAT no discharge. Neck: Supple, No jugular venous distention/bruits. Trachea midline   Lungs: on bipap, lungs seem clear to auscultation b/l bs+  Heart: RRR  Abdomen: soft nt nd  Extremities: trace edema in LE  Skin: no gross lesions  Neurologic: aao, no gross deficits  Mental status: Alert, oriented, thought content appropriate. Data    Recent Labs     21  1300 21  0600   WBC 10.3 9.1   HGB 16.4 16.6   HCT 50.5 51.8    279      Recent Labs     21  1300 21  0600    139   K 5.1 5.2*    102   CO2 22 23   PHOS 3.4  --    BUN 27* 31*   CREATININE 0.7* 0.7*     Recent Labs     21  1300 21  0600   AST 53* 57*   ALT 53* 68*   BILITOT 0.7 0.9   ALKPHOS 128 135*     No results for input(s): INR in the last 72 hours. No results for input(s): CKTOTAL, CKMB, CKMBINDEX, TROPONINI in the last 72 hours.     Imaging/Test Results            Consults:     IP CONSULT TO HOSPITALIST  IP CONSULT TO PHARMACY  IP CONSULT TO INFECTIOUS DISEASES  IP CONSULT TO PHARMACY  IP CONSULT TO PULMONOLOGY    Allergies  Percocet [oxycodone-acetaminophen]    Medications      Scheduled Meds:   methylPREDNISolone  40 mg IntraVENous Q12H    HYDROcodone 5 mg - acetaminophen  1 tablet Oral Q12H    vitamin C  1,000 mg Oral BID    zinc sulfate  50 mg Oral BID    Vitamin D  1,000 Units Oral Daily    baricitinib  4 mg Oral Daily    sodium chloride flush  5-40 mL IntraVENous 2 times per day    enoxaparin  30 mg SubCUTAneous BID    bisacodyl  5 mg Oral Daily    albuterol sulfate HFA  2 puff Inhalation 4x daily    carvedilol  6.25 mg Oral BID    divalproex  250 mg Oral Daily    furosemide  20 mg Oral Daily    pantoprazole  40 mg Oral QAM AC    montelukast  10 mg Oral QPM    fluticasone  2 puff Inhalation BID    pravastatin  40 mg Oral Dinner    tiotropium-olodaterol  2 puff Inhalation BID    diclofenac-miSOPROStol  1 tablet Oral BID       Infusions:   sodium chloride         PRN Meds:  albuterol sulfate HFA, LORazepam, benzonatate, sodium chloride flush, sodium chloride, ondansetron **OR** ondansetron, polyethylene glycol, acetaminophen **OR** acetaminophen, guaiFENesin-dextromethorphan, methocarbamol    ASSESSMENT AND PLAN      Active Hospital Problems    Diagnosis Date Noted    Vitamin D deficiency [E55.9] 09/21/2021    Severe malnutrition ([facility] [address] [E43] 09/20/2021    Acute respiratory failure with hypoxia (HCC) [J96.01] 09/18/2021    COVID-19 [U07.1] 09/14/2021    Obstructive sleep apnea syndrome [G47.33] 09/14/2021    Tobacco dependence in remission [F17.201] 09/14/2021    Obesity [E66.9] 09/14/2021    Hyperlipidemia [E78.5] 09/14/2021       Acute on chronic respiratory failure  --severe, pulm following, due to covid pneumonia,previous chest CT no PE  -CXR: Mild pulmonary vascular congestion.    -Check BNP.   On BiPAP 50% FiO2.    2.  severe COVID-19 pneumonia   on immunomodulator - baricitinib started 9/15 for 14 days, pulm and ID following, isolation ends 9/29, completed azithro, was on decadron,  -Solu-Medrol twice daily for 5 days. On vitamin D.    3.  COPD, history of smoking   continue inhalers. Severe malnutrition  -Diet and frozen high-protein oral nutrition supplements as per dietitian. Vitamin D deficiency  -On oral vitamin D.    BEV-stable    Morbid obesity--bmi 39    HLD--stable      DVT Prophylaxis: SQ lovenox  Diet: ADULT DIET; Regular  Adult Oral Nutrition Supplement; Frozen Oral Supplement  Adult Oral Nutrition Supplement; Low Calorie/High Protein Oral Supplement  Code Status: Full Code      Dispo-need breathing to improve.     Marion Jeffers MD

## 2021-09-23 NOTE — CARE COORDINATION
CM reviewed notes. Pt is currently on high flow 12lmin.  CM team will continue to follow [address]

## 2021-09-23 NOTE — PROGRESS NOTES
Hospitalist Progress Note      PCP: No primary care provider on file. Date of Admission: 2021    Chief Complaint on Admission: SOB    Pt Seen/Examined and Chart Reviewed. Admitting dx covid pneumonia    SUBJECTIVE:     He had a bowel movement that made him feel better  He also states he had a amor catheter placed because it was becoming hard to go to use the bathroom  On Bipap 60%FiO2    OBJECTIVE:   Vitals    TEMPERATURE:  Current - Temp: 98.3 °F (36.8 °C); Max - Temp  Av.3 °F (36.8 °C)  Min: 98 °F (36.7 °C)  Max: 98.8 °F (37.1 °C)  RESPIRATIONS RANGE: Resp  Av.3  Min: 17  Max: 25  PULSE RANGE: Pulse  Av.5  Min: 78  Max: 91  BLOOD PRESSURE RANGE:  Systolic (59UID), PQI:794 , Min:117 , EXS:738   ; Diastolic (35HVG), UEM:69, Min:73, Max:99    PULSE OXIMETRY RANGE: SpO2  Av.1 %  Min: 63 %  Max: 100 %  24HR INTAKE/OUTPUT:      Intake/Output Summary (Last 24 hours) at 2021 0846  Last data filed at 2021 0602  Gross per 24 hour   Intake 600 ml   Output 700 ml   Net -100 ml       Exam:    General appearance: no acute distress, on bipap, sitting in bed  HEENT NCAT no discharge. Neck: Supple, No jugular venous distention/bruits. Trachea midline   Lungs: on bipap, may have had some wheeze while on bipap  Heart: RRR  Abdomen: soft nt nd  Extremities: trace edema in LE  Skin: no gross lesions  Neurologic: aao, no gross deficits  Mental status: Alert, oriented, thought content appropriate. Data    Recent Labs     21  1212 21  06   WBC 14.7* 9.8   HGB 17.5 16.2   HCT 54.1* 51.0    337      Recent Labs     21  1212 21  06    137   K 5.3* 4.9    101   CO2    BUN 45* 63*   CREATININE 0.6* 0.8*     Recent Labs     21  1212 21  0619   * 128*   * 244*   BILITOT 2.5* 1.4*   ALKPHOS 159* 160*     No results for input(s): INR in the last 72 hours.   No results for input(s): CKTOTAL, CKMB, CKMBINDEX, TROPONINI in the last 72 hours.     Imaging/Test Results            Consults:     IP CONSULT TO HOSPITALIST  IP CONSULT TO PHARMACY  IP CONSULT TO INFECTIOUS DISEASES  IP CONSULT TO PHARMACY  IP CONSULT TO PULMONOLOGY    Allergies  Percocet [oxycodone-acetaminophen]    Medications      Scheduled Meds:   methylPREDNISolone  40 mg IntraVENous Q12H    [Held by provider] HYDROcodone 5 mg - acetaminophen  1 tablet Oral Q12H    vitamin C  1,000 mg Oral BID    zinc sulfate  50 mg Oral BID    Vitamin D  1,000 Units Oral Daily    baricitinib  4 mg Oral Daily    sodium chloride flush  5-40 mL IntraVENous 2 times per day    enoxaparin  30 mg SubCUTAneous BID    bisacodyl  5 mg Oral Daily    albuterol sulfate HFA  2 puff Inhalation 4x daily    carvedilol  6.25 mg Oral BID    [Held by provider] divalproex  250 mg Oral Daily    furosemide  20 mg Oral Daily    pantoprazole  40 mg Oral QAM AC    montelukast  10 mg Oral QPM    fluticasone  2 puff Inhalation BID    [Held by provider] pravastatin  40 mg Oral Dinner    tiotropium-olodaterol  2 puff Inhalation BID    diclofenac-miSOPROStol  1 tablet Oral BID       Infusions:   sodium chloride         PRN Meds:  albuterol sulfate HFA, LORazepam, benzonatate, sodium chloride flush, sodium chloride, ondansetron **OR** ondansetron, polyethylene glycol, [Held by provider] acetaminophen **OR** [Held by provider] acetaminophen, guaiFENesin-dextromethorphan, methocarbamol    ASSESSMENT AND PLAN      Active Hospital Problems    Diagnosis Date Noted    Vitamin D deficiency [E55.9] 09/21/2021    Severe malnutrition ([facility] [address] [E43] 09/20/2021    Acute respiratory failure with hypoxia (HCC) [J96.01] 09/18/2021    COVID-19 [U07.1] 09/14/2021    Obstructive sleep apnea syndrome [G47.33] 09/14/2021    Tobacco dependence in remission [F17.201] 09/14/2021    Obesity [E66.9] 09/14/2021    Hyperlipidemia [E78.5] 09/14/2021       Acute on chronic respiratory failure  --severe, pulm following, due to covid pneumonia,previous chest CT no PE  -CXR: Mild pulmonary vascular congestion.    -On BiPAP 60% FiO2. . CXR in AM.    2.  severe COVID-19 pneumonia   on immunomodulator  pulm and ID following, isolation ends 9/29, completed azithro, was on decadron,  -On vitamin D.  -Solu-Medrol 40 mg twice a day for 5 days started 9/21 then switch to 20 mg twice daily. Hold baricitinib due to elevated LFTs. 3.  COPD, history of smoking   continue inhalers. Severe malnutrition  -Diet and frozen high-protein oral nutrition supplements as per dietitian. Vitamin D deficiency  -On oral vitamin D.    Elevated LFTs  -Hold baricitinib. Hold Depakote. Hold statin. Hold Tylenol products. BEV  -stable    Morbid obesity  --bmi 39    HLD  --stable      DVT Prophylaxis: SQ lovenox  Diet: ADULT DIET; Regular  Adult Oral Nutrition Supplement; Frozen Oral Supplement  Adult Oral Nutrition Supplement; Low Calorie/High Protein Oral Supplement  Code Status: Full Code      Dispo-need breathing to improve.     Shania Monson MD

## 2021-09-23 NOTE — PLAN OF CARE
Nutrition Problem #1: Severe malnutrition, In context of acute illness or injury  Intervention: Food and/or Nutrient Delivery: Continue Current Diet, Continue Oral Nutrition Supplement  Nutritional Goals: pt will consume greater than 50-75% of meals and supplements

## 2021-09-23 NOTE — PROGRESS NOTES
pulmonary      SUBJECTIVE:  Remains on high fio2     OBJECTIVE    VITALS:  BP (!) 151/84   Pulse 81   Temp 97.4 °F (36.3 °C) (Oral)   Resp 21   Ht 5' 9.02\" (1.753 m)   Wt 261 lb 7.5 oz (118.6 kg)   SpO2 90%   BMI 38.59 kg/m²   HEAD AND FACE EXAM:  No throat injection, no active exudate,no thrush  NECK EXAM;No JVD, no masses, symmetrical  CHEST EXAM; Expansion equal and symmetrical, no masses  LUNG EXAM; Good breath sounds bilaterally. There are expiratory wheezes both lungs, there are crackles at both lung bases  CARDIOVASCULAR EXAM: Positive S1 and S2, no S3 or S4, no clicks ,no murmurs  RIGHT AND LEFT LOWER EXTRIMITY EXAM: No edema, no swelling, no inflamation  CNS EXAM: Alert and oriented X3          LABS   Lab Results   Component Value Date    WBC 9.8 09/23/2021    HGB 16.2 09/23/2021    HCT 51.0 09/23/2021    MCV 88.4 09/23/2021     09/23/2021     Lab Results   Component Value Date    CREATININE 0.8 (L) 09/23/2021    BUN 63 (H) 09/23/2021     09/23/2021    K 4.9 09/23/2021     09/23/2021    CO2 21 09/23/2021     No results found for: INR, PROTIME       Lab Results   Component Value Date    PHOS 3.4 09/19/2021      No results for input(s): PH, PO2ART, WWV3DZB, HCO3, BEART, O2SAT in the last 72 hours. Wt Readings from Last 3 Encounters:   09/23/21 261 lb 7.5 oz (118.6 kg)   12/23/16 263 lb (119.3 kg)               ASSESMENT  Ac on ch resp failure  covid pneumonia  copd        PLAN  1. Bd rx  2. o2 adm cont pap rx  3.  On solumedrol and baricitinab  4. cxr in am    9/23/2021  Raynette Nageotte, MD, M.D.

## 2021-09-23 NOTE — PROGRESS NOTES
Comprehensive Nutrition Assessment    Type and Reason for Visit:  Reassess    Nutrition Recommendations/Plan:   · Continue diet and oral nutrition supplements as ordered  · Recommend placing NG and initiating EN to ensure adequate nutrition as patient has not been able to meet greater than 50% of estimated energy needs via po intake per flow sheet documentation  · Please consult dietitian for tube feed order and manage if EN is initiated  · Will closely monitor nutrition status, poc    Nutrition Assessment:  pt remains on bipap 60% fiO2, pt on regular diet with frozen and high protein oral nutrition supplements, pt is continues to consume less than 50% at most meals documented in the past 72 hr, weight loss continued, perfectserved attending physician regarding nutrition plan, awaiting response, pt meets criteria for severe malnutrition, pt is at high nutrition risk    Malnutrition Assessment:  Malnutrition Status:  Severe malnutrition    Context:  Acute Illness     Findings of the 6 clinical characteristics of malnutrition:  Energy Intake:  7 - 50% or less of estimated energy requirements for 5 or more days  Weight Loss:  7 - Greater than 2% over 1 week     Body Fat Loss:  Unable to assess     Muscle Mass Loss:  Unable to assess    Fluid Accumulation:  Unable to assess     Strength:  Not Performed    Estimated Daily Nutrient Needs:  Energy (kcal):  2410-2009 (Roselynn Dear w/ stress factor 1.0-1.2); Weight Used for Energy Requirements:  Current     Protein (g):  80-95 (1.1-1.3 g/kg); Weight Used for Protein Requirements:  Ideal        Fluid (ml/day):  2000; Method Used for Fluid Requirements:  1 ml/kcal      Nutrition Related Findings:  Meds: Vitamin C, Vitamin D, Zinc Sulfate      Wounds:  None       Current Nutrition Therapies:    ADULT DIET; Regular  Adult Oral Nutrition Supplement; Frozen Oral Supplement  Adult Oral Nutrition Supplement;  Low Calorie/High Protein Oral Supplement    Anthropometric Measures:  · Height: 5' 9.02\" (175.3 cm)  · Current Body Weight: 261 lb 7.5 oz (118.6 kg)   · Admission Body Weight: 272 lb 4.3 oz (123.5 kg) (first measured weight)    · Ideal Body Weight: 160 lbs; % Ideal Body Weight 163.4 %   · BMI: 38.6  · BMI Categories: Obese Class 2 (BMI 35.0 -39.9)       Nutrition Diagnosis:   · Severe malnutrition, In context of acute illness or injury related to inadequate protein-energy intake as evidenced by  (~2.9% wt loss since admission, pt meeting less than 50% of estimated energy needs for 5 days)    Nutrition Interventions:   Food and/or Nutrient Delivery:  Continue Current Diet, Continue Oral Nutrition Supplement   Nutrition Education/Counseling:  No recommendation at this time   Coordination of Nutrition Care:  Continue to monitor while inpatient    Goals:  pt will consume greater than 50-75% of meals and supplements       Nutrition Monitoring and Evaluation:   Behavioral-Environmental Outcomes:  None Identified   Food/Nutrient Intake Outcomes:  Supplement Intake, Food and Nutrient Intake  Physical Signs/Symptoms Outcomes:  Biochemical Data, Skin, Weight, GI Status, Hemodynamic Status, Fluid Status or Edema     Discharge Planning:     Too soon to determine     Electronically signed by Marquita Murphy, MS, RD, LD on 9/23/21 at 10:23 AM EDT    Contact: [phone]

## 2021-09-24 NOTE — PROGRESS NOTES
Hospitalist Progress Note      PCP: No primary care provider on file. Date of Admission: 2021    Chief Complaint on Admission: SOB    Pt Seen/Examined and Chart Reviewed. Admitting dx covid pneumonia    SUBJECTIVE:     On bipap at 65%  He states he has been drinking a lot of juice, but not as much ensure    OBJECTIVE:   Vitals    TEMPERATURE:  Current - Temp: 97.4 °F (36.3 °C); Max - Temp  Av.5 °F (36.4 °C)  Min: 96.9 °F (36.1 °C)  Max: 98.4 °F (36.9 °C)  RESPIRATIONS RANGE: Resp  Av.5  Min: 16  Max: 23  PULSE RANGE: Pulse  Av.9  Min: 85  Max: 110  BLOOD PRESSURE RANGE:  Systolic (18LWD), RMQ:000 , Min:119 , WRF:270   ; Diastolic (93APZ), OBP:16, Min:82, Max:94    PULSE OXIMETRY RANGE: SpO2  Av.4 %  Min: 90 %  Max: 98 %  24HR INTAKE/OUTPUT:      Intake/Output Summary (Last 24 hours) at 2021 1051  Last data filed at 2021 0540  Gross per 24 hour   Intake 180 ml   Output 1525 ml   Net -1345 ml       Exam:    General appearance: no acute distress, on bipap, sitting in bed  HEENT NCAT no discharge. Neck: Supple, No jugular venous distention/bruits. Trachea midline   Lungs: on bipap, end inspiratory wheeze, faint rale at base  Heart: RRR  Abdomen: soft nt nd  Extremities: trace edema in LE  Skin: no gross lesions  Neurologic: aao, no gross deficits  Mental status: Alert, oriented, thought content appropriate.       Data    Recent Labs     21  1212 21  0619 21  0900   WBC 14.7* 9.8 16.2*   HGB 17.5 16.2 17.5   HCT 54.1* 51.0 56.1*    337 386      Recent Labs     21  1212 21  0619 21  0900    137 141   K 5.3* 4.9 5.3*    101 103   CO2    BUN 45* 63* 44*   CREATININE 0.6* 0.8* 0.8*     Recent Labs     21  1212 21  0619 21  0900   * 128* 88*   * 244* 248*   BILITOT 2.5* 1.4* 1.2*   ALKPHOS 159* 160* 185*     Recent Labs     21  0900   INR 0.93     No results for input(s): CKTOTAL, CKMB, CKMBINDEX, TROPONINI in the last 72 hours.     Imaging/Test Results            Consults:     IP CONSULT TO HOSPITALIST  IP CONSULT TO PHARMACY  IP CONSULT TO INFECTIOUS DISEASES  IP CONSULT TO PHARMACY  IP CONSULT TO PULMONOLOGY    Allergies  Percocet [oxycodone-acetaminophen]    Medications      Scheduled Meds:   methylPREDNISolone  40 mg IntraVENous Q12H    [Held by provider] HYDROcodone 5 mg - acetaminophen  1 tablet Oral Q12H    vitamin C  1,000 mg Oral BID    zinc sulfate  50 mg Oral BID    Vitamin D  1,000 Units Oral Daily    [Held by provider] baricitinib  4 mg Oral Daily    sodium chloride flush  5-40 mL IntraVENous 2 times per day    enoxaparin  30 mg SubCUTAneous BID    bisacodyl  5 mg Oral Daily    albuterol sulfate HFA  2 puff Inhalation 4x daily    carvedilol  6.25 mg Oral BID    [Held by provider] divalproex  250 mg Oral Daily    furosemide  20 mg Oral Daily    pantoprazole  40 mg Oral QAM AC    montelukast  10 mg Oral QPM    fluticasone  2 puff Inhalation BID    [Held by provider] pravastatin  40 mg Oral Dinner    tiotropium-olodaterol  2 puff Inhalation BID    diclofenac-miSOPROStol  1 tablet Oral BID       Infusions:   sodium chloride         PRN Meds:  albuterol sulfate HFA, LORazepam, benzonatate, sodium chloride flush, sodium chloride, ondansetron **OR** ondansetron, polyethylene glycol, [Held by provider] acetaminophen **OR** [Held by provider] acetaminophen, guaiFENesin-dextromethorphan, methocarbamol    ASSESSMENT AND PLAN      Active Hospital Problems    Diagnosis Date Noted    Vitamin D deficiency [E55.9] 09/21/2021    Severe malnutrition ([facility] [address] [E43] 09/20/2021    Acute respiratory failure with hypoxia (HCC) [J96.01] 09/18/2021    COVID-19 [U07.1] 09/14/2021    Obstructive sleep apnea syndrome [G47.33] 09/14/2021    Tobacco dependence in remission [F17.201] 09/14/2021    Obesity [E66.9] 09/14/2021    Hyperlipidemia [E78.5] 09/14/2021       Acute on chronic respiratory failure  --severe, pulm following, due to covid pneumonia, previous chest CT no PE  -.    -On BiPAP 65% FiO2. CXR: Bibasilar opacification. 2.  severe COVID-19 pneumonia   pulm and ID following, isolation ends 9/29, completed azithro, was on decadron,  -On vitamin D.  -Solu-Medrol 40 mg twice a day for 5 days started 9/21 then switch to 20 mg twice daily. Hold baricitinib due to elevated LFTs. 3.  COPD, history of smoking   continue inhalers. Severe malnutrition  -Diet and frozen high-protein oral nutrition supplements as per dietitian.  -Encouraged patient to eat nutrition supplements. Vitamin D deficiency  -On oral vitamin D.    Elevated LFTs  -Hold baricitinib. Hold Depakote. Hold statin. Hold Tylenol products.  -Continue to monitor LFTs. RUQ US: Fatty liver    BEV  -stable    Morbid obesity  --bmi 39    HLD  --stable      DVT Prophylaxis: SQ lovenox  Diet: ADULT DIET; Regular  Adult Oral Nutrition Supplement; Frozen Oral Supplement  Adult Oral Nutrition Supplement; Low Calorie/High Protein Oral Supplement  Code Status: Full Code      Dispo-need breathing to improve.     Alan Vicnete MD

## 2021-09-24 NOTE — PROGRESS NOTES
pulmonary      SUBJECTIVE: he remains on high fio2 and bipap     OBJECTIVE    VITALS:  /89   Pulse 107   Temp 97.4 °F (36.3 °C) (Axillary)   Resp 22   Ht 5' 9.02\" (1.753 m)   Wt 261 lb 7.5 oz (118.6 kg)   SpO2 92%   BMI 38.59 kg/m²   HEAD AND FACE EXAM:  No throat injection, no active exudate,no thrush  NECK EXAM;No JVD, no masses, symmetrical  CHEST EXAM; Expansion equal and symmetrical, no masses  LUNG EXAM; Good breath sounds bilaterally. There are expiratory wheezes both lungs, there are crackles at both lung bases  CARDIOVASCULAR EXAM: Positive S1 and S2, no S3 or S4, no clicks ,no murmurs  RIGHT AND LEFT LOWER EXTRIMITY EXAM: No edema, no swelling, no inflamation  CNS EXAM: Alert and oriented X3          LABS   Lab Results   Component Value Date    WBC 16.2 (H) 09/24/2021    HGB 17.5 09/24/2021    HCT 56.1 (H) 09/24/2021    MCV 88.2 09/24/2021     09/24/2021     Lab Results   Component Value Date    CREATININE 0.8 (L) 09/24/2021    BUN 44 (H) 09/24/2021     09/24/2021    K 5.3 (H) 09/24/2021     09/24/2021    CO2 21 09/24/2021     Lab Results   Component Value Date    INR 0.93 09/24/2021    PROTIME 12.0 09/24/2021          Lab Results   Component Value Date    PHOS 3.4 09/19/2021      No results for input(s): PH, PO2ART, JDE9CQV, HCO3, BEART, O2SAT in the last 72 hours. Wt Readings from Last 3 Encounters:   09/23/21 261 lb 7.5 oz (118.6 kg)   12/23/16 263 lb (119.3 kg)               ASSESMENT  Ac resp failure  covid pneumonia  copd        PLAN  1. Bd rx  2. Steroids  3. cpm  4.  Cont bipap    9/24/2021  Kirt Scherer MD, M.D.

## 2021-09-24 NOTE — PROGRESS NOTES
Infectious Disease Progress Note  2021   Patient Name: Krystin Downs : 1955       Reason for visit: F/u COVID-19 pneumonia, acute respiratory failure? History:? Interval history noted  Denies n/v/d/f or untoward effects of antimicrobials  Physical Exam:  Vital Signs: /82   Pulse 102   Temp 98.4 °F (36.9 °C) (Oral)   Resp 16   Ht 5' 9.02\" (1.753 m)   Wt 261 lb 7.5 oz (118.6 kg)   SpO2 93%   BMI 38.59 kg/m²     Gen: alert and oriented X3  Skin: no stigmata of endocarditis  Wounds: C/D/I  HEMT: AT/NC Oropharynx pink, moist, and without lesions or exudates; dentition in good state of repair  Eyes: PERRLA, EOMI, conjunctiva pink, sclera anicteric. Neck: Supple. Trachea midline. No LAD. Chest: deferred  Heart: RRR  Abd: soft, non-distended, no tenderness, no hepatomegaly. Normoactive bowel sounds. Ext: no clubbing, cyanosis, or edema  Catheter Site: without erythema or tenderness  LDA: CVC:  Neuro: Mental status intact. CN 2-12 intact and no focal sensory or motor deficits     Radiologic / Imaging / TESTING  ONE XRAY VIEW OF THE CHEST     2021 1:46 pm     COMPARISON:   2021     HISTORY:   ORDERING SYSTEM PROVIDED HISTORY: acute resp failure, covid pneumonia   TECHNOLOGIST PROVIDED HISTORY:   Reason for exam:->acute resp failure, covid pneumonia   Reason for Exam: acute resp failure, covid pneumonia   Acuity: Acute   Type of Exam: Initial   Additional signs and symptoms: na   Relevant Medical/Surgical History: na     FINDINGS:   Mild pulmonary vascular congestion. There is no effusion or pneumothorax. The cardiomediastinal silhouette is without acute process. The osseous   structures are without acute process. Impression:   Mild pulmonary vascular congestion.         Labs:    Recent Results (from the past 24 hour(s))   Comprehensive metabolic panel    Collection Time: 21  6:19 AM   Result Value Ref Range    Sodium 137 135 - 145 MMOL/L    Potassium 4.9 3.5 - 5.1 MMOL/L deficiency       Active Problems  Active Problems:    Obstructive sleep apnea syndrome    COVID-19    Tobacco dependence in remission    Obesity    Hyperlipidemia    Acute respiratory failure with hypoxia (HCC)    Severe malnutrition (HCC)    Vitamin D deficiency  Resolved Problems:    * No resolved hospital problems. *      Impression and plan   Summary and rationale: Patient is a 63 y.o.  male with a medical hx of obesity, HLD , thoracic aorta aneurysm admitted on 9/14/2021 with SOB, cough, fever, chills, anorexia for 5 days prior to admission. Diagnosed with severe covid-19 pneumonia and respiratory failure   Vitamin D deficiency   COVID-19 symptom onset: 9/9/2021   COVID-19 test date: 9/14/2021   Expected discontinuation of isolation precautions: 9/29/2021   Clinical status: some improvement, oxygen needs are being reduced.    Therapeutic:  o Ongoing antibiotics:none  o Immunomodulators:  - Solumedrol 40 mg q 12 for 5 days, and then consider switching to 20 mg IV q 12 h  - Baracitinib: 9/15 (14 day therapy)  o Completed antibiotics: azithromycin  o Other agents:    Diagnostic: trend CRP and pct   F/u:   Other:       Electronically signed by: Electronically signed by Jeremie Dobbins MD on 9/23/2021 at 10:22 PM

## 2021-09-25 NOTE — PROGRESS NOTES
Infectious Disease Progress Note  2021   Patient Name: Rupesh Moseley : 1955       Reason for visit: F/u COVID-19 pneumonia, acute respiratory failure? History:? Interval history noted  Denies n/v/d/f or untoward effects of antimicrobials  Feels better  Physical Exam:  Vital Signs: /85   Pulse 92   Temp 98.3 °F (36.8 °C) (Axillary)   Resp 20   Ht 5' 9.02\" (1.753 m)   Wt 257 lb 8 oz (116.8 kg)   SpO2 100%   BMI 38.01 kg/m²     Gen: alert and oriented X3  Skin: no stigmata of endocarditis  Wounds: C/D/I  HEMT: AT/NC Oropharynx pink, moist, and without lesions or exudates; dentition in good state of repair  Eyes: PERRLA, EOMI, conjunctiva pink, sclera anicteric. Neck: Supple. Trachea midline. No LAD. Chest: deferred  Heart: RRR  Abd: soft, non-distended, no tenderness, no hepatomegaly. Normoactive bowel sounds. Ext: no clubbing, cyanosis, or edema  Catheter Site: without erythema or tenderness  LDA: CVC:  Neuro: Mental status intact. CN 2-12 intact and no focal sensory or motor deficits     Radiologic / Imaging / TESTING  ONE XRAY VIEW OF THE CHEST     2021 1:46 pm     COMPARISON:   2021     HISTORY:   ORDERING SYSTEM PROVIDED HISTORY: acute resp failure, covid pneumonia   TECHNOLOGIST PROVIDED HISTORY:   Reason for exam:->acute resp failure, covid pneumonia   Reason for Exam: acute resp failure, covid pneumonia   Acuity: Acute   Type of Exam: Initial   Additional signs and symptoms: na   Relevant Medical/Surgical History: na     FINDINGS:   Mild pulmonary vascular congestion. There is no effusion or pneumothorax. The cardiomediastinal silhouette is without acute process. The osseous   structures are without acute process. Impression:   Mild pulmonary vascular congestion.         Labs:    Recent Results (from the past 24 hour(s))   Hepatitis B Surface Antibody    Collection Time: 21  9:00 AM   Result Value Ref Range    Hep B S Ab <3.5    Hepatitis Panel, Acute Collection Time: 09/24/21  9:00 AM   Result Value Ref Range    Hepatitis B Surface Ag NON REACTIVE NON REACTIVE    Hep A IgM NON REACTIVE NON REACTIVE    Hep B Core Ab, IgM NON REACTIVE NON REACTIVE    Hepatitis C Ab NON REACTIVE NON REACTIVE   Comprehensive metabolic panel    Collection Time: 09/24/21  9:00 AM   Result Value Ref Range    Sodium 141 135 - 145 MMOL/L    Potassium 5.3 (H) 3.5 - 5.1 MMOL/L    Chloride 103 99 - 110 mMol/L    CO2 21 21 - 32 MMOL/L    BUN 44 (H) 6 - 23 MG/DL    CREATININE 0.8 (L) 0.9 - 1.3 MG/DL    Glucose 157 (H) 70 - 99 MG/DL    Calcium 9.3 8.3 - 10.6 MG/DL    Albumin 3.8 3.4 - 5.0 GM/DL    Total Protein 7.5 6.4 - 8.2 GM/DL    Total Bilirubin 1.2 (H) 0.0 - 1.0 MG/DL     (H) 10 - 40 U/L    AST 88 (H) 15 - 37 IU/L    Alkaline Phosphatase 185 (H) 40 - 128 IU/L    GFR Non-African American >60 >60 mL/min/1.73m2    GFR African American >60 >60 mL/min/1.73m2    Anion Gap 17 (H) 4 - 16   CBC auto differential    Collection Time: 09/24/21  9:00 AM   Result Value Ref Range    WBC 16.2 (H) 4.0 - 10.5 K/CU MM    RBC 6.36 (H) 4.6 - 6.2 M/CU MM    Hemoglobin 17.5 13.5 - 18.0 GM/DL    Hematocrit 56.1 (H) 42 - 52 %    MCV 88.2 78 - 100 FL    MCH 27.5 27 - 31 PG    MCHC 31.2 (L) 32.0 - 36.0 %    RDW 15.8 (H) 11.7 - 14.9 %    Platelets 281 327 - 643 K/CU MM    MPV 12.9 (H) 7.5 - 11.1 FL    Differential Type AUTOMATED DIFFERENTIAL     Segs Relative 89.3 (H) 36 - 66 %    Lymphocytes % 4.3 (L) 24 - 44 %    Monocytes % 3.4 0 - 4 %    Eosinophils % 0.1 0 - 3 %    Basophils % 0.2 0 - 1 %    Segs Absolute 14.4 K/CU MM    Lymphocytes Absolute 0.7 K/CU MM    Monocytes Absolute 0.6 K/CU MM    Eosinophils Absolute 0.0 K/CU MM    Basophils Absolute 0.0 K/CU MM    Nucleated RBC % 0.0 %    Total Nucleated RBC 0.0 K/CU MM    Total Immature Neutrophil 0.44 K/CU MM    Immature Neutrophil % 2.7 (H) 0 - 0.43 %   Protime-INR    Collection Time: 09/24/21  9:00 AM   Result Value Ref Range    Protime 12.0 11.7 - 14.5 SECONDS    INR 0.93 INDEX     CULTURE results: Invalid input(s): BLOOD CULTURE,  URINE CULTURE, SURGICAL CULTURE    Diagnosis:  Patient Active Problem List   Diagnosis    Obstructive sleep apnea syndrome    Arteriosclerosis of coronary artery    Acute bronchitis with chronic obstructive pulmonary disease (COPD) ([facility] [address]    COVID-19    Tobacco dependence in remission    Obesity    Hyperlipidemia    Cardiomyopathy ([facility] [address]    Aneurysm of thoracic aorta (HCC)    Acute respiratory failure with hypoxia (HCC)    Severe malnutrition (HCC)    Vitamin D deficiency       Active Problems  Active Problems:    Obstructive sleep apnea syndrome    COVID-19    Tobacco dependence in remission    Obesity    Hyperlipidemia    Acute respiratory failure with hypoxia (HCC)    Severe malnutrition (HCC)    Vitamin D deficiency  Resolved Problems:    * No resolved hospital problems. *      Impression and plan   Summary and rationale: Patient is a 63 y.o.  male with a medical hx of obesity, HLD , thoracic aorta aneurysm admitted on 9/14/2021 with SOB, cough, fever, chills, anorexia for 5 days prior to admission. Diagnosed with severe covid-19 pneumonia and respiratory failure   Vitamin D deficiency   COVID-19 symptom onset: 9/9/2021   COVID-19 test date: 9/14/2021   Expected discontinuation of isolation precautions: 9/29/2021   Clinical status: some improvement, oxygen needs are being reduced.    Therapeutic:  o Ongoing antibiotics:none  o Immunomodulators:  - Solumedrol 40 mg q 12h, continue Solumedrol  - Baracitinib: 9/15 (14 day therapy)  o Completed antibiotics: azithromycin  o Other agents:    Diagnostic: trend CRP and pct   F/u:   Other:       Electronically signed by: Electronically signed by Partha Bianchi MD on 9/25/2021 at 7:50 AM

## 2021-09-25 NOTE — PROGRESS NOTES
pulmonary      SUBJECTIVE: seen earlier and doing fair     OBJECTIVE    VITALS:  /85   Pulse 92   Temp 98.3 °F (36.8 °C) (Axillary)   Resp 20   Ht 5' 9.02\" (1.753 m)   Wt 257 lb 8 oz (116.8 kg)   SpO2 100%   BMI 38.01 kg/m²   HEAD AND FACE EXAM:  No throat injection, no active exudate,no thrush  NECK EXAM;No JVD, no masses, symmetrical  CHEST EXAM; Expansion equal and symmetrical, no masses  LUNG EXAM; Good breath sounds bilaterally. There are expiratory wheezes both lungs, there are crackles at both lung bases  CARDIOVASCULAR EXAM: Positive S1 and S2, no S3 or S4, no clicks ,no murmurs  RIGHT AND LEFT LOWER EXTRIMITY EXAM: No edema, no swelling, no inflamation  CNS EXAM: Alert and oriented X3          LABS   Lab Results   Component Value Date    WBC 18.2 (H) 09/25/2021    HGB 16.5 09/25/2021    HCT 51.1 09/25/2021    MCV 87.8 09/25/2021     09/25/2021     Lab Results   Component Value Date    CREATININE 0.7 (L) 09/25/2021    BUN 47 (H) 09/25/2021     09/25/2021    K 5.1 09/25/2021     09/25/2021    CO2 21 09/25/2021     Lab Results   Component Value Date    INR 0.97 09/25/2021    PROTIME 12.5 09/25/2021          Lab Results   Component Value Date    PHOS 3.4 09/19/2021      No results for input(s): PH, PO2ART, OOD1IOM, HCO3, BEART, O2SAT in the last 72 hours. Wt Readings from Last 3 Encounters:   09/25/21 257 lb 8 oz (116.8 kg)   12/23/16 263 lb (119.3 kg)               ASSESMENT  Ac resp failure  covid pneumonia  copd        PLAN  1. Bd rx  2. bipap  3. Steroids  4.  On decadron and baricitinab    9/25/2021  Bambi Potter MD, MCHRISTA.

## 2021-09-25 NOTE — PROGRESS NOTES
Hospitalist Progress Note      PCP: No primary care provider on file. Date of Admission: 2021    Chief Complaint on Admission: SOB    Pt Seen/Examined and Chart Reviewed. Admitting dx covid pneumonia    SUBJECTIVE:     He feels he is doing better, he is on bipap at 65%    OBJECTIVE:   Vitals    TEMPERATURE:  Current - Temp: 98.3 °F (36.8 °C); Max - Temp  Av.2 °F (36.8 °C)  Min: 98 °F (36.7 °C)  Max: 98.3 °F (36.8 °C)  RESPIRATIONS RANGE: Resp  Av.4  Min: 15  Max: 23  PULSE RANGE: Pulse  Av.2  Min: 83  Max: 110  BLOOD PRESSURE RANGE:  Systolic (91YON), LRW:235 , Min:107 , WFN:314   ; Diastolic (38LPN), GBO:14, Min:80, Max:95    PULSE OXIMETRY RANGE: SpO2  Av.5 %  Min: 91 %  Max: 100 %  24HR INTAKE/OUTPUT:      Intake/Output Summary (Last 24 hours) at 2021 0909  Last data filed at 2021 1846  Gross per 24 hour   Intake 120 ml   Output 1425 ml   Net -1305 ml       Exam:    General appearance: no acute distress, on nasal canula (while eating), sitting in bed  HEENT NCAT no discharge. Neck: Supple, No jugular venous distention/bruits. Trachea midline   Lungs: on bipap, end inspiratory wheeze  Heart: RRR  Abdomen: soft nt nd  Extremities: trace edema in LE  Skin: no gross lesions  Neurologic: aao, no gross deficits  Mental status: Alert, oriented, thought content appropriate.       Data    Recent Labs     21  1212 21  0619 21  0900   WBC 14.7* 9.8 16.2*   HGB 17.5 16.2 17.5   HCT 54.1* 51.0 56.1*    337 386      Recent Labs     21  1212 21  0619 21  0900    137 141   K 5.3* 4.9 5.3*    101 103   CO2    BUN 45* 63* 44*   CREATININE 0.6* 0.8* 0.8*     Recent Labs     21  1212 21  0619 21  0900   * 128* 88*   * 244* 248*   BILITOT 2.5* 1.4* 1.2*   ALKPHOS 159* 160* 185*     Recent Labs     21  0900   INR 0.93     No results for input(s): CKTOTAL, CKMB, CKMBINDEX, TROPONINI in the last 72 hours.     Imaging/Test Results            Consults:     IP CONSULT TO HOSPITALIST  IP CONSULT TO PHARMACY  IP CONSULT TO INFECTIOUS DISEASES  IP CONSULT TO PHARMACY  IP CONSULT TO PULMONOLOGY    Allergies  Percocet [oxycodone-acetaminophen]    Medications      Scheduled Meds:   sodium zirconium cyclosilicate  5 g Oral TID    methylPREDNISolone  40 mg IntraVENous Q12H    [Held by provider] HYDROcodone 5 mg - acetaminophen  1 tablet Oral Q12H    vitamin C  1,000 mg Oral BID    zinc sulfate  50 mg Oral BID    Vitamin D  1,000 Units Oral Daily    [Held by provider] baricitinib  4 mg Oral Daily    sodium chloride flush  5-40 mL IntraVENous 2 times per day    enoxaparin  30 mg SubCUTAneous BID    bisacodyl  5 mg Oral Daily    albuterol sulfate HFA  2 puff Inhalation 4x daily    carvedilol  6.25 mg Oral BID    [Held by provider] divalproex  250 mg Oral Daily    furosemide  20 mg Oral Daily    pantoprazole  40 mg Oral QAM AC    montelukast  10 mg Oral QPM    fluticasone  2 puff Inhalation BID    [Held by provider] pravastatin  40 mg Oral Dinner    tiotropium-olodaterol  2 puff Inhalation BID    diclofenac-miSOPROStol  1 tablet Oral BID       Infusions:   sodium chloride         PRN Meds:  albuterol sulfate HFA, LORazepam, benzonatate, sodium chloride flush, sodium chloride, ondansetron **OR** ondansetron, polyethylene glycol, [Held by provider] acetaminophen **OR** [Held by provider] acetaminophen, guaiFENesin-dextromethorphan, methocarbamol    ASSESSMENT AND PLAN      Active Hospital Problems    Diagnosis Date Noted    Vitamin D deficiency [E55.9] 09/21/2021    Severe malnutrition ([facility] [address] [E43] 09/20/2021    Acute respiratory failure with hypoxia (HCC) [J96.01] 09/18/2021    COVID-19 [U07.1] 09/14/2021    Obstructive sleep apnea syndrome [G47.33] 09/14/2021    Tobacco dependence in remission [F17.201] 09/14/2021    Obesity [E66.9] 09/14/2021    Hyperlipidemia [E78.5] 09/14/2021 Acute on chronic respiratory failure  --severe, pulm following, due to covid pneumonia, previous chest CT no PE  -. -CXR: Bibasilar opacification.  -Continues on on BiPAP at 65% FiO2.    2.  severe COVID-19 pneumonia   pulm and ID following, isolation ends 9/29, completed azithro, was on decadron,  -On vitamin D.  -Solu-Medrol 40 mg twice a day for 5 days started 9/21 then switch to 20 mg twice daily. Hold baricitinib due to elevated LFTs. 3.  COPD, history of smoking   continue inhalers. Severe malnutrition  -Diet and frozen high-protein oral nutrition supplements as per dietitian.  -Encouraged patient to eat nutrition supplements. Vitamin D deficiency  -On oral vitamin D.    Elevated LFTs  -Hold baricitinib. Hold Depakote. Hold statin. Hold Tylenol products. -RUQ US: Fatty liver  -Recheck LFTs. Hepatitis panel negative. Hyperkalemia  -Given Lokelma. Recheck potassium. BEV  -stable    Morbid obesity  --bmi 39    HLD  --stable      DVT Prophylaxis: SQ lovenox  Diet: ADULT DIET; Regular  Adult Oral Nutrition Supplement; Frozen Oral Supplement  Adult Oral Nutrition Supplement; Low Calorie/High Protein Oral Supplement  Code Status: Full Code      Dispo-need breathing to improve.     Juliette Elizondo MD

## 2021-09-26 NOTE — PROGRESS NOTES
pulmonary      SUBJECTIVE: no change     OBJECTIVE    VITALS:  BP (!) 136/98   Pulse 86   Temp 98 °F (36.7 °C) (Axillary)   Resp 22   Ht 5' 9.02\" (1.753 m)   Wt 257 lb 8 oz (116.8 kg)   SpO2 94%   BMI 38.01 kg/m²   HEAD AND FACE EXAM:  No throat injection, no active exudate,no thrush  NECK EXAM;No JVD, no masses, symmetrical  CHEST EXAM; Expansion equal and symmetrical, no masses  LUNG EXAM; Good breath sounds bilaterally. There are expiratory wheezes both lungs, there are crackles at both lung bases  CARDIOVASCULAR EXAM: Positive S1 and S2, no S3 or S4, no clicks ,no murmurs  RIGHT AND LEFT LOWER EXTRIMITY EXAM: No edema, no swelling, no inflamation            LABS   Lab Results   Component Value Date    WBC 18.2 (H) 09/25/2021    HGB 16.5 09/25/2021    HCT 51.1 09/25/2021    MCV 87.8 09/25/2021     09/25/2021     Lab Results   Component Value Date    CREATININE 0.7 (L) 09/25/2021    BUN 47 (H) 09/25/2021     09/25/2021    K 5.1 09/25/2021     09/25/2021    CO2 21 09/25/2021     Lab Results   Component Value Date    INR 0.97 09/25/2021    PROTIME 12.5 09/25/2021          Lab Results   Component Value Date    PHOS 3.4 09/19/2021      No results for input(s): PH, PO2ART, OLW6ISS, HCO3, BEART, O2SAT in the last 72 hours. Wt Readings from Last 3 Encounters:   09/25/21 257 lb 8 oz (116.8 kg)   12/23/16 263 lb (119.3 kg)               ASSESMENT  Ac on ch resp failure  Copd  covid pneumonia        PLAN  1. cpm  2.  Cont pap  3. o2 adm    9/26/2021  Tanner Peralta MD, M.D.

## 2021-09-26 NOTE — PROGRESS NOTES
Hospitalist Progress Note      PCP: No primary care provider on file. Date of Admission: 2021    Chief Complaint on Admission: SOB    Pt Seen/Examined and Chart Reviewed. Admitting dx covid pneumonia    SUBJECTIVE:     As per nurse patient was desaturating while trying to eat on high flow nasal canula   On bipap at 80%    OBJECTIVE:   Vitals    TEMPERATURE:  Current - Temp: 98 °F (36.7 °C); Max - Temp  Av °F (36.7 °C)  Min: 97.7 °F (36.5 °C)  Max: 98.4 °F (36.9 °C)  RESPIRATIONS RANGE: Resp  Av.1  Min: 19  Max: 25  PULSE RANGE: Pulse  Av.8  Min: 85  Max: 97  BLOOD PRESSURE RANGE:  Systolic (64BRB), ZIR:612 , Min:136 , PEW:051   ; Diastolic (44NAG), KFV:46, Min:91, Max:98    PULSE OXIMETRY RANGE: SpO2  Av.1 %  Min: 87 %  Max: 94 %  24HR INTAKE/OUTPUT:      Intake/Output Summary (Last 24 hours) at 2021 0957  Last data filed at 2021 0947  Gross per 24 hour   Intake 120 ml   Output --   Net 120 ml       Exam:    General appearance: no acute distress, on bipap, laying in bed  HEENT NCAT no discharge. Neck: Supple, No jugular venous distention/bruits. Trachea midline   Lungs: on bipap, inspiratory wheeze  Heart: RRR  Abdomen: soft nt nd  Extremities: trace edema in LE  Skin: no gross lesions  Neurologic: aao, no gross deficits  Mental status: Alert, oriented, thought content appropriate. Data    Recent Labs     21  0921  1222   WBC 16.2* 18.2*   HGB 17.5 16.5   HCT 56.1* 51.1    367      Recent Labs     21  1222    138   K 5.3* 5.1    102   CO2    BUN 44* 47*   CREATININE 0.8* 0.7*     Recent Labs     21  1222   AST 88* 84*   * 256*   BILITOT 1.2* 1.0   ALKPHOS 185* 183*     Recent Labs     21  0900 21  1222   INR 0.93 0.97     No results for input(s): CKTOTAL, CKMB, CKMBINDEX, TROPONINI in the last 72 hours.     Imaging/Test Results            Consults:     IP CONSULT TO HOSPITALIST  IP CONSULT TO PHARMACY  IP CONSULT TO INFECTIOUS DISEASES  IP CONSULT TO PHARMACY  IP CONSULT TO PULMONOLOGY    Allergies  Percocet [oxycodone-acetaminophen]    Medications      Scheduled Meds:   methylPREDNISolone  40 mg IntraVENous Q12H    [Held by provider] HYDROcodone 5 mg - acetaminophen  1 tablet Oral Q12H    vitamin C  1,000 mg Oral BID    zinc sulfate  50 mg Oral BID    Vitamin D  1,000 Units Oral Daily    [Held by provider] baricitinib  4 mg Oral Daily    sodium chloride flush  5-40 mL IntraVENous 2 times per day    enoxaparin  30 mg SubCUTAneous BID    bisacodyl  5 mg Oral Daily    albuterol sulfate HFA  2 puff Inhalation 4x daily    carvedilol  6.25 mg Oral BID    [Held by provider] divalproex  250 mg Oral Daily    furosemide  20 mg Oral Daily    pantoprazole  40 mg Oral QAM AC    montelukast  10 mg Oral QPM    fluticasone  2 puff Inhalation BID    [Held by provider] pravastatin  40 mg Oral Dinner    tiotropium-olodaterol  2 puff Inhalation BID    diclofenac-miSOPROStol  1 tablet Oral BID       Infusions:   sodium chloride         PRN Meds:  albuterol sulfate HFA, LORazepam, benzonatate, sodium chloride flush, sodium chloride, ondansetron **OR** ondansetron, polyethylene glycol, [Held by provider] acetaminophen **OR** [Held by provider] acetaminophen, guaiFENesin-dextromethorphan, methocarbamol    ASSESSMENT AND PLAN      Active Hospital Problems    Diagnosis Date Noted    Vitamin D deficiency [E55.9] 09/21/2021    Severe malnutrition ([facility] [address] [E43] 09/20/2021    Acute respiratory failure with hypoxia (HCC) [J96.01] 09/18/2021    COVID-19 [U07.1] 09/14/2021    Obstructive sleep apnea syndrome [G47.33] 09/14/2021    Tobacco dependence in remission [F17.201] 09/14/2021    Obesity [E66.9] 09/14/2021    Hyperlipidemia [E78.5] 09/14/2021       Acute on chronic respiratory failure  --severe, pulm following, due to covid pneumonia, previous chest CT no PE  -.     -CXR: Bibasilar opacification.  -Increased FiO2 80% on BiPAP. 2.  severe COVID-19 pneumonia   pulm and ID following, isolation ends 9/29, completed azithro, was on decadron,  -On vitamin D.  -Hold baricitinib due to elevated LFTs. -Due to increasing oxygen requirements continue current dose of Solu-Medrol. 3.  COPD, history of smoking   continue inhalers. Severe malnutrition  -Diet and frozen high-protein oral nutrition supplements as per dietitian.  -Encouraged patient to eat nutrition supplements. Vitamin D deficiency  -On oral vitamin D.    Elevated LFTs  -Hold baricitinib. Hold Depakote. Hold statin. Hold Tylenol products. -RUQ US: Fatty liver  -Hepatitis panel negative.  -Still elevated. Hyperkalemia  -Given Lokelma.    -Improved potassium 5.1. BEV  -stable    Morbid obesity  --bmi 39    HLD  --stable      DVT Prophylaxis: SQ lovenox  Diet: ADULT DIET; Regular  Adult Oral Nutrition Supplement; Frozen Oral Supplement  Adult Oral Nutrition Supplement; Low Calorie/High Protein Oral Supplement  Code Status: Full Code      Dispo-need breathing to improve.     Trish Villafuerte MD

## 2021-09-27 NOTE — PROGRESS NOTES
09/27/21 1927   NIV Type   NIV Started/Stopped On   Equipment Type v60   Mode Bilevel   Mask Type Full face mask   Mask Size Medium   Settings/Measurements   IPAP 18 cmH20   CPAP/EPAP 8 cmH2O   Rate Ordered 12   FiO2  80 %   I Time/ I Time % 1.15 s   Vt Exhaled 752 mL   Mask Leak (lpm) 41 lpm   Comfort Level Fair   SpO2 94   Alarm Settings   Alarms On Y   Press Low Alarm 5 cmH2O   High Pressure Alarm 25 cmH2O   Apnea (secs) 20 secs   Resp Rate Low Alarm 13   High Respiratory Rate 40 br/min

## 2021-09-27 NOTE — PROGRESS NOTES
Hospitalist Progress Note      Name:  Melyssa Kaye /Age/Sex: 1955  (39 y.o. male)   MRN & CSN:  [id number] & [id number] Admission Date/Time: 2021  1:13 PM   Location:  [address] [place] [address] PCP: No primary care provider on file. Hospital Day: 14    Assessment and Plan:   Melyssa Kaye is a 63 y.o.  male  who presents with SOB    1) Acute on chronic hypoxic respiratory failure due to COVID PNA  -Tested positive on 2021  -CTA chest: No PE  -On baricitinib and Solu-Medrol  -Completed IV antibiotics  -Continue BiPAP; wean FiO2 as tolerated  -ID on board    2) Elevated LFTs  -Hold hepatotoxic medication. -RUQ US: Fatty liver  -Hepatitis panel negative.         Other chronic medical conditions, medication resumed unless contraindicated    COPD  Obesity  BEV  Hyperlipidemia      Diet ADULT DIET; Regular  Adult Oral Nutrition Supplement; Frozen Oral Supplement  Adult Oral Nutrition Supplement; Low Calorie/High Protein Oral Supplement   DVT Prophylaxis [] Lovenox, []  Heparin, [] SCDs, [] Ambulation   GI Prophylaxis [] PPI,  [] H2 Blocker,  [] Carafate,  [] Diet/Tube Feeds   Code Status Full Code   Disposition  TBD   MDM      History of Present Illness:     Patient was seen and examined  Denied any chest pain or worsening shortness of breath  No fever or chills reported  No acute events overnight    Ten point ROS reviewed negative, unless as noted above    Objective: Intake/Output Summary (Last 24 hours) at 2021 1858  Last data filed at 2021 1853  Gross per 24 hour   Intake 640 ml   Output 1300 ml   Net -660 ml      Vitals:   Vitals:    21 1853   BP:    Pulse:    Resp:    Temp:    SpO2: 96%     Physical Exam:   GEN Awake male, sitting upright in bed in no apparent distress. Appears given age. EYES Pupils are equally round. No scleral erythema, discharge, or conjunctivitis.   HENT Mucous membranes are moist. Oral pharynx without exudates, no evidence of thrush. NECK Supple, no apparent thyromegaly or masses. RESP bilateral crackles. Symmetric chest movement while on BiPAP  CARDIO/VASC S1/S2 auscultated. Regular rate without appreciable murmurs, rubs, or gallops. No JVD or carotid bruits. Peripheral pulses equal bilaterally and palpable. No peripheral edema. GI Abdomen is soft without significant tenderness, masses, or guarding. Bowel sounds are normoactive. Rectal exam deferred.  No costovertebral angle tenderness. Mosher catheter is not present. HEME/LYMPH No palpable cervical lymphadenopathy and no hepatosplenomegaly. No petechiae or ecchymoses. MSK No gross joint deformities. SKIN Normal coloration, warm, dry. NEURO Cranial nerves appear grossly intact, normal speech, no lateralizing weakness. PSYCH Awake, alert, oriented x 4. Affect appropriate.     Medications:   Medications:    methylPREDNISolone  40 mg IntraVENous Q12H    [Held by provider] HYDROcodone 5 mg - acetaminophen  1 tablet Oral Q12H    vitamin C  1,000 mg Oral BID    zinc sulfate  50 mg Oral BID    Vitamin D  1,000 Units Oral Daily    [Held by provider] baricitinib  4 mg Oral Daily    sodium chloride flush  5-40 mL IntraVENous 2 times per day    enoxaparin  30 mg SubCUTAneous BID    bisacodyl  5 mg Oral Daily    albuterol sulfate HFA  2 puff Inhalation 4x daily    carvedilol  6.25 mg Oral BID    [Held by provider] divalproex  250 mg Oral Daily    furosemide  20 mg Oral Daily    pantoprazole  40 mg Oral QAM AC    montelukast  10 mg Oral QPM    fluticasone  2 puff Inhalation BID    [Held by provider] pravastatin  40 mg Oral Dinner    tiotropium-olodaterol  2 puff Inhalation BID    diclofenac-miSOPROStol  1 tablet Oral BID      Infusions:    sodium chloride       PRN Meds: polyvinyl alcohol, 1 drop, Q1H PRN  albuterol sulfate HFA, 2 puff, Q4H PRN  LORazepam, 1 mg, Q6H PRN  benzonatate, 100 mg, TID PRN  sodium chloride flush, 5-40 mL, PRN  sodium chloride, 25 mL, PRN  ondansetron, 4 mg, Q8H PRN   Or  ondansetron, 4 mg, Q6H PRN  polyethylene glycol, 17 g, Daily PRN  [Held by provider] acetaminophen, 650 mg, Q6H PRN   Or  [Held by provider] acetaminophen, 650 mg, Q6H PRN  guaiFENesin-dextromethorphan, 5 mL, Q4H PRN  methocarbamol, 500 mg, Q8H PRN          Electronically signed by Erica Henson MD on 9/27/2021 at 6:58 PM

## 2021-09-27 NOTE — CARE COORDINATION
Patient remains on BIPAP; HHF when tolerated. May benefit from PT/OT when able to tolerate activity.  Dinorah Campuzano RN

## 2021-09-27 NOTE — PROGRESS NOTES
pulmonary      SUBJECTIVE: seen early am and no significant change     OBJECTIVE    VITALS:  /83   Pulse 97   Temp 98.1 °F (36.7 °C) (Axillary)   Resp 17   Ht 5' 9.02\" (1.753 m)   Wt 259 lb 7.7 oz (117.7 kg)   SpO2 94%   BMI 38.30 kg/m²   HEAD AND FACE EXAM:  No throat injection, no active exudate,no thrush  NECK EXAM;No JVD, no masses, symmetrical  CHEST EXAM; Expansion equal and symmetrical, no masses  LUNG EXAM; Good breath sounds bilaterally. There are expiratory wheezes both lungs, there are crackles at both lung bases  CARDIOVASCULAR EXAM: Positive S1 and S2, no S3 or S4, no clicks ,no murmurs  RIGHT AND LEFT LOWER EXTRIMITY EXAM: No edema, no swelling, no inflamation  CNS EXAM: Alert and oriented X3          LABS   Lab Results   Component Value Date    WBC 17.5 (H) 09/27/2021    HGB 15.7 09/27/2021    HCT 50.0 09/27/2021    MCV 89.1 09/27/2021     09/27/2021     Lab Results   Component Value Date    CREATININE 0.7 (L) 09/27/2021    BUN 38 (H) 09/27/2021     09/27/2021    K 5.5 (HH) 09/27/2021    CL 99 09/27/2021    CO2 21 09/27/2021     Lab Results   Component Value Date    INR 0.95 09/26/2021    PROTIME 12.2 09/26/2021          Lab Results   Component Value Date    PHOS 3.4 09/19/2021      No results for input(s): PH, PO2ART, CEK4DSQ, HCO3, BEART, O2SAT in the last 72 hours. Wt Readings from Last 3 Encounters:   09/27/21 259 lb 7.7 oz (117.7 kg)   12/23/16 263 lb (119.3 kg)               ASSESMENT  Ac resp failure  covid pneumonia  copd        PLAN  1. Bd rx  2. cpm  3.  Cont pap rx  4. cxr in am    9/27/2021  Selvin Joseph MD, M.D.

## 2021-09-27 NOTE — PROGRESS NOTES
Infectious Disease Progress Note  2021   Patient Name: Van Art : 1955       Reason for visit: F/u COVID-19 pneumonia, acute respiratory failure? History:? Interval history noted  Denies n/v/d/f or untoward effects of antimicrobials  Feels better  Physical Exam:  Vital Signs: /81   Pulse 90   Temp 97.4 °F (36.3 °C) (Axillary)   Resp 19   Ht 5' 9.02\" (1.753 m)   Wt 259 lb 7.7 oz (117.7 kg)   SpO2 92%   BMI 38.30 kg/m²     Gen: alert and oriented X3  Skin: no stigmata of endocarditis  Wounds: C/D/I  HEMT: AT/NC Oropharynx pink, moist, and without lesions or exudates; dentition in good state of repair  Eyes: PERRLA, EOMI, conjunctiva pink, sclera anicteric. Neck: Supple. Trachea midline. No LAD. Chest: deferred  Heart: RRR  Abd: soft, non-distended, no tenderness, no hepatomegaly. Normoactive bowel sounds. Ext: no clubbing, cyanosis, or edema  Catheter Site: without erythema or tenderness  LDA: CVC:  Neuro: Mental status intact. CN 2-12 intact and no focal sensory or motor deficits     Radiologic / Imaging / TESTING  ONE XRAY VIEW OF THE CHEST     2021 1:46 pm     COMPARISON:   2021     HISTORY:   ORDERING SYSTEM PROVIDED HISTORY: acute resp failure, covid pneumonia   TECHNOLOGIST PROVIDED HISTORY:   Reason for exam:->acute resp failure, covid pneumonia   Reason for Exam: acute resp failure, covid pneumonia   Acuity: Acute   Type of Exam: Initial   Additional signs and symptoms: na   Relevant Medical/Surgical History: na     FINDINGS:   Mild pulmonary vascular congestion. There is no effusion or pneumothorax. The cardiomediastinal silhouette is without acute process. The osseous   structures are without acute process. Impression:   Mild pulmonary vascular congestion.         Labs:    Recent Results (from the past 24 hour(s))   Comprehensive metabolic panel    Collection Time: 21  2:32 AM   Result Value Ref Range    Sodium 136 135 - 145 MMOL/L    Potassium 5.5 (HH) 3.5 - 5.1 MMOL/L    Chloride 99 99 - 110 mMol/L    CO2 21 21 - 32 MMOL/L    BUN 38 (H) 6 - 23 MG/DL    CREATININE 0.7 (L) 0.9 - 1.3 MG/DL    Glucose 171 (H) 70 - 99 MG/DL    Calcium 9.1 8.3 - 10.6 MG/DL    Albumin 3.4 3.4 - 5.0 GM/DL    Total Protein 6.6 6.4 - 8.2 GM/DL    Total Bilirubin 0.9 0.0 - 1.0 MG/DL     (H) 10 - 40 U/L    AST 81 (H) 15 - 37 IU/L    Alkaline Phosphatase 186 (H) 40 - 128 IU/L    GFR Non-African American >60 >60 mL/min/1.73m2    GFR African American >60 >60 mL/min/1.73m2    Anion Gap 16 4 - 16   CBC auto differential    Collection Time: 09/27/21  2:32 AM   Result Value Ref Range    WBC 17.5 (H) 4.0 - 10.5 K/CU MM    RBC 5.61 4.6 - 6.2 M/CU MM    Hemoglobin 15.7 13.5 - 18.0 GM/DL    Hematocrit 50.0 42 - 52 %    MCV 89.1 78 - 100 FL    MCH 28.0 27 - 31 PG    MCHC 31.4 (L) 32.0 - 36.0 %    RDW 14.9 11.7 - 14.9 %    Platelets 467 351 - 949 K/CU MM    MPV 13.6 (H) 7.5 - 11.1 FL    Differential Type AUTOMATED DIFFERENTIAL     Segs Relative 91.5 (H) 36 - 66 %    Lymphocytes % 2.9 (L) 24 - 44 %    Monocytes % 2.6 0 - 4 %    Eosinophils % 0.1 0 - 3 %    Basophils % 0.2 0 - 1 %    Segs Absolute 16.0 K/CU MM    Lymphocytes Absolute 0.5 K/CU MM    Monocytes Absolute 0.5 K/CU MM    Eosinophils Absolute 0.0 K/CU MM    Basophils Absolute 0.0 K/CU MM    Nucleated RBC % 0.0 %    Total Nucleated RBC 0.0 K/CU MM    Total Immature Neutrophil 0.47 K/CU MM    Immature Neutrophil % 2.7 (H) 0 - 0.43 %   Brain Natriuretic Peptide    Collection Time: 09/27/21  2:32 AM   Result Value Ref Range    Pro-.9 (H) <300 PG/ML     CULTURE results: Invalid input(s): BLOOD CULTURE,  URINE CULTURE, SURGICAL CULTURE    Diagnosis:  Patient Active Problem List   Diagnosis    Obstructive sleep apnea syndrome    Arteriosclerosis of coronary artery    Acute bronchitis with chronic obstructive pulmonary disease (COPD) (HCC)    COVID-19    Tobacco dependence in remission    Obesity    Hyperlipidemia    Cardiomyopathy ([address]    Aneurysm of thoracic aorta ([address]    Acute respiratory failure with hypoxia (HCC)    Severe malnutrition (HCC)    Vitamin D deficiency       Active Problems  Active Problems:    Obstructive sleep apnea syndrome    COVID-19    Tobacco dependence in remission    Obesity    Hyperlipidemia    Acute respiratory failure with hypoxia (HCC)    Severe malnutrition (HCC)    Vitamin D deficiency  Resolved Problems:    * No resolved hospital problems. *      Impression and plan   Summary and rationale: Patient is a 63 y.o.  male with a medical hx of obesity, HLD , thoracic aorta aneurysm admitted on 9/14/2021 with SOB, cough, fever, chills, anorexia for 5 days prior to admission. Diagnosed with severe covid-19 pneumonia and respiratory failure   Vitamin D deficiency   COVID-19 symptom onset: 9/9/2021   COVID-19 test date: 9/14/2021   Expected discontinuation of isolation precautions: 9/29/2021   Clinical status: some improvement, oxygen needs are being reduced.    Therapeutic:  o Ongoing antibiotics:none  o Immunomodulators:  - Solumedrol 40 mg q 12h, continue Solumedrol  - Baracitinib: 9/15 (14 day therapy), on hold owing to transaminitis  o Completed antibiotics: azithromycin  o Other agents:    Diagnostic: trend CRP and pct   F/u:   Other:       Electronically signed by: Electronically signed by Farooq Rendon MD on 9/27/2021 at 10:44 AM

## 2021-09-28 NOTE — PROGRESS NOTES
Hospitalist Progress Note      Name:  Emory Guzmán /Age/Sex: 1955  (8 y.o. male)   MRN & CSN:  [id number] & [id number] Admission Date/Time: 2021  1:13 PM   Location:  [address] [place] [address] PCP: No primary care provider on file. Hospital Day: 15    Assessment and Plan:   Emory Guzmán is a 63 y.o.  male  who presents with SOB    1) Acute on chronic hypoxic respiratory failure due to COVID PNA  -Tested positive on 2021  -CTA chest: No PE  -On baricitinib and Solu-Medrol  -Completed IV antibiotics  -Continue BiPAP; wean FiO2 as tolerated  -ID on board    2) Elevated LFTs  -Hold hepatotoxic medication. -RUQ US: Fatty liver  -Hepatitis panel negative.         Other chronic medical conditions, medication resumed unless contraindicated    COPD  Obesity  BEV  Hyperlipidemia      Diet ADULT DIET; Regular  Adult Oral Nutrition Supplement; Frozen Oral Supplement  Adult Oral Nutrition Supplement; Low Calorie/High Protein Oral Supplement   DVT Prophylaxis [] Lovenox, []  Heparin, [] SCDs, [] Ambulation   GI Prophylaxis [] PPI,  [] H2 Blocker,  [] Carafate,  [] Diet/Tube Feeds   Code Status Full Code   Disposition  TBD   MDM      History of Present Illness:     Patient was seen and examined  Denied any chest pain or worsening shortness of breath  No fever or chills. No cough reported  No acute events overnight    Ten point ROS reviewed negative, unless as noted above    Objective: Intake/Output Summary (Last 24 hours) at 2021 0828  Last data filed at 2021 1853  Gross per 24 hour   Intake 640 ml   Output 650 ml   Net -10 ml      Vitals:   Vitals:    21 0818   BP:    Pulse:    Resp: 20   Temp:    SpO2:      Physical Exam:   GEN Awake male, sitting upright in bed in no apparent distress. Appears given age. EYES Pupils are equally round. No scleral erythema, discharge, or conjunctivitis.   HENT Mucous membranes are moist. Oral pharynx without exudates, no evidence of thrush. NECK Supple, no apparent thyromegaly or masses. RESP bilateral crackles. Symmetric chest movement while on BiPAP  CARDIO/VASC S1/S2 auscultated. Regular rate without appreciable murmurs, rubs, or gallops. No JVD or carotid bruits. Peripheral pulses equal bilaterally and palpable. No peripheral edema. GI Abdomen is soft without significant tenderness, masses, or guarding. Bowel sounds are normoactive. Rectal exam deferred.  No costovertebral angle tenderness. Mosher catheter is not present. HEME/LYMPH No palpable cervical lymphadenopathy and no hepatosplenomegaly. No petechiae or ecchymoses. MSK No gross joint deformities. SKIN Normal coloration, warm, dry. NEURO Cranial nerves appear grossly intact, normal speech, no lateralizing weakness. PSYCH Awake, alert, oriented x 4. Affect appropriate.     Medications:   Medications:    methylPREDNISolone  40 mg IntraVENous Q12H    [Held by provider] HYDROcodone 5 mg - acetaminophen  1 tablet Oral Q12H    vitamin C  1,000 mg Oral BID    zinc sulfate  50 mg Oral BID    Vitamin D  1,000 Units Oral Daily    [Held by provider] baricitinib  4 mg Oral Daily    sodium chloride flush  5-40 mL IntraVENous 2 times per day    enoxaparin  30 mg SubCUTAneous BID    bisacodyl  5 mg Oral Daily    albuterol sulfate HFA  2 puff Inhalation 4x daily    carvedilol  6.25 mg Oral BID    [Held by provider] divalproex  250 mg Oral Daily    furosemide  20 mg Oral Daily    pantoprazole  40 mg Oral QAM AC    montelukast  10 mg Oral QPM    fluticasone  2 puff Inhalation BID    [Held by provider] pravastatin  40 mg Oral Dinner    tiotropium-olodaterol  2 puff Inhalation BID    diclofenac-miSOPROStol  1 tablet Oral BID      Infusions:    sodium chloride       PRN Meds: polyvinyl alcohol, 1 drop, Q1H PRN  albuterol sulfate HFA, 2 puff, Q4H PRN  LORazepam, 1 mg, Q6H PRN  benzonatate, 100 mg, TID PRN  sodium chloride flush, 5-40 mL, PRN  sodium chloride, 25 mL, PRN  ondansetron, 4 mg, Q8H PRN   Or  ondansetron, 4 mg, Q6H PRN  polyethylene glycol, 17 g, Daily PRN  [Held by provider] acetaminophen, 650 mg, Q6H PRN   Or  [Held by provider] acetaminophen, 650 mg, Q6H PRN  guaiFENesin-dextromethorphan, 5 mL, Q4H PRN  methocarbamol, 500 mg, Q8H PRN          Electronically signed by Eugene Johnson MD on 9/28/2021 at 8:28 AM

## 2021-09-28 NOTE — PROGRESS NOTES
09/27/21 5766   NIV Type   NIV Started/Stopped On   Equipment Type v60   Mode Bilevel   Mask Type Full face mask   Mask Size Medium   Settings/Measurements   IPAP 18 cmH20   CPAP/EPAP 8 cmH2O   Rate Ordered 12   FiO2  75 %   I Time/ I Time % 1.15 s   Vt Exhaled 846 mL   Minute Volume 15.6 Liters   Mask Leak (lpm) 73 lpm   Comfort Level Fair   SpO2 94   Alarm Settings   Alarms On Y   Press Low Alarm 5 cmH2O   High Pressure Alarm 25 cmH2O   Apnea (secs) 20 secs   Resp Rate Low Alarm 13   High Respiratory Rate 40 br/min

## 2021-09-28 NOTE — PROGRESS NOTES
malnutrition, In context of acute illness or injury related to inadequate protein-energy intake as evidenced by weight loss, intake 51-75%      Nutrition Interventions:   Food and/or Nutrient Delivery:  Continue Current Diet, Continue Oral Nutrition Supplement  Nutrition Education/Counseling:  No recommendation at this time   Coordination of Nutrition Care:  Continue to monitor while inpatient    Goals:  pt will consume greater than 50-75% of meals and supplements       Nutrition Monitoring and Evaluation:   Behavioral-Environmental Outcomes:  None Identified   Food/Nutrient Intake Outcomes:  Supplement Intake, Food and Nutrient Intake  Physical Signs/Symptoms Outcomes:  Biochemical Data, Skin, Weight, GI Status, Hemodynamic Status, Fluid Status or Edema     Discharge Planning:     Too soon to determine     Electronically signed by Rashmi Busch RD, LD on 9/27/21 at 10:38 PM EDT    Contact: [phone]

## 2021-09-28 NOTE — PROGRESS NOTES
09/28/21 0034   NIV Type   NIV Started/Stopped On   Equipment Type v60   Mode Bilevel   Mask Type Full face mask   Mask Size Medium   Settings/Measurements   IPAP 18 cmH20   CPAP/EPAP 8 cmH2O   Resp 19   FiO2  75 %   I Time/ I Time % 1.15 s   Vt Exhaled 549 mL   Minute Volume 11.1 Liters   Mask Leak (lpm) 93 lpm   SpO2 89   Alarm Settings   Alarms On Y   Press Low Alarm 5 cmH2O   High Pressure Alarm 25 cmH2O   Apnea (secs) 20 secs   Resp Rate Low Alarm 13   High Respiratory Rate 40 br/min

## 2021-09-28 NOTE — PROGRESS NOTES
pulmonary      SUBJECTIVE:  He feels a little better     OBJECTIVE    VITALS:  /80   Pulse 90   Temp 97.8 °F (36.6 °C) (Axillary)   Resp 20   Ht 5' 9.02\" (1.753 m)   Wt 259 lb 11.2 oz (117.8 kg)   SpO2 (!) 88%   BMI 38.33 kg/m²   HEAD AND FACE EXAM:  No throat injection, no active exudate,no thrush  NECK EXAM;No JVD, no masses, symmetrical  CHEST EXAM; Expansion equal and symmetrical, no masses  LUNG EXAM; Good breath sounds bilaterally. There are expiratory wheezes both lungs, there are crackles at both lung bases  CARDIOVASCULAR EXAM: Positive S1 and S2, no S3 or S4, no clicks ,no murmurs  RIGHT AND LEFT LOWER EXTRIMITY EXAM: No edema, no swelling, no inflamation  CNS EXAM: Alert and oriented X3          LABS   Lab Results   Component Value Date    WBC 17.5 (H) 09/27/2021    HGB 15.7 09/27/2021    HCT 50.0 09/27/2021    MCV 89.1 09/27/2021     09/27/2021     Lab Results   Component Value Date    CREATININE 0.7 (L) 09/27/2021    BUN 38 (H) 09/27/2021     09/27/2021    K 5.5 (HH) 09/27/2021    CL 99 09/27/2021    CO2 21 09/27/2021     Lab Results   Component Value Date    INR 0.95 09/26/2021    PROTIME 12.2 09/26/2021          Lab Results   Component Value Date    PHOS 3.4 09/19/2021      No results for input(s): PH, PO2ART, HWB8KJX, HCO3, BEART, O2SAT in the last 72 hours. Wt Readings from Last 3 Encounters:   09/28/21 259 lb 11.2 oz (117.8 kg)   12/23/16 263 lb (119.3 kg)               ASSESMENT  Ac resp failure   covid pneumonia  copd        PLAN  1. cpm  2.  Cont bipap with fio2 75    9/28/2021  Jesi Sykes MD, M.D.

## 2021-09-28 NOTE — PROGRESS NOTES
Infectious Disease Progress Note  2021   Patient Name: Isabel Valencia : 1955       Reason for visit: F/u COVID-19 pneumonia, acute respiratory failure? History:? Interval history noted  Denies n/v/d/f or untoward effects of antimicrobials  Feels better  Physical Exam:  Vital Signs: /80   Pulse 90   Temp 97.8 °F (36.6 °C) (Axillary)   Resp 20   Ht 5' 9.02\" (1.753 m)   Wt 259 lb 11.2 oz (117.8 kg)   SpO2 (!) 88%   BMI 38.33 kg/m²     Gen: alert and oriented X3  Skin: no stigmata of endocarditis  Wounds: C/D/I  HEMT: AT/NC Oropharynx pink, moist, and without lesions or exudates; dentition in good state of repair  Eyes: PERRLA, EOMI, conjunctiva pink, sclera anicteric. Neck: Supple. Trachea midline. No LAD. Chest: deferred  Heart: RRR  Abd: soft, non-distended, no tenderness, no hepatomegaly. Normoactive bowel sounds. Ext: no clubbing, cyanosis, or edema  Catheter Site: without erythema or tenderness  LDA: CVC:  Neuro: Mental status intact. CN 2-12 intact and no focal sensory or motor deficits     Radiologic / Imaging / TESTING  ONE XRAY VIEW OF THE CHEST     2021 1:46 pm     COMPARISON:   2021     HISTORY:   ORDERING SYSTEM PROVIDED HISTORY: acute resp failure, covid pneumonia   TECHNOLOGIST PROVIDED HISTORY:   Reason for exam:->acute resp failure, covid pneumonia   Reason for Exam: acute resp failure, covid pneumonia   Acuity: Acute   Type of Exam: Initial   Additional signs and symptoms: na   Relevant Medical/Surgical History: na     FINDINGS:   Mild pulmonary vascular congestion. There is no effusion or pneumothorax. The cardiomediastinal silhouette is without acute process. The osseous   structures are without acute process. Impression:   Mild pulmonary vascular congestion. Labs:    No results found for this or any previous visit (from the past 24 hour(s)).   CULTURE results: Invalid input(s): BLOOD CULTURE,  URINE CULTURE, SURGICAL CULTURE    Diagnosis:  Patient Active Problem List   Diagnosis    Obstructive sleep apnea syndrome    Arteriosclerosis of coronary artery    [facility] with chronic obstructive pulmonary disease ([facility]D) [facility]    COVID-19    Tobacco dependence in remission    Obesity    Hyperlipidemia    Cardiomyopathy ([facility]    Aneurysm of thoracic aorta (HCC)    Acute respiratory failure with hypoxia (HCC)    Severe malnutrition (HCC)    Vitamin D deficiency       Active Problems  Active Problems:    Obstructive sleep apnea syndrome    COVID-19    Tobacco dependence in remission    Obesity    Hyperlipidemia    Acute respiratory failure with hypoxia (HCC)    Severe malnutrition (HCC)    Vitamin D deficiency  Resolved Problems:    * No resolved hospital problems. *      Impression and plan   Summary and rationale: Patient is a 63 y.o.  male with a medical hx of obesity, HLD , thoracic aorta aneurysm admitted on 9/14/2021 with SOB, cough, fever, chills, anorexia for 5 days prior to admission.  Diagnosed with severe covid-19 pneumonia and respiratory failure   Vitamin D deficiency   COVID-19 symptom onset: 9/9/2021   COVID-19 test date: 9/14/2021   Expected discontinuation of isolation precautions: 9/29/2021   Clinical status:   Therapeutic:  o Ongoing antibiotics:none  o Immunomodulators:  o Dexamethasone: 9/14-20 (7 days)  - Solumedrol 40 mg q 12h, (9/20-)  - Baracitinib: 9/15 (14 day therapy), on hold owing to transaminitis  o Completed antibiotics: azithromycin  o Other agents:    Diagnostic: trend CRP and pct   F/u:   Other:       Electronically signed by: Electronically signed by Felix Gan MD on 9/28/2021 at 10:32 AM

## 2021-09-29 NOTE — PROGRESS NOTES
pulmonary      SUBJECTIVE:  Remains on high fio2     OBJECTIVE    VITALS:  /73   Pulse 81   Temp 97.4 °F (36.3 °C) (Axillary)   Resp 17   Ht 5' 9.02\" (1.753 m)   Wt 259 lb 11.2 oz (117.8 kg)   SpO2 93%   BMI 38.33 kg/m²   HEAD AND FACE EXAM:  No throat injection, no active exudate,no thrush  NECK EXAM;No JVD, no masses, symmetrical  CHEST EXAM; Expansion equal and symmetrical, no masses  LUNG EXAM; Good breath sounds bilaterally. There are expiratory wheezes both lungs, there are crackles at both lung bases  CARDIOVASCULAR EXAM: Positive S1 and S2, no S3 or S4, no clicks ,no murmurs  RIGHT AND LEFT LOWER EXTRIMITY EXAM: No edema, no swelling, no inflamation  CNS EXAM: Alert and oriented X3          LABS   Lab Results   Component Value Date    WBC 13.6 (H) 09/29/2021    HGB 14.4 09/29/2021    HCT 45.1 09/29/2021    MCV 87.7 09/29/2021     09/29/2021     Lab Results   Component Value Date    CREATININE 0.8 (L) 09/29/2021    BUN 56 (H) 09/29/2021     (L) 09/29/2021    K 5.0 09/29/2021    CL 97 (L) 09/29/2021    CO2 23 09/29/2021     Lab Results   Component Value Date    INR 0.95 09/26/2021    PROTIME 12.2 09/26/2021          Lab Results   Component Value Date    PHOS 3.4 09/19/2021      No results for input(s): PH, PO2ART, TJG6IRJ, HCO3, BEART, O2SAT in the last 72 hours. Wt Readings from Last 3 Encounters:   09/28/21 259 lb 11.2 oz (117.8 kg)   12/23/16 263 lb (119.3 kg)               ASSESMENT  Ac resp failure  covid pneumonia  copd        PLAN  1. cpm  2.  Cont bipap    9/29/2021  Patti Alfaro MD, MCHRISTA.

## 2021-09-29 NOTE — PROGRESS NOTES
Hospitalist Progress Note      Name:  Rachel Soriano /Age/Sex: 1955  (31 y.o. male)   MRN & CSN:  [id number] & [id number] Admission Date/Time: 2021  1:13 PM   Location:  [address] [place] [address]- PCP: No primary care provider on file. Hospital Day: 16    Assessment and Plan:   Rachel Soriano is a 63 y.o.  male  who presents with SOB    1) Acute on chronic hypoxic respiratory failure due to COVID PNA  -Tested positive on 2021  -CTA chest: No PE  -On Steroid. Baricitinib on hold due to elevated LFT. -Completed IV antibiotics  -Continue BiPAP; wean FiO2 as tolerated  -Check echo due to bilateral bibasilar fine crepitation  -ID on board    2) Elevated LFTs  -Hold hepatotoxic medication. -RUQ US: Fatty liver  -Hepatitis panel negative.     Other chronic medical conditions, medication resumed unless contraindicated  COPD  Obesity  BEV  Hyperlipidemia      Diet ADULT DIET; Regular  Adult Oral Nutrition Supplement; Frozen Oral Supplement  Adult Oral Nutrition Supplement; Low Calorie/High Protein Oral Supplement   DVT Prophylaxis [] Lovenox, []  Heparin, [] SCDs, [] Ambulation   GI Prophylaxis [] PPI,  [] H2 Blocker,  [] Carafate,  [] Diet/Tube Feeds   Code Status Full Code   Disposition  TBD   MDM      History of Present Illness:     Patient was seen and examined  No significant change from yesterday  However denied any chest pain or shortness of breath  No acute events overnight    Ten point ROS reviewed negative, unless as noted above    Objective: Intake/Output Summary (Last 24 hours) at 2021 1200  Last data filed at 2021 0354  Gross per 24 hour   Intake 10 ml   Output 1250 ml   Net -1240 ml      Vitals:   Vitals:    21 0831   BP:    Pulse:    Resp: 17   Temp:    SpO2: 93%     Physical Exam:   GEN Awake male, sitting upright in bed in no apparent distress. Appears given age. EYES Pupils are equally round.   No scleral erythema, discharge, or conjunctivitis. HENT Mucous membranes are moist. Oral pharynx without exudates, no evidence of thrush. NECK Supple, no apparent thyromegaly or masses. RESP bilateral crackles. Symmetric chest movement while on BiPAP  CARDIO/VASC S1/S2 auscultated. Regular rate without appreciable murmurs, rubs, or gallops. No JVD or carotid bruits. Peripheral pulses equal bilaterally and palpable. No peripheral edema. GI Abdomen is soft without significant tenderness, masses, or guarding. Bowel sounds are normoactive. Rectal exam deferred.  No costovertebral angle tenderness. Mosher catheter is not present. HEME/LYMPH No palpable cervical lymphadenopathy and no hepatosplenomegaly. No petechiae or ecchymoses. MSK No gross joint deformities. SKIN Normal coloration, warm, dry. NEURO Cranial nerves appear grossly intact, normal speech, no lateralizing weakness. PSYCH Awake, alert, oriented x 4. Affect appropriate.     Medications:   Medications:    furosemide  40 mg IntraVENous BID    albuterol sulfate HFA  2 puff Inhalation 4x daily    methylPREDNISolone  40 mg IntraVENous Q12H    [Held by provider] HYDROcodone 5 mg - acetaminophen  1 tablet Oral Q12H    vitamin C  1,000 mg Oral BID    zinc sulfate  50 mg Oral BID    Vitamin D  1,000 Units Oral Daily    sodium chloride flush  5-40 mL IntraVENous 2 times per day    enoxaparin  30 mg SubCUTAneous BID    bisacodyl  5 mg Oral Daily    carvedilol  6.25 mg Oral BID    [Held by provider] divalproex  250 mg Oral Daily    pantoprazole  40 mg Oral QAM AC    montelukast  10 mg Oral QPM    fluticasone  2 puff Inhalation BID    [Held by provider] pravastatin  40 mg Oral Dinner    tiotropium-olodaterol  2 puff Inhalation BID    diclofenac-miSOPROStol  1 tablet Oral BID      Infusions:    sodium chloride       PRN Meds: polyvinyl alcohol, 1 drop, Q1H PRN  albuterol sulfate HFA, 2 puff, Q4H PRN  LORazepam, 1 mg, Q6H PRN  benzonatate, 100 mg, TID PRN  sodium chloride flush, 5-40 mL, PRN  sodium chloride, 25 mL, PRN  ondansetron, 4 mg, Q8H PRN   Or  ondansetron, 4 mg, Q6H PRN  polyethylene glycol, 17 g, Daily PRN  [Held by provider] acetaminophen, 650 mg, Q6H PRN   Or  [Held by provider] acetaminophen, 650 mg, Q6H PRN  guaiFENesin-dextromethorphan, 5 mL, Q4H PRN  methocarbamol, 500 mg, Q8H PRN          Electronically signed by Vijaya Barreto MD on 9/29/2021 at 12:00 PM

## 2021-09-29 NOTE — PROGRESS NOTES
Infectious Disease Progress Note  2021   Patient Name: Van Art : 1955       Reason for visit: F/u COVID-19 pneumonia, acute respiratory failure? History:? Interval history noted  Denies n/v/d/f or untoward effects of antimicrobials  Feels better  Physical Exam:  Vital Signs: /73   Pulse 98   Temp 97.4 °F (36.3 °C) (Axillary)   Resp 20   Ht 5' 9.02\" (1.753 m)   Wt 259 lb 11.2 oz (117.8 kg)   SpO2 91%   BMI 38.33 kg/m²     Gen: alert and oriented X3, on BiPAP  Skin: no stigmata of endocarditis  Wounds: C/D/I  HEMT: AT/NC Oropharynx pink, moist, and without lesions or exudates  Eyes: PERRLA, EOMI, conjunctiva pink, sclera anicteric. Neck: Supple. Trachea midline. No LAD. Chest: deferred  Heart: RRR  Abd: soft, non-distended, no tenderness, no hepatomegaly. Normoactive bowel sounds. Ext: no clubbing, cyanosis, or edema  Catheter Site: without erythema or tenderness  LDA: CVC:  Neuro: Mental status intact. CN 2-12 intact and no focal sensory or motor deficits     Radiologic / Imaging / TESTING  ONE XRAY VIEW OF THE CHEST     2021 1:46 pm     COMPARISON:   2021     HISTORY:   ORDERING SYSTEM PROVIDED HISTORY: acute resp failure, covid pneumonia   TECHNOLOGIST PROVIDED HISTORY:   Reason for exam:->acute resp failure, covid pneumonia   Reason for Exam: acute resp failure, covid pneumonia   Acuity: Acute   Type of Exam: Initial   Additional signs and symptoms: na   Relevant Medical/Surgical History: na     FINDINGS:   Mild pulmonary vascular congestion. There is no effusion or pneumothorax. The cardiomediastinal silhouette is without acute process. The osseous   structures are without acute process. Impression:   Mild pulmonary vascular congestion.         Labs:    Recent Results (from the past 24 hour(s))   Comprehensive metabolic panel    Collection Time: 21  7:07 AM   Result Value Ref Range    Sodium 134 (L) 135 - 145 MMOL/L    Potassium 5.0 3.5 - 5.1 MMOL/L Chloride 97 (L) 99 - 110 mMol/L    CO2 23 21 - 32 MMOL/L    BUN 56 (H) 6 - 23 MG/DL    CREATININE 0.8 (L) 0.9 - 1.3 MG/DL    Glucose 132 (H) 70 - 99 MG/DL    Calcium 8.9 8.3 - 10.6 MG/DL    Albumin 3.1 (L) 3.4 - 5.0 GM/DL    Total Protein 6.2 (L) 6.4 - 8.2 GM/DL    Total Bilirubin 0.8 0.0 - 1.0 MG/DL     (H) 10 - 40 U/L    AST 85 (H) 15 - 37 IU/L    Alkaline Phosphatase 182 (H) 40 - 128 IU/L    GFR Non-African American >60 >60 mL/min/1.73m2    GFR African American >60 >60 mL/min/1.73m2    Anion Gap 14 4 - 16   CBC auto differential    Collection Time: 09/29/21  7:07 AM   Result Value Ref Range    WBC 13.6 (H) 4.0 - 10.5 K/CU MM    RBC 5.14 4.6 - 6.2 M/CU MM    Hemoglobin 14.4 13.5 - 18.0 GM/DL    Hematocrit 45.1 42 - 52 %    MCV 87.7 78 - 100 FL    MCH 28.0 27 - 31 PG    MCHC 31.9 (L) 32.0 - 36.0 %    RDW 15.0 (H) 11.7 - 14.9 %    Platelets 906 936 - 141 K/CU MM    MPV 14.0 (H) 7.5 - 11.1 FL    Differential Type AUTOMATED DIFFERENTIAL     Segs Relative 91.8 (H) 36 - 66 %    Lymphocytes % 3.5 (L) 24 - 44 %    Monocytes % 2.7 0 - 4 %    Eosinophils % 0.1 0 - 3 %    Basophils % 0.1 0 - 1 %    Segs Absolute 12.5 K/CU MM    Lymphocytes Absolute 0.5 K/CU MM    Monocytes Absolute 0.4 K/CU MM    Eosinophils Absolute 0.0 K/CU MM    Basophils Absolute 0.0 K/CU MM    Nucleated RBC % 0.0 %    Total Nucleated RBC 0.0 K/CU MM    Total Immature Neutrophil 0.25 K/CU MM    Immature Neutrophil % 1.8 (H) 0 - 0.43 %     CULTURE results: Invalid input(s): BLOOD CULTURE,  URINE CULTURE, SURGICAL CULTURE    Diagnosis:  Patient Active Problem List   Diagnosis    Obstructive sleep apnea syndrome    Arteriosclerosis of coronary artery    [facility] bronchitis with chronic obstructive pulmonary disease ([facility]D) [facility]    COVID-19    Tobacco dependence in remission    Obesity    Hyperlipidemia    Cardiomyopathy ([facility]    Aneurysm of thoracic aorta (HCC)    Acute respiratory failure with hypoxia (HCC)    Severe malnutrition [facility]    Vitamin D deficiency       Active Problems  Active Problems:    Obstructive sleep apnea syndrome    COVID-19    Tobacco dependence in remission    Obesity    Hyperlipidemia    Acute respiratory failure with hypoxia (HCC)    Severe malnutrition (HCC)    Vitamin D deficiency  Resolved Problems:    * No resolved hospital problems. *      Impression and plan   Summary and rationale: Patient is a 63 y.o.  male with a medical hx of obesity, HLD , thoracic aorta aneurysm admitted on 9/14/2021 with SOB, cough, fever, chills, anorexia for 5 days prior to admission.  Diagnosed with severe covid-19 pneumonia and respiratory failure   Vitamin D deficiency   COVID-19 symptom onset: 9/9/2021   COVID-19 test date: 9/14/2021   Expected discontinuation of isolation precautions: 9/29/2021   Clinical status:remains on BiPAP   Therapeutic:  o Ongoing antibiotics:none  o Immunomodulators:  o Dexamethasone: 9/14-20 (7 days)  - Solumedrol 40 mg q 12h, (9/20-)  - Baracitinib: 9/15 , d/red due to transaminitis  o Completed antibiotics: azithromycin  o Other agents:    Diagnostic: trend CRP and pct   F/u:   Other:       Electronically signed by: Electronically signed by Ewelina Fuchs MD on 9/29/2021 at 1:42 PM

## 2021-09-30 NOTE — PROGRESS NOTES
pulmonary      SUBJECTIVE:  Doing fair     OBJECTIVE    VITALS:  BP (!) 137/91   Pulse 82   Temp 96.9 °F (36.1 °C) (Axillary)   Resp 20   Ht 5' 9.02\" (1.753 m)   Wt 255 lb 11.7 oz (116 kg)   SpO2 94%   BMI 37.75 kg/m²   HEAD AND FACE EXAM:  No throat injection, no active exudate,no thrush  NECK EXAM;No JVD, no masses, symmetrical  CHEST EXAM; Expansion equal and symmetrical, no masses  LUNG EXAM; Good breath sounds bilaterally. There are expiratory wheezes both lungs, there are crackles at both lung bases  CARDIOVASCULAR EXAM: Positive S1 and S2, no S3 or S4, no clicks ,no murmurs  RIGHT AND LEFT LOWER EXTRIMITY EXAM: No edema, no swelling, no inflamation  CNS EXAM: Alert and oriented X3          LABS   Lab Results   Component Value Date    WBC 15.9 (H) 09/30/2021    HGB 14.6 09/30/2021    HCT 45.8 09/30/2021    MCV 88.1 09/30/2021     09/30/2021     Lab Results   Component Value Date    CREATININE 1.3 09/30/2021    BUN 59 (H) 09/30/2021     09/30/2021    K 5.1 09/30/2021    CL 97 (L) 09/30/2021    CO2 22 09/30/2021     Lab Results   Component Value Date    INR 0.95 09/26/2021    PROTIME 12.2 09/26/2021          Lab Results   Component Value Date    PHOS 3.4 09/19/2021      No results for input(s): PH, PO2ART, VYE7GBG, HCO3, BEART, O2SAT in the last 72 hours. Wt Readings from Last 3 Encounters:   09/30/21 255 lb 11.7 oz (116 kg)   12/23/16 263 lb (119.3 kg)               ASSESMENT  Ac resp failure  covid pneumonia  copd        PLAN  1. cpm  2.  Cont o2    9/30/2021  Corrinne Brackett, MD, M.D.

## 2021-09-30 NOTE — CARE COORDINATION
CM reviewed notes. Pt is on bipap FiO2 80. Per previous notes pt home with fiance. Pt was independent. Pt goes to the [place]. Pt has insurance and able to obtain his prescriptions. Pt has home O2 and Cpap. CM team will follow.  [facility]

## 2021-09-30 NOTE — PROGRESS NOTES
Infectious Disease Progress Note  2021   Patient Name: Lexy Pascual : 1955       Reason for visit: F/u COVID-19 pneumonia, acute respiratory failure? History:? Interval history noted  Denies n/v/d/f or untoward effects of antimicrobials  Feels better  Physical Exam:  Vital Signs: BP (!) 137/91   Pulse 82   Temp 96.9 °F (36.1 °C) (Axillary)   Resp 20   Ht 5' 9.02\" (1.753 m)   Wt 255 lb 11.7 oz (116 kg)   SpO2 94%   BMI 37.75 kg/m²     Gen: alert and oriented X3, on BiPAP  Skin: no stigmata of endocarditis  Wounds: C/D/I  HEMT: AT/NC Oropharynx pink, moist, and without lesions or exudates  Eyes: PERRLA, EOMI, conjunctiva pink, sclera anicteric. Neck: Supple. Trachea midline. No LAD. Chest: deferred  Heart: RRR  Abd: soft, non-distended, no tenderness, no hepatomegaly. Normoactive bowel sounds. Ext: no clubbing, cyanosis, or edema  Catheter Site: without erythema or tenderness  LDA: CVC:  Neuro: Mental status intact. CN 2-12 intact and no focal sensory or motor deficits     Radiologic / Imaging / TESTING  ONE XRAY VIEW OF THE CHEST     2021 1:46 pm     COMPARISON:   2021     HISTORY:   ORDERING SYSTEM PROVIDED HISTORY: acute resp failure, covid pneumonia   TECHNOLOGIST PROVIDED HISTORY:   Reason for exam:->acute resp failure, covid pneumonia   Reason for Exam: acute resp failure, covid pneumonia   Acuity: Acute   Type of Exam: Initial   Additional signs and symptoms: na   Relevant Medical/Surgical History: na     FINDINGS:   Mild pulmonary vascular congestion. There is no effusion or pneumothorax. The cardiomediastinal silhouette is without acute process. The osseous   structures are without acute process. Impression:   Mild pulmonary vascular congestion.         Labs:    Recent Results (from the past 24 hour(s))   Comprehensive metabolic panel    Collection Time: 21  3:10 AM   Result Value Ref Range    Sodium 137 135 - 145 MMOL/L    Potassium 5.1 3.5 - 5.1 MMOL/L Chloride 97 (L) 99 - 110 mMol/L    CO2 22 21 - 32 MMOL/L    BUN 59 (H) 6 - 23 MG/DL    CREATININE 1.3 0.9 - 1.3 MG/DL    Glucose 170 (H) 70 - 99 MG/DL    Calcium 9.2 8.3 - 10.6 MG/DL    Albumin 3.3 (L) 3.4 - 5.0 GM/DL    Total Protein 6.4 6.4 - 8.2 GM/DL    Total Bilirubin 0.8 0.0 - 1.0 MG/DL     (H) 10 - 40 U/L    AST 95 (H) 15 - 37 IU/L    Alkaline Phosphatase 197 (H) 40 - 128 IU/L    GFR Non- 56 (L) >60 mL/min/1.73m2    GFR African American >60 >60 mL/min/1.73m2    Anion Gap 18 (H) 4 - 16   CBC auto differential    Collection Time: 09/30/21  3:10 AM   Result Value Ref Range    WBC 15.9 (H) 4.0 - 10.5 K/CU MM    RBC 5.20 4.6 - 6.2 M/CU MM    Hemoglobin 14.6 13.5 - 18.0 GM/DL    Hematocrit 45.8 42 - 52 %    MCV 88.1 78 - 100 FL    MCH 28.1 27 - 31 PG    MCHC 31.9 (L) 32.0 - 36.0 %    RDW 14.8 11.7 - 14.9 %    Platelets 127 917 - 184 K/CU MM    MPV 14.3 (H) 7.5 - 11.1 FL    Differential Type AUTOMATED DIFFERENTIAL     Segs Relative 93.0 (H) 36 - 66 %    Lymphocytes % 2.4 (L) 24 - 44 %    Monocytes % 2.8 0 - 4 %    Eosinophils % 0.1 0 - 3 %    Basophils % 0.1 0 - 1 %    Segs Absolute 14.8 K/CU MM    Lymphocytes Absolute 0.4 K/CU MM    Monocytes Absolute 0.4 K/CU MM    Eosinophils Absolute 0.0 K/CU MM    Basophils Absolute 0.0 K/CU MM    Nucleated RBC % 0.0 %    Total Nucleated RBC 0.0 K/CU MM    Total Immature Neutrophil 0.26 K/CU MM    Immature Neutrophil % 1.6 (H) 0 - 0.43 %   Magnesium    Collection Time: 09/30/21  3:10 AM   Result Value Ref Range    Magnesium 2.2 1.8 - 2.4 mg/dl     CULTURE results: Invalid input(s): BLOOD CULTURE,  URINE CULTURE, SURGICAL CULTURE    Diagnosis:  Patient Active Problem List   Diagnosis    Obstructive sleep apnea syndrome    Arteriosclerosis of coronary artery    Acute [facility] with chronic obstructive pulmonary disease ([facility]D) [facility]    COVID-19    Tobacco dependence in remission    Obesity    Hyperlipidemia    Cardiomyopathy ([facility]    Aneurysm of thoracic aorta (HCC)    Acute respiratory failure with hypoxia (HCC)    Severe malnutrition (HCC)    Vitamin D deficiency       Active Problems  Active Problems:    Obstructive sleep apnea syndrome    COVID-19    Tobacco dependence in remission    Obesity    Hyperlipidemia    Acute respiratory failure with hypoxia (HCC)    Severe malnutrition (HCC)    Vitamin D deficiency  Resolved Problems:    * No resolved hospital problems. *      Impression and plan   Summary and rationale: Patient is a 63 y.o.  male with a medical hx of obesity, HLD , thoracic aorta aneurysm admitted on 9/14/2021 with SOB, cough, fever, chills, anorexia for 5 days prior to admission.  Diagnosed with severe covid-19 pneumonia and respiratory failure   Vitamin D deficiency   COVID-19 symptom onset: 9/9/2021   COVID-19 test date: 9/14/2021   Expected discontinuation of isolation precautions: 9/29/2021   Clinical status:remains on BiPAP, unchanged   Therapeutic:  o Ongoing antibiotics:none  o Immunomodulators:  o Dexamethasone: 9/14-20 (7 days)  - Solumedrol 40 mg q 12h, (9/20-)  - Baracitinib: 9/15 , d/red due to transaminitis  o Completed antibiotics: azithromycin  o Other agents:    Diagnostic:   F/u:   Other:       Electronically signed by: Electronically signed by Nathalia Angeles MD on 9/30/2021 at 9:12 AM

## 2021-09-30 NOTE — PROGRESS NOTES
Hospitalist Progress Note      Name:  Chay Gabriel /Age/Sex: 1955  (89 y.o. male)   MRN & CSN:  [id number] & [id number] Admission Date/Time: 2021  1:13 PM   Location:  [address] [place] [address] PCP: No primary care provider on file. Hospital Day: 17    Assessment and Plan:   Chay Gabriel is a 63 y.o.  male  who presents with SOB    1) Acute on chronic hypoxic respiratory failure due to COVID PNA  -Tested positive on 2021  -CTA chest: No PE  -On Steroid. Baricitinib on hold due to elevated LFT. -Completed IV antibiotics  -Continue BiPAP; wean FiO2 as tolerated  -Check echo due to bilateral bibasilar fine crepitation  -ID on board    2) Elevated LFTs  -Hold hepatotoxic medication. -RUQ US: Fatty liver  -Hepatitis panel negative. 3) HFrEF  -Possibly chronic. No previous  TTE on file  -TTE with EF of 30%  -Continue lasix, BB; added ACEi  -Cardiology work up when respiratory status improves     Other chronic medical conditions, medication resumed unless contraindicated  COPD  Obesity  BEV  Hyperlipidemia      Diet ADULT DIET; Regular  Adult Oral Nutrition Supplement; Frozen Oral Supplement  Adult Oral Nutrition Supplement; Low Calorie/High Protein Oral Supplement   DVT Prophylaxis [] Lovenox, []  Heparin, [] SCDs, [] Ambulation   GI Prophylaxis [] PPI,  [] H2 Blocker,  [] Carafate,  [] Diet/Tube Feeds   Code Status Full Code   Disposition  TBD   MDM      History of Present Illness:     Patient was seen and examined  Denied any chest pain or palpitations  No worsening SOB  No acute events overnight    Ten point ROS reviewed negative, unless as noted above    Objective:        Intake/Output Summary (Last 24 hours) at 2021 1519  Last data filed at 2021 1417  Gross per 24 hour   Intake 250 ml   Output 1550 ml   Net -1300 ml      Vitals:   Vitals:    21 1417   BP:    Pulse: 101   Resp: 17   Temp:    SpO2: (!) 89%     Physical Exam: GEN Awake male, sitting upright in bed in no apparent distress. Appears given age. EYES Pupils are equally round. No scleral erythema, discharge, or conjunctivitis. HENT Mucous membranes are moist. Oral pharynx without exudates, no evidence of thrush. NECK Supple, no apparent thyromegaly or masses. RESP bilateral crackles. Symmetric chest movement while on BiPAP  CARDIO/VASC S1/S2 auscultated. Regular rate without appreciable murmurs, rubs, or gallops. No JVD or carotid bruits. Peripheral pulses equal bilaterally and palpable. No peripheral edema. GI Abdomen is soft without significant tenderness, masses, or guarding. Bowel sounds are normoactive. Rectal exam deferred.  No costovertebral angle tenderness. Mosher catheter is not present. HEME/LYMPH No palpable cervical lymphadenopathy and no hepatosplenomegaly. No petechiae or ecchymoses. MSK No gross joint deformities. SKIN Normal coloration, warm, dry. NEURO Cranial nerves appear grossly intact, normal speech, no lateralizing weakness. PSYCH Awake, alert, oriented x 4. Affect appropriate.     Medications:   Medications:    furosemide  40 mg IntraVENous BID    albuterol sulfate HFA  2 puff Inhalation 4x daily    methylPREDNISolone  40 mg IntraVENous Q12H    [Held by provider] HYDROcodone 5 mg - acetaminophen  1 tablet Oral Q12H    vitamin C  1,000 mg Oral BID    zinc sulfate  50 mg Oral BID    Vitamin D  1,000 Units Oral Daily    sodium chloride flush  5-40 mL IntraVENous 2 times per day    enoxaparin  30 mg SubCUTAneous BID    bisacodyl  5 mg Oral Daily    carvedilol  6.25 mg Oral BID    [Held by provider] divalproex  250 mg Oral Daily    pantoprazole  40 mg Oral QAM AC    montelukast  10 mg Oral QPM    fluticasone  2 puff Inhalation BID    [Held by provider] pravastatin  40 mg Oral Dinner    tiotropium-olodaterol  2 puff Inhalation BID    diclofenac-miSOPROStol  1 tablet Oral BID      Infusions:    sodium chloride       PRN Meds: polyvinyl alcohol, 1 drop, Q1H PRN  albuterol sulfate HFA, 2 puff, Q4H PRN  LORazepam, 1 mg, Q6H PRN  benzonatate, 100 mg, TID PRN  sodium chloride flush, 5-40 mL, PRN  sodium chloride, 25 mL, PRN  ondansetron, 4 mg, Q8H PRN   Or  ondansetron, 4 mg, Q6H PRN  polyethylene glycol, 17 g, Daily PRN  [Held by provider] acetaminophen, 650 mg, Q6H PRN   Or  [Held by provider] acetaminophen, 650 mg, Q6H PRN  guaiFENesin-dextromethorphan, 5 mL, Q4H PRN  methocarbamol, 500 mg, Q8H PRN          Electronically signed by Jose Mancini MD on 9/30/2021 at 3:19 PM

## 2021-10-01 NOTE — PROGRESS NOTES
Hospitalist Progress Note      Name:  Caryl Yun /Age/Sex: 1955  (89 y.o. male)   MRN & CSN:  [id number] & [id number] Admission Date/Time: 2021  1:13 PM   Location:  [address] [place] [address] PCP: No primary care provider on file. Hospital Day: 18    Assessment and Plan:   Caryl Yun is a 63 y.o.  male  who presents with SOB    1) Acute on chronic hypoxic respiratory failure due to COVID PNA  -Tested positive on 2021  -CTA chest: No PE  -On Steroid. Baricitinib on hold due to elevated LFT. -Completed IV antibiotics  -Continue BiPAP; wean FiO2 as tolerated  -Check echo due to bilateral bibasilar fine crepitation  -ID on board    2) Elevated LFTs  -Hold hepatotoxic medication. -RUQ US: Fatty liver  -Hepatitis panel negative. 3) HFrEF  -Possibly chronic. No previous  TTE on file  -TTE with EF of 30%  -Continue lasix, BB; added ACEi  -Cardiology work up when respiratory status improves     Other chronic medical conditions, medication resumed unless contraindicated  COPD  Obesity  BEV  Hyperlipidemia      Diet ADULT DIET; Regular  Adult Oral Nutrition Supplement; Frozen Oral Supplement  Adult Oral Nutrition Supplement; Low Calorie/High Protein Oral Supplement   DVT Prophylaxis [] Lovenox, []  Heparin, [] SCDs, [] Ambulation   GI Prophylaxis [] PPI,  [] H2 Blocker,  [] Carafate,  [] Diet/Tube Feeds   Code Status Full Code   Disposition  TBD   MDM      History of Present Illness:     Patient was seen and examined  Denied any chest pain or palpitations  No worsening SOB  No acute events overnight    Ten point ROS reviewed negative, unless as noted above    Objective:        Intake/Output Summary (Last 24 hours) at 10/1/2021 1202  Last data filed at 10/1/2021 0600  Gross per 24 hour   Intake 240 ml   Output 3100 ml   Net -2860 ml      Vitals:   Vitals:    10/01/21 0821   BP:    Pulse:    Resp: 22   Temp:    SpO2: 90%     Physical Exam:   GEN Awake male,

## 2021-10-01 NOTE — CONSULTS
Palliative Medicine Consultation    Reason for Consult:      __X___ Advance Care Planning  __X___Transition of Care Planning  __X___ Psychosocial Support  _____ Symptom Management:     Recommendations:    1. Continue with the excellent care of this patient with COVID-19 pneumonia, HFrEF, and COPD. 2.  Believes he has ACP documents at the [facility]. 3.  Wants to remain a full code. Requesting Physician:  Dr. Uri Nicolas:  Shortness of breath, cough fever, and chills    History Obtained From:  patient, electronic medical record    HISTORY OF PRESENT ILLNESS:    63year old male who presented to the ED with the above complaints. He was found to be COVID-19 positive with CXR suggestive of pneumonia. He is not vaccinated. When he ambulated in the ED his oxygen saturations would fall into the 80's. He does have a history of COPD and sleep apnea and uses CPAP. During this admission the patient has had increasing oxygen demand and is on full facemask BiPAP currently at 100%. He is receiving immunomodulators and steriods for his COVID pneumonia. The patient has a history of COPD and HFrEF at 30%. Palliative Care was asked to see the patient and family for goals of care and support.               Past Medical History:        Diagnosis Date    Asthma     COPD (chronic obstructive pulmonary disease) ([facility] [address]     COVID-19 09/14/2021       Past Surgical History:        Procedure Laterality Date    CARDIAC CATHETERIZATION  2013    HERNIA REPAIR      WRIST SURGERY Left 1976       Current Medications:    Current Facility-Administered Medications: sodium chloride (OCEAN, BABY AYR) 0.65 % nasal spray 1 spray, 1 spray, Each Nostril, PRN  dexamethasone (PF) (DECADRON) injection 10 mg, 10 mg, IntraVENous, Daily  lisinopril (PRINIVIL;ZESTRIL) tablet 2.5 mg, 2.5 mg, Oral, Daily  senna (SENOKOT) tablet 8.6 mg, 1 tablet, Oral, BID PRN  furosemide (LASIX) injection 40 mg, 40 mg, IntraVENous, Daily  albuterol tablet 40 mg, 40 mg, Oral, Dinner  tiotropium-olodaterol (STIOLTO) 2.5-2.5 MCG/ACT inhaler 2 puff, 2 puff, Inhalation, BID  diclofenac-miSOPROStol (ARTHROTEC 75) 75-0.2 MG per tablet 1 tablet, 1 tablet, Oral, BID    Allergies:  Percocet [oxycodone-acetaminophen]    Social History:    Resides with his fiance. Does have children from a previous marriage    Family History:   History reviewed. No pertinent family history.     REVIEW OF SYSTEMS:    CONSTITUTIONAL:  positive for  fevers, chills, fatigue, malaise and anorexia  EYES:  negative for  visual disturbance and irritation  HEENT:  negative for  nasal congestion and sore throat  RESPIRATORY:  positive for  dry cough, dyspnea and wheezing  CARDIOVASCULAR:  negative for  chest pain, palpitations  GASTROINTESTINAL:  negative for nausea, vomiting and change in bowel habits  GENITOURINARY:  negative for frequency and dysuria  INTEGUMENT/BREAST:  negative for rash and skin lesion(s)  MUSCULOSKELETAL:  positive for  muscle weakness  NEUROLOGICAL:  negative for memory problems, speech problems and gait problems  BEHAVIOR/PSYCH:  positive for decreased sleep, decreased energy level and fatigue and negative for agitated and anxiety    Vitals:    Vitals:    10/01/21 0821   BP:    Pulse:    Resp: 22   Temp:    SpO2: 90%       PHYSICAL EXAM:  No visitors at bediside  GENERAL: up in a chair having lunch  HEENT: No cervical adenopathy, MM moist, PERRL  COR: RRR  LUNGS: Very diminished breath sounds bilaterlly  ABD: soft, ND/NT, +BS  SKIN: no rashes  PSYCH: cognitively intact  NEURO: CN II-XII grossly intact    DATA:    CBC with Differential:    Lab Results   Component Value Date    WBC 17.2 10/03/2021    RBC 5.34 10/03/2021    HGB 15.3 10/03/2021    HCT 46.7 10/03/2021     10/03/2021    MCV 87.5 10/03/2021    MCH 28.7 10/03/2021    MCHC 32.8 10/03/2021    RDW 14.6 10/03/2021    SEGSPCT 89.3 10/03/2021    BANDSPCT 2 09/28/2021    LYMPHOPCT 5.6 10/03/2021    MONOPCT 1.7 10/03/2021    BASOPCT 0.2 10/03/2021    MONOSABS 0.3 10/03/2021    LYMPHSABS 1.0 10/03/2021    EOSABS 0.3 10/03/2021    BASOSABS 0.0 10/03/2021    DIFFTYPE AUTOMATED DIFFERENTIAL 10/03/2021     CMP:    Lab Results   Component Value Date     10/03/2021    K 4.5 10/03/2021    CL 94 10/03/2021    CO2 27 10/03/2021    BUN 39 10/03/2021    CREATININE 0.7 10/03/2021    GFRAA >60 10/03/2021    LABGLOM >60 10/03/2021    GLUCOSE 154 10/03/2021    PROT 6.4 10/03/2021    LABALBU 3.3 10/03/2021    CALCIUM 9.2 10/03/2021    BILITOT 1.5 10/03/2021    ALKPHOS 174 10/03/2021     10/03/2021     10/03/2021     Albumin:    Lab Results   Component Value Date    LABALBU 3.3 10/03/2021     ECHO: 09/29/2021  Summary   Expedited imaging was used to obtain protocol images to reduce the   sonographer's exposure during COVID-19 pandemic. Poor quality study due to body habitus. Left ventricular systolic function is abnormal.   Ejection fraction is visually estimated at 30%. Dilated aortic root (5 cm)-unable to visualize ascending aorta. Mild to moderate aortic regurgitation is noted by color Doppler,  ms. No evidence of any pericardial effusion. CTA pulmonary: 09/14/2021     Negative for acute pulmonary embolism       Predominately peripheral ground-glass opacities in both lungs could represent   COVID pneumonia in the proper clinical setting       Cxr: 10/02/2021  FINDINGS:   Cardiomediastinal silhouette is stable.  Mild bilateral pulmonary vascular   congestion.  No pleural effusion or pneumothorax.  No gross bony abnormality.           IMPRESSION:    63year old male with COVID -19 pneumonia and increasing oxygen demands as well as COPD and EFrEF for Palliative Care encounter. The patient was seen and examined. He states that he believes that he has ACP documents at the [facility]. I offered for him to have them completed here as well jsut in case he did not have them at the [facility] and he will think about it. He wants his fiance to be his HCPOA and not his children. I let him know that without documents his children would speak for him if he could not speak for himself. The patient wants to remain a full code as well, understanding that this would mean intubation, CPR, defibrillation if necessary and medications. He was fine with this. We will continue to follow for goals of care and support.         Electronically signed by Cammie Denson MD on 10/1/2021 at 12:46 PM

## 2021-10-01 NOTE — PROGRESS NOTES
pulmonary      SUBJECTIVE:  Doing fair but on high fio2     OBJECTIVE    VITALS:  /87   Pulse 100   Temp 97 °F (36.1 °C) (Axillary)   Resp 22   Ht 5' 9.02\" (1.753 m)   Wt 255 lb 11.7 oz (116 kg)   SpO2 90%   BMI 37.75 kg/m²   HEAD AND FACE EXAM:  No throat injection, no active exudate,no thrush  NECK EXAM;No JVD, no masses, symmetrical  CHEST EXAM; Expansion equal and symmetrical, no masses  LUNG EXAM; Good breath sounds bilaterally. There are expiratory wheezes both lungs, there are crackles at both lung bases  CARDIOVASCULAR EXAM: Positive S1 and S2, no S3 or S4, no clicks ,no murmurs  RIGHT AND LEFT LOWER EXTRIMITY EXAM: No edema, no swelling, no inflamation  CNS EXAM: Alert and oriented X3          LABS   Lab Results   Component Value Date    WBC 15.2 (H) 10/01/2021    HGB 15.0 10/01/2021    HCT 45.9 10/01/2021    MCV 87.9 10/01/2021     10/01/2021     Lab Results   Component Value Date    CREATININE 0.8 (L) 10/01/2021    BUN 42 (H) 10/01/2021     10/01/2021    K 4.9 10/01/2021    CL 96 (L) 10/01/2021    CO2 24 10/01/2021     Lab Results   Component Value Date    INR 0.95 09/26/2021    PROTIME 12.2 09/26/2021          Lab Results   Component Value Date    PHOS 3.4 09/19/2021      No results for input(s): PH, PO2ART, [id number], HCO3, BEART, O2SAT in the last 72 hours. Wt Readings from Last 3 Encounters:   09/30/21 255 lb 11.7 oz (116 kg)   12/23/16 263 lb (119.3 kg)               ASSESMENT  Ac on ch resp failure  covid pneumonia  copd        PLAN  1.  Cpm  2. bipap/airvo as tolerated    10/1/2021  Gerhardt Amy, MD, M.D.

## 2021-10-02 NOTE — PROGRESS NOTES
10/02/21 0610   NIV Type   NIV Started/Stopped On   Equipment Type v60   Mode Bilevel   Mask Type Full face mask   Mask Size Medium   Bonnet size Medium   Settings/Measurements   IPAP 18 cmH20   CPAP/EPAP 8 cmH2O   Rate Ordered 12   Resp 24   FiO2  100 %   I Time/ I Time % 1.15 s   Vt Exhaled 680 mL   Minute Volume 16.6 Liters   Mask Leak (lpm) 60 lpm   Comfort Level Good   Using Accessory Muscles No   SpO2 93   Patient Observation   Observations Pt resting in bed   Alarm Settings   Alarms On Y   Press Low Alarm 5 cmH2O   High Pressure Alarm 25 cmH2O   Delay Alarm 20 sec(s)   Apnea (secs) 20 secs   Resp Rate Low Alarm 13   High Respiratory Rate 40 br/min

## 2021-10-02 NOTE — PROGRESS NOTES
Infectious Disease Progress Note  10/2/2021   Patient Name: Naomie Garcia : 1955       Reason for visit: F/u COVID-19 pneumonia, acute respiratory failure? History:? Interval history noted  Denies n/v/d/f or untoward effects of antimicrobials  He would like some physical therapy  Physical Exam:  Vital Signs: BP (!) 139/92   Pulse 100   Temp 96.1 °F (35.6 °C) (Axillary)   Resp 28   Ht 5' 9.02\" (1.753 m)   Wt 255 lb 11.7 oz (116 kg)   SpO2 94%   BMI 37.75 kg/m²     Gen: alert and oriented X3, on BiPAP  Skin: no stigmata of endocarditis  Wounds: C/D/I  HEMT: AT/NC Oropharynx pink, moist, and without lesions or exudates  Eyes: PERRLA, EOMI, conjunctiva pink, sclera anicteric. Neck: Supple. Trachea midline. No LAD. Chest: deferred  Heart: RRR  Abd: soft, non-distended, no tenderness, no hepatomegaly. Normoactive bowel sounds. Ext: no clubbing, cyanosis, or edema  Catheter Site: without erythema or tenderness  LDA: CVC:  Neuro: Mental status intact. CN 2-12 intact and no focal sensory or motor deficits     Radiologic / Imaging / TESTING  ONE XRAY VIEW OF THE CHEST     2021 1:46 pm     COMPARISON:   2021     HISTORY:   ORDERING SYSTEM PROVIDED HISTORY: acute resp failure, covid pneumonia   TECHNOLOGIST PROVIDED HISTORY:   Reason for exam:->acute resp failure, covid pneumonia   Reason for Exam: acute resp failure, covid pneumonia   Acuity: Acute   Type of Exam: Initial   Additional signs and symptoms: na   Relevant Medical/Surgical History: na     FINDINGS:   Mild pulmonary vascular congestion. There is no effusion or pneumothorax. The cardiomediastinal silhouette is without acute process. The osseous   structures are without acute process. Impression:   Mild pulmonary vascular congestion.         Labs:    Recent Results (from the past 24 hour(s))   Comprehensive metabolic panel    Collection Time: 10/02/21  7:30 AM   Result Value Ref Range    Sodium 134 (L) 135 - 145 MMOL/L Potassium 4.8 3.5 - 5.1 MMOL/L    Chloride 95 (L) 99 - 110 mMol/L    CO2 25 21 - 32 MMOL/L    BUN 48 (H) 6 - 23 MG/DL    CREATININE 0.7 (L) 0.9 - 1.3 MG/DL    Glucose 148 (H) 70 - 99 MG/DL    Calcium 9.2 8.3 - 10.6 MG/DL    Albumin 3.3 (L) 3.4 - 5.0 GM/DL    Total Protein 6.3 (L) 6.4 - 8.2 GM/DL    Total Bilirubin 1.3 (H) 0.0 - 1.0 MG/DL     (H) 10 - 40 U/L     (H) 15 - 37 IU/L    Alkaline Phosphatase 177 (H) 40 - 128 IU/L    GFR Non-African American >60 >60 mL/min/1.73m2    GFR African American >60 >60 mL/min/1.73m2    Anion Gap 14 4 - 16   CBC auto differential    Collection Time: 10/02/21  7:30 AM   Result Value Ref Range    WBC 15.9 (H) 4.0 - 10.5 K/CU MM    RBC 5.35 4.6 - 6.2 M/CU MM    Hemoglobin 14.9 13.5 - 18.0 GM/DL    Hematocrit 47.0 42 - 52 %    MCV 87.9 78 - 100 FL    MCH 27.9 27 - 31 PG    MCHC 31.7 (L) 32.0 - 36.0 %    RDW 14.8 11.7 - 14.9 %    Platelets 348 700 - 234 K/CU MM    MPV 13.4 (H) 7.5 - 11.1 FL    Differential Type AUTOMATED DIFFERENTIAL     Segs Relative 91.3 (H) 36 - 66 %    Lymphocytes % 4.2 (L) 24 - 44 %    Monocytes % 2.5 0 - 4 %    Eosinophils % 0.2 0 - 3 %    Basophils % 0.2 0 - 1 %    Segs Absolute 14.5 K/CU MM    Lymphocytes Absolute 0.7 K/CU MM    Monocytes Absolute 0.4 K/CU MM    Eosinophils Absolute 0.0 K/CU MM    Basophils Absolute 0.0 K/CU MM    Nucleated RBC % 0.0 %    Total Nucleated RBC 0.0 K/CU MM    Total Immature Neutrophil 0.25 K/CU MM    Immature Neutrophil % 1.6 (H) 0 - 0.43 %   Magnesium    Collection Time: 10/02/21  7:30 AM   Result Value Ref Range    Magnesium 2.1 1.8 - 2.4 mg/dl     CULTURE results: Invalid input(s): BLOOD CULTURE,  URINE CULTURE, SURGICAL CULTURE    Diagnosis:  Patient Active Problem List   Diagnosis    Obstructive sleep apnea syndrome    Arteriosclerosis of coronary artery    Acute bronchitis with chronic obstructive pulmonary disease (COPD) ([facility])    COVID-19    Tobacco dependence in remission    Obesity    Hyperlipidemia    Cardiomyopathy ([facility] [address]    Aneurysm of thoracic aorta ([facility] [address]    Acute respiratory failure with hypoxia (HCC)    Severe malnutrition (HCC)    Vitamin D deficiency       Active Problems  Active Problems:    Obstructive sleep apnea syndrome    COVID-19    Tobacco dependence in remission    Obesity    Hyperlipidemia    Acute respiratory failure with hypoxia (HCC)    Severe malnutrition (HCC)    Vitamin D deficiency  Resolved Problems:    * No resolved hospital problems. *      Impression and plan   Summary and rationale: Patient is a 63 y.o.  male with a medical hx of obesity, HLD , thoracic aorta aneurysm admitted on 9/14/2021 with SOB, cough, fever, chills, anorexia for 5 days prior to admission. Diagnosed with severe covid-19 pneumonia and respiratory failure   Vitamin D deficiency   COVID-19 symptom onset: 9/9/2021   COVID-19 test date: 9/14/2021   Expected discontinuation of isolation precautions: 9/29/2021   Clinical status:remains on BiPAP, unchanged   Therapeutic:  o Ongoing antibiotics:none  o Immunomodulators:  o Dexamethasone: 9/14-20 (7 days)  - Solumedrol 40 mg q 12h, (9/20-)  - Baracitinib: 9/15 , d/red due to transaminitis  o Completed antibiotics: azithromycin  o Other agents:    Diagnostic:   F/u:   Other: Defer question of physical therapy to pulmonology service.       Electronically signed by: Electronically signed by Jeremie Dobbins MD on 10/2/2021 at 2:07 PM

## 2021-10-02 NOTE — PROGRESS NOTES
Comprehensive Nutrition Assessment    Type and Reason for Visit:  Reassess    Nutrition Recommendations/Plan:   · Continue current diet and supplements    Nutrition Assessment:  Pt is now consuming 75% of his meals. No supplements intake is recorded. Pt is now moderate risk. Malnutrition Assessment:  Malnutrition Status:  Severe malnutrition    Context:  Acute Illness     Findings of the 6 clinical characteristics of malnutrition:  Energy Intake:  7 - 50% or less of estimated energy requirements for 5 or more days  Weight Loss:  7 - Greater than 2% over 1 week     Body Fat Loss:  Unable to assess     Muscle Mass Loss:  Unable to assess    Fluid Accumulation:  Unable to assess     Strength:  Not Performed    Estimated Daily Nutrient Needs:  Energy (kcal):  7319-6412 (Mahnaz Danielle w/ stress factor 1.0-1.2); Weight Used for Energy Requirements:  Current     Protein (g):  80-95 (1.1-1.3 g/kg); Weight Used for Protein Requirements:  Ideal        Fluid (ml/day):  2000; Method Used for Fluid Requirements:  1 ml/kcal      Wounds:  None       Current Nutrition Therapies:    ADULT DIET; Regular  Adult Oral Nutrition Supplement; Frozen Oral Supplement  Adult Oral Nutrition Supplement;  Low Calorie/High Protein Oral Supplement    Anthropometric Measures:  · Height: 5' 9.02\" (175.3 cm)  · Current Body Weight: 255 lb (115.7 kg)   · Admission Body Weight: 272 lb 4.3 oz (123.5 kg) (first measured weight)    · Ideal Body Weight: 160 lbs; % Ideal Body Weight 163.4 %   · BMI: 37.6  · BMI Categories: Obese Class 2 (BMI 35.0 -39.9)       Nutrition Diagnosis:   · Severe malnutrition, In context of acute illness or injury related to inadequate protein-energy intake as evidenced by weight loss, intake 51-75%      Nutrition Interventions:   Food and/or Nutrient Delivery:  Continue Current Diet, Continue Oral Nutrition Supplement  Nutrition Education/Counseling:  No recommendation at this time   Coordination of Nutrition Care: Continue to monitor while inpatient    Goals:  pt will consume greater than 50-75% of meals and supplements       Nutrition Monitoring and Evaluation:   Behavioral-Environmental Outcomes:  None Identified   Food/Nutrient Intake Outcomes:  Supplement Intake, Food and Nutrient Intake  Physical Signs/Symptoms Outcomes:  Biochemical Data, Skin, Weight, GI Status, Hemodynamic Status, Fluid Status or Edema     Discharge Planning:     Too soon to determine     Electronically signed by Cammie Malone RD, LINDA on 23/0/98 at 9:42 PM EDT    Contact: [phone]

## 2021-10-02 NOTE — PROGRESS NOTES
Hospitalist Progress Note      Name:  Domingo Inman /Age/Sex: 1955  (23 y.o. male)   MRN & CSN:  [id number] & [id number] Admission Date/Time: 2021  1:13 PM   Location:  [address] [place] [address]-[place] PCP: No primary care provider on file. Hospital Day: 19    Assessment and Plan:   Domingo Inman is a 63 y.o.  male  who presents with SOB    1) Acute on chronic hypoxic respiratory failure due to COVID PNA  -Tested positive on 2021  -CTA chest: No PE  -On Steroid. Baricitinib on hold due to elevated LFT. -Completed IV antibiotics  -Continue BiPAP; wean FiO2 as tolerated  -ID on board    2) Elevated LFTs  -Hold hepatotoxic medication. -RUQ US: Fatty liver  -Hepatitis panel negative. 3) HFrEF  -Possibly chronic. No previous  TTE on file  -TTE with EF of 30%  -Continue lasix, BB; added ACEi  -Cardiology work up when respiratory status improves     Other chronic medical conditions, medication resumed unless contraindicated  COPD  Obesity  BEV  Hyperlipidemia      Diet ADULT DIET; Regular  Adult Oral Nutrition Supplement; Frozen Oral Supplement  Adult Oral Nutrition Supplement; Low Calorie/High Protein Oral Supplement   DVT Prophylaxis [] Lovenox, []  Heparin, [] SCDs, [] Ambulation   GI Prophylaxis [] PPI,  [] H2 Blocker,  [] Carafate,  [] Diet/Tube Feeds   Code Status Full Code   Disposition  TBD   MDM      History of Present Illness:     Patient was seen and examined  Denied any chest pain or palpitations  O2 requirement increasing  No acute events overnight    Ten point ROS reviewed negative, unless as noted above    Objective:        Intake/Output Summary (Last 24 hours) at 10/2/2021 1112  Last data filed at 10/2/2021 0553  Gross per 24 hour   Intake 130 ml   Output 1550 ml   Net -1420 ml      Vitals:   Vitals:    10/02/21 0823   BP: (!) 139/92   Pulse: 100   Resp: 18   Temp: 96.1 °F (35.6 °C)   SpO2:      Physical Exam:   GEN Awake male, sitting upright in bed in no apparent distress. Appears given age. EYES Pupils are equally round. No scleral erythema, discharge, or conjunctivitis. HENT Mucous membranes are moist. Oral pharynx without exudates, no evidence of thrush. NECK Supple, no apparent thyromegaly or masses. RESP bilateral crackles. Symmetric chest movement while on BiPAP  CARDIO/VASC S1/S2 auscultated. Regular rate without appreciable murmurs, rubs, or gallops. No JVD or carotid bruits. Peripheral pulses equal bilaterally and palpable. No peripheral edema. GI Abdomen is soft without significant tenderness, masses, or guarding. Bowel sounds are normoactive. Rectal exam deferred.  No costovertebral angle tenderness. Mosher catheter is not present. HEME/LYMPH No palpable cervical lymphadenopathy and no hepatosplenomegaly. No petechiae or ecchymoses. MSK No gross joint deformities. SKIN Normal coloration, warm, dry. NEURO Cranial nerves appear grossly intact, normal speech, no lateralizing weakness. PSYCH Awake, alert, oriented x 4. Affect appropriate.     Medications:   Medications:    dexamethasone  10 mg IntraVENous Daily    lisinopril  2.5 mg Oral Daily    furosemide  40 mg IntraVENous Daily    albuterol sulfate HFA  2 puff Inhalation 4x daily    [Held by provider] HYDROcodone 5 mg - acetaminophen  1 tablet Oral Q12H    vitamin C  1,000 mg Oral BID    zinc sulfate  50 mg Oral BID    Vitamin D  1,000 Units Oral Daily    sodium chloride flush  5-40 mL IntraVENous 2 times per day    enoxaparin  30 mg SubCUTAneous BID    bisacodyl  5 mg Oral Daily    carvedilol  6.25 mg Oral BID    [Held by provider] divalproex  250 mg Oral Daily    pantoprazole  40 mg Oral QAM AC    montelukast  10 mg Oral QPM    fluticasone  2 puff Inhalation BID    [Held by provider] pravastatin  40 mg Oral Dinner    tiotropium-olodaterol  2 puff Inhalation BID    diclofenac-miSOPROStol  1 tablet Oral BID      Infusions:    sodium chloride       PRN Meds: senna, 1 tablet, BID PRN  polyvinyl alcohol, 1 drop, Q1H PRN  albuterol sulfate HFA, 2 puff, Q4H PRN  LORazepam, 1 mg, Q6H PRN  benzonatate, 100 mg, TID PRN  sodium chloride flush, 5-40 mL, PRN  sodium chloride, 25 mL, PRN  ondansetron, 4 mg, Q8H PRN   Or  ondansetron, 4 mg, Q6H PRN  polyethylene glycol, 17 g, Daily PRN  [Held by provider] acetaminophen, 650 mg, Q6H PRN   Or  [Held by provider] acetaminophen, 650 mg, Q6H PRN  guaiFENesin-dextromethorphan, 5 mL, Q4H PRN  methocarbamol, 500 mg, Q8H PRN          Electronically signed by Tony Venegas MD on 10/2/2021 at 11:12 AM

## 2021-10-03 NOTE — PLAN OF CARE
Problem: Airway Clearance - Ineffective  Goal: Achieve or maintain patent airway  Outcome: Ongoing     Problem: Gas Exchange - Impaired  Goal: Absence of hypoxia  Outcome: Ongoing  Goal: Promote optimal lung function  Outcome: Ongoing     Problem: Breathing Pattern - Ineffective  Goal: Ability to achieve and maintain a regular respiratory rate  Outcome: Ongoing     Problem:  Body Temperature -  Risk of, Imbalanced  Goal: Ability to maintain a body temperature within defined limits  Outcome: Ongoing  Goal: Will regain or maintain usual level of consciousness  Outcome: Ongoing  Goal: Complications related to the disease process, condition or treatment will be avoided or minimized  Outcome: Ongoing     Problem: Isolation Precautions - Risk of Spread of Infection  Goal: Prevent transmission of infection  Outcome: Ongoing     Problem: Nutrition Deficits  Goal: Optimize nutritional status  Outcome: Ongoing     Problem: Risk for Fluid Volume Deficit  Goal: Maintain normal heart rhythm  Outcome: Ongoing  Goal: Maintain absence of muscle cramping  Outcome: Ongoing  Goal: Maintain normal serum potassium, sodium, calcium, phosphorus, and pH  Outcome: Ongoing     Problem: Loneliness or Risk for Loneliness  Goal: Demonstrate positive use of time alone when socialization is not possible  Outcome: Ongoing     Problem: Fatigue  Goal: Verbalize increase energy and improved vitality  Outcome: Ongoing     Problem: Patient Education: Go to Patient Education Activity  Goal: Patient/Family Education  Outcome: Ongoing     Problem: Falls - Risk of:  Goal: Will remain free from falls  Description: Will remain free from falls  Outcome: Ongoing  Goal: Absence of physical injury  Description: Absence of physical injury  Outcome: Ongoing     Problem: Nutrition  Goal: Optimal nutrition therapy  Outcome: Ongoing     Problem: Pain:  Goal: Pain level will decrease  Description: Pain level will decrease  Outcome: Ongoing  Goal: Control of acute pain  Description: Control of acute pain  Outcome: Ongoing  Goal: Control of chronic pain  Description: Control of chronic pain  Outcome: Ongoing

## 2021-10-03 NOTE — PROGRESS NOTES
Patient assisted with bathing and amor care. Red, inflamed fungal rash to cesar area/groin, antifungal cream applied. Undershirt from home removed. Hair washed. Gown and linens changed. Patient assisted from chair back to bed with 2 RN assist. Oxygen dropped into 80's but patient slowly recovered. Tolerated fairly well.

## 2021-10-03 NOTE — PROGRESS NOTES
Hospitalist Progress Note      Name:  Perez Calderon /Age/Sex: 1955  (80 y.o. male)   MRN & CSN:  [id number] & [id number] Admission Date/Time: 2021  1:13 PM   Location:  [address] [place] [address] PCP: No primary care provider on file. Hospital Day: 20    Assessment and Plan:   Perez Calderon is a 63 y.o.  male  who presents with SOB    1) Acute on chronic hypoxic respiratory failure due to COVID PNA  -Tested positive on 2021  -CTA chest: No PE  -On Steroid. Baricitinib on hold due to elevated LFT. -Completed IV antibiotics  -Continue BiPAP; wean FiO2 as tolerated  -ID on board    2) Elevated LFTs  -Hold hepatotoxic medication. -RUQ US: Fatty liver  -Hepatitis panel negative. 3) HFrEF  -Possibly chronic. No previous  TTE on file  -TTE with EF of 30%  -Continue lasix, BB; added ACEi  -Cardiology work up when respiratory status improves     Other chronic medical conditions, medication resumed unless contraindicated  COPD  Obesity  BEV  Hyperlipidemia      Diet ADULT DIET; Regular  Adult Oral Nutrition Supplement; Frozen Oral Supplement  Adult Oral Nutrition Supplement; Low Calorie/High Protein Oral Supplement   DVT Prophylaxis [] Lovenox, []  Heparin, [] SCDs, [] Ambulation   GI Prophylaxis [] PPI,  [] H2 Blocker,  [] Carafate,  [] Diet/Tube Feeds   Code Status Full Code   Disposition  TBD   MDM      History of Present Illness:     Patient was seen and examined  Denied any chest pain or palpitations  O2 requirement increasing  No acute events overnight    Ten point ROS reviewed negative, unless as noted above    Objective: Intake/Output Summary (Last 24 hours) at 10/3/2021 0838  Last data filed at 10/2/2021 2030  Gross per 24 hour   Intake 480 ml   Output 2100 ml   Net -1620 ml      Vitals:   Vitals:    10/03/21 0804   BP:    Pulse:    Resp: 25   Temp:    SpO2:      Physical Exam:   GEN Awake male, sitting upright in bed in no apparent distress. Appears given age. EYES Pupils are equally round. No scleral erythema, discharge, or conjunctivitis. HENT Mucous membranes are moist. Oral pharynx without exudates, no evidence of thrush. NECK Supple, no apparent thyromegaly or masses. RESP bilateral crackles. Symmetric chest movement while on BiPAP  CARDIO/VASC S1/S2 auscultated. Regular rate without appreciable murmurs, rubs, or gallops. No JVD or carotid bruits. Peripheral pulses equal bilaterally and palpable. No peripheral edema. GI Abdomen is soft without significant tenderness, masses, or guarding. Bowel sounds are normoactive. Rectal exam deferred.  No costovertebral angle tenderness. Mosher catheter is not present. HEME/LYMPH No palpable cervical lymphadenopathy and no hepatosplenomegaly. No petechiae or ecchymoses. MSK No gross joint deformities. SKIN Normal coloration, warm, dry. NEURO Cranial nerves appear grossly intact, normal speech, no lateralizing weakness. PSYCH Awake, alert, oriented x 4. Affect appropriate.     Medications:   Medications:    dexamethasone  10 mg IntraVENous Daily    lisinopril  2.5 mg Oral Daily    furosemide  40 mg IntraVENous Daily    albuterol sulfate HFA  2 puff Inhalation 4x daily    [Held by provider] HYDROcodone 5 mg - acetaminophen  1 tablet Oral Q12H    vitamin C  1,000 mg Oral BID    zinc sulfate  50 mg Oral BID    Vitamin D  1,000 Units Oral Daily    sodium chloride flush  5-40 mL IntraVENous 2 times per day    enoxaparin  30 mg SubCUTAneous BID    bisacodyl  5 mg Oral Daily    carvedilol  6.25 mg Oral BID    [Held by provider] divalproex  250 mg Oral Daily    pantoprazole  40 mg Oral QAM AC    montelukast  10 mg Oral QPM    fluticasone  2 puff Inhalation BID    [Held by provider] pravastatin  40 mg Oral Dinner    tiotropium-olodaterol  2 puff Inhalation BID    diclofenac-miSOPROStol  1 tablet Oral BID      Infusions:    sodium chloride       PRN Meds: senna, 1 tablet, BID PRN  polyvinyl alcohol, 1 drop, Q1H PRN  albuterol sulfate HFA, 2 puff, Q4H PRN  LORazepam, 1 mg, Q6H PRN  benzonatate, 100 mg, TID PRN  sodium chloride flush, 5-40 mL, PRN  sodium chloride, 25 mL, PRN  ondansetron, 4 mg, Q8H PRN   Or  ondansetron, 4 mg, Q6H PRN  polyethylene glycol, 17 g, Daily PRN  [Held by provider] acetaminophen, 650 mg, Q6H PRN   Or  [Held by provider] acetaminophen, 650 mg, Q6H PRN  guaiFENesin-dextromethorphan, 5 mL, Q4H PRN  methocarbamol, 500 mg, Q8H PRN          Electronically signed by Vijaya Barreto MD on 10/3/2021 at 8:38 AM

## 2021-10-04 NOTE — FLOWSHEET NOTE
Called and spoke with patient's daughter via phone, she is aware of poor oxgenation today, pt unable to be off bipap for any length of time, cannot even take oral meds or eat meals. Discussed the option of ventilator, and she states she is in agreement with intubation if patient is in agreement and physicians feel it is necessary. Condition update given at this time. Dr Rhett White at bedside, updated on poor oxygenation status today.  Verbal orders received for STAT portable CXR     Wade Agarwal RN 1:29 PM

## 2021-10-04 NOTE — PLAN OF CARE
Problem: Airway Clearance - Ineffective  Goal: Achieve or maintain patent airway  Outcome: Ongoing     Problem: Gas Exchange - Impaired  Goal: Absence of hypoxia  Outcome: Ongoing  Goal: Promote optimal lung function  Outcome: Ongoing     Problem: Breathing Pattern - Ineffective  Goal: Ability to achieve and maintain a regular respiratory rate  Outcome: Ongoing     Problem: Body Temperature -  Risk of, Imbalanced  Goal: Ability to maintain a body temperature within defined limits  Outcome: Ongoing  Goal: Will regain or maintain usual level of consciousness  Outcome: Ongoing  Goal: Complications related to the disease process, condition or treatment will be avoided or minimized  Outcome: Ongoing     Problem: Isolation Precautions - Risk of Spread of Infection  Goal: Prevent transmission of infection  Outcome: Ongoing     Problem: Nutrition Deficits  Goal: Optimize nutritional status  Outcome: Ongoing     Problem: Risk for Fluid Volume Deficit  Goal: Maintain normal heart rhythm  Outcome: Ongoing  Goal: Maintain absence of muscle cramping  Outcome: Ongoing  Goal: Maintain normal serum potassium, sodium, calcium, phosphorus, and pH  Outcome: Ongoing     Problem: Loneliness or Risk for Loneliness  Goal: Demonstrate positive use of time alone when socialization is not possible  Outcome: Ongoing     Problem: Fatigue  Goal: Verbalize increase energy and improved vitality  Outcome: Ongoing     Problem: Patient Education: Go to Patient Education Activity  Goal: Patient/Family Education  Outcome: Ongoing     Problem: Falls - Risk of:  Goal: Will remain free from falls  Description: Will remain free from falls  Outcome: Ongoing  Goal: Absence of physical injury  Description: Absence of physical injury  Outcome: Ongoing     Problem: Nutrition  Goal: Optimal nutrition therapy  Outcome: Ongoing     Problem: Pain:  Description: Pain management should include both nonpharmacologic and pharmacologic interventions.   Goal: Pain level will decrease  Description: Pain level will decrease  Outcome: Ongoing  Goal: Control of acute pain  Description: Control of acute pain  Outcome: Ongoing  Goal: Control of chronic pain  Description: Control of chronic pain  Outcome: Ongoing  Goal: Patient's pain/discomfort is manageable  Description: Patient's pain/discomfort is manageable  Outcome: Ongoing     Problem: Skin Integrity:  Goal: Will show no infection signs and symptoms  Description: Will show no infection signs and symptoms  Outcome: Ongoing  Goal: Absence of new skin breakdown  Description: Absence of new skin breakdown  Outcome: Ongoing     Problem: Infection:  Goal: Will remain free from infection  Description: Will remain free from infection  Outcome: Ongoing     Problem: Safety:  Goal: Free from accidental physical injury  Description: Free from accidental physical injury  Outcome: Ongoing  Goal: Free from intentional harm  Description: Free from intentional harm  Outcome: Ongoing     Problem: Daily Care:  Goal: Daily care needs are met  Description: Daily care needs are met  Outcome: Ongoing     Problem: Skin Integrity:  Goal: Skin integrity will stabilize  Description: Skin integrity will stabilize  Outcome: Ongoing     Problem: Discharge Planning:  Goal: Patients continuum of care needs are met  Description: Patients continuum of care needs are met  Outcome: Ongoing

## 2021-10-04 NOTE — CARE COORDINATION
LSW received a call from pt RN Martha Wagner who stated pt is requesting that this LSW call his finance regarding paperwork they need to fill out for the [place] in order to get help with household bills. LSW called Bibiana Akers and the call went straight to  . LSW left a message asking for a return call. Contact info for this LSW was provided.

## 2021-10-04 NOTE — FLOWSHEET NOTE
Contacted CM Ally Montana regarding patient request to speak with CM regarding some questions with [facility] financial assistance     Glenn Akers RN 11:41 AM

## 2021-10-04 NOTE — PROGRESS NOTES
Palliative Care RN,  Soy Frank visited patient today. He is having worsening of his respiratory status and intubation may be necessary. The patient continues to want all aggressive measures including intubation if  Necessary. It is felt that if he could prone himself more often this may be of benefit. I will order IV medication to help with shortness of breath and pain including Morphine and Ativan. Hopefully this may help to avoid intubation.

## 2021-10-04 NOTE — PROGRESS NOTES
Infectious Disease Progress Note  10/4/2021   Patient Name: Ching Abrams : 1955       Reason for visit: F/u COVID-19 pneumonia, acute respiratory failure? History:? Interval history noted  Worsened breathing, pain on swalloowing  Physical Exam:  Vital Signs: /77   Pulse 102   Temp 97.6 °F (36.4 °C) (Oral)   Resp 22   Ht 5' 9.02\" (1.753 m)   Wt 255 lb 11.7 oz (116 kg)   SpO2 (!) 75%   BMI 37.75 kg/m²     Gen: alert and oriented X3, on BiPAP  Skin: no stigmata of endocarditis  Wounds: C/D/I  HEMT: AT/NC Oropharynx is dry and cracked  Eyes: PERRLA, EOMI, conjunctiva pink, sclera anicteric. Neck: Supple. Trachea midline. No LAD. Chest: deferred  Heart: RRR  Abd: soft, non-distended, no tenderness, no hepatomegaly. Normoactive bowel sounds. Ext: no clubbing, cyanosis, or edema  Catheter Site: without erythema or tenderness  LDA: CVC:  Neuro: Mental status intact. CN 2-12 intact and no focal sensory or motor deficits     Radiologic / Imaging / TESTING  ONE XRAY VIEW OF THE CHEST     2021 1:46 pm     COMPARISON:   2021     HISTORY:   ORDERING SYSTEM PROVIDED HISTORY: acute resp failure, covid pneumonia   TECHNOLOGIST PROVIDED HISTORY:   Reason for exam:->acute resp failure, covid pneumonia   Reason for Exam: acute resp failure, covid pneumonia   Acuity: Acute   Type of Exam: Initial   Additional signs and symptoms: na   Relevant Medical/Surgical History: na     FINDINGS:   Mild pulmonary vascular congestion. There is no effusion or pneumothorax. The cardiomediastinal silhouette is without acute process. The osseous   structures are without acute process. Impression:   Mild pulmonary vascular congestion. Labs:    No results found for this or any previous visit (from the past 24 hour(s)).   CULTURE results: Invalid input(s): BLOOD CULTURE,  URINE CULTURE, SURGICAL CULTURE    Diagnosis:  Patient Active Problem List   Diagnosis    Obstructive sleep apnea syndrome    Arteriosclerosis of coronary artery    Acute bronchitis with chronic obstructive pulmonary disease (COPD) ([address]    COVID-19    Tobacco dependence in remission    Obesity    Hyperlipidemia    Cardiomyopathy ([address]    Aneurysm of thoracic aorta ([address]    Acute respiratory failure with hypoxia (HCC)    Severe malnutrition (HCC)    Vitamin D deficiency       Active Problems  Active Problems:    Obstructive sleep apnea syndrome    COVID-19    Tobacco dependence in remission    Obesity    Hyperlipidemia    Acute respiratory failure with hypoxia (HCC)    Severe malnutrition (HCC)    Vitamin D deficiency  Resolved Problems:    * No resolved hospital problems. *      Impression and plan   Summary and rationale: Patient is a 63 y.o.  male with a medical hx of obesity, HLD , thoracic aorta aneurysm admitted on 9/14/2021 with SOB, cough, fever, chills, anorexia for 5 days prior to admission. Diagnosed with severe covid-19 pneumonia and respiratory failure   Vitamin D deficiency   Candida esophagitis   COVID-19 symptom onset: 9/9/2021   COVID-19 test date: 9/14/2021   Expected discontinuation of isolation precautions: 9/29/2021   Clinical status:remains on BiPAP, worsening, intubation may be imminent   Therapeutic:  o Ongoing antibiotics:none  o Fluconazole 200 mg daily for 14 days. o Immunomodulators:  o Dexamethasone: 9/14-20 (7 days)  - Solumedrol 40 mg q 12h, (9/20-10/1); dexamethasone 10/2-  - Sepsis: On antibiotics  - NSTEMI   - Baracitinib: 9/15 , d/red due to transaminitis  o Completed antibiotics: azithromycin  o Other agents:    Diagnostic:CXR   F/u:   Other: Taper and discontinue corticosteroids.       Electronically signed by: Electronically signed by Yovani Rubin MD on 10/4/2021 at 11:41 AM

## 2021-10-04 NOTE — PROGRESS NOTES
pulmonary      SUBJECTIVE: alert but on 100% fio2 via bipap     OBJECTIVE    VITALS:  /77   Pulse 102   Temp 97.6 °F (36.4 °C) (Oral)   Resp 22   Ht 5' 9.02\" (1.753 m)   Wt 255 lb 11.7 oz (116 kg)   SpO2 (!) 75%   BMI 37.75 kg/m²   HEAD AND FACE EXAM:  No throat injection, no active exudate,no thrush  NECK EXAM;No JVD, no masses, symmetrical  CHEST EXAM; Expansion equal and symmetrical, no masses  LUNG EXAM; Good breath sounds bilaterally. There are expiratory wheezes both lungs, there are crackles at both lung bases  CARDIOVASCULAR EXAM: Positive S1 and S2, no S3 or S4, no clicks ,no murmurs  RIGHT AND LEFT LOWER EXTRIMITY EXAM: No edema, no swelling, no inflamation  CNS EXAM: Alert and oriented X3          LABS   Lab Results   Component Value Date    WBC 17.2 (H) 10/03/2021    HGB 15.3 10/03/2021    HCT 46.7 10/03/2021    MCV 87.5 10/03/2021     10/03/2021     Lab Results   Component Value Date    CREATININE 0.7 (L) 10/03/2021    BUN 39 (H) 10/03/2021     10/03/2021    K 4.5 10/03/2021    CL 94 (L) 10/03/2021    CO2 27 10/03/2021     Lab Results   Component Value Date    INR 0.95 09/26/2021    PROTIME 12.2 09/26/2021          Lab Results   Component Value Date    PHOS 3.4 09/19/2021      No results for input(s): PH, PO2ART, LIE6KYM, HCO3, BEART, O2SAT in the last 72 hours. Wt Readings from Last 3 Encounters:   09/30/21 255 lb 11.7 oz (116 kg)   12/23/16 263 lb (119.3 kg)               ASSESMENT  Ac resap failure  covid pneumonia  copd        PLAN  1. cpm  2. Cont bipap  3.  transfet to icu    10/4/2021  Alysa Harris MD, M.BRENDA.

## 2021-10-04 NOTE — FLOWSHEET NOTE
10/04/21 1830   Vitals   Pulse 149   Resp 24   Oxygen Therapy   SpO2 93 %   Dr Madison Ponce notified via phone HR flipped into sustained tachycardic rhythm 140s consistently. Telephone orders received to give Lopressor 2.5 mg IVP x1 now. Aware Coreg held this morning due to patient unable to take oral medications due to poor oxygenation.      Wade Agarwal, RN 6:35 PM

## 2021-10-04 NOTE — FLOWSHEET NOTE
Dr Ernestina King updated on poor oxygenation status today, and patient unable to successfully be off bipap for any length of time, unable to eat breakfast or lunch, or even take morning oral meds. RN has spoken to him several times about being placed on ventilator and he states he is still hesitant at this time. O2 sats consistently 85-55% on continuous bipap 100% FIO2.      Aman Benites RN 2:38 PM

## 2021-10-04 NOTE — PROGRESS NOTES
diclofenac-miSOPROStol  1 tablet Oral BID      Infusions:    sodium chloride       PRN Meds: sodium chloride, 1 spray, PRN  senna, 1 tablet, BID PRN  polyvinyl alcohol, 1 drop, Q1H PRN  albuterol sulfate HFA, 2 puff, Q4H PRN  LORazepam, 1 mg, Q6H PRN  benzonatate, 100 mg, TID PRN  sodium chloride flush, 5-40 mL, PRN  sodium chloride, 25 mL, PRN  ondansetron, 4 mg, Q8H PRN   Or  ondansetron, 4 mg, Q6H PRN  polyethylene glycol, 17 g, Daily PRN  [Held by provider] acetaminophen, 650 mg, Q6H PRN   Or  [Held by provider] acetaminophen, 650 mg, Q6H PRN  guaiFENesin-dextromethorphan, 5 mL, Q4H PRN  methocarbamol, 500 mg, Q8H PRN          Electronically signed by Eugene Johnson MD on 10/4/2021 at 11:14 AM

## 2021-10-04 NOTE — FLOWSHEET NOTE
Dr Harlan Flores notified of K+ 5.2 on labs resulted around 1630 today, also updated on liver profile labs     Gaby tSory RN 6:08 PM

## 2021-10-04 NOTE — FLOWSHEET NOTE
Pt unable to eat breakfast or take oral medications at this time. Attempted to take patient of bipap for less than 7 minutes and placed on 100 NRB mask and 15 L high flow flow cannula together, per his request to eat. Patient O2 sat did not raise over 76% with that combination. After a few minutes, patient c/o increasing difficulty breathing and dry nares. Bipap placed back on at 20/6 100%.      Wade Agarwal RN 9:24 AM

## 2021-10-04 NOTE — CARE COORDINATION
PAOLA received a call from pt [address] this LSW that she needs a Statement for the [place] to help with house hold bills. Elsa Tapia stated it needs to state that the pt is in the hospital, what he is here for and date he was admitted. ИРИНАW wrote this statement. Elsa Tapia to meet this LSW in the lobby [facility] at 4:15 to give her this letter.

## 2021-10-04 NOTE — FLOWSHEET NOTE
Patient moved into room 2004 from 2022 for hospital convenience. O2 sats now maintaining 90-92% on continuous bipap.      Aman Benites RN 5:29 PM

## 2021-10-04 NOTE — FLOWSHEET NOTE
Patient's daughter Kira Beth called and given an update. Updated per Josselin nAgulo RN nurse manager on this unit, she may come tomorrow for a one time 30 minute visit. She verbalized understanding.      Sushma Chan RN 7:01 PM

## 2021-10-05 NOTE — CONSULTS
INPATIENT CARDIOLOGY CONSULT NOTE       Reason for consultation:  CHF     Referring physician:  Sandhya Baer MD          Dear Sandhya Baer MD Thank you for the consult    Chief Complaint   Patient presents with    Nausea     has not eaten in 5 days    Fever     headache, eye pain    Shortness of Breath    Rectal Bleeding     black diarrhea for 3 days       History of present illness:Camilo BUNCH is a 63 y. o.year old who  presents with  Chief Complaint   Patient presents with    Nausea     has not eaten in 5 days    Fever     headache, eye pain    Shortness of Breath    Rectal Bleeding     black diarrhea for 3 days       Patient is a 61-year-old gentleman, who follows up at [facility], with prior medical history significant for history of nonischemic cardiomyopathy, is admitted to the hospital in COVID-19 unit with acute respiratory failure and COVID-19 pneumonia. Cardiology is consulted to evaluate patient for low EF CHF. Upon questioning, patient mentions that he had a left heart cath several years ago which was normal.  He has been diagnosed with a weak left ventricle and he takes 2 medications for this which includes beta-blocker and ACE inhibitor's. He denies any chest pain or palpitations      Echocardiogram performed on 929 shows  LVEF around 30%, dilated aortic root, moderate aortic regurgitation. Stress EKG shows sinus rhythm with IVCD no ischemic changes otherwise      Past medical history:    has a past medical history of Asthma, COPD (chronic obstructive pulmonary disease) ([address] and COVID-19. Past surgical history:   has a past surgical history that includes Wrist surgery (Left, 1976); hernia repair; and Cardiac catheterization (2013). Social History:   reports that he quit smoking about 16 years ago. His smoking use included cigarettes. He started smoking about 50 years ago. He smoked 0.50 packs per day.  He has never used smokeless tobacco. He reports that he does not drink alcohol and does not use drugs.   Family history:   no family history of CAD, STROKE of DM    Allergies   Allergen Reactions    Percocet [Oxycodone-Acetaminophen] Nausea And Vomiting       vitamin D (ERGOCALCIFEROL) capsule 50,000 Units, Weekly  furosemide (LASIX) injection 40 mg, BID  cefepime (MAXIPIME) 2000 mg IVPB minibag, Q8H  vancomycin (VANCOCIN) 1500 mg in 300 mL IVPB, Q12H  fluconazole (DIFLUCAN) 200 mg IVPB, Q24H  LORazepam (ATIVAN) injection 1 mg, Q6H PRN  morphine (PF) injection 2 mg, Q4H PRN  sodium chloride (OCEAN, BABY AYR) 0.65 % nasal spray 1 spray, PRN  dexamethasone (PF) (DECADRON) injection 10 mg, Daily  lisinopril (PRINIVIL;ZESTRIL) tablet 2.5 mg, Daily  senna (SENOKOT) tablet 8.6 mg, BID PRN  albuterol sulfate  (90 Base) MCG/ACT inhaler 2 puff, 4x daily  polyvinyl alcohol (LIQUIFILM TEARS) 1.4 % ophthalmic solution 1 drop, Q1H PRN  albuterol sulfate  (90 Base) MCG/ACT inhaler 2 puff, Q4H PRN  LORazepam (ATIVAN) tablet 1 mg, Q6H PRN  [Held by provider] HYDROcodone-acetaminophen (NORCO) 5-325 MG per tablet 1 tablet, Q12H  ascorbic acid (VITAMIN C) tablet 1,000 mg, BID  zinc sulfate (ZINCATE) capsule 50 mg, BID  benzonatate (TESSALON) capsule 100 mg, TID PRN  Vitamin D (CHOLECALCIFEROL) tablet 1,000 Units, Daily  sodium chloride flush 0.9 % injection 5-40 mL, 2 times per day  sodium chloride flush 0.9 % injection 5-40 mL, PRN  0.9 % sodium chloride infusion, PRN  enoxaparin (LOVENOX) injection 30 mg, BID  ondansetron (ZOFRAN-ODT) disintegrating tablet 4 mg, Q8H PRN   Or  ondansetron (ZOFRAN) injection 4 mg, Q6H PRN  polyethylene glycol (GLYCOLAX) packet 17 g, Daily PRN  [Held by provider] acetaminophen (TYLENOL) tablet 650 mg, Q6H PRN   Or  [Held by provider] acetaminophen (TYLENOL) suppository 650 mg, Q6H PRN  bisacodyl (DULCOLAX) EC tablet 5 mg, Daily  guaiFENesin-dextromethorphan (ROBITUSSIN DM) 100-10 MG/5ML syrup 5 mL, Q4H PRN  carvedilol (COREG) tablet 6.25 mg, BID  [Held by provider] divalproex (DEPAKOTE ER) extended release tablet 250 mg, Daily  methocarbamol (ROBAXIN) tablet 500 mg, Q8H PRN  pantoprazole (PROTONIX) tablet 40 mg, QAM AC  montelukast (SINGULAIR) tablet 10 mg, QPM  fluticasone (FLOVENT HFA) 110 MCG/ACT inhaler 2 puff, BID  [Held by provider] pravastatin (PRAVACHOL) tablet 40 mg, Dinner  tiotropium-olodaterol (STIOLTO) 2.5-2.5 MCG/ACT inhaler 2 puff, BID  diclofenac-miSOPROStol (ARTHROTEC 75) 75-0.2 MG per tablet 1 tablet, BID      Current Facility-Administered Medications   Medication Dose Route Frequency Provider Last Rate Last Admin    vitamin D (ERGOCALCIFEROL) capsule 50,000 Units  50,000 Units Oral Weekly MD Franklin   50,000 Units at 10/05/21 1147    furosemide (LASIX) injection 40 mg  40 mg IntraVENous BID MD Franklin   40 mg at 10/05/21 1000    cefepime (MAXIPIME) 2000 mg IVPB minibag  2,000 mg IntraVENous Q8H Kirill Miller MD   Stopped at 10/05/21 1539    vancomycin (VANCOCIN) 1500 mg in 300 mL IVPB  1,500 mg IntraVENous Q12H Kirill Miller  mL/hr at 10/05/21 1538 1,500 mg at 10/05/21 1538    fluconazole (DIFLUCAN) 200 mg IVPB  200 mg IntraVENous Q24H Kirill Miller  mL/hr at 10/04/21 1819 200 mg at 10/04/21 1819    LORazepam (ATIVAN) injection 1 mg  1 mg IntraVENous Q6H PRN Larry Vaughan MD   1 mg at 10/04/21 2206    morphine (PF) injection 2 mg  2 mg IntraVENous Q4H PRN Larry Vaughan MD        sodium chloride (OCEAN, BABY AYR) 0.65 % nasal spray 1 spray  1 spray Each Nostril PRN Jason Chauhan MD   1 spray at 10/03/21 1725    dexamethasone (PF) (DECADRON) injection 10 mg  10 mg IntraVENous Daily Maurizio KELLY MD   10 mg at 10/05/21 1000    lisinopril (PRINIVIL;ZESTRIL) tablet 2.5 mg  2.5 mg Oral Daily Maurizio KELLY MD   2.5 mg at 10/03/21 0942    senna (SENOKOT) tablet 8.6 mg  1 tablet Oral BID PRN Jason Chauhan MD        albuterol sulfate  (90 Base) MCG/ACT inhaler 2 puff  2 puff Inhalation 4x daily Jesse Poe MD   2 puff at 10/05/21 1609    polyvinyl alcohol (LIQUIFILM TEARS) 1.4 % ophthalmic solution 1 drop  1 drop Both Eyes Q1H PRN Jayne Sanderson MD        albuterol sulfate  (90 Base) MCG/ACT inhaler 2 puff  2 puff Inhalation Q4H PRN Jesse Poe MD   2 puff at 09/27/21 1924    LORazepam (ATIVAN) tablet 1 mg  1 mg Oral Q6H PRN Joni Chaudhry MD   1 mg at 10/03/21 1005    [Held by provider] HYDROcodone-acetaminophen ([facility]) 5-325 MG per tablet 1 tablet  1 tablet Oral Q12H Joni Chaudhry MD   1 tablet at 09/22/21 2210    ascorbic acid (VITAMIN C) tablet 1,000 mg  1,000 mg Oral BID Blank Munoz MD   1,000 mg at 10/05/21 1002    zinc sulfate (ZINCATE) capsule 50 mg  50 mg Oral BID Blank Munoz MD   50 mg at 10/05/21 1002    benzonatate (TESSALON) capsule 100 mg  100 mg Oral TID PRN Driss Montero MD   100 mg at 09/15/21 1831    Vitamin D (CHOLECALCIFEROL) tablet 1,000 Units  1,000 Units Oral Daily Blank Munoz MD   1,000 Units at 10/05/21 1003    sodium chloride flush 0.9 % injection 5-40 mL  5-40 mL IntraVENous 2 times per day DANIELLE Madison CNP   10 mL at 10/05/21 1001    sodium chloride flush 0.9 % injection 5-40 mL  5-40 mL IntraVENous PRN DANIELLE Segura - CNP        0.9 % sodium chloride infusion  25 mL IntraVENous PRN DANIELLE Segura CNP        enoxaparin (LOVENOX) injection 30 mg  30 mg SubCUTAneous BID DANIELLE Segura - CNP   30 mg at 10/05/21 1000    ondansetron (ZOFRAN-ODT) disintegrating tablet 4 mg  4 mg Oral Q8H PRN DANIELLE Madison CNP   4 mg at 09/15/21 2044    Or    ondansetron (ZOFRAN) injection 4 mg  4 mg IntraVENous Q6H PRN DANIELLE Segura CNP        polyethylene glycol (GLYCOLAX) packet 17 g  17 g Oral Daily PRN DANIELLE Madison CNP   17 g at 10/01/21 1314    [Held by provider] acetaminophen (TYLENOL) tablet 650 mg  650 mg Oral Q6H PRN DANIELLE June CNP        Or    [Held by provider] acetaminophen (TYLENOL) suppository 650 mg  650 mg Rectal Q6H PRN DANIELLE Segura CNP        bisacodyl (DULCOLAX) EC tablet 5 mg  5 mg Oral Daily DANIELLE Segura CNP   5 mg at 10/05/21 1003    guaiFENesin-dextromethorphan (ROBITUSSIN DM) 100-10 MG/5ML syrup 5 mL  5 mL Oral Q4H PRN DANIELLE June CNP   5 mL at 10/03/21 1004    carvedilol (COREG) tablet 6.25 mg  6.25 mg Oral BID DANIELLE June CNP   6.25 mg at 10/05/21 1002    [Held by provider] divalproex (DEPAKOTE ER) extended release tablet 250 mg  250 mg Oral Daily DANIELLE June CNP   250 mg at 09/22/21 1026    methocarbamol (ROBAXIN) tablet 500 mg  500 mg Oral Q8H PRN DANIELLE June CNP   500 mg at 09/15/21 2045    pantoprazole (PROTONIX) tablet 40 mg  40 mg Oral QAM AC DANIELLE Segura CNP   40 mg at 10/05/21 1018    montelukast (SINGULAIR) tablet 10 mg  10 mg Oral QPM DANIELLE Segura CNP   10 mg at 10/03/21 1938    fluticasone (FLOVENT HFA) 110 MCG/ACT inhaler 2 puff  2 puff Inhalation BID DANIELLE Segura CNP   2 puff at 10/05/21 0800    [Held by provider] pravastatin (PRAVACHOL) tablet 40 mg  40 mg Oral Dinner DANIELLE June CNP   40 mg at 09/22/21 1917    tiotropium-olodaterol (STIOLTO) 2.5-2.5 MCG/ACT inhaler 2 puff  2 puff Inhalation BID DANIELLE June CNP   2 puff at 10/02/21 2037    diclofenac-miSOPROStol (ARTHROTEC 75) 75-0.2 MG per tablet 1 tablet  1 tablet Oral BID DANIELLE June CNP   1 tablet at 09/29/21 2033         Review of Systems:     · Constitutional: + Fever or Weight Loss   · Eyes: No Decreased Vision  · ENT: No Headaches, Hearing Loss or Vertigo  · Cardiovascular: No chest pain, dyspnea on exertion, palpitations or loss of consciousness  · Respiratory: No cough or wheezing    · Gastrointestinal: No abdominal pain, appetite loss, blood in stools, constipation, diarrhea or heartburn  · Genitourinary: No dysuria, trouble voiding, or hematuria  · Musculoskeletal:  No gait disturbance, weakness or joint complaints  · Integumentary: No rash or pruritis  · Neurological: No TIA or stroke symptoms  · Psychiatric: No anxiety or depression  · Endocrine: No malaise, fatigue or temperature intolerance  · Hematologic/Lymphatic: No bleeding problems, blood clots or swollen lymph nodes  · Allergic/Immunologic: No nasal congestion or hives    All other systems were reviewed and were negative otherwise. Physical Examination:      Vitals:    10/05/21 1700   BP: 110/74   Pulse: 102   Resp: 29   Temp:    SpO2: 90%      Wt Readings from Last 3 Encounters:   09/30/21 255 lb 11.7 oz (116 kg)   12/23/16 263 lb (119.3 kg)     Body mass index is 37.75 kg/m². General Appearance:  No distress, conversant  Constitutional:  Well developed, Well nourished  HEENT:  Normocephalic, Atraumatic, Oropharynx moist, No oral exudates,   Nose normal. Neck Supple Carotid: no carotid bruit  Eyes:  Conjunctiva normal, No discharge. Respiratory:    Coarse breath sounds,  Moderate respiratory distress, No wheezing, no use of accessory muscles, diaphragm movement is normal  No chest Tenderness  Cardiovascular: S1-S2 No murmurs auscultated. No rubs, thrills or gallops. Normal  rhythm. Pedal pulses are normal. No pedal edema  GI:  Soft Non tender, non distended. :  No CVA tenderness. Musculoskeletal:   No tenderness, No cyanosis, No clubbing. Integument:  Warm, Dry, No erythema, No rash. Lymphatic:  No lymphadenopathy noted. Neurologic:  Alert & oriented x 3  No focal deficits noted.    Psychiatric:  Affect normal, Judgment normal, Mood normal.       Lab Review     Recent Labs     10/05/21  0851   WBC 13.3*   HGB 15.5   HCT 48.2         Recent Labs     10/05/21  0851   *   K 4.9   CL 93*   CO2 27   PHOS 3.5   BUN 31* CREATININE 0.4*     Recent Labs     10/05/21  0851   *   *   BILITOT 1.4*   ALKPHOS 171*     No results for input(s): TROPONINI in the last 72 hours. No results found for: BNP  Lab Results   Component Value Date    INR 0.95 09/26/2021    PROTIME 12.2 09/26/2021         All labs, images, EKGs were personally reviewed      Assessment: 63 y. o.year old with PMH of  has a past medical history of Asthma, COPD (chronic obstructive pulmonary disease) ([address] and COVID-19. Recommendations:      1. Acute respiratory failure   2. COVID-19 pneumonia    Currently on BiPAP  IV antibiotics  Bronchodilators  ICU care    3. Acute on chronic systolic heart failure  4. nonischemic cardiomyopathy as per history -patient follows up at [facility], had left normal heart cath in the past.    Continue with IV Lasix  Fluid restriction, low-salt diet  Continue with Coreg and lisinopril, guideline directed medical therapy  Continue outpatient follow-up with [facility] cardiology      5. Dilated aortic root: Patient with history of thoracic aortic aneurysm. Recommend close outpatient follow-up  6.  DVT: Lovenox     Thank you for the consult    Dr. Dipika Hayes  10/5/2021 5:11 PM

## 2021-10-05 NOTE — PROGRESS NOTES
Comprehensive Nutrition Assessment    Type and Reason for Visit:  Reassess    Nutrition Recommendations/Plan:   Continue diet with oral nutrition supplement  Encourage intake   Consider EN if po intake declines/remains poor  Will continue to follow up during stay     Nutrition Assessment:  Remains on regular diet with oral nutrition supplements-frozen and high protein supplements. Was improving intake but now respiratory status worsening impacting po intake. If intake continues to decline need to consider EN. Will follow at high nutrition risk. Malnutrition Assessment:  Malnutrition Status:  Severe malnutrition    Context:  Acute Illness     Findings of the 6 clinical characteristics of malnutrition:  Energy Intake:  7 - 50% or less of estimated energy requirements for 5 or more days  Weight Loss:  7 - Greater than 2% over 1 week     Body Fat Loss:  Unable to assess     Muscle Mass Loss:  Unable to assess    Fluid Accumulation:  Unable to assess     Strength:  Not Performed    Estimated Daily Nutrient Needs:  Energy (kcal):  4479-7586 (Danielle Hoe w/ stress factor 1.0-1.2); Weight Used for Energy Requirements:  Current     Protein (g):  80-95 (1.1-1.3 g/kg); Weight Used for Protein Requirements:  Ideal        Fluid (ml/day):  2000; Method Used for Fluid Requirements:  1 ml/kcal      Nutrition Related Findings:  remains on COVID unit, may need vent support with high O2 needs, elevated LFTs, weight trending down during stay      Wounds:  None       Current Nutrition Therapies:    ADULT DIET; Regular  Adult Oral Nutrition Supplement; Frozen Oral Supplement  Adult Oral Nutrition Supplement;  Low Calorie/High Protein Oral Supplement    Anthropometric Measures:  · Height: 5' 9.02\" (175.3 cm)  · Current Body Weight: 255 lb 11.7 oz (116 kg)   · Admission Body Weight: 272 lb 4.3 oz (123.5 kg) (first measured weight)    · Ideal Body Weight: 160 lbs; % Ideal Body Weight 163.4 %   · BMI: 37.7  · Adjusted Body Weight:  ; No Adjustment   · BMI Categories: Obese Class 2 (BMI 35.0 -39.9)       Nutrition Diagnosis:   · Severe malnutrition, In context of acute illness or injury related to inadequate protein-energy intake as evidenced by weight loss, intake 51-75%      Nutrition Interventions:   Food and/or Nutrient Delivery:  Continue Current Diet, Continue Oral Nutrition Supplement  Nutrition Education/Counseling:  No recommendation at this time   Coordination of Nutrition Care:  Continue to monitor while inpatient    Goals:  pt will consume greater than 50-75% of meals and supplements       Nutrition Monitoring and Evaluation:   Behavioral-Environmental Outcomes:  None Identified   Food/Nutrient Intake Outcomes:  Food and Nutrient Intake, Supplement Intake  Physical Signs/Symptoms Outcomes:  Biochemical Data, Meal Time Behavior, Skin, Weight     Discharge Planning:     Too soon to determine     Electronically signed by Farzana Malloy RD, LD on 10/5/21 at 10:34 AM EDT    Contact: [phone]

## 2021-10-05 NOTE — PROGRESS NOTES
Hospitalist Progress Note         Admit Date: 9/14/2021    PCP: No primary care provider on file. Chief Complaint   Patient presents with    Nausea     has not eaten in 5 days    Fever     headache, eye pain    Shortness of Breath    Rectal Bleeding     black diarrhea for 3 days        Assessment and Plan:      COVID-19 pneumonia    Acute respiratory failure with hypoxia ([address]   Acute on chronic systolic heart failure/HTN   Hyponatremia   Hyperkalemia   LFT elevation   Severe malnutrition (HCC)   Vitamin D deficiency levels 14.0   Obstructive sleep apnea syndrome   Tobacco dependence in remission   Obesity   Hyperlipidemia   Candida esophagitis   Thoracic aortic aneurysm    Plan:    Continue BiPAP support, bronchodilators. Check ABG and CXR as needed to consider intubation. Baricitinib discontinued with transaminitis. On steroids/dexamethasone and broad-spectrum ABX per ID  Continue on fluconazole x14 days for candidal esophagitis. Repeat CRP/procalcitonin for now. Increased dose of Lasix IV, Coreg and low-dose lisinopril. Current EF 30% and normal trops. Further work-up when patient come out of isolation and hypoxia improves. Repeat CXR/ABG as needed  Dietitian on board for nutritional recommendation. However patient could not be off BiPAP to eat. Hyponatremia due to pneumonia. Hyperkalemia should improve with aggressive diuresis. VTE prophylaxis LMWH as per Covid protocol with elevated D-dimer. CTA no PE.  GI prophylaxis Protonix  Diet   p.o. diet but poor tolerance off BiPAP  CODE STATUS full code    Patient is critically ill. Care plan discussed with patient and RN at bedside. Changed BiPAP settings. Total critical care time spent >35 minutes.     Current Facility-Administered Medications   Medication Dose Route Frequency Provider Last Rate Last Admin    vitamin D (ERGOCALCIFEROL) capsule 50,000 Units  50,000 Units Oral Weekly Diane Zuniga MD   50,000 Units at 10/05/21 1147    furosemide (LASIX) injection 40 mg  40 mg IntraVENous BID Ardelle Phalen, MD   40 mg at 10/05/21 1000    cefepime (MAXIPIME) 2000 mg IVPB minibag  2,000 mg IntraVENous Q8H Araceli Abarca MD        vancomycin (VANCOCIN) 1500 mg in 300 mL IVPB  1,500 mg IntraVENous Q12H Araceli Abarca MD        fluconazole (DIFLUCAN) 200 mg IVPB  200 mg IntraVENous Q24H Araceli Abarca  mL/hr at 10/04/21 1819 200 mg at 10/04/21 1819    LORazepam (ATIVAN) injection 1 mg  1 mg IntraVENous Q6H PRN Timmy Posey MD   1 mg at 10/04/21 2206    morphine (PF) injection 2 mg  2 mg IntraVENous Q4H PRN Timmy Posey MD        sodium chloride (OCEAN, BABY AYR) 0.65 % nasal spray 1 spray  1 spray Each Nostril PRN Sarkis Orr MD   1 spray at 10/03/21 1725    dexamethasone (PF) (DECADRON) injection 10 mg  10 mg IntraVENous Daily Kathie KELLY MD   10 mg at 10/05/21 1000    lisinopril (PRINIVIL;ZESTRIL) tablet 2.5 mg  2.5 mg Oral Daily Kathie KELLY MD   2.5 mg at 10/03/21 0942    senna (SENOKOT) tablet 8.6 mg  1 tablet Oral BID PRN Sarkis Orr MD        albuterol sulfate  (90 Base) MCG/ACT inhaler 2 puff  2 puff Inhalation 4x daily Dylon Vazquez MD   2 puff at 10/05/21 1154    polyvinyl alcohol (LIQUIFILM TEARS) 1.4 % ophthalmic solution 1 drop  1 drop Both Eyes Q1H PRN Dot Alvarez MD        albuterol sulfate  (90 Base) MCG/ACT inhaler 2 puff  2 puff Inhalation Q4H PRN Dylon Vazquez MD   2 puff at 09/27/21 1924    LORazepam (ATIVAN) tablet 1 mg  1 mg Oral Q6H PRN Epifanio Alvarez MD   1 mg at 10/03/21 1005    [Held by provider] HYDROcodone-acetaminophen (NORCO) 5-325 MG per tablet 1 tablet  1 tablet Oral Q12H Epifanio Alvarez MD   1 tablet at 09/22/21 2210    ascorbic acid (VITAMIN C) tablet 1,000 mg  1,000 mg Oral BID Blank Munoz MD   1,000 mg at 10/05/21 1002    zinc sulfate (ZINCATE) capsule 50 mg  50 mg Oral BID Jignesh Ornelas MD   50 mg at 10/05/21 1002    benzonatate (TESSALON) capsule 100 mg  100 mg Oral TID PRN Renard Paredes MD   100 mg at 09/15/21 1831    Vitamin D (CHOLECALCIFEROL) tablet 1,000 Units  1,000 Units Oral Daily Blank Munoz MD   1,000 Units at 10/05/21 1003    sodium chloride flush 0.9 % injection 5-40 mL  5-40 mL IntraVENous 2 times per day DANIELLE Ferguson - CNP   10 mL at 10/05/21 1001    sodium chloride flush 0.9 % injection 5-40 mL  5-40 mL IntraVENous PRN Holly I DANIELLE Diggs - CNP        0.9 % sodium chloride infusion  25 mL IntraVENous PRN DANIELLE Segura CNP        enoxaparin (LOVENOX) injection 30 mg  30 mg SubCUTAneous BID DANIELLE Ferguson - CNP   30 mg at 10/05/21 1000    ondansetron (ZOFRAN-ODT) disintegrating tablet 4 mg  4 mg Oral Q8H PRN DANIELLE Ferguson - CNP   4 mg at 09/15/21 2044    Or    ondansetron (ZOFRAN) injection 4 mg  4 mg IntraVENous Q6H PRN DANIELLE Segura CNP        polyethylene glycol (GLYCOLAX) packet 17 g  17 g Oral Daily PRN DANIELLE Segura - CNP   17 g at 10/01/21 1314    [Held by provider] acetaminophen (TYLENOL) tablet 650 mg  650 mg Oral Q6H PRN DANIELLE Segura CNP        Or    [Held by provider] acetaminophen (TYLENOL) suppository 650 mg  650 mg Rectal Q6H PRN DANIELLE Segura CNP        bisacodyl (DULCOLAX) EC tablet 5 mg  5 mg Oral Daily DANIELLE Seugra CNP   5 mg at 10/05/21 1003    guaiFENesin-dextromethorphan (ROBITUSSIN DM) 100-10 MG/5ML syrup 5 mL  5 mL Oral Q4H PRN DANIELLE Ferguson - CNP   5 mL at 10/03/21 1004    carvedilol (COREG) tablet 6.25 mg  6.25 mg Oral BID DANIELLE Ferguson CNP   6.25 mg at 10/05/21 1002    [Held by provider] divalproex (DEPAKOTE ER) extended release tablet 250 mg  250 mg Oral Daily DANIELLE Ferguson CNP   250 mg at 09/22/21 1026    methocarbamol (ROBAXIN) tablet 500 mg  500 mg Oral Q8H VINCE Barrera Diagnostic Studies:   DATA:    CBC   Recent Labs     10/03/21  0908 10/04/21  1324 10/05/21  0851   WBC 17.2* 15.3* 13.3*   HGB 15.3 14.8 15.5   HCT 46.7 46.2 48.2    215 185      BMP   Recent Labs     10/03/21  0908 10/04/21  1631 10/05/21  0851    129* 132*   K 4.5 5.2* 4.9   CL 94* 89* 93*   CO2 27 24 27   PHOS  --   --  3.5   BUN 39* 34* 31*   CREATININE 0.7* 0.5* 0.4*     LFT'S   Recent Labs     10/03/21  0908 10/04/21  1631 10/05/21  0851   * 165* 167*   * 521* 559*   BILITOT 1.5* 1.8* 1.4*   ALKPHOS 174* 180* 171*     COAG No results for input(s): INR in the last 72 hours. POC: No results found for: POCGLU  VbdugwaopnT2M:No results found for: LABA1C  CARDIAC ENZYMES  No results for input(s): CKTOTAL, CKMB, CKMBINDEX, TROPONINI in the last 72 hours. Troponin: No results for input(s): TROPONINT in the last 72 hours. BNP:   Recent Labs     10/05/21  0851   PROBNP 459.5*     U/A:  No results found for: NITRITE, COLORU, WBCUA, RBCUA, MUCUS, BACTERIA, CLARITYU, SPECGRAV, LEUKOCYTESUR, BLOODU, GLUCOSEU, AMORPHOUS    Echocardiogram limited    Result Date: 9/29/2021  Transthoracic Echocardiography Report (TTE)  Demographics   Patient Name       Kayleigh BUNCH    Date of Study       09/29/2021   Date of Birth      1955         Gender              Male   Age                63 year(s)         Race                   Patient Number     [id number]         Room Number         2022   Visit Number       [id number]   Corporate ID       [id number]   Accession Number   [id number]         Ean Markham RN   Ordering Physician Farhan Belle MD                 Physician           Suri WARREN  Procedure Type of Study   TTE procedure:ECHOCARDIOGRAM LIMITED.   Procedure Date Date: 09/29/2021 Start: 02:18 PM Study Location: Portable 10/4/2021  EXAMINATION: ONE XRAY VIEW OF THE CHEST 10/4/2021 1:46 pm COMPARISON: October 2, 2021 HISTORY: ORDERING SYSTEM PROVIDED HISTORY: covid 23 TECHNOLOGIST PROVIDED HISTORY: Reason for exam:->covid 23 Reason for Exam: covid 19 Acuity: Acute Type of Exam: Initial FINDINGS: Mild cardiomegaly. Cardiomediastinal silhouette appears unchanged. Multifocal bibasilar predominant airspace disease. No definite effusion. No pneumothorax or subdiaphragmatic free air. No acute osseous abnormality identified. Multifocal bibasilar predominant airspace disease has progressed slightly since prior study. XR CHEST PORTABLE    Result Date: 10/2/2021  EXAMINATION: ONE XRAY VIEW OF THE CHEST 10/2/2021 11:28 am COMPARISON: 09/28/2021 HISTORY: ORDERING SYSTEM PROVIDED HISTORY: SOB TECHNOLOGIST PROVIDED HISTORY: Reason for exam:->SOB Reason for Exam: SOB Acuity: Acute Type of Exam: Initial FINDINGS: Cardiomediastinal silhouette is stable. Mild bilateral pulmonary vascular congestion. No pleural effusion or pneumothorax. No gross bony abnormality. Mild bilateral pulmonary vascular congestion. XR CHEST PORTABLE    Result Date: 9/28/2021  EXAMINATION: ONE XRAY VIEW OF THE CHEST 9/28/2021 4:01 am COMPARISON: Chest x-ray 09/24/2021 HISTORY: ORDERING SYSTEM PROVIDED HISTORY: [place] pneumonia TECHNOLOGIST PROVIDED HISTORY: Reason for exam:->fu pneumonia Reason for Exam: fu pneumonia Acuity: Acute Type of Exam: Initial Additional signs and symptoms: na Relevant Medical/Surgical History: copd, COVID - 19 FINDINGS: Reticular and scattered opacities are present, not considerably changed. Costophrenic angles are clear. Cardiac and mediastinal structures are unchanged. The bones are intact. No significant change since the prior exam 09/24/2021     XR CHEST PORTABLE    Result Date: 9/24/2021  EXAMINATION: ONE XRAY VIEW OF THE CHEST 9/24/2021 5:15 am COMPARISON: 09/20/2021.  HISTORY: ORDERING SYSTEM PROVIDED HISTORY: [place] pneumonia TECHNOLOGIST PROVIDED HISTORY: Reason for exam:->fu pneumonia Reason for Exam: fu pneumonia Acuity: Unknown Type of Exam: Subsequent/Follow-up Additional signs and symptoms: fu pneumonia Relevant Medical/Surgical History: fu pneumonia FINDINGS: The cardiac silhouette appears at the upper limits of normal for size. There is minimal prominence of the pulmonary vasculature bilaterally, which appears similar to the prior exam.  There is mild bibasilar opacification, right greater than left. No pleural effusion or pneumothorax. 1. Minimal prominence of the pulmonary vasculature bilaterally. 2. Bibasilar opacification, right greater than left. XR CHEST PORTABLE    Result Date: 9/20/2021  EXAMINATION: ONE XRAY VIEW OF THE CHEST 9/20/2021 1:46 pm COMPARISON: 09/14/2021 HISTORY: ORDERING SYSTEM PROVIDED HISTORY: acute resp failure, covid pneumonia TECHNOLOGIST PROVIDED HISTORY: Reason for exam:->acute resp failure, covid pneumonia Reason for Exam: acute resp failure, covid pneumonia Acuity: Acute Type of Exam: Initial Additional signs and symptoms: na Relevant Medical/Surgical History: na FINDINGS: Mild pulmonary vascular congestion. There is no effusion or pneumothorax. The cardiomediastinal silhouette is without acute process. The osseous structures are without acute process. Mild pulmonary vascular congestion. XR CHEST PORTABLE    Result Date: 9/14/2021  EXAMINATION: ONE XRAY VIEW OF THE CHEST 9/14/2021 1:52 pm COMPARISON: Chest x-ray December 23, 2016 HISTORY: ORDERING SYSTEM PROVIDED HISTORY: cough SOB chest pain TECHNOLOGIST PROVIDED HISTORY: Reason for exam:->cough SOB chest pain Reason for Exam: nausea   fever    sob   rectal bleeding Acuity: Unknown Type of Exam: Unknown Relevant Medical/Surgical History: copd FINDINGS: Cardiac silhouette is enlarged.   Chronic interstitial changes of the lungs with slightly increased interstitial opacities compared with previous exam. Superimposed edema or atypical/viral infectious process cannot be excluded in the appropriate clinical setting. Nonspecific fullness of the right hilar region which is new when compared with previous exam.  Findings could reflect infiltrate or lymphadenopathy. Underlying mass cannot entirely be excluded on radiograph. 1. Increased interstitial opacities bilaterally when compared with previous exam.  Mild pulmonary edema or atypical/viral infectious process cannot be excluded in the appropriate clinical setting. 2. Nonspecific fullness of the right hilar region which is new compared with previous exam.  Findings could reflect infiltrate or possible lymphadenopathy. Underlying mass lesion can also not be excluded on radiograph. Recommend further evaluation with dedicated CT chest with contrast if clinically feasible. CTA PULMONARY W CONTRAST    Result Date: 9/14/2021  EXAMINATION: CTA OF THE CHEST 9/14/2021 3:46 pm TECHNIQUE: CTA of the chest was performed after the administration of intravenous contrast.  Multiplanar reformatted images are provided for review. MIP images are provided for review. Dose modulation, iterative reconstruction, and/or weight based adjustment of the mA/kV was utilized to reduce the radiation dose to as low as reasonably achievable. COMPARISON: None. HISTORY: ORDERING SYSTEM PROVIDED HISTORY: abnormal cxr elevated d dimer TECHNOLOGIST PROVIDED HISTORY: Reason for exam:->abnormal cxr elevated d dimer Decision Support Exception - unselect if not a suspected or confirmed emergency medical condition->Emergency Medical Condition (MA) Reason for Exam: abnormal cxr elevated d dimer Acuity: Acute Type of Exam: Initial Additional signs and symptoms: Nausea; Fever; Shortness of Breath; Rectal Bleeding FINDINGS: Pulmonary Arteries: Pulmonary arteries are adequately opacified for evaluation. No evidence of intraluminal filling defect to suggest pulmonary embolism. Main pulmonary artery is normal in caliber. Mediastinum: No evidence of mediastinal lymphadenopathy. The heart and pericardium demonstrate no acute abnormality. There is no acute abnormality of the thoracic aorta. Lungs/pleura: Predominately peripheral ground-glass opacities seen in both lungs. No pneumothorax. No pleural effusion. Central airways are patent. Upper Abdomen: Limited images of the upper abdomen are unremarkable. Soft Tissues/Bones: No acute bone or soft tissue abnormality. Negative for acute pulmonary embolism Predominately peripheral ground-glass opacities in both lungs could represent COVID pneumonia in the proper clinical setting     US ABDOMEN LIMITED    Result Date: 9/24/2021  EXAMINATION: RIGHT UPPER QUADRANT ULTRASOUND 9/24/2021 6:45 am COMPARISON: CTA of the pulmonary arteries dated 09/14/2021 HISTORY: ORDERING SYSTEM PROVIDED HISTORY: RUQ US, elevated LFTs TECHNOLOGIST PROVIDED HISTORY: Reason for exam:->RUQ US, elevated LFTs Reason for Exam: abnormal labs Acuity: Acute Type of Exam: Initial FINDINGS: LIVER:  There is an increase in the echogenicity of the liver compatible with fatty infiltration. The liver is top-normal size with a soft caudal dimension of 19.5 cm. No focal hepatic mass or intrahepatic biliary ductal dilatation is seen. BILIARY SYSTEM:  Small amount of pericholecystic fluid is seen. No gallbladder wall thickening or gallstones are seen. No sonographic Boo's sign was elicited during the exam. Common bile duct is within normal limits measuring 5 mm. RIGHT KIDNEY: The right kidney is grossly unremarkable without evidence of hydronephrosis. PANCREAS:  Not seen secondary to overlying bowel gas. OTHER: No evidence of right upper quadrant ascites. Fatty liver. Small amount of pericholecystic fluid but no gallstones identified.   No sonographic Boo's sign elicited during the exam.           [address]

## 2021-10-05 NOTE — PROGRESS NOTES
[address] [facility]  consulted by Dr. Darylene Frederic for monitoring and adjustment. Indication for treatment: Pneumonia  Goal trough: 15-20 mcg/mL  AUC/MARYSOL: 400-600    Pertinent Laboratory Values:   Temp Readings from Last 3 Encounters:   10/05/21 98 °F (36.7 °C) (Axillary)   12/23/16 98.8 °F (37.1 °C) (Oral)     Recent Labs     10/03/21  0908 10/04/21  1324 10/05/21  0851   WBC 17.2* 15.3* 13.3*     Recent Labs     10/03/21  0908 10/04/21  1631 10/05/21  0851   BUN 39* 34* 31*   CREATININE 0.7* 0.5* 0.4*     Estimated Creatinine Clearance: 237 mL/min (A) (based on SCr of 0.4 mg/dL (L)). Intake/Output Summary (Last 24 hours) at 10/5/2021 1329  Last data filed at 10/5/2021 1151  Gross per 24 hour   Intake 360 ml   Output 1225 ml   Net -865 ml       Pertinent Cultures:  Date    Source    Results  10/5   MRSA nasal   Ordered    Vancomycin level:   TROUGH:  No results for input(s): VANCOTROUGH in the last 72 hours. RANDOM:  No results for input(s): VANCORANDOM in the last 72 hours.     Assessment:  · WBC and temperature: WBC @ 13.3; afebrile  · SCr, BUN, and urine output: WNL, slight improvement in trends  · Day(s) of therapy: 1  · Vancomycin concentration: to be collected    Plan:  · Vancomycin 1500 mg IVPB q12h  · Predicted trough: 17,   · Plan to collect a level prior to the 4th total dose  · Pharmacy will continue to monitor patient and adjust therapy as indicated    Sb 3 10/7 @ 3708    Thank you for the consult,  Modesta Montoya, [facility], PharmD  10/5/2021 1:29 PM

## 2021-10-05 NOTE — PLAN OF CARE
level will decrease  Description: Pain level will decrease  Outcome: Ongoing  Goal: Control of acute pain  Description: Control of acute pain  Outcome: Ongoing  Goal: Control of chronic pain  Description: Control of chronic pain  Outcome: Ongoing  Goal: Patient's pain/discomfort is manageable  Description: Patient's pain/discomfort is manageable  Outcome: Ongoing     Problem: Skin Integrity:  Goal: Will show no infection signs and symptoms  Description: Will show no infection signs and symptoms  Outcome: Ongoing  Goal: Absence of new skin breakdown  Description: Absence of new skin breakdown  Outcome: Ongoing     Problem: Infection:  Goal: Will remain free from infection  Description: Will remain free from infection  Outcome: Ongoing     Problem: Safety:  Goal: Free from accidental physical injury  Description: Free from accidental physical injury  Outcome: Ongoing  Goal: Free from intentional harm  Description: Free from intentional harm  Outcome: Ongoing     Problem: Daily Care:  Goal: Daily care needs are met  Description: Daily care needs are met  Outcome: Ongoing     Problem: Skin Integrity:  Goal: Skin integrity will stabilize  Description: Skin integrity will stabilize  Outcome: Ongoing     Problem: Discharge Planning:  Goal: Patients continuum of care needs are met  Description: Patients continuum of care needs are met  Outcome: Ongoing

## 2021-10-05 NOTE — PROGRESS NOTES
This note also relates to the following rows which could not be included:  Resp - Cannot attach notes to unvalidated device data       10/05/21 1610   NIV Type   Equipment Type V60   Mode Bilevel   Mask Type Full face mask   Mask Size Large   Settings/Measurements   IPAP 18 cmH20   CPAP/EPAP 12 cmH2O   Rate Ordered 12   FiO2  100 %   Vt Exhaled 585 mL   Minute Volume 17.4 Liters   Mask Leak (lpm) 80 lpm   Comfort Level Good   Using Accessory Muscles No   SpO2 89   Alarm Settings   Alarms On Y   Press Low Alarm 5 cmH2O   High Pressure Alarm 25 cmH2O   Delay Alarm 20 sec(s)   Apnea (secs) 20 secs   Resp Rate Low Alarm 13   High Respiratory Rate 40 br/min

## 2021-10-05 NOTE — PROGRESS NOTES
Infectious Disease Progress Note  10/5/2021   Patient Name: Slime Patton : 1955       Reason for visit: F/u COVID-19 pneumonia, acute respiratory failure? History:? Interval history noted  Worsened breathing, pain on swalloowing  Physical Exam:  Vital Signs: /68   Pulse 87   Temp 97.6 °F (36.4 °C) (Axillary)   Resp 20   Ht 5' 9.02\" (1.753 m)   Wt 255 lb 11.7 oz (116 kg)   SpO2 (!) 89%   BMI 37.75 kg/m²     Gen: alert and oriented X3, on BiPAP  Skin: no stigmata of endocarditis  Wounds: C/D/I  HEMT: AT/NC Oropharynx is dry and cracked  Eyes: PERRLA, EOMI, conjunctiva pink, sclera anicteric. Neck: Supple. Trachea midline. No LAD. Chest: deferred  Heart: RRR  Abd: soft, non-distended, no tenderness, no hepatomegaly. Normoactive bowel sounds. Ext: no clubbing, cyanosis, or edema  Catheter Site: without erythema or tenderness  LDA: CVC:  Neuro: Mental status intact. CN 2-12 intact and no focal sensory or motor deficits     Radiologic / Imaging / TESTING  Collected: 10/04/21 1504 Updated: 10/04/21 1508 Narrative:  EXAMINATION:   ONE XRAY VIEW OF THE CHEST     10/4/2021 1:46 pm     COMPARISON:   2021     HISTORY:   ORDERING SYSTEM PROVIDED HISTORY: covid 23   TECHNOLOGIST PROVIDED HISTORY:   Reason for exam:->covid 23   Reason for Exam: covid 19   Acuity: Acute   Type of Exam: Initial     FINDINGS:   Mild cardiomegaly. Cardiomediastinal silhouette appears unchanged. Multifocal bibasilar predominant airspace disease. No definite effusion. No   pneumothorax or subdiaphragmatic free air. No acute osseous abnormality   identified. Impression:  Multifocal bibasilar predominant airspace disease has progressed slightly   since prior study.            Labs:    Recent Results (from the past 24 hour(s))   CBC auto differential    Collection Time: 10/04/21  1:24 PM   Result Value Ref Range    WBC 15.3 (H) 4.0 - 10.5 K/CU MM    RBC 5.18 4.6 - 6.2 M/CU MM    Hemoglobin 14.8 13.5 - 18.0 GM/DL    Hematocrit 46.2 42 - 52 %    MCV 89.2 78 - 100 FL    MCH 28.6 27 - 31 PG    MCHC 32.0 32.0 - 36.0 %    RDW 14.8 11.7 - 14.9 %    Platelets 203 001 - 424 K/CU MM    MPV 15.4 (H) 7.5 - 11.1 FL    Differential Type AUTOMATED DIFFERENTIAL     Segs Relative 92.9 (H) 36 - 66 %    Lymphocytes % 3.1 (L) 24 - 44 %    Monocytes % 1.6 0 - 4 %    Eosinophils % 0.4 0 - 3 %    Basophils % 0.2 0 - 1 %    Segs Absolute 14.2 K/CU MM    Lymphocytes Absolute 0.5 K/CU MM    Monocytes Absolute 0.3 K/CU MM    Eosinophils Absolute 0.1 K/CU MM    Basophils Absolute 0.0 K/CU MM    Nucleated RBC % 0.0 %    Total Nucleated RBC 0.0 K/CU MM    Total Immature Neutrophil 0.28 K/CU MM    Immature Neutrophil % 1.8 (H) 0 - 0.43 %   D-dimer, quantitative    Collection Time: 10/04/21  2:32 PM   Result Value Ref Range    D-Dimer, Quant 686 (H) <230 NG/mL(DDU)   Comprehensive Metabolic Panel    Collection Time: 10/04/21  4:31 PM   Result Value Ref Range    Sodium 129 (L) 135 - 145 MMOL/L    Potassium 5.2 (H) 3.5 - 5.1 MMOL/L    Chloride 89 (L) 99 - 110 mMol/L    CO2 24 21 - 32 MMOL/L    BUN 34 (H) 6 - 23 MG/DL    CREATININE 0.5 (L) 0.9 - 1.3 MG/DL    Glucose 158 (H) 70 - 99 MG/DL    Calcium 8.7 8.3 - 10.6 MG/DL    Albumin 3.2 (L) 3.4 - 5.0 GM/DL    Total Protein 6.5 6.4 - 8.2 GM/DL    Total Bilirubin 1.8 (H) 0.0 - 1.0 MG/DL     (H) 10 - 40 U/L     (H) 15 - 37 IU/L    Alkaline Phosphatase 180 (H) 40 - 128 IU/L    GFR Non-African American >60 >60 mL/min/1.73m2    GFR African American >60 >60 mL/min/1.73m2    Anion Gap 16 4 - 16   Magnesium    Collection Time: 10/04/21  4:31 PM   Result Value Ref Range    Magnesium 2.0 1.8 - 2.4 mg/dl     CULTURE results: Invalid input(s): BLOOD CULTURE,  URINE CULTURE, SURGICAL CULTURE    Diagnosis:  Patient Active Problem List   Diagnosis    Obstructive sleep apnea syndrome    Arteriosclerosis of coronary artery    Acute bronchitis with chronic obstructive pulmonary disease (COPD) ([facility] [address]    COVID-19    Tobacco dependence in remission    Obesity    Hyperlipidemia    Cardiomyopathy ([facility] [address]    Aneurysm of thoracic aorta (HCC)    Acute respiratory failure with hypoxia (HCC)    Severe malnutrition (HCC)    Vitamin D deficiency       Active Problems  Active Problems:    Obstructive sleep apnea syndrome    COVID-19    Tobacco dependence in remission    Obesity    Hyperlipidemia    Acute respiratory failure with hypoxia (HCC)    Severe malnutrition (HCC)    Vitamin D deficiency  Resolved Problems:    * No resolved hospital problems. *      Impression and plan   Summary and rationale: Patient is a 63 y.o.  male with a medical hx of obesity, HLD , thoracic aorta aneurysm admitted on 9/14/2021 with SOB, cough, fever, chills, anorexia for 5 days prior to admission. Diagnosed with severe covid-19 pneumonia and respiratory failure   Vitamin D deficiency   Candida esophagitis   Fatty liver disease present and this may double the risk of mortality   COVID-19 symptom onset: 9/9/2021   COVID-19 test date: 9/14/2021   Expected discontinuation of isolation precautions: 9/29/2021   Clinical status: Day 27 of disease, remains on BiPAP, worsening, intubation may be imminent. Elevated CRP and procalcitonin. Possible bacterial superinfection. Or tissue damage from hypoxia.  Transaminitis: Hepatitis A, B and C serology test are negative. Concerned about hypoxic damage. Rule out CMV, HSV.  Therapeutic:  o Ongoing antibiotics: Vancomycin and cefepime  o Fluconazole 200 mg daily for 14 days. o Immunomodulators:  o Dexamethasone: 9/14-20 (7 days)  - Solumedrol 40 mg q 12h, (9/20-10/1); dexamethasone 10/2-  - Baracitinib: 9/15 , d/red due to transaminitis  o Completed antibiotics: azithromycin  o Other agents:    Diagnostic: CMV IgG, CMV IgM, HSV IgG, Aspergillus antigen, beta-1 3D glucan, BNP, check IgG, A and M   F/u:   Other: Taper and discontinue corticosteroids.       Electronically signed by: Electronically signed by Yovani Rubin MD on 10/5/2021 at 8:25 AM

## 2021-10-05 NOTE — PROGRESS NOTES
Pt's daughter, Aimee Pacheco, called for an update on Pt. She was able to provide Pt's pass code. This RN updated her on Pt's current condition and answered her questions. She stated she is on her way here to come and see Pt.

## 2021-10-05 NOTE — PROGRESS NOTES
pulmonary      SUBJECTIVE:no change and remains on high o2     OBJECTIVE    VITALS:  /68   Pulse 87   Temp 97.6 °F (36.4 °C) (Axillary)   Resp 20   Ht 5' 9.02\" (1.753 m)   Wt 255 lb 11.7 oz (116 kg)   SpO2 (!) 89%   BMI 37.75 kg/m²   HEAD AND FACE EXAM:  No throat injection, no active exudate,no thrush  NECK EXAM;No JVD, no masses, symmetrical  CHEST EXAM; Expansion equal and symmetrical, no masses  LUNG EXAM; Good breath sounds bilaterally. There are expiratory wheezes both lungs, there are crackles at both lung bases  CARDIOVASCULAR EXAM: Positive S1 and S2, no S3 or S4, no clicks ,no murmurs  RIGHT AND LEFT LOWER EXTRIMITY EXAM: No edema, no swelling, no inflamation  CNS EXAM: Alert and oriented X3          LABS   Lab Results   Component Value Date    WBC 15.3 (H) 10/04/2021    HGB 14.8 10/04/2021    HCT 46.2 10/04/2021    MCV 89.2 10/04/2021     10/04/2021     Lab Results   Component Value Date    CREATININE 0.5 (L) 10/04/2021    BUN 34 (H) 10/04/2021     (L) 10/04/2021    K 5.2 (H) 10/04/2021    CL 89 (L) 10/04/2021    CO2 24 10/04/2021     Lab Results   Component Value Date    INR 0.95 09/26/2021    PROTIME 12.2 09/26/2021          Lab Results   Component Value Date    PHOS 3.4 09/19/2021      No results for input(s): PH, PO2ART, RTU7ZTA, HCO3, BEART, O2SAT in the last 72 hours. Wt Readings from Last 3 Encounters:   09/30/21 255 lb 11.7 oz (116 kg)   12/23/16 263 lb (119.3 kg)        EXAMINATION:   ONE XRAY VIEW OF THE CHEST       10/4/2021 1:46 pm       COMPARISON:   October 2, 2021       HISTORY:   ORDERING SYSTEM PROVIDED HISTORY: covid 23   TECHNOLOGIST PROVIDED HISTORY:   Reason for exam:->covid 23   Reason for Exam: covid 19   Acuity: Acute   Type of Exam: Initial       FINDINGS:   Mild cardiomegaly.  Cardiomediastinal silhouette appears unchanged. Multifocal bibasilar predominant airspace disease.  No definite effusion.  No   pneumothorax or subdiaphragmatic free air.  No acute osseous abnormality   identified.           Impression   Multifocal bibasilar predominant airspace disease has progressed slightly   since prior study.                   ASSESMENT  Ac on ch resp failure  covid pneumonia  copd        PLAN  1. Bd rx  2. cpm  3. o2 adm and bipap  4.  Observe closely as may need vent support    10/5/2021  Cassidy Lord MD, M.D.

## 2021-10-06 PROBLEM — I42.8 NONISCHEMIC CARDIOMYOPATHY (HCC): Status: ACTIVE | Noted: 2021-01-01

## 2021-10-06 NOTE — PROGRESS NOTES
Cardiology Progress Note     Today's Plan: IV digoxin, PRN metoprolol     Admit Date:  9/14/2021    Consult reason/ Seen today for: CHF     Subjective and  Overnight Events:  Patient reports SOB is unchanged but was able to tolerate eating breakfast.       Assessment / Plan / Recommendation:     1. Acute respiratory failure: patient wanting to hold off on ventilator support. 2. COVID-19 pneumonia: Currently on BiPAP,IV antibiotics, Bronchodilators, ICU care. 3. Acute on chronic systolic heart failure: total I/O since admission -18 L. Lasix and lisinopril on hold due to worsening renal function. Continue with Coreg. 4. nonischemic cardiomyopathy as per history -patient follows up at [facility], had left normal heart cath in the past.  5. Tachycardia: sinus tachycardia secondary to hypoxia. Stop Cardizem due to low EF, start Digoxin  mcg and PRN q 4 hrs metoprolol 2.5 mg IVP. 5. Dilated aortic root: Patient with history of thoracic aortic aneurysm. Recommend close outpatient follow-up  6. DVT: Lovenox      History of Presenting Illness:    Chief complain on admission : 63 y. o.year old who is admitted for  Chief Complaint   Patient presents with    Nausea     has not eaten in 5 days    Fever     headache, eye pain    Shortness of Breath    Rectal Bleeding     black diarrhea for 3 days        Past medical history:    has a past medical history of Asthma, COPD (chronic obstructive pulmonary disease) ([address] and COVID-19. Past surgical history:   has a past surgical history that includes Wrist surgery (Left, 1976); hernia repair; and Cardiac catheterization (2013). Social History:   reports that he quit smoking about 16 years ago. His smoking use included cigarettes. He started smoking about 50 years ago. He smoked 0.50 packs per day.  He has never used smokeless tobacco. He reports that he does not drink alcohol and does not use drugs. Family history:  family history is not on file. Allergies   Allergen Reactions    Percocet [Oxycodone-Acetaminophen] Nausea And Vomiting       Review of Systems:  Review of Systems   Constitutional: Positive for fatigue. Respiratory: Positive for shortness of breath. Cardiovascular: Positive for leg swelling. Negative for chest pain and palpitations. Musculoskeletal: Negative. Skin: Negative. Neurological: Negative for dizziness and weakness. All other systems reviewed and are negative. /78   Pulse 89   Temp 96.6 °F (35.9 °C) (Axillary)   Resp 27   Ht 5' 9.02\" (1.753 m)   Wt 255 lb 11.7 oz (116 kg)   SpO2 (!) 78%   BMI 37.75 kg/m²       Intake/Output Summary (Last 24 hours) at 10/6/2021 1109  Last data filed at 10/6/2021 1000  Gross per 24 hour   Intake 834.13 ml   Output 2600 ml   Net -1765.87 ml       Physical Exam:  Physical Exam  Constitutional:       Appearance: He is well-developed. Cardiovascular:      Rate and Rhythm: Regular rhythm. Tachycardia present. Pulses: Intact distal pulses. Dorsalis pedis pulses are 2+ on the right side and 2+ on the left side. Posterior tibial pulses are 2+ on the right side and 2+ on the left side. Heart sounds: Normal heart sounds, S1 normal and S2 normal.   Pulmonary:      Breath sounds: Examination of the right-lower field reveals decreased breath sounds. Examination of the left-lower field reveals decreased breath sounds. Decreased breath sounds present. Musculoskeletal:         General: Normal range of motion. Comments: Trace ankle edema    Skin:     General: Skin is warm and dry. Neurological:      Mental Status: He is alert and oriented to person, place, and time.           Medications:    [START ON 10/7/2021] vancomycin (VANCOCIN) intermittent dosing (placeholder)   Other RX Placeholder    vitamin D  50,000 Units Oral Weekly    [Held by provider] furosemide  40 mg IntraVENous BID    cefepime  2,000 mg IntraVENous Q8H    fluconazole  200 mg IntraVENous Q24H    dexamethasone  10 mg IntraVENous Daily    [Held by provider] lisinopril  2.5 mg Oral Daily    albuterol sulfate HFA  2 puff Inhalation 4x daily    [Held by provider] HYDROcodone 5 mg - acetaminophen  1 tablet Oral Q12H    vitamin C  1,000 mg Oral BID    zinc sulfate  50 mg Oral BID    Vitamin D  1,000 Units Oral Daily    sodium chloride flush  5-40 mL IntraVENous 2 times per day    enoxaparin  30 mg SubCUTAneous BID    bisacodyl  5 mg Oral Daily    carvedilol  6.25 mg Oral BID    [Held by provider] divalproex  250 mg Oral Daily    pantoprazole  40 mg Oral QAM AC    montelukast  10 mg Oral QPM    fluticasone  2 puff Inhalation BID    [Held by provider] pravastatin  40 mg Oral Dinner    tiotropium-olodaterol  2 puff Inhalation BID    [Held by provider] diclofenac-miSOPROStol  1 tablet Oral BID      sodium chloride       LORazepam, morphine, sodium chloride, senna, polyvinyl alcohol, albuterol sulfate HFA, LORazepam, benzonatate, sodium chloride flush, sodium chloride, ondansetron **OR** ondansetron, polyethylene glycol, [Held by provider] acetaminophen **OR** [Held by provider] acetaminophen, guaiFENesin-dextromethorphan, methocarbamol    Lab Data:  CBC:   Recent Labs     10/04/21  1324 10/05/21  0851 10/06/21  0337   WBC 15.3* 13.3* 13.4*   HGB 14.8 15.5 14.4   HCT 46.2 48.2 44.1   MCV 89.2 88.8 85.5    185 165     BMP:   Recent Labs     10/04/21  1631 10/05/21  0851 10/06/21  0337   * 132* 133*   K 5.2* 4.9 4.7   CL 89* 93* 94*   CO2 24 27 23   PHOS  --  3.5  --    BUN 34* 31* 45*   CREATININE 0.5* 0.4* 1.6*     PT/INR: No results for input(s): PROTIME, INR in the last 72 hours. BNP:    Recent Labs     10/05/21  0851   PROBNP 459.5*     TROPONIN: No results for input(s): TROPONINT in the last 72 hours.      Impression:  Active Problems:    Obstructive sleep apnea syndrome    COVID-19    Tobacco dependence in remission    Obesity    Hyperlipidemia    Acute respiratory failure with hypoxia (HCC)    Severe malnutrition (HCC)    Vitamin D deficiency  Resolved Problems:    * No resolved hospital problems. *       All labs, medications and tests reviewed by myself, continue all other medications of all above medical condition listed as is except for changes mentioned above. Thank you   Please call with questions. Electronically signed by Phil Paulino. DANIELLE Goldberg CNP on 10/6/2021 at 11:09 AM           CARDIOLOGY ATTENDING ADDENDUM    I have seen, spoken to and examined this patient personally, independently of the nurse practitioner. I have reviewed the hospital care given to date and reviewed all pertinent labs and imaging. The plan was developed mutually at the time of the visit with the patient,  NP   and myself. I have spoken with patient, nursing staff and provided written and verbal instructions . The above note has been reviewed and I agree with the assessment, diagnosis, and treatment plan with changes made by me as follows       HPI:  I have reviewed the above HPI  And agree with above   Please review addendum/changes made to note above     Interval history:      Tachycardia       Physical Exam:  General:  Awake, alert, NAD  Head:normal  Eye:normal  Neck:  No JVD   Chest: Coarse breath sounds  Cardiovascular:  s1s2 tachy  Abdomen:   nontender  Extremities:  0 edema  Pulses; palpable  Neuro: grossly normal      MEDICAL DECISION MAKING;    I agree with the above plan, which was planned by myself and discussed with NP.     IV metoprolol as needed  Add digoxin  Will encourage against IV Cardizem as patient is sinus tach and cardiomyopathy  Continue with supportive care for COVID-19        Dr. Sherine Viveros MD

## 2021-10-06 NOTE — PROGRESS NOTES
Pt  and sustained. EKG obtained. Dr. Evy Carlson notified. See new orders. HR down to 110. Dr. Nilda Escobar notified as well.

## 2021-10-06 NOTE — PROGRESS NOTES
pulmonary      SUBJECTIVE: not much improvement     OBJECTIVE    VITALS:  /69   Pulse 90   Temp 96.6 °F (35.9 °C) (Axillary)   Resp 29   Ht 5' 9.02\" (1.753 m)   Wt 255 lb 11.7 oz (116 kg)   SpO2 (!) 88%   BMI 37.75 kg/m²   HEAD AND FACE EXAM:  No throat injection, no active exudate,no thrush  NECK EXAM;No JVD, no masses, symmetrical  CHEST EXAM; Expansion equal and symmetrical, no masses  LUNG EXAM; Good breath sounds bilaterally. There are expiratory wheezes both lungs, there are crackles at both lung bases  CARDIOVASCULAR EXAM: Positive S1 and S2, no S3 or S4, no clicks ,no murmurs  RIGHT AND LEFT LOWER EXTRIMITY EXAM: No edema, no swelling, no inflamation  CNS EXAM: Alert and oriented X3          LABS   Lab Results   Component Value Date    WBC 13.4 (H) 10/06/2021    HGB 14.4 10/06/2021    HCT 44.1 10/06/2021    MCV 85.5 10/06/2021     10/06/2021     Lab Results   Component Value Date    CREATININE 1.6 (H) 10/06/2021    BUN 45 (H) 10/06/2021     (L) 10/06/2021    K 4.7 10/06/2021    CL 94 (L) 10/06/2021    CO2 23 10/06/2021     Lab Results   Component Value Date    INR 0.95 09/26/2021    PROTIME 12.2 09/26/2021          Lab Results   Component Value Date    PHOS 3.5 10/05/2021    PHOS 3.4 09/19/2021        Recent Labs     10/05/21  0830   PH 7.48*   PO2ART 46*   INM2IRC 37.0   O2SAT 85.0*         Wt Readings from Last 3 Encounters:   09/30/21 255 lb 11.7 oz (116 kg)   12/23/16 263 lb (119.3 kg)               ASSESMENT  Ac resp failure  covid pneumonia  copd        PLAN  1. D/w pt  2. Cont bipap with 100% fio2  3.  Observe closely as may need vent support    10/6/2021  Jessica Sullivan MD, MAlexaD.

## 2021-10-06 NOTE — PROGRESS NOTES
10/06/21 0737   NIV Type   $NIV $Daily Charge   NIV Started/Stopped On   Equipment Type V60   Mode Bilevel   Mask Type Full face mask   Mask Size Large   Settings/Measurements   IPAP 18 cmH20   CPAP/EPAP 12 cmH2O   Rate Ordered 12   Resp 23   Insp Rise Time (%) 3 %   FiO2  100 %   I Time/ I Time % 1.15 s   Vt Exhaled 405 mL   Minute Volume 11 Liters   Mask Leak (lpm) 81 lpm   Comfort Level Good   Using Accessory Muscles No   SpO2 92   Alarm Settings   Alarms On Y   Press Low Alarm 5 cmH2O   High Pressure Alarm 25 cmH2O   Delay Alarm 20 sec(s)   Apnea (secs) 20 secs   Resp Rate Low Alarm 13   High Respiratory Rate 40 br/min

## 2021-10-06 NOTE — PROGRESS NOTES
Infectious Disease Progress Note  10/6/2021   Patient Name: Collins Smith : 1955       Reason for visit: F/u COVID-19 pneumonia, acute respiratory failure? History:? Interval history noted  Less pain with swallowing, quite fatigued and remains on BiPAP  Physical Exam:  Vital Signs: /77   Pulse 87   Temp 96.5 °F (35.8 °C) (Axillary)   Resp 25   Ht 5' 9.02\" (1.753 m)   Wt 255 lb 11.7 oz (116 kg)   SpO2 90%   BMI 37.75 kg/m²     Gen: alert and oriented X3, on BiPAP  Skin: no stigmata of endocarditis  Wounds: C/D/I  HEMT: AT/NC Oropharynx is dry and cracked  Eyes: PERRLA, EOMI, conjunctiva pink, sclera anicteric. Neck: Supple. Trachea midline. No LAD. Chest: deferred  Heart: RRR  Abd: soft, non-distended, no tenderness, no hepatomegaly. Normoactive bowel sounds. Ext: no clubbing, cyanosis, or edema  Catheter Site: without erythema or tenderness  LDA: CVC:  Neuro: Mental status intact. CN 2-12 intact and no focal sensory or motor deficits     Radiologic / Imaging / TESTING  Collected: 10/04/21 1504 Updated: 10/04/21 1508 Narrative:  EXAMINATION:   ONE XRAY VIEW OF THE CHEST     10/4/2021 1:46 pm     COMPARISON:   2021     HISTORY:   ORDERING SYSTEM PROVIDED HISTORY: covid 23   TECHNOLOGIST PROVIDED HISTORY:   Reason for exam:->covid 23   Reason for Exam: covid 19   Acuity: Acute   Type of Exam: Initial     FINDINGS:   Mild cardiomegaly. Cardiomediastinal silhouette appears unchanged. Multifocal bibasilar predominant airspace disease. No definite effusion. No   pneumothorax or subdiaphragmatic free air. No acute osseous abnormality   identified. Impression:  Multifocal bibasilar predominant airspace disease has progressed slightly   since prior study. Labs:    Recent Results (from the past 24 hour(s))   Vancomycin, trough    Collection Time: 10/06/21  3:37 AM   Result Value Ref Range    Vancomycin Tr 13.6 10 - 20 UG/ML    DOSE AMOUNT DOSE AMT.  GIVEN - 1500     DOSE  Acute bronchitis with chronic obstructive pulmonary disease (COPD) ([facility] [address]    COVID-19    Tobacco dependence in remission    Obesity    Hyperlipidemia    Cardiomyopathy ([facility] [address]    Aneurysm of thoracic aorta (HCC)    Acute respiratory failure with hypoxia (HCC)    Severe malnutrition (HCC)    Vitamin D deficiency       Active Problems  Active Problems:    Obstructive sleep apnea syndrome    COVID-19    Tobacco dependence in remission    Obesity    Hyperlipidemia    Acute respiratory failure with hypoxia (HCC)    Severe malnutrition (HCC)    Vitamin D deficiency  Resolved Problems:    * No resolved hospital problems. *      Impression and plan   Summary and rationale: Patient is a 63 y.o.  male with a medical hx of obesity, HLD , thoracic aorta aneurysm admitted on 9/14/2021 with SOB, cough, fever, chills, anorexia for 5 days prior to admission. Diagnosed with severe covid-19 pneumonia and respiratory failure   Vitamin D deficiency   Candida esophagitis   Fatty liver disease present and this may double the risk of mortality   COVID-19 symptom onset: 9/9/2021   COVID-19 test date: 9/14/2021   Expected discontinuation of isolation precautions: 9/29/2021   Clinical status: Day 28 of disease, remains on BiPAP,  Worsening liver enzymes. Elevated CRP and procalcitonin. Possible bacterial superinfection. Or tissue damage from hypoxia. Elevated BNP. Discussed with the patient. Worsening hypoxic damage, intubation is imminent. I advised elective intubation. Continue hypoxia will lead to cardiac dysrhythmia. He would like his daughter and significant other to be called. I called and left a message they should call back. Prognosis is poor at this time.  Transaminitis: Hepatitis A, B and C serology test are negative. Concerned about hypoxic damage. Rule out CMV, HSV.    Therapeutic:  o Ongoing antibiotics: Vancomycin and cefepime  o Fluconazole 200 mg daily for 14

## 2021-10-06 NOTE — PROGRESS NOTES
Dr. Lb Deutsch discussed intubation with pt as pt is maxed on BiPAP and sat is 86%. Pt wants family to come in before any decision is made unless it is an emergent intubation. Sarah, pt daughter, is coordinating for family to come see pt.

## 2021-10-06 NOTE — PROGRESS NOTES
Anh Mei, pt SO, at bedside with pt. Pt O2 sat 91% on BiPAP, RR 20.  Pt resting comfortably and talking with SO.

## 2021-10-06 NOTE — PROGRESS NOTES
minutes.     Current Facility-Administered Medications   Medication Dose Route Frequency Provider Last Rate Last Admin    [START ON 10/7/2021] vancomycin (VANCOCIN) intermittent dosing (placeholder)   Other RX Placeholder Luis Lua MD        albumin human 5 % IV solution 25 g  25 g IntraVENous Once Tawanna Prakash MD        dilTIAZem (CARDIZEM) tablet 30 mg  30 mg Oral 4 times per day Tawanna Prakash MD        vitamin D (ERGOCALCIFEROL) capsule 50,000 Units  50,000 Units Oral Weekly Tawanna Prakash MD   50,000 Units at 10/05/21 1147    [Held by provider] furosemide (LASIX) injection 40 mg  40 mg IntraVENous BID Tawanna Prakash MD   40 mg at 10/05/21 1721    cefepime (MAXIPIME) 2000 mg IVPB minibag  2,000 mg IntraVENous Brandan Tomlin MD   Stopped at 10/06/21 1308    fluconazole (DIFLUCAN) 200 mg IVPB  200 mg IntraVENous Q24H Luis Lua  mL/hr at 10/05/21 1822 Rate Verify at 10/05/21 1822    LORazepam (ATIVAN) injection 1 mg  1 mg IntraVENous Q6H PRN Sabina Rolle MD   1 mg at 10/06/21 1414    morphine (PF) injection 2 mg  2 mg IntraVENous Q4H PRN Sabina Rolle MD        sodium chloride (OCEAN, BABY AYR) 0.65 % nasal spray 1 spray  1 spray Each Nostril PRN Brant Cunha MD   1 spray at 10/03/21 1725    dexamethasone (PF) (DECADRON) injection 10 mg  10 mg IntraVENous Daily Darby KELLY MD   10 mg at 10/06/21 0820    [Held by provider] lisinopril (PRINIVIL;ZESTRIL) tablet 2.5 mg  2.5 mg Oral Daily Darby KELLY MD   2.5 mg at 10/03/21 0942    senna (SENOKOT) tablet 8.6 mg  1 tablet Oral BID PRN Brant Cunha MD        albuterol sulfate  (90 Base) MCG/ACT inhaler 2 puff  2 puff Inhalation 4x daily Gelacio Corral MD   2 puff at 10/06/21 1120    polyvinyl alcohol (LIQUIFILM TEARS) 1.4 % ophthalmic solution 1 drop  1 drop Both Eyes Q1H PRN Shannon Morrison MD        albuterol sulfate  (90 Base) MCG/ACT inhaler 2 puff  2 puff Inhalation Q4H PRN Blanca Soriano MD   2 puff at 09/27/21 1924    LORazepam (ATIVAN) tablet 1 mg  1 mg Oral Q6H PRN Lili Guerrier MD   1 mg at 10/06/21 0820    [Held by provider] HYDROcodone-acetaminophen ([facility]) 5-325 MG per tablet 1 tablet  1 tablet Oral Q12H Lili Guerrier MD   1 tablet at 09/22/21 2210    ascorbic acid (VITAMIN C) tablet 1,000 mg  1,000 mg Oral BID Blank Munoz MD   1,000 mg at 10/06/21 0820    zinc sulfate (ZINCATE) capsule 50 mg  50 mg Oral BID Blank Munoz MD   50 mg at 10/06/21 0820    benzonatate (TESSALON) capsule 100 mg  100 mg Oral TID PRN Ivelisse Marie MD   100 mg at 09/15/21 1831    Vitamin D (CHOLECALCIFEROL) tablet 1,000 Units  1,000 Units Oral Daily Blank Munoz MD   1,000 Units at 10/06/21 0820    sodium chloride flush 0.9 % injection 5-40 mL  5-40 mL IntraVENous 2 times per day Drayton Back, APRN - CNP   10 mL at 10/06/21 1050    sodium chloride flush 0.9 % injection 5-40 mL  5-40 mL IntraVENous PRN DANIELLE Segura - CNP        0.9 % sodium chloride infusion  25 mL IntraVENous PRN Holly I DANIELLE Diggs - CNP        enoxaparin (LOVENOX) injection 30 mg  30 mg SubCUTAneous BID Yi Back, APRN - CNP   30 mg at 10/06/21 6587    ondansetron (ZOFRAN-ODT) disintegrating tablet 4 mg  4 mg Oral Q8H PRN Yi Back, APRN - CNP   4 mg at 09/15/21 2044    Or    ondansetron (ZOFRAN) injection 4 mg  4 mg IntraVENous Q6H PRN DANIELLE Segura - CNP        polyethylene glycol (GLYCOLAX) packet 17 g  17 g Oral Daily PRN DANIELLE Seugra - CNP   17 g at 10/01/21 1314    [Held by provider] acetaminophen (TYLENOL) tablet 650 mg  650 mg Oral Q6H PRN DANIELLE Segura CNP        Or    [Held by provider] acetaminophen (TYLENOL) suppository 650 mg  650 mg Rectal Q6H PRN DANIELLE Segura CNP        bisacodyl (DULCOLAX) EC tablet 5 mg  5 mg Oral Daily DANIELLE Segura CNP   5 mg at 10/06/21 0820    guaiFENesin-dextromethorphan (ROBITUSSIN DM) 100-10 MG/5ML syrup 5 mL  5 mL Oral Q4H PRN Evgenyzaria Bejarano APRARNOL - CNP   5 mL at 10/03/21 1004    carvedilol (COREG) tablet 6.25 mg  6.25 mg Oral BID Evgenyzaria Bejarano APRN - CNP   6.25 mg at 10/06/21 0820    [Held by provider] divalproex (DEPAKOTE ER) extended release tablet 250 mg  250 mg Oral Daily Evgenyzaria Bejarano APRN - CNP   250 mg at 09/22/21 1026    methocarbamol (ROBAXIN) tablet 500 mg  500 mg Oral Q8H PRN Evgenyzaria Bejarano APRARNOL - CNP   500 mg at 09/15/21 2045    pantoprazole (PROTONIX) tablet 40 mg  40 mg Oral QAM AC DANIELLE Segura - CNP   40 mg at 10/06/21 0825    montelukast (SINGULAIR) tablet 10 mg  10 mg Oral QPM DANIELLE Segura - CNP   10 mg at 10/05/21 2125    fluticasone (FLOVENT HFA) 110 MCG/ACT inhaler 2 puff  2 puff Inhalation BID Evgenyzaria Bejarano APRN - CNP   2 puff at 10/06/21 0736    [Held by provider] pravastatin (PRAVACHOL) tablet 40 mg  40 mg Oral Dinner Evgenyzaria Bejarano APRN - CNP   40 mg at 09/22/21 1917    tiotropium-olodaterol (STIOLTO) 2.5-2.5 MCG/ACT inhaler 2 puff  2 puff Inhalation BID Evgenyzaria Bejarano APRN - CNP   2 puff at 10/02/21 2037    [Held by provider] diclofenac-miSOPROStol (ARTHROTEC 75) 75-0.2 MG per tablet 1 tablet  1 tablet Oral BID Evgenyzaria Bejarano APRN - CNP   1 tablet at 10/05/21 2129       Subjective:     Patient is currently 86 to 89% on current BiPAP settings with FiO2 100%. Could not take off BiPAP even for few seconds to eat or take meds  Patient wants to wait for intubation until he talks with his daughter face-to-face    Objective:        Intake/Output Summary (Last 24 hours) at 10/6/2021 1504  Last data filed at 10/6/2021 1000  Gross per 24 hour   Intake 714.13 ml   Output 1650 ml   Net -935.87 ml      Vitals:   Vitals:    10/06/21 1453   BP:    Pulse: 145   Resp:    Temp:    SpO2:      Physical Exam:  General Appearance: Corporate ID       [id number]   Accession Number   [id number]         Jennie Lozoya RN   Ordering Physician Smitha Low MD                 Physician           Suri WARREN  Procedure Type of Study   TTE procedure:ECHOCARDIOGRAM LIMITED. Procedure Date Date: 09/29/2021 Start: 02:18 PM Study Location: Portable Indications:Dyspnea/SOB, Elevated BNP and COVID-19. Patient Status: Routine Height: 69 inches Weight: 260 pounds BSA: 2.31 m2 BMI: 38.39 kg/m2 HR: 104 bpm BP: 113/73 mmHg  Conclusions   Summary  Expedited imaging was used to obtain protocol images to reduce the  sonographer's exposure during COVID-19 pandemic. Poor quality study due to body habitus. Left ventricular systolic function is abnormal.  Ejection fraction is visually estimated at 30%. Dilated aortic root (5 cm)-unable to visualize ascending aorta. Mild to moderate aortic regurgitation is noted by color Doppler,  ms. No evidence of any pericardial effusion. Signature   ------------------------------------------------------------------  Electronically signed by Umang Berry MD  (Interpreting physician) on 09/29/2021 at 03:08 PM  ------------------------------------------------------------------   Findings   Left Ventricle  Left ventricular systolic function is abnormal.  Ejection fraction is visually estimated at 30%. Aortic Valve  Dilated aortic root (5 cm). Mild to moderate aortic regurgitation is noted by color Doppler,  ms. Mitral Valve  Mild mitral regurgitation is present. Tricuspid Valve  Trace tricuspid regurgitation is present. Pulmonic Valve  The pulmonic valve was not well visualized. Pericardial Effusion  No evidence of any pericardial effusion.   M-Mode/2D Measurements & Calculations                                AO Root Dimension: 5 cm   CO: 6.79 l/min  CI: 2.94 l/m*m2                   LVOT: 2.5 cm  Doppler Measurements & Calculations    AV Peak Velocity: 196 cm/s    LVOT Peak Velocity: 67.7 cm/s   AV Peak Gradient: 15.37 mmHg  LVOT Mean Velocity: 50 cm/s   AV Mean Velocity: 147 cm/s    LVOT Peak Gradient: 2 mmHgLVOT Mean Gradient:   AV Mean Gradient: 10 mmHg     1 mmHg   AV VTI: 28.8 cm   AV Area (Continuity):2.27 cm2    LVOT VTI: 13.3 cm   AV P1/2t: 430 msec      XR CHEST PORTABLE    Result Date: 10/4/2021  EXAMINATION: ONE XRAY VIEW OF THE CHEST 10/4/2021 1:46 pm COMPARISON: October 2, 2021 HISTORY: ORDERING SYSTEM PROVIDED HISTORY: covid 23 TECHNOLOGIST PROVIDED HISTORY: Reason for exam:->covid 23 Reason for Exam: covid 19 Acuity: Acute Type of Exam: Initial FINDINGS: Mild cardiomegaly. Cardiomediastinal silhouette appears unchanged. Multifocal bibasilar predominant airspace disease. No definite effusion. No pneumothorax or subdiaphragmatic free air. No acute osseous abnormality identified. Multifocal bibasilar predominant airspace disease has progressed slightly since prior study. XR CHEST PORTABLE    Result Date: 10/2/2021  EXAMINATION: ONE XRAY VIEW OF THE CHEST 10/2/2021 11:28 am COMPARISON: 09/28/2021 HISTORY: ORDERING SYSTEM PROVIDED HISTORY: SOB TECHNOLOGIST PROVIDED HISTORY: Reason for exam:->SOB Reason for Exam: SOB Acuity: Acute Type of Exam: Initial FINDINGS: Cardiomediastinal silhouette is stable. Mild bilateral pulmonary vascular congestion. No pleural effusion or pneumothorax. No gross bony abnormality. Mild bilateral pulmonary vascular congestion.      XR CHEST PORTABLE    Result Date: 9/28/2021  EXAMINATION: ONE XRAY VIEW OF THE CHEST 9/28/2021 4:01 am COMPARISON: Chest x-ray 09/24/2021 HISTORY: ORDERING SYSTEM PROVIDED HISTORY: fu pneumonia TECHNOLOGIST PROVIDED HISTORY: Reason for exam:->fu pneumonia Reason for Exam: fu pneumonia Acuity: Acute Type of Exam: Initial Additional signs and symptoms: na Relevant Medical/Surgical History: copd, COVID - 23 FINDINGS: Reticular and scattered opacities are present, not considerably changed. Costophrenic angles are clear. Cardiac and mediastinal structures are unchanged. The bones are intact. No significant change since the prior exam 09/24/2021     XR CHEST PORTABLE    Result Date: 9/24/2021  EXAMINATION: ONE XRAY VIEW OF THE CHEST 9/24/2021 5:15 am COMPARISON: 09/20/2021. HISTORY: ORDERING SYSTEM PROVIDED HISTORY: fu pneumonia TECHNOLOGIST PROVIDED HISTORY: Reason for exam:->fu pneumonia Reason for Exam: fu pneumonia Acuity: Unknown Type of Exam: Subsequent/Follow-up Additional signs and symptoms: fu pneumonia Relevant Medical/Surgical History: fu pneumonia FINDINGS: The cardiac silhouette appears at the upper limits of normal for size. There is minimal prominence of the pulmonary vasculature bilaterally, which appears similar to the prior exam.  There is mild bibasilar opacification, right greater than left. No pleural effusion or pneumothorax. 1. Minimal prominence of the pulmonary vasculature bilaterally. 2. Bibasilar opacification, right greater than left. XR CHEST PORTABLE    Result Date: 9/20/2021  EXAMINATION: ONE XRAY VIEW OF THE CHEST 9/20/2021 1:46 pm COMPARISON: 09/14/2021 HISTORY: ORDERING SYSTEM PROVIDED HISTORY: acute resp failure, covid pneumonia TECHNOLOGIST PROVIDED HISTORY: Reason for exam:->acute resp failure, covid pneumonia Reason for Exam: acute resp failure, covid pneumonia Acuity: Acute Type of Exam: Initial Additional signs and symptoms: na Relevant Medical/Surgical History: na FINDINGS: Mild pulmonary vascular congestion. There is no effusion or pneumothorax. The cardiomediastinal silhouette is without acute process. The osseous structures are without acute process. Mild pulmonary vascular congestion.      XR CHEST PORTABLE    Result Date: 9/14/2021  EXAMINATION: ONE XRAY VIEW OF THE CHEST 9/14/2021 1:52 pm COMPARISON: Chest x-ray December 23, 2016 HISTORY: ORDERING SYSTEM PROVIDED HISTORY: cough SOB chest pain TECHNOLOGIST PROVIDED HISTORY: Reason for exam:->cough SOB chest pain Reason for Exam: nausea   fever    sob   rectal bleeding Acuity: Unknown Type of Exam: Unknown Relevant Medical/Surgical History: copd FINDINGS: Cardiac silhouette is enlarged. Chronic interstitial changes of the lungs with slightly increased interstitial opacities compared with previous exam. Superimposed edema or atypical/viral infectious process cannot be excluded in the appropriate clinical setting. Nonspecific fullness of the right hilar region which is new when compared with previous exam.  Findings could reflect infiltrate or lymphadenopathy. Underlying mass cannot entirely be excluded on radiograph. 1. Increased interstitial opacities bilaterally when compared with previous exam.  Mild pulmonary edema or atypical/viral infectious process cannot be excluded in the appropriate clinical setting. 2. Nonspecific fullness of the right hilar region which is new compared with previous exam.  Findings could reflect infiltrate or possible lymphadenopathy. Underlying mass lesion can also not be excluded on radiograph. Recommend further evaluation with dedicated CT chest with contrast if clinically feasible. CTA PULMONARY W CONTRAST    Result Date: 9/14/2021  EXAMINATION: CTA OF THE CHEST 9/14/2021 3:46 pm TECHNIQUE: CTA of the chest was performed after the administration of intravenous contrast.  Multiplanar reformatted images are provided for review. MIP images are provided for review. Dose modulation, iterative reconstruction, and/or weight based adjustment of the mA/kV was utilized to reduce the radiation dose to as low as reasonably achievable. COMPARISON: None.  HISTORY: ORDERING SYSTEM PROVIDED HISTORY: abnormal cxr elevated d dimer TECHNOLOGIST PROVIDED HISTORY: Reason for exam:->abnormal cxr elevated d dimer Decision Support Exception - unselect if not a suspected or confirmed emergency medical condition->Emergency Medical Condition (MA) Reason for Exam: abnormal cxr elevated d dimer Acuity: Acute Type of Exam: Initial Additional signs and symptoms: Nausea; Fever; Shortness of Breath; Rectal Bleeding FINDINGS: Pulmonary Arteries: Pulmonary arteries are adequately opacified for evaluation. No evidence of intraluminal filling defect to suggest pulmonary embolism. Main pulmonary artery is normal in caliber. Mediastinum: No evidence of mediastinal lymphadenopathy. The heart and pericardium demonstrate no acute abnormality. There is no acute abnormality of the thoracic aorta. Lungs/pleura: Predominately peripheral ground-glass opacities seen in both lungs. No pneumothorax. No pleural effusion. Central airways are patent. Upper Abdomen: Limited images of the upper abdomen are unremarkable. Soft Tissues/Bones: No acute bone or soft tissue abnormality. Negative for acute pulmonary embolism Predominately peripheral ground-glass opacities in both lungs could represent COVID pneumonia in the proper clinical setting     US ABDOMEN LIMITED    Result Date: 9/24/2021  EXAMINATION: RIGHT UPPER QUADRANT ULTRASOUND 9/24/2021 6:45 am COMPARISON: CTA of the pulmonary arteries dated 09/14/2021 HISTORY: ORDERING SYSTEM PROVIDED HISTORY: RUQ US, elevated LFTs TECHNOLOGIST PROVIDED HISTORY: Reason for exam:->RUQ US, elevated LFTs Reason for Exam: abnormal labs Acuity: Acute Type of Exam: Initial FINDINGS: LIVER:  There is an increase in the echogenicity of the liver compatible with fatty infiltration. The liver is top-normal size with a soft caudal dimension of 19.5 cm. No focal hepatic mass or intrahepatic biliary ductal dilatation is seen. BILIARY SYSTEM:  Small amount of pericholecystic fluid is seen. No gallbladder wall thickening or gallstones are seen.   No sonographic Boo's sign was elicited during the exam. Common bile duct is within normal limits measuring 5 mm. RIGHT KIDNEY: The right kidney is grossly unremarkable without evidence of hydronephrosis. PANCREAS:  Not seen secondary to overlying bowel gas. OTHER: No evidence of right upper quadrant ascites. Fatty liver. Small amount of pericholecystic fluid but no gallstones identified.   No sonographic Boo's sign elicited during the exam.           [id number]

## 2021-10-07 NOTE — PROGRESS NOTES
Cardiology Progress Note     Today's Plan: IV digoxin, PRN metoprolol     Admit Date:  9/14/2021    Consult reason/ Seen today for: CHF     Subjective and  Overnight Events:  Patient reports SOB is unchanged but was able to tolerate eating breakfast.       Assessment / Plan / Recommendation:     1. Acute respiratory failure: patient wanting to hold off on ventilator support. 2. COVID-19 pneumonia: Currently on BiPAP,IV antibiotics, Bronchodilators, ICU care. 3. Acute on chronic systolic heart failure: total I/O neg. Lasix and lisinopril on hold due to worsening renal function. Continue with Coreg. 4. nonischemic cardiomyopathy as per history -patient follows up at [facility], had left normal heart cath in the past.  5. Tachycardia: sinus tachycardia secondary to hypoxia. Stop Cardizem due to low EF, started on Digoxin  mcg and PRN q 4 hrs metoprolol 2.5 mg IVP. 5. Dilated aortic root: Patient with history of thoracic aortic aneurysm. Recommend close outpatient follow-up  6. DVT: Lovenox      History of Presenting Illness:    Chief complain on admission : 63 y. o.year old who is admitted for  Chief Complaint   Patient presents with    Nausea     has not eaten in 5 days    Fever     headache, eye pain    Shortness of Breath    Rectal Bleeding     black diarrhea for 3 days        Past medical history:    has a past medical history of Asthma, COPD (chronic obstructive pulmonary disease) ([address] and COVID-19. Past surgical history:   has a past surgical history that includes Wrist surgery (Left, 1976); hernia repair; and Cardiac catheterization (2013). Social History:   reports that he quit smoking about 16 years ago. His smoking use included cigarettes. He started smoking about 50 years ago. He smoked 0.50 packs per day.  He has never used smokeless tobacco. He reports that he does not drink alcohol and does not use drugs. Family history:  family history is not on file. Allergies   Allergen Reactions    Percocet [Oxycodone-Acetaminophen] Nausea And Vomiting       Review of Systems:  Review of Systems   Constitutional: Positive for fatigue. Respiratory: Positive for shortness of breath. Cardiovascular: Positive for leg swelling. Negative for chest pain and palpitations. Musculoskeletal: Negative. Skin: Negative. Neurological: Negative for dizziness and weakness. All other systems reviewed and are negative. /82   Pulse 106   Temp 97.6 °F (36.4 °C) (Oral)   Resp 27   Ht 5' 9.02\" (1.753 m)   Wt 255 lb 11.7 oz (116 kg)   SpO2 (!) 87%   BMI 37.75 kg/m²       Intake/Output Summary (Last 24 hours) at 10/7/2021 1119  Last data filed at 10/7/2021 2859  Gross per 24 hour   Intake 220 ml   Output 1625 ml   Net -1405 ml       Physical Exam:  Physical Exam  Constitutional:       Appearance: He is well-developed. Cardiovascular:      Rate and Rhythm: Regular rhythm. Tachycardia present. Pulses: Intact distal pulses. Dorsalis pedis pulses are 2+ on the right side and 2+ on the left side. Posterior tibial pulses are 2+ on the right side and 2+ on the left side. Heart sounds: Normal heart sounds, S1 normal and S2 normal.   Pulmonary:      Breath sounds: Examination of the right-lower field reveals decreased breath sounds. Examination of the left-lower field reveals decreased breath sounds. Decreased breath sounds present. Musculoskeletal:         General: Normal range of motion. Comments: Trace ankle edema    Skin:     General: Skin is warm and dry. Neurological:      Mental Status: He is alert and oriented to person, place, and time.           Medications:    cefepime  2,000 mg IntraVENous Q12H    fluconazole  100 mg IntraVENous Q24H    vancomycin (VANCOCIN) intermittent dosing (placeholder)   Other RX Placeholder    vitamin D  50,000 Units Oral Weekly    [Held by provider] furosemide  40 mg IntraVENous BID    dexamethasone  10 mg IntraVENous Daily    [Held by provider] lisinopril  2.5 mg Oral Daily    albuterol sulfate HFA  2 puff Inhalation 4x daily    [Held by provider] HYDROcodone 5 mg - acetaminophen  1 tablet Oral Q12H    vitamin C  1,000 mg Oral BID    zinc sulfate  50 mg Oral BID    Vitamin D  1,000 Units Oral Daily    sodium chloride flush  5-40 mL IntraVENous 2 times per day    enoxaparin  30 mg SubCUTAneous BID    bisacodyl  5 mg Oral Daily    carvedilol  6.25 mg Oral BID    [Held by provider] divalproex  250 mg Oral Daily    pantoprazole  40 mg Oral QAM AC    montelukast  10 mg Oral QPM    fluticasone  2 puff Inhalation BID    [Held by provider] pravastatin  40 mg Oral Dinner    tiotropium-olodaterol  2 puff Inhalation BID    [Held by provider] diclofenac-miSOPROStol  1 tablet Oral BID      sodium chloride       metoprolol, LORazepam, morphine, sodium chloride, senna, polyvinyl alcohol, albuterol sulfate HFA, LORazepam, benzonatate, sodium chloride flush, sodium chloride, ondansetron **OR** ondansetron, polyethylene glycol, [Held by provider] acetaminophen **OR** [Held by provider] acetaminophen, guaiFENesin-dextromethorphan, methocarbamol    Lab Data:  CBC:   Recent Labs     10/04/21  1324 10/05/21  0851 10/06/21  0337   WBC 15.3* 13.3* 13.4*   HGB 14.8 15.5 14.4   HCT 46.2 48.2 44.1   MCV 89.2 88.8 85.5    185 165     BMP:   Recent Labs     10/04/21  1631 10/05/21  0851 10/06/21  0337   * 132* 133*   K 5.2* 4.9 4.7   CL 89* 93* 94*   CO2 24 27 23   PHOS  --  3.5  --    BUN 34* 31* 45*   CREATININE 0.5* 0.4* 1.6*     PT/INR: No results for input(s): PROTIME, INR in the last 72 hours. BNP:    Recent Labs     10/05/21  0851   PROBNP 459.5*     TROPONIN: No results for input(s): TROPONINT in the last 72 hours.      Impression:  Active Problems:    Obstructive sleep apnea syndrome    COVID-19    Tobacco dependence in remission Obesity    Hyperlipidemia    Nonischemic cardiomyopathy (HCC)    Acute respiratory failure with hypoxia (HCC)    Severe malnutrition (HCC)    Vitamin D deficiency  Resolved Problems:    * No resolved hospital problems. *       All labs, medications and tests reviewed by myself, continue all other medications of all above medical condition listed as is except for changes mentioned above. Thank you   Please call with questions.     Electronically signed by Robinson Hummel MD on 10/7/2021 at 11:19 AM

## 2021-10-07 NOTE — PROGRESS NOTES
pulmonary      SUBJECTIVE: no sob but on high fio2     OBJECTIVE    VITALS:  /78   Pulse 93   Temp 98 °F (36.7 °C) (Oral)   Resp 25   Ht 5' 9.02\" (1.753 m)   Wt 255 lb 11.7 oz (116 kg)   SpO2 92%   BMI 37.75 kg/m²   HEAD AND FACE EXAM:  No throat injection, no active exudate,no thrush  NECK EXAM;No JVD, no masses, symmetrical  CHEST EXAM; Expansion equal and symmetrical, no masses  LUNG EXAM; Good breath sounds bilaterally. There are expiratory wheezes both lungs, there are crackles at both lung bases  CARDIOVASCULAR EXAM: Positive S1 and S2, no S3 or S4, no clicks ,no murmurs  RIGHT AND LEFT LOWER EXTRIMITY EXAM: No edema, no swelling, no inflamation  CNS EXAM: Alert and oriented X3          LABS   Lab Results   Component Value Date    WBC 13.4 (H) 10/06/2021    HGB 14.4 10/06/2021    HCT 44.1 10/06/2021    MCV 85.5 10/06/2021     10/06/2021     Lab Results   Component Value Date    CREATININE 1.6 (H) 10/06/2021    BUN 45 (H) 10/06/2021     (L) 10/06/2021    K 4.7 10/06/2021    CL 94 (L) 10/06/2021    CO2 23 10/06/2021     Lab Results   Component Value Date    INR 0.95 09/26/2021    PROTIME 12.2 09/26/2021          Lab Results   Component Value Date    PHOS 3.5 10/05/2021    PHOS 3.4 09/19/2021        Recent Labs     10/05/21  0830   PH 7.48*   PO2ART 46*   EWE5ETA 37.0   O2SAT 85.0*         Wt Readings from Last 3 Encounters:   09/30/21 255 lb 11.7 oz (116 kg)   12/23/16 263 lb (119.3 kg)               ASSESMENT  Ac on ch resp failure  cpvid pneumonia  copd        PLAN  1. D/w pt  2. cpm  3. Cont high fio2  4.  Observe closely as may need additional support    10/7/2021  Katie Daniels MD, MCHRISTA.

## 2021-10-07 NOTE — CARE COORDINATION
Chart reviewed for continued discharge planning. Pt is on bipap FiO2 100%. Per previous CM notes, discharge plan is home w/ pt fiance. Pt has PCP at [place] and insurance with medication coverage. DME in the home includes home O2 & CPAP. CM provided pt fiance with documentation requested for financial assistance from [place]. CM will continue to follow for pt progress and needs at discharge.

## 2021-10-07 NOTE — PROGRESS NOTES
Comprehensive Nutrition Assessment    Type and Reason for Visit:  Reassess    Nutrition Recommendations/Plan:   Continue regular diet-soft/easy to chew foods as needed   Continue to offer oral nutrition supplements  Encourage consistent intake   Will continue to follow up during stay     Nutrition Assessment:  Able to tolerate eating some meals again. Remains on regular diet with meal intake varying 25-75% past few meals. Receiving oral nutrition supplement with meals. Improving intake at some meal but intake not yet consistent, will continue to follow at high nutrition risk. Malnutrition Assessment:  Malnutrition Status:  Severe malnutrition    Context:  Acute Illness     Findings of the 6 clinical characteristics of malnutrition:  Energy Intake:  7 - 50% or less of estimated energy requirements for 5 or more days  Weight Loss:  7 - Greater than 2% over 1 week     Body Fat Loss:  Unable to assess     Muscle Mass Loss:  Unable to assess    Fluid Accumulation:  Unable to assess     Strength:  Not Performed    Estimated Daily Nutrient Needs:  Energy (kcal):  7784-2595 ([place] 1898 w/ stress factor 1.0-1.2); Weight Used for Energy Requirements:  Current     Protein (g):  80-95 (1.1-1.3 g/kg); Weight Used for Protein Requirements:  Ideal        Fluid (ml/day):  2000; Method Used for Fluid Requirements:  1 ml/kcal      Nutrition Related Findings:  COVID unit, still with hgh O2 needs, bipap with discussion for intubation, meds noted: vitamin D, C, decadron      Wounds:  None       Current Nutrition Therapies:    ADULT DIET; Regular  Adult Oral Nutrition Supplement; Frozen Oral Supplement  Adult Oral Nutrition Supplement;  Low Calorie/High Protein Oral Supplement    Anthropometric Measures:  · Height: 5' 9.02\" (175.3 cm)  · Current Body Weight: 255 lb 11.7 oz (116 kg)   · Admission Body Weight: 272 lb 4.3 oz (123.5 kg) (first measured weight)    · Ideal Body Weight: 160 lbs; % Ideal Body Weight 163.4 % · BMI: 37.7  · Adjusted Body Weight:  ; No Adjustment   ·     · BMI Categories: Obese Class 2 (BMI 35.0 -39.9)       Nutrition Diagnosis:   · Severe malnutrition, In context of acute illness or injury related to inadequate protein-energy intake as evidenced by weight loss, intake 51-75%      Nutrition Interventions:   Food and/or Nutrient Delivery:  Continue Current Diet, Continue Oral Nutrition Supplement  Nutrition Education/Counseling:  No recommendation at this time   Coordination of Nutrition Care:  Continue to monitor while inpatient    Goals:  pt will consume greater than 50-75% of meals and supplements       Nutrition Monitoring and Evaluation:   Behavioral-Environmental Outcomes:  None Identified   Food/Nutrient Intake Outcomes:  Diet Advancement/Tolerance, Food and Nutrient Intake, Supplement Intake  Physical Signs/Symptoms Outcomes:  Biochemical Data, Meal Time Behavior, Skin, Weight     Discharge Planning:     Too soon to determine     Electronically signed by Birgit Herman RD, LD on 10/7/21 at 2:04 PM EDT    Contact: [phone]

## 2021-10-07 NOTE — PROGRESS NOTES
[address] [facility]  consulted by Dr. Nandini Gipson for monitoring and adjustment. Indication for treatment: Pneumonia  Goal trough: 15-20 mcg/mL  AUC/MARYSOL: 400-600    Pertinent Laboratory Values:   Temp Readings from Last 3 Encounters:   10/07/21 97.6 °F (36.4 °C) (Oral)   12/23/16 98.8 °F (37.1 °C) (Oral)     Recent Labs     10/05/21  0851 10/06/21  0337 10/07/21  1030   WBC 13.3* 13.4* 14.9*     Recent Labs     10/05/21  0851 10/06/21  0337 10/07/21  1030   BUN 31* 45* 37*   CREATININE 0.4* 1.6* 0.5*     Estimated Creatinine Clearance: 190 mL/min (A) (based on SCr of 0.5 mg/dL (L)).     Intake/Output Summary (Last 24 hours) at 10/7/2021 1508  Last data filed at 10/7/2021 4573  Gross per 24 hour   Intake 100 ml   Output 1625 ml   Net -1525 ml     Pertinent Cultures:  Date    Source    Results  10/5   MRSA nasal   Sent     Vancomycin level:   TROUGH:    Recent Labs     10/06/21  0337   VANCOTROUGH 13.6     RANDOM:    Recent Labs     10/07/21  1030   VANCORANDOM 6.5     Assessment:  · WBC and temperature: WBC elevated/afebrile  · SCr, BUN, and urine output: Returned to previous levels (1.6 outlier)   · Day(s) of therapy: 3  · Vancomycin concentration: 6.5 (~ 29 hours)    Plan:  · Vancomycin 1250mg q12h   · Pharmacy will continue to monitor patient and adjust therapy as indicated    Sb 3 10/9 @ 0600    Thank you for the consult,  Evelio Flores, [facility]  10/7/2021 3:08 PM

## 2021-10-07 NOTE — PROGRESS NOTES
10/07/21 0457   NIV Type   NIV Started/Stopped On   Equipment Type v60   Mode Bilevel   Mask Type Full face mask   Mask Size Large   Bonnet size Large   Settings/Measurements   IPAP 18 cmH20   CPAP/EPAP 12 cmH2O   Rate Ordered 12   Resp 19   FiO2  100 %   I Time/ I Time % 1.15 s   Vt Exhaled 871 mL   Minute Volume 19.3 Liters   Mask Leak (lpm) 25 lpm   Comfort Level Good   Using Accessory Muscles No   SpO2 92   Alarm Settings   Alarms On Y   Press Low Alarm 5 cmH2O   High Pressure Alarm 25 cmH2O   Delay Alarm 20 sec(s)   Apnea (secs) 20 secs   Resp Rate Low Alarm 13   High Respiratory Rate 40 br/min

## 2021-10-07 NOTE — PROGRESS NOTES
[facility] HOSPITALIST PROGRESS NOTE      PCP: No primary care provider on file. Date of Admission: 9/14/2021    Subjective: mild cough      Brief Hospital summary patient is a 72-year-old male with history of tobacco dependence, obstructive sleep apnea and COPD who presented to the ER with hypoxia. Unvaccinated against Covid, positive for Covid on admission, CT scan chest showed bilateral groundglass opacities, azithromycin IV started  Remdesivir, dexamethasone, ID and pulmonary consultation  Remains on BiPAP intermittently, IV Cardizem and digoxin started for tachycardia,  Cardiology consulted      Vitals signs:  Afebrile, heart rate 90s to 145 range, normotensive, on BiPAP at 100% FiO2    Medications: Albuterol, ascorbic acid, bisacodyl, Coreg, cefepime, dexamethasone, digoxin, divalproex, Lovenox, fluconazole, Lasix, lisinopril, montelukast, pravastatin, vancomycin, vitamin D    Antibiotics: Cefepime day 4 today    Fluid status: 1625 cc overnight    Labs:   Labs from prior reviewed, no labs today  Transaminitis    Imaging:   RUQ USG     Impression:        Fatty liver. Small amount of pericholecystic fluid but no gallstones identified.  No   sonographic Boo's sign elicited during the exam.        Assessment/Plan:     Sepsis-POA due to COVID PNA with acute organ dysfunction as evidenced by Acute Hypoxic Respiratory Failure  Acute on chronic systolic CHF  Transaminitis  Hyponatremia   Hyperkalemia   Morbid obesity   BEV   Tobacco dependence   HLD   Candida esophagitis       Admitted with dyspnea, positive for covid, continue bipap as needed   S/p BARCITINIB, stopped sec.  To transaminitis   R value 10.8,   USG negative, gentle hydration, avoid hepatoxic medicatons   bipap PRN, continue dexamethasone/ PPI   Cefepime and vancomycin per ID  Fluconazole for candida esophagitis    EF 30%, I/O record, daily weight, cardiology following, lasix PRN   hyponatermia and hyperkalemia resolved, check daily BMP   CPAP QHS     DVT prophlaxis:   Treatment dose lovenox     Last BM:   1/5    Ambulation:   As tolerated    Disposition:   Home vs ECF    Diet: as tolerated     Physical Exam Performed:       /84   Pulse 98   Temp 97.6 °F (36.4 °C) (Oral)   Resp (!) 31   Ht 5' 9.02\" (1.753 m)   Wt 255 lb 11.7 oz (116 kg)   SpO2 90%   BMI 37.75 kg/m²     Physical Exam  Constitutional:       General: He is not in acute distress. Appearance: Normal appearance. HENT:      Head: Normocephalic and atraumatic. Right Ear: External ear normal.      Left Ear: External ear normal.   Eyes:      Extraocular Movements: Extraocular movements intact. Pupils: Pupils are equal, round, and reactive to light. Cardiovascular:      Rate and Rhythm: Normal rate and regular rhythm. Heart sounds: No murmur heard. Pulmonary:      Effort: Pulmonary effort is normal. No respiratory distress. Breath sounds: Normal breath sounds. No wheezing. Abdominal:      General: Bowel sounds are normal. There is no distension. Palpations: Abdomen is soft. Tenderness: There is no abdominal tenderness. Musculoskeletal:         General: No swelling. Cervical back: Normal range of motion. Skin:     General: Skin is warm. Neurological:      General: No focal deficit present. Mental Status: He is alert and oriented to person, place, and time. Cranial Nerves: No cranial nerve deficit.    Psychiatric:         Mood and Affect: Mood normal.         Labs:   Recent Labs     10/04/21  1324 10/05/21  0851 10/06/21  0337   WBC 15.3* 13.3* 13.4*   HGB 14.8 15.5 14.4   HCT 46.2 48.2 44.1    185 165     Recent Labs     10/04/21  1631 10/05/21  0851 10/06/21  0337   * 132* 133*   K 5.2* 4.9 4.7   CL 89* 93* 94*   CO2 24 27 23   BUN 34* 31* 45*   CREATININE 0.5* 0.4* 1.6*   CALCIUM 8.7 9.1 9.0   PHOS  --  3.5  --      Recent Labs     10/04/21  1631 10/05/21  0851 10/06/21  0337   * 167* 204*   * 559* 640*   BILITOT 1.8* 1.4* 1.2*   ALKPHOS 180* 171* 178*     No results for input(s): INR in the last 72 hours. No results for input(s): Assunta Shi in the last 72 hours. Urinalysis:    No results found for: Samira Castellani, BACTERIA, RBCUA, BLOODU, SPECGRAV, [place] [place] [address]    Radiology:  XR CHEST PORTABLE   Final Result   Multifocal bibasilar predominant airspace disease has progressed slightly   since prior study. XR CHEST PORTABLE   Final Result   Mild bilateral pulmonary vascular congestion. XR CHEST PORTABLE   Final Result   No significant change since the prior exam 09/24/2021         XR CHEST PORTABLE   Final Result   1. Minimal prominence of the pulmonary vasculature bilaterally. 2. Bibasilar opacification, right greater than left. US ABDOMEN LIMITED   Final Result   Fatty liver. Small amount of pericholecystic fluid but no gallstones identified. No   sonographic Boo's sign elicited during the exam.         XR CHEST PORTABLE   Final Result   Mild pulmonary vascular congestion. CTA PULMONARY W CONTRAST   Final Result   Negative for acute pulmonary embolism      Predominately peripheral ground-glass opacities in both lungs could represent   COVID pneumonia in the proper clinical setting         XR CHEST PORTABLE   Final Result   1. Increased interstitial opacities bilaterally when compared with previous   exam.  Mild pulmonary edema or atypical/viral infectious process cannot be   excluded in the appropriate clinical setting. 2. Nonspecific fullness of the right hilar region which is new compared with   previous exam.  Findings could reflect infiltrate or possible   lymphadenopathy. Underlying mass lesion can also not be excluded on   radiograph. Recommend further evaluation with dedicated CT chest with   contrast if clinically feasible.          XR CHEST PORTABLE    (Results Pending)           MACKENZIE Bahena MD  10/7/2021 9:16 AM

## 2021-10-08 NOTE — PROGRESS NOTES
Hospitalist Progress Note      Name:  Naomie Garcia /Age/Sex: 1955  (34 y.o. male)   MRN & CSN:  [id number] & [id number] Admission Date/Time: 2021  1:13 PM   Location:  -A PCP: No primary care provider on file. Hospital Day: 25    Assessment and Plan:   Naomie Garcia is a 63 y.o.  male  who presents with COVID-19    Acute Hypoxic respiratory Failure:   · due to COVID-19  · Requiring O2 via BiPAP  · CXR persistent multifocal airspace consolidation  · Continue bronchodilators  · Monitor saturation  · Wean O2 as tolerated    COVID 19 Pneumonia  · Onset   · Started on baricitinib, discontinued due to transaminasemia  · Given Solu-Medrol, currently on dexamethasone 10 mg IV  · DVT prophylaxis  · Isolation precaution    Possible secondary bacterial infection  · CRP trending up, WBC 14.9, afebrile  · On cefepime and vancomycin    Acute on Chronic Congestive Heart Failure: Systolic   Echo with ejection fraction 30%, mild to moderate aortic regurgitation   Continue Lasix, lisinopril restarted. Continue carvedilol.  Strict I and O. Daily weight.  Cardiology on consult    Nonischemic cardiomyopathy    Sinus tachycardia likely from acute respiratory failure. · On metoprolol IV as needed. History of thoracic aortic aneurysm    Transaminasemia  · Could be from COVID-19  · Liver enzymes trending up  · Liver ultrasound with fatty liver with small amount of pericholecystic fluid but no gallstones  · Hepatitis panel negative  · Avoid hepatotoxic medication  · GI on consult    Candida esophagitis -on fluconazole    Hyponatremia  We will monitor BMP    Hyperkalemia -resolved    Morbid obesity    Obstructive sleep apnea  Tobacco dependence  Hyperlipidemia    Diet ADULT DIET; Regular  Adult Oral Nutrition Supplement; Frozen Oral Supplement  Adult Oral Nutrition Supplement;  Low Calorie/High Protein Oral Supplement   DVT Prophylaxis [x] Lovenox, []  Heparin, [] SCDs, [] Warfarin  [] NOAC     GI Prophylaxis [x] PPI,  [] H2 Blocker,  [] Carafate,  [] Diet/Tube Feeds   Code Status Full Code   MDM [] Low, [] Moderate,[x]  High     History of Present Illness:     Chief Complaint: [id number]    Seen and examined today. Still with shortness of breath and cough. Has difficulty swallowing. Ten point ROS reviewed negative, unless as noted above    Objective: Intake/Output Summary (Last 24 hours) at 10/8/2021 1147  Last data filed at 10/8/2021 1131  Gross per 24 hour   Intake 582.33 ml   Output 1175 ml   Net -592.67 ml      Vitals:   Vitals:    10/08/21 1100   BP: 118/89   Pulse: 102   Resp: (!) 31   Temp:    SpO2: 90%     Physical Exam:   GEN awake, in respiratory distress, on BiPAP  EYES Pupils are equally round. No scleral erythema, discharge, or conjunctivitis. HENT Mucous membranes are moist. Oral pharynx without exudates, no evidence of thrush. NECK Supple, no apparent thyromegaly or masses. RESP rales bilaterally, no wheezing  CARDIO/VASC S1/S2 auscultated. Regular rate without appreciable murmurs, rubs, or gallops. No JVD or carotid bruits. Peripheral pulses equal bilaterally and palpable. No peripheral edema. GI Abdomen is soft without significant tenderness, masses, or guarding. Bowel sounds are normoactive. Rectal exam deferred. MSK No gross joint deformities. SKIN Normal coloration, warm, dry.     Medications:   Medications:    cefepime  2,000 mg IntraVENous Q12H    fluconazole  100 mg IntraVENous Q24H    vancomycin  1,250 mg IntraVENous Q12H    vitamin D  50,000 Units Oral Weekly    furosemide  40 mg IntraVENous BID    dexamethasone  10 mg IntraVENous Daily    lisinopril  2.5 mg Oral Daily    albuterol sulfate HFA  2 puff Inhalation 4x daily    [Held by provider] HYDROcodone 5 mg - acetaminophen  1 tablet Oral Q12H    vitamin C  1,000 mg Oral BID    zinc sulfate  50 mg Oral BID    Vitamin D  1,000 Units Oral Daily    sodium chloride flush  5-40 mL IntraVENous 2 times per day    enoxaparin  30 mg SubCUTAneous BID    bisacodyl  5 mg Oral Daily    carvedilol  6.25 mg Oral BID    [Held by provider] divalproex  250 mg Oral Daily    pantoprazole  40 mg Oral QAM AC    montelukast  10 mg Oral QPM    fluticasone  2 puff Inhalation BID    [Held by provider] pravastatin  40 mg Oral Dinner    tiotropium-olodaterol  2 puff Inhalation BID    [Held by provider] diclofenac-miSOPROStol  1 tablet Oral BID      Infusions:    sodium chloride 25 mL (10/08/21 0949)     PRN Meds: magic (miracle) mouthwash, 5 mL, 4x Daily PRN  metoprolol, 2.5 mg, Q4H PRN  LORazepam, 1 mg, Q6H PRN  morphine, 2 mg, Q4H PRN  sodium chloride, 1 spray, PRN  senna, 1 tablet, BID PRN  polyvinyl alcohol, 1 drop, Q1H PRN  albuterol sulfate HFA, 2 puff, Q4H PRN  LORazepam, 1 mg, Q6H PRN  benzonatate, 100 mg, TID PRN  sodium chloride flush, 5-40 mL, PRN  sodium chloride, 25 mL, PRN  ondansetron, 4 mg, Q8H PRN   Or  ondansetron, 4 mg, Q6H PRN  polyethylene glycol, 17 g, Daily PRN  [Held by provider] acetaminophen, 650 mg, Q6H PRN   Or  [Held by provider] acetaminophen, 650 mg, Q6H PRN  guaiFENesin-dextromethorphan, 5 mL, Q4H PRN  methocarbamol, 500 mg, Q8H PRN        Recent Labs     10/06/21  0337 10/07/21  1030   WBC 13.4* 14.9*   HGB 14.4 14.6   HCT 44.1 45.3    160      Recent Labs     10/06/21  0337 10/07/21  1030   * 132*   K 4.7 4.4   CL 94* 97*   CO2 23 22   BUN 45* 37*   CREATININE 1.6* 0.5*     Recent Labs     10/06/21  0337 10/07/21  1030   * 223*   * 788*   BILITOT 1.2* 1.0   ALKPHOS 178* 179*     Imaging reviewed    Electronically signed by Len Sullivan MD on 10/8/2021 at 11:47 AM

## 2021-10-08 NOTE — CONSULTS
Department of Internal Medicine  Gastroenterology Consult Note  Kaylee Armando MD      Reason for Consult:  Elevated liver function tests. Primary Care Physician:  No primary care provider on file. History Obtained From:  patient, electronic medical record    HISTORY OF PRESENT ILLNESS:              The patient is a 63 y.o.  male who was admitted on 9/14 with a 5 day history of shortness of breath and was found to be positive for covid 19. He was placed on baricitinib. One note says that this was discontinued after four days because of a bump in his transaminases but review of medicine history it looks like he has been on it for 14 days before it was discontinued. On 9/17/21 his ALt was 30 ast was 53. Since then there has been a gradual rise in his liver function tests. On 9/20 his ALT was 68, AST was 53 and alkaline phoshpatase was 153. On 9/25 his ALT was 256, his AST was 84 and his alkaline phosphatase was 183. On 103 his ALT was 425 his AST was 116 and his alkaline phosphatase was 174. On 10/7 his alt was 788, ast was 223 and his alkaline phosphatse was 179. An ultrasound was done which showed evidence of fatty liver. Hepatitis panel for A,B and C were negative Igm for CMV was negative. He had an elevated HSV level of 8.6 but this was IgG. He is still on high flow 100% oxygen. He had been on depakote prior to admission but this has been DCd. He has been on vancomycin for most of this admission. Past Medical History:        Diagnosis Date    Asthma     COPD (chronic obstructive pulmonary disease) ([facility] [address]     COVID-19 09/14/2021       Past Surgical History:        Procedure Laterality Date    CARDIAC CATHETERIZATION  2013    HERNIA REPAIR      WRIST SURGERY Left 1976       Medications Prior to Admission:    Prior to Admission medications    Medication Sig Start Date End Date Taking?  Authorizing Provider   carvedilol (COREG) 12.5 MG tablet Take 0.5 tablets by mouth 2 times daily 5/5/21 Yes Historical Provider, MD   albuterol sulfate  (90 Base) MCG/ACT inhaler Inhale 2 puffs into the lungs 4 times daily 6/2/21  Yes Historical Provider, MD   diclofenac-miSOPROStol (ARTHROTEC 75) 75-0.2 MG per tablet Take 1 tablet by mouth 2 times daily 5/5/21  Yes Historical Provider, MD   divalproex (DEPAKOTE ER) 250 MG extended release tablet Take 1 tablet by mouth daily 6/21/21  Yes Historical Provider, MD   methocarbamol (ROBAXIN) 500 MG tablet Take 1 tablet by mouth every 8 hours as needed 8/19/21  Yes Historical Provider, MD   omeprazole (PRILOSEC) 20 MG delayed release capsule Take 1 capsule by mouth daily 6/2/21  Yes Historical Provider, MD   montelukast (SINGULAIR) 10 MG tablet Take 1 tablet by mouth every evening 6/2/21  Yes Historical Provider, MD   mometasone (ASMANEX) 220 MCG/INH inhaler Inhale 2 puffs into the lungs 2 times daily 6/2/21  Yes Historical Provider, MD   pravastatin (PRAVACHOL) 40 MG tablet Take 1 tablet by mouth Daily with supper 6/2/21  Yes Historical Provider, MD   tiotropium-olodaterol (STIOLTO) 2.5-2.5 MCG/ACT AERS Inhale 2 puffs into the lungs 2 times daily 5/5/21  Yes Historical Provider, MD   furosemide (LASIX) 20 MG tablet Take 1 tablet by mouth daily 7/1/21   Historical Provider, MD       Allergies: Allergies   Allergen Reactions    Percocet [Oxycodone-Acetaminophen] Nausea And Vomiting   . Social History:    TOBACCO:  Yes  ETOH:  No    Family History:   History reviewed. No pertinent family history. REVIEW OF SYSTEMS: (POSITIVES WILL BE UNDERLINED)  CONSTITUTIONAL:    Weight change,fatigue, fever, chills  EYES:  Diplopia, change in vision  EARS:  hearing loss, tinnitus, vertigo  NOSE:   epistaxis  MOUTH/THROAT:     hoarseness, sore throat. RESPIRATORY:  SOB,  cough, sputum, hemoptysis  CARDIOVASCULAR : chest pain,palpitations, dyspnea exertion, orthopnea, paroxysmal nocturnal dyspnea, pedal edema.   GASTROINTESTINAL:  See HPI  GENITOURINARY:   dysuria, hematuria, . HEMATOLOGIC/LYMPHATIC:   Anemia, bleeding tendencies. MUSCULOSKELETAL:    myalgias,  joint pain  NEUROLOGICAL:   Loss of Consciousness, paresthesias, anesthesias, focal weakness  SKIN :   History of dermatitis, rashes  PSYCHIATRIC:  depression, , anxiety past psychosis  ENDOCRINE:  History of diabetes, thyroid disease  ALL/IMM : allergies, rashes    PHYSICAL EXAM:    Vitals:  /88   Pulse 106   Temp (S) 97.7 °F (36.5 °C) (Axillary) Comment (Src): Continuous Bipap  Resp (!) 33   Ht 5' 9.02\" (1.753 m)   Wt 255 lb 11.7 oz (116 kg)   SpO2 (!) 88%   BMI 37.75 kg/m²   CONSTITUTIONAL: alert, cooperative, no apparent distress,   EYES:  pupils equal, round and reactive to light and sclera clear  ENT:  normocepalic, without obvious abnormality  NECK:  supple, symmetrical, trachea midline  HEMATOLOGIC/LYMPHATICS:  no cervical lymphadenopathy and no supraclavicular lymphadenopathy  LUNGS:  clear to auscultation  CARDIOVASCULAR:  regular rate and rhythm and no murmur noted  ABDOMEN:  Soft, non-tender with normal bowel sounds. No organomegaly or masses  NEUROLOGIC: no focal deficit detected  SKIN:  no lesions  EXTREMITIES: no clubbing, cyanosis, or edema    IMPRESSION:  1) Elevated liver function tests, primarily transaminase rise is most likely multifactorial.  Vancomycin has been implicated in asymptomatic rise in transaminase in about 1-5% of patients who receive it long term. Also his fatty liver could be in part responsible as well as Covid which is known to cause liver function test abnormalities. RECOMMENDATIONS:  1) Follow liver function tests as you are doing, no specific treatment for Covid involvement of the liver exists. Consider holding the Vancomycin or changing to another antibiotic if the liver function tests continue to rise.            Electronically signed by Kimmie Severino MD on 10/8/2021 at 5:05 PM

## 2021-10-08 NOTE — PROGRESS NOTES
BMP:  Recent Labs     10/06/21  0337 10/07/21  1030 10/08/21  1036   * 132* 133*   K 4.7 4.4 4.4   CL 94* 97* 97*   CO2 23 22 22   BUN 45* 37* 34*   CREATININE 1.6* 0.5* 0.6*   CALCIUM 9.0 9.1 9.3   GLUCOSE 126* 135* 101*      ABG:  No results for input(s): PH, PO2ART, [id number], HCO3, BEART, O2SAT in the last 72 hours. Lab Results   Component Value Date    PROBNP 459.5 (H) 10/05/2021    PROBNP 356.9 (H) 09/27/2021    PROBNP 377.7 (H) 09/25/2021     No results found for: [facility]    Radiology Review:  Pertinent images / reports were reviewed as a part of this visit. Assessment:     Patient Active Problem List   Diagnosis    Obstructive sleep apnea syndrome    Arteriosclerosis of coronary artery    Acute bronchitis with chronic obstructive pulmonary disease (COPD) ([facility] [address]    COVID-19    Tobacco dependence in remission    Obesity    Hyperlipidemia    Nonischemic cardiomyopathy ([facility] [address]    Aneurysm of thoracic aorta (HCC)    Acute respiratory failure with hypoxia (HCC)    Severe malnutrition (HCC)    Vitamin D deficiency       Plan:   1. Overall the patient is unchanged. 2. Wean FiO2.       Cyndi Mittal MD   10/8/2021  3:47 PM

## 2021-10-08 NOTE — PROGRESS NOTES
RN attempted to help patient with dinner. Bipap mask removed by RN and small bites of ice cream were successfully consumed before O2 saturation dropped to 83%. Bipap mask immediately placed back onto patient. O2 saturation returned to 86%. NO signs of choking, coughing, or aspiration from patient.

## 2021-10-08 NOTE — PROGRESS NOTES
O2 saturation dropped to 84% during PO medication administration. Patient became visibly anxious. Bipap mask immediately placed back on patient and PRN Ativan order given. O2 saturation increased to 89%.

## 2021-10-08 NOTE — PROGRESS NOTES
10/08/21 0812   NIV Type   $NIV $Daily Charge   NIV Started/Stopped On   Equipment Type v60   Mode Bilevel   Mask Type Full face mask   Mask Size Large   Bonnet size Large   Settings/Measurements   IPAP 18 cmH20   CPAP/EPAP 12 cmH2O   Rate Ordered 12   Resp (!) 32   Insp Rise Time (%) 3 %   FiO2  100 %   I Time/ I Time % 1.15 s   Vt Exhaled 848 mL   Minute Volume 26.3 Liters   Mask Leak (lpm) 20 lpm   Comfort Level Good   Using Accessory Muscles No   SpO2 90   Breath Sounds   Right Upper Lobe Diminished   Right Middle Lobe Diminished   Right Lower Lobe Diminished   Left Upper Lobe Diminished   Left Lower Lobe Diminished   Patient Observation   Observations pt is on BiPAP   Alarm Settings   Alarms On Y   Press Low Alarm 5 cmH2O   High Pressure Alarm 25 cmH2O   Apnea (secs) 20 secs   Resp Rate Low Alarm 13   High Respiratory Rate 40 br/min

## 2021-10-08 NOTE — PROGRESS NOTES
IV infiltrated. Pressure applied to left forearm. Dressing applied. RN will attempt to place new access at this time.

## 2021-10-08 NOTE — PROGRESS NOTES
Cardiology Progress Note     Today's Plan: restart lisinopril      Admit Date:  9/14/2021    Consult reason/ Seen today for: CHF     Subjective and  Overnight Events:  Patient reports SOB is unchanged    Assessment / Plan / Recommendation:     1. Acute respiratory failure: patient wanting to hold off on ventilator support. 2. COVID-19 pneumonia: Currently on BiPAP,IV antibiotics, Bronchodilators, ICU care. 3. Acute on chronic systolic heart failure: total I/O neg. restart lisinopril 2.5 mg daily and continue with Coreg. Renal function improved back to baseline. 4. nonischemic cardiomyopathy as per history -patient follows up at [facility], had left normal heart cath in the past.  5. Tachycardia: sinus tachycardia secondary to hypoxia. Stop Cardizem due to low EF, previously recieved Digoxin  mcg and then was placed on PRN q 4 hrs metoprolol 2.5 mg IVP which has not been used. 5. Dilated aortic root: Patient with history of thoracic aortic aneurysm. Recommend close outpatient follow-up  6. DVT: Lovenox      History of Presenting Illness:    Chief complain on admission : 63 y. o.year old who is admitted for  Chief Complaint   Patient presents with    Nausea     has not eaten in 5 days    Fever     headache, eye pain    Shortness of Breath    Rectal Bleeding     black diarrhea for 3 days        Past medical history:    has a past medical history of Asthma, COPD (chronic obstructive pulmonary disease) ([address] and COVID-19. Past surgical history:   has a past surgical history that includes Wrist surgery (Left, 1976); hernia repair; and Cardiac catheterization (2013). Social History:   reports that he quit smoking about 16 years ago. His smoking use included cigarettes. He started smoking about 50 years ago. He smoked 0.50 packs per day.  He has never used smokeless tobacco. He reports that he does not drink alcohol and does not use drugs. Family history:  family history is not on file. Allergies   Allergen Reactions    Percocet [Oxycodone-Acetaminophen] Nausea And Vomiting       Review of Systems:  Review of Systems   Constitutional: Positive for fatigue. Respiratory: Positive for shortness of breath. Cardiovascular: Positive for leg swelling. Negative for chest pain and palpitations. Musculoskeletal: Negative. Skin: Negative. Neurological: Negative for dizziness and weakness. All other systems reviewed and are negative. /89   Pulse 102   Temp (S) 97.8 °F (36.6 °C) (Axillary) Comment (Src): Continuous Bipap  Resp (!) 31   Ht 5' 9.02\" (1.753 m)   Wt 255 lb 11.7 oz (116 kg)   SpO2 90%   BMI 37.75 kg/m²       Intake/Output Summary (Last 24 hours) at 10/8/2021 1121  Last data filed at 10/8/2021 0845  Gross per 24 hour   Intake 332.33 ml   Output 1175 ml   Net -842.67 ml       Physical Exam:  Physical Exam  Constitutional:       Appearance: He is well-developed. Cardiovascular:      Rate and Rhythm: Regular rhythm. Tachycardia present. Pulses: Intact distal pulses. Dorsalis pedis pulses are 2+ on the right side and 2+ on the left side. Posterior tibial pulses are 2+ on the right side and 2+ on the left side. Heart sounds: Normal heart sounds, S1 normal and S2 normal.   Pulmonary:      Breath sounds: Examination of the right-lower field reveals decreased breath sounds. Examination of the left-lower field reveals decreased breath sounds. Decreased breath sounds present. Musculoskeletal:         General: Normal range of motion. Comments: Trace ankle edema    Skin:     General: Skin is warm and dry. Neurological:      Mental Status: He is alert and oriented to person, place, and time.           Medications:    cefepime  2,000 mg IntraVENous Q12H    fluconazole  100 mg IntraVENous Q24H    vancomycin  1,250 mg IntraVENous Q12H    vitamin D  50,000 Units Oral Weekly    furosemide  40 mg IntraVENous BID    dexamethasone  10 mg IntraVENous Daily    [Held by provider] lisinopril  2.5 mg Oral Daily    albuterol sulfate HFA  2 puff Inhalation 4x daily    [Held by provider] HYDROcodone 5 mg - acetaminophen  1 tablet Oral Q12H    vitamin C  1,000 mg Oral BID    zinc sulfate  50 mg Oral BID    Vitamin D  1,000 Units Oral Daily    sodium chloride flush  5-40 mL IntraVENous 2 times per day    enoxaparin  30 mg SubCUTAneous BID    bisacodyl  5 mg Oral Daily    carvedilol  6.25 mg Oral BID    [Held by provider] divalproex  250 mg Oral Daily    pantoprazole  40 mg Oral QAM AC    montelukast  10 mg Oral QPM    fluticasone  2 puff Inhalation BID    [Held by provider] pravastatin  40 mg Oral Dinner    tiotropium-olodaterol  2 puff Inhalation BID    [Held by provider] diclofenac-miSOPROStol  1 tablet Oral BID      sodium chloride 25 mL (10/08/21 0949)     magic (miracle) mouthwash, metoprolol, LORazepam, morphine, sodium chloride, senna, polyvinyl alcohol, albuterol sulfate HFA, LORazepam, benzonatate, sodium chloride flush, sodium chloride, ondansetron **OR** ondansetron, polyethylene glycol, [Held by provider] acetaminophen **OR** [Held by provider] acetaminophen, guaiFENesin-dextromethorphan, methocarbamol    Lab Data:  CBC:   Recent Labs     10/06/21  0337 10/07/21  1030   WBC 13.4* 14.9*   HGB 14.4 14.6   HCT 44.1 45.3   MCV 85.5 87.6    160     BMP:   Recent Labs     10/06/21  0337 10/07/21  1030   * 132*   K 4.7 4.4   CL 94* 97*   CO2 23 22   BUN 45* 37*   CREATININE 1.6* 0.5*     PT/INR: No results for input(s): PROTIME, INR in the last 72 hours. BNP:    No results for input(s): PROBNP in the last 72 hours. TROPONIN: No results for input(s): TROPONINT in the last 72 hours.      Impression:  Active Problems:    Obstructive sleep apnea syndrome    COVID-19    Tobacco dependence in remission    Obesity    Hyperlipidemia    Nonischemic cardiomyopathy ([facility] [address]

## 2021-10-08 NOTE — PROGRESS NOTES
O2 saturation dropped to 82% and sustained with bipap mask on. RT called to bedside to assess. Julian Chand from RT at bedside. Patient encouraged to take deep breaths. After approx. 20 min, O2 saturation returned to 88%. Patient stated he gets panic attacks when he gets short of breath.  Patient medicated with PRN Ativan order for anxiety and PRN metoprolol for HR >110 (see MAR)

## 2021-10-09 NOTE — PROGRESS NOTES
10/09/21 0810   NIV Type   $NIV $Daily Charge   NIV Started/Stopped On   Equipment Type v60   Mode Bilevel   Mask Type Full face mask   Mask Size Medium   Settings/Measurements   IPAP 18 cmH20   CPAP/EPAP 12 cmH2O   Rate Ordered 12   Resp 29   Insp Rise Time (%) 3 %   FiO2  100 %   I Time/ I Time % 1.25 s   Vt Exhaled 777 mL   Minute Volume 23 Liters   Mask Leak (lpm) 19 lpm   Comfort Level Good   Using Accessory Muscles No   SpO2 89   Breath Sounds   Right Upper Lobe Diminished;Clear   Right Middle Lobe Diminished   Right Lower Lobe Diminished   Left Upper Lobe Diminished;Clear   Left Lower Lobe Diminished   Alarm Settings   Alarms On Y   Press Low Alarm 5 cmH2O   High Pressure Alarm 25 cmH2O   Delay Alarm 20 sec(s)   Resp Rate Low Alarm 13   High Respiratory Rate 40 br/min

## 2021-10-09 NOTE — PROGRESS NOTES
10/08/21 2027   NIV Type   NIV Started/Stopped On   Equipment Type v 60   Mode Bilevel   Mask Type Full face mask   Mask Size Medium   Settings/Measurements   IPAP 18 cmH20   CPAP/EPAP 12 cmH2O   Resp 28   Insp Rise Time (%) 3 %   FiO2  100 %   I Time/ I Time % 1.25 s   Vt Exhaled 533 mL   Minute Volume 17.3 Liters   Mask Leak (lpm) 13 lpm   Comfort Level Fair   SpO2 91   Alarm Settings   Alarms On Y   Press Low Alarm 5 cmH2O   High Pressure Alarm 25 cmH2O   Delay Alarm 20 sec(s)   Resp Rate Low Alarm 13   High Respiratory Rate 40 br/min

## 2021-10-09 NOTE — PROGRESS NOTES
This note also relates to the following rows which could not be included:  Resp - Cannot attach notes to unvalidated device data       10/08/21 9467   NIV Type   NIV Started/Stopped On   Equipment Type v60   Mode Bilevel   Mask Type Full face mask   Mask Size Medium   Settings/Measurements   IPAP 18 cmH20   CPAP/EPAP 12 cmH2O   Rate Ordered 12   FiO2  100 %   I Time/ I Time % 1.25 s   Vt Exhaled 735 mL   Minute Volume 16 Liters   Comfort Level Good   SpO2 90   Alarm Settings   Alarms On Y   Press Low Alarm 5 cmH2O   High Pressure Alarm 25 cmH2O   Delay Alarm 20 sec(s)   Resp Rate Low Alarm 13   High Respiratory Rate 40 br/min

## 2021-10-09 NOTE — PROGRESS NOTES
10/09/21 1608   NIV Type   NIV Started/Stopped On   Equipment Type v60   Mode Bilevel   Mask Type Full face mask   Mask Size Medium   Settings/Measurements   IPAP 18 cmH20   CPAP/EPAP 12 cmH2O   Rate Ordered 12   Resp 24   Insp Rise Time (%) 3 %   FiO2  100 %   I Time/ I Time % 1.25 s   Vt Exhaled 648 mL   Minute Volume 16.1 Liters   Mask Leak (lpm) 58 lpm   Comfort Level Good   Using Accessory Muscles No   SpO2 90   Alarm Settings   Alarms On Y   Press Low Alarm 5 cmH2O   High Pressure Alarm 25 cmH2O   Apnea (secs) 20 secs   Resp Rate Low Alarm 13   High Respiratory Rate 40 br/min

## 2021-10-09 NOTE — PROGRESS NOTES
Hospitalist Progress Note      Name:  Krystin Downs /Age/Sex: 1955  (20 y.o. male)   MRN & CSN:  [id number] & [id number] Admission Date/Time: 2021  1:13 PM   Location:  -A PCP: No primary care provider on file. Hospital Day: 26    Assessment and Plan:   Krystin Downs is a 63 y.o.  male  who presents with COVID-19    Acute Hypoxic respiratory Failure:   · due to COVID-19  · Requiring O2 via BiPAP  · CXR persistent multifocal airspace consolidation  · Continue bronchodilators  · Monitor saturation  · Wean O2 as tolerated  · Recommended elective intubation but would like to hold off for now. COVID 19 Pneumonia  · Onset   · Started on baricitinib, discontinued due to transaminasemia  · Given Solu-Medrol, currently on dexamethasone 10 mg IV  · DVT prophylaxis  · Isolation precaution  · Increase furosemide to 40 mg twice daily. Possible secondary bacterial infection  · CRP trending up, WBC 14.9, afebrile  · On cefepime and vancomycin    Acute on Chronic Congestive Heart Failure: Systolic   Echo with ejection fraction 30%, mild to moderate aortic regurgitation   Continue Lasix, lisinopril restarted. Continue carvedilol.  Strict I and O. Daily weight.  Cardiology on consult    Nonischemic cardiomyopathy    Sinus tachycardia likely from acute respiratory failure. · Heart rate 150s this morning, given metoprolol 5 mg IV  · On metoprolol IV as needed. History of thoracic aortic aneurysm    Transaminasemia, worsening  · Could be from COVID-19  · Liver enzymes trending up  · Liver ultrasound with fatty liver with small amount of pericholecystic fluid but no gallstones  · Hepatitis panel negative  · Avoid hepatotoxic medication  · GI on consult    Candida esophagitis -on fluconazole    Hyponatremia  · We will monitor BMP    Hyperkalemia -resolved    Morbid obesity    Obstructive sleep apnea  Tobacco dependence  Hyperlipidemia    Diet ADULT DIET;  Regular  Adult Oral Nutrition Supplement; Frozen Oral Supplement  Adult Oral Nutrition Supplement; Low Calorie/High Protein Oral Supplement   DVT Prophylaxis [x] Lovenox, []  Heparin, [] SCDs, [] Warfarin  [] NOAC     GI Prophylaxis [x] PPI,  [] H2 Blocker,  [] Carafate,  [] Diet/Tube Feeds   Code Status Full Code   MDM [] Low, [] Moderate,[x]  High     History of Present Illness:     Chief Complaint: [id number]    Seen and examined today. Tachycardia, short of breath. Ten point ROS reviewed negative, unless as noted above    Objective: Intake/Output Summary (Last 24 hours) at 10/9/2021 1043  Last data filed at 10/9/2021 0900  Gross per 24 hour   Intake 637.56 ml   Output 3475 ml   Net -2837.44 ml      Vitals:   Vitals:    10/09/21 0900   BP: 112/87   Pulse: 112   Resp: 29   Temp: 97.5 °F (36.4 °C)   SpO2: (!) 89%     Physical Exam:   GEN awake, in respiratory distress, on BiPAP  EYES Pupils are equally round. No scleral erythema, discharge, or conjunctivitis. HENT Mucous membranes are moist. Oral pharynx without exudates, no evidence of thrush. NECK Supple, no apparent thyromegaly or masses. RESP rales bilaterally, no wheezing  CARDIO/VASC S1/S2 auscultated. Regular rate without appreciable murmurs, rubs, or gallops. No JVD or carotid bruits. Peripheral pulses equal bilaterally and palpable. No peripheral edema. GI Abdomen is soft without significant tenderness, masses, or guarding. Bowel sounds are normoactive. Rectal exam deferred. MSK No gross joint deformities. SKIN Normal coloration, warm, dry.     Medications:   Medications:    furosemide  40 mg IntraVENous BID    cefepime  2,000 mg IntraVENous Q12H    fluconazole  100 mg IntraVENous Q24H    vancomycin  1,250 mg IntraVENous Q12H    vitamin D  50,000 Units Oral Weekly    dexamethasone  10 mg IntraVENous Daily    lisinopril  2.5 mg Oral Daily    albuterol sulfate HFA  2 puff Inhalation 4x daily    [Held by provider] HYDROcodone 5 mg - acetaminophen  1

## 2021-10-09 NOTE — PROGRESS NOTES
[facility]  consulted by Dr. Roque Looney for monitoring and adjustment. Indication for treatment: Pneumonia  Goal trough: 15-20 mcg/mL  AUC/MARYSOL: 400-600    Pertinent Laboratory Values:   Temp Readings from Last 3 Encounters:   10/09/21 97.4 °F (36.3 °C) (Axillary)   12/23/16 98.8 °F (37.1 °C) (Oral)     Recent Labs     10/07/21  1030 10/08/21  1036 10/09/21  0603   WBC 14.9* 13.9* 13.3*     Recent Labs     10/07/21  1030 10/08/21  1036 10/09/21  0603   BUN 37* 34* 37*   CREATININE 0.5* 0.6* 0.5*     Estimated Creatinine Clearance: 190 mL/min (A) (based on SCr of 0.5 mg/dL (L)). Intake/Output Summary (Last 24 hours) at 10/9/2021 1857  Last data filed at 10/9/2021 1530  Gross per 24 hour   Intake 549.02 ml   Output 3050 ml   Net -2500.98 ml     Pertinent Cultures:  Date    Source    Results  10/5   MRSA nasal   Ordered    Vancomycin level:   TROUGH:    No results for input(s): VANCOTROUGH in the last 72 hours.   RANDOM:    Recent Labs     10/07/21  1030 10/09/21  0603   VANCORANDOM 6.5 15.9     Assessment:  · WBC and temperature: WBC elevated/afebrile  · SCr, BUN, and urine output: Stable, at baseline 0.4-0.6   · Day(s) of therapy: 5  · Vancomycin concentration:   · 10/07: 6.5 (~ 29 hours post-dose)  · 10/09: 15.9, therapeutic    Plan:  · Vancomycin 1250mg q12h   · Predicted , trough 18.3  · Repeat next level in 48h  · Pharmacy will continue to monitor patient and adjust therapy as indicated    VANCOMYCIN CONCENTRATION SCHEDULED FOR 10/11 @ 0600    Thank you for the consult,  Baljeet DENG [facility] [place], PharmD  10/9/2021 6:57 PM

## 2021-10-09 NOTE — PROGRESS NOTES
Pulmonary and Critical Care  Progress Note    Subjective: The patient is on Bipap 100%. Having anxiety at times. Shortness of breath is unchanged  Chest pain none  Addressing respiratory complaints Patient is negative for  hemoptysis and cyanosis  CONSTITUTIONAL:  negative for fevers and chills      Past Medical History:     has a past medical history of Asthma, COPD (chronic obstructive pulmonary disease) ([address] and COVID-19.   has a past surgical history that includes Wrist surgery (Left, 1976); hernia repair; and Cardiac catheterization (2013). reports that he quit smoking about 16 years ago. His smoking use included cigarettes. He started smoking about 50 years ago. He smoked 0.50 packs per day. He has never used smokeless tobacco. He reports that he does not drink alcohol and does not use drugs. Family history:  family history is not on file. Allergies   Allergen Reactions    Percocet [Oxycodone-Acetaminophen] Nausea And Vomiting     Social History:    Reviewed; no changes    Objective:   PHYSICAL EXAM:        VITALS:  /74   Pulse 111   Temp 97.4 °F (36.3 °C) (Axillary)   Resp 24   Ht 5' 9.02\" (1.753 m)   Wt 255 lb 11.7 oz (116 kg)   SpO2 (!) 89%   BMI 37.75 kg/m²     24HR INTAKE/OUTPUT:      Intake/Output Summary (Last 24 hours) at 10/9/2021 1617  Last data filed at 10/9/2021 1530  Gross per 24 hour   Intake 689.23 ml   Output 3050 ml   Net -2360.77 ml       CONSTITUTIONAL:  Awake. LUNGS:  decreased breath sounds, basilar crackles. CARDIOVASCULAR:  normal S1 and S2 and negative JVD  ABD:Abdomen soft, non-tender. BS normal. No masses,  No organomegaly  NEURO:Alert.   DATA:    CBC:  Recent Labs     10/07/21  1030 10/08/21  1036 10/09/21  0603   WBC 14.9* 13.9* 13.3*   RBC 5.17 5.37 5.43   HGB 14.6 15.3 15.2   HCT 45.3 47.1 47.6    154 141   MCV 87.6 87.7 87.7   MCH 28.2 28.5 28.0   MCHC 32.2 32.5 31.9*   RDW 14.8 15.1* 15.1*   NRBC  --   --  1   SEGSPCT 87.0* 80.0* 73.0*   BANDSPCT 9 8 5      BMP:  Recent Labs     10/07/21  1030 10/08/21  1036 10/09/21  0603   * 133* 134*   K 4.4 4.4 4.5   CL 97* 97* 98*   CO2 22 22 22   BUN 37* 34* 37*   CREATININE 0.5* 0.6* 0.5*   CALCIUM 9.1 9.3 9.2   GLUCOSE 135* 101* 102*      ABG:  No results for input(s): PH, PO2ART, XKA8ZAO, HCO3, BEART, O2SAT in the last 72 hours. Lab Results   Component Value Date    PROBNP 459.5 (H) 10/05/2021    PROBNP 356.9 (H) 09/27/2021    PROBNP 377.7 (H) 09/25/2021     No results found for: [facility]    Radiology Review:  Pertinent images / reports were reviewed as a part of this visit. Assessment:     Patient Active Problem List   Diagnosis    Obstructive sleep apnea syndrome    Arteriosclerosis of coronary artery    Acute bronchitis with chronic obstructive pulmonary disease (COPD) ([facility] [address]    COVID-19    Tobacco dependence in remission    Obesity    Hyperlipidemia    Nonischemic cardiomyopathy ([facility] [address]    Aneurysm of thoracic aorta (HCC)    Acute respiratory failure with hypoxia (HCC)    Severe malnutrition (HCC)    Vitamin D deficiency       Plan:   1. Overall the patient is unchanged. 2. Wean FiO2.  3. Watch resp status closely.     Victor Hugo Collins MD   10/9/2021  4:17 PM

## 2021-10-10 NOTE — PROGRESS NOTES
Pulmonary and Critical Care  Progress Note  Pt seen in am.  Subjective: The patient is unchanged, on Bipap 100%. Shortness of breath is unchanged  Chest pain none  Addressing respiratory complaints Patient is negative for  hemoptysis and cyanosis  CONSTITUTIONAL:  negative for fevers and chills      Past Medical History:     has a past medical history of Asthma, COPD (chronic obstructive pulmonary disease) ([address] and COVID-19.   has a past surgical history that includes Wrist surgery (Left, 1976); hernia repair; and Cardiac catheterization (2013). reports that he quit smoking about 16 years ago. His smoking use included cigarettes. He started smoking about 50 years ago. He smoked 0.50 packs per day. He has never used smokeless tobacco. He reports that he does not drink alcohol and does not use drugs. Family history:  family history is not on file. Allergies   Allergen Reactions    Percocet [Oxycodone-Acetaminophen] Nausea And Vomiting     Social History:    Reviewed; no changes    Objective:   PHYSICAL EXAM:        VITALS:  /80   Pulse 90   Temp 97.5 °F (36.4 °C) (Axillary)   Resp 30   Ht 5' 9.02\" (1.753 m)   Wt 255 lb 11.7 oz (116 kg)   SpO2 (!) 88%   BMI 37.75 kg/m²     24HR INTAKE/OUTPUT:      Intake/Output Summary (Last 24 hours) at 10/10/2021 1539  Last data filed at 10/10/2021 1200  Gross per 24 hour   Intake --   Output 4100 ml   Net -4100 ml       CONSTITUTIONAL:  Awake. LUNGS:  decreased breath sounds, basilar crackles. CARDIOVASCULAR:  normal S1 and S2 and negative JVD  ABD:Abdomen soft, non-tender. BS normal. No masses,  No organomegaly  NEURO:Alert.   DATA:    CBC:  Recent Labs     10/08/21  1036 10/08/21  1036 10/09/21  0603 10/09/21  2345 10/10/21  0616   WBC 13.9*   < > 13.3* 15.8* 14.3*   RBC 5.37   < > 5.43 5.69 5.73   HGB 15.3   < > 15.2 16.0 16.1   HCT 47.1   < > 47.6 49.0 49.9      < > 141 200 176   MCV 87.7   < > 87.7 86.1 87.1   MCH 28.5   < > 28.0 28.1 28.1 MCHC 32.5   < > 31.9* 32.7 32.3   RDW 15.1*   < > 15.1* 15.3* 15.9*   NRBC  --   --  1  --   --    SEGSPCT 80.0*  --  73.0* 80.0*  --    BANDSPCT 8  --  5 8  --     < > = values in this interval not displayed. BMP:  Recent Labs     10/09/21  0603 10/09/21  2345 10/10/21  0616   * 133* 139   K 4.5 4.5 4.5   CL 98* 97* 102   CO2 22 19* 16*   BUN 37* 44* 50*   CREATININE 0.5* 0.6* 0.7*   CALCIUM 9.2 9.3 9.6   GLUCOSE 102* 140* 130*      ABG:  Recent Labs     10/10/21  1500   PH 7.42   PO2ART 57*   ETF7MCU 29.0*   O2SAT 89.5*     Lab Results   Component Value Date    PROBNP 459.5 (H) 10/05/2021    PROBNP 356.9 (H) 09/27/2021    PROBNP 377.7 (H) 09/25/2021     No results found for: [facility]    Radiology Review:  Pertinent images / reports were reviewed as a part of this visit. Assessment:     Patient Active Problem List   Diagnosis    Obstructive sleep apnea syndrome    Arteriosclerosis of coronary artery    Acute bronchitis with chronic obstructive pulmonary disease (COPD) ([facility] [address]    COVID-19    Tobacco dependence in remission    Obesity    Hyperlipidemia    Nonischemic cardiomyopathy ([facility] [address]    Aneurysm of thoracic aorta (HCC)    Acute respiratory failure with hypoxia (HCC)    Severe malnutrition (HCC)    Vitamin D deficiency       Plan:   1. Overall the patient is unchanged. 2. Wean FiO2.  3. Watch resp status closely.     Maame Morris MD   10/10/2021  3:39 PM

## 2021-10-10 NOTE — PROGRESS NOTES
10/10/21 0352   NIV Type   NIV Started/Stopped On   Equipment Type v60   Mode Bilevel   Mask Type Full face mask   Mask Size Medium   Bonnet size Medium   Settings/Measurements   IPAP 18 cmH20   CPAP/EPAP 12 cmH2O   Rate Ordered 12   Resp 23   FiO2  100 %   I Time/ I Time % 1.15 s   Vt Exhaled 645 mL   Minute Volume 17 Liters   Mask Leak (lpm) 80 lpm   Comfort Level Good   Using Accessory Muscles No   SpO2 89   Patient Observation   Observations Pt sleeping   Alarm Settings   Alarms On Y   Press Low Alarm 5 cmH2O   High Pressure Alarm 25 cmH2O   Delay Alarm 20 sec(s)   Apnea (secs) 20 secs   Resp Rate Low Alarm 13   High Respiratory Rate 40 br/min

## 2021-10-10 NOTE — PROGRESS NOTES
Infectious Disease Progress Note  10/10/2021   Patient Name: Van Art : 1955       Reason for visit: F/u COVID-19 pneumonia, acute respiratory failure? History:? Interval history noted   quite fatigued and remains on BiPAP  Physical Exam:  Vital Signs: BP (!) 136/90   Pulse 103   Temp 97.9 °F (36.6 °C) (Axillary)   Resp (!) 31   Ht 5' 9.02\" (1.753 m)   Wt 255 lb 11.7 oz (116 kg)   SpO2 (!) 88%   BMI 37.75 kg/m²     Gen: alert and oriented X3, on BiPAP  Skin: no stigmata of endocarditis  Wounds: C/D/I  HEMT: AT/NC Oropharynx is dry and cracked  Eyes: PERRLA, EOMI, conjunctiva pink, sclera anicteric. Neck: Supple. Trachea midline. No LAD. Chest: deferred  Heart: RRR  Abd: soft, non-distended, no tenderness, no hepatomegaly. Normoactive bowel sounds. Ext: no clubbing, cyanosis, or edema  Catheter Site: without erythema or tenderness  LDA: CVC:  Neuro: Mental status intact. CN 2-12 intact and no focal sensory or motor deficits     Radiologic / Imaging / TESTING  Collected: 10/04/21 1504 Updated: 10/04/21 1508 Narrative:  EXAMINATION:   ONE XRAY VIEW OF THE CHEST     10/4/2021 1:46 pm     COMPARISON:   2021     HISTORY:   ORDERING SYSTEM PROVIDED HISTORY: covid 23   TECHNOLOGIST PROVIDED HISTORY:   Reason for exam:->covid 23   Reason for Exam: covid 19   Acuity: Acute   Type of Exam: Initial     FINDINGS:   Mild cardiomegaly. Cardiomediastinal silhouette appears unchanged. Multifocal bibasilar predominant airspace disease. No definite effusion. No   pneumothorax or subdiaphragmatic free air. No acute osseous abnormality   identified. Impression:  Multifocal bibasilar predominant airspace disease has progressed slightly   since prior study.            Labs:    Recent Results (from the past 24 hour(s))   Comprehensive Metabolic Panel w/ Reflex to MG    Collection Time: 10/09/21 11:45 PM   Result Value Ref Range    Sodium 133 (L) 135 - 145 MMOL/L    Potassium 4.5 3.5 - 5.1 MMOL/L Chloride 97 (L) 99 - 110 mMol/L    CO2 19 (L) 21 - 32 MMOL/L    BUN 44 (H) 6 - 23 MG/DL    CREATININE 0.6 (L) 0.9 - 1.3 MG/DL    Glucose 140 (H) 70 - 99 MG/DL    Calcium 9.3 8.3 - 10.6 MG/DL    Albumin 3.6 3.4 - 5.0 GM/DL    Total Protein 7.1 6.4 - 8.2 GM/DL    Total Bilirubin 1.1 (H) 0.0 - 1.0 MG/DL    ALT 1,686 (H) 10 - 40 U/L     (H) 15 - 37 IU/L    Alkaline Phosphatase 210 (H) 40 - 129 IU/L    GFR Non-African American >60 >60 mL/min/1.73m2    GFR African American >60 >60 mL/min/1.73m2    Anion Gap 17 (H) 4 - 16   CBC auto differential    Collection Time: 10/09/21 11:45 PM   Result Value Ref Range    WBC 15.8 (H) 4.0 - 10.5 K/CU MM    RBC 5.69 4.6 - 6.2 M/CU MM    Hemoglobin 16.0 13.5 - 18.0 GM/DL    Hematocrit 49.0 42 - 52 %    MCV 86.1 78 - 100 FL    MCH 28.1 27 - 31 PG    MCHC 32.7 32.0 - 36.0 %    RDW 15.3 (H) 11.7 - 14.9 %    Platelets 114 258 - 712 K/CU MM    MPV 12.5 (H) 7.5 - 11.1 FL    Metamyelocytes Relative 1 (H) 0.0 %    Bands Relative 8 5 - 11 %    Segs Relative 80.0 (H) 36 - 66 %    Lymphocytes % 10.0 (L) 24 - 44 %    Monocytes % 1.0 0 - 4 %    Metamyelocytes Absolute 0.16 K/CU MM    Bands Absolute 1.26 K/CU MM    Segs Absolute 12.6 K/CU MM    Lymphocytes Absolute 1.6 K/CU MM    Monocytes Absolute 0.2 K/CU MM    Differential Type MANUAL DIFFERENTIAL    Troponin    Collection Time: 10/09/21 11:45 PM   Result Value Ref Range    Troponin T <0.010 <0.01 NG/ML   Comprehensive metabolic panel    Collection Time: 10/10/21  6:16 AM   Result Value Ref Range    Sodium 139 135 - 145 MMOL/L    Potassium 4.5 3.5 - 5.1 MMOL/L    Chloride 102 99 - 110 mMol/L    CO2 16 (L) 21 - 32 MMOL/L    BUN 50 (H) 6 - 23 MG/DL    CREATININE 0.7 (L) 0.9 - 1.3 MG/DL    Glucose 130 (H) 70 - 99 MG/DL    Calcium 9.6 8.3 - 10.6 MG/DL    Albumin 3.4 3.4 - 5.0 GM/DL    Total Protein 7.3 6.4 - 8.2 GM/DL    Total Bilirubin 1.2 (H) 0.0 - 1.0 MG/DL    ALT 1,767 (H) 10 - 40 U/L     (H) 15 - 37 IU/L    Alkaline Phosphatase 214 (H) 40 - 128 IU/L    GFR Non-African American >60 >60 mL/min/1.73m2    GFR African American >60 >60 mL/min/1.73m2    Anion Gap 21 (H) 4 - 16   CBC auto differential    Collection Time: 10/10/21  6:16 AM   Result Value Ref Range    WBC 14.3 (H) 4.0 - 10.5 K/CU MM    RBC 5.73 4.6 - 6.2 M/CU MM    Hemoglobin 16.1 13.5 - 18.0 GM/DL    Hematocrit 49.9 42 - 52 %    MCV 87.1 78 - 100 FL    MCH 28.1 27 - 31 PG    MCHC 32.3 32.0 - 36.0 %    RDW 15.9 (H) 11.7 - 14.9 %    Platelets 539 344 - 540 K/CU MM    MPV 14.4 (H) 7.5 - 11.1 FL   Magnesium    Collection Time: 10/10/21  6:16 AM   Result Value Ref Range    Magnesium 2.3 1.8 - 2.4 mg/dl   C-Reactive Protein    Collection Time: 10/10/21  6:16 AM   Result Value Ref Range    CRP, High Sensitivity 66.7 mg/L   Troponin    Collection Time: 10/10/21  6:16 AM   Result Value Ref Range    Troponin T <0.010 <0.01 NG/ML     CULTURE results: Invalid input(s): BLOOD CULTURE,  URINE CULTURE, SURGICAL CULTURE    Diagnosis:  Patient Active Problem List   Diagnosis    Obstructive sleep apnea syndrome    Arteriosclerosis of coronary artery    [facility]    COVID-19    Tobacco dependence in remission    Obesity    Hyperlipidemia    Nonischemic cardiomyopathy ([facility]    Aneurysm of thoracic aorta (HCC)    Acute respiratory failure with hypoxia (HCC)    Severe malnutrition (HCC)    Vitamin D deficiency       Active Problems  Principal Problem:    COVID-19  Active Problems:    Obstructive sleep apnea syndrome    Tobacco dependence in remission    Obesity    Hyperlipidemia    Nonischemic cardiomyopathy (HCC)    Acute respiratory failure with hypoxia (HCC)    Severe malnutrition (HCC)    Vitamin D deficiency  Resolved Problems:    * No resolved hospital problems. *      Impression and plan   Summary and rationale: Patient is a 63 y.o.   male with a medical hx of obesity, HLD , thoracic aorta aneurysm admitted on 9/14/2021 with SOB, cough, fever, chills, anorexia for 5 days prior to admission. Diagnosed with severe covid-19 pneumonia and respiratory failure   QuantiFERON-TB test negative; IgG, IgA, IgM all within normal limits.  Vitamin D deficiency   Candida esophagitis   Fatty liver disease present and this may double the risk of mortality   COVID-19 symptom onset: 9/9/2021   COVID-19 test date: 9/14/2021   Expected discontinuation of isolation precautions: 9/29/2021   Clinical status: Day 28 of disease, remains on BiPAP,  Worsening liver enzymes. Elevated CRP and procalcitonin. With the use of corticosteroids, will need to consider possibility of CMV and HSV hepatitis.  Transaminitis: Hepatitis A, B and C serology test are negative. CMV IgG present, CMV IgM within normal limits, HSV 1 and 2 IgM negative, HSV IgG is elevated. Med Rene Therapeutic:  o Ongoing antibiotics: Vancomycin and cefepime. Start acyclovir. o Fluconazole 200 mg daily for 14 days. o Immunomodulators:  o Dexamethasone: 9/14-20 (7 days)  - Solumedrol 40 mg q 12h, (9/20-10/1); dexamethasone 10/2-  - Baracitinib: 9/15 , d/red due to transaminitis  o Completed antibiotics: azithromycin  o Other agents:    Diagnostic:    F/u: Check CMV DNA PCR, repeat liver ultrasound   Other: Taper and discontinue corticosteroids.       Electronically signed by: Electronically signed by Siobhan Ferrara MD on 10/10/2021 at 10:45 AM

## 2021-10-10 NOTE — PROGRESS NOTES
Hospitalist Progress Note      Name:  Jake Zuñiga /Age/Sex: 1955  (31 y.o. male)   MRN & CSN:  [id number] & [id number] Admission Date/Time: 2021  1:13 PM   Location:  -A PCP: No primary care provider on file. Hospital Day: 27    Assessment and Plan:   Jake Zuñiga is a 63 y.o.  male  who presents with COVID-19    Acute Hypoxic respiratory Failure  Severe COVID-19 Pneumonia  Possible secondary bacterial infection  · Onset:   · On BiPAP; RR 28; sat 89%  · High risk for intubation if work of breathing worsens  · On dexamethasone 10 mg IV; decrease to 6 mg; gradually discontinue  · Baricitinib was discontinued due to range LFTs  · CXR 10/7 persistent multifocal airspace consolidation  · Continue bronchodilators  · Monitor saturation; Wean O2 as tolerated  · Continue furosemide 40 mg twice daily. · CRP 66, WBC 14  · On cefepime and vancomycin, day 4; monitor Vanco trough  · Seen by ID; recommendations pending    Acute on Chronic systolic congestive Heart Failure  Nonischemic cardiomyopathy   Echo with ejection fraction 30%, mild to moderate aortic regurgitation   Continue Lasix, lisinopril, carvedilol.  Strict I and O. Daily weight.  Cardiology following    Hypotension  Secondary to Covid versus cardiomyopathy  Systolic BP 65  Start norepinephrine infusion; titrate to a map of 65    Sinus tachycardia   · likely from acute respiratory failure  · Rapid response during the night due to heart rate in 170s   · heart rate 100s this morning  · Continue beta-blocker    Liver dysfunction  · Likely secondary to COVID-19  · LFTs worsening; monitor the trend  · Liver ultrasound with fatty liver  · Hepatitis panel negative  · Avoid hepatotoxins  · Seen by GI  · Discontinue vancomycin if did not improve    Candida esophagitis   on fluconazole    Morbid obesity  Obstructive sleep apnea  Tobacco dependence    Diet ADULT DIET;  Regular  Adult Oral Nutrition Supplement; Frozen Oral Supplement  Adult Oral Nutrition Supplement; Low Calorie/High Protein Oral Supplement   DVT Prophylaxis [x] Lovenox, []  Heparin, [] SCDs, [] Warfarin  [] NOAC     GI Prophylaxis [x] PPI,  [] H2 Blocker,  [] Carafate,  [] Diet/Tube Feeds   Code Status Full Code   MDM [] Low, [] Moderate,[x]  High     Objective:   Patient seen and examined at bedside; alert and oriented; complaining of thirst; off isolation. Feeling better than yesterday      Intake/Output Summary (Last 24 hours) at 10/10/2021 1132  Last data filed at 10/10/2021 0525  Gross per 24 hour   Intake 301.67 ml   Output 3150 ml   Net -2848.33 ml      Vitals:   Vitals:    10/10/21 1000   BP: 108/76   Pulse: 94   Resp: (!) 31   Temp:    SpO2:      Physical Exam:   GEN awake, in respiratory distress, on BiPAP  EYES Pupils are equally round. No scleral erythema, discharge, or conjunctivitis. NECK Supple, no apparent thyromegaly or masses. RESP rales bilaterally, no wheezing  CARDIO/VASC S1/S2 auscultated. Regular rate without appreciable murmurs, rubs, or gallops. No JVD or carotid bruits. Peripheral pulses equal bilaterally and palpable. No peripheral edema. GI Abdomen is soft without significant tenderness, masses, or guarding. Bowel sounds are normoactive. MSK No gross joint deformities. SKIN Normal coloration, warm, dry.     Medications:   Medications:    acyclovir  10 mg/kg (Ideal) IntraVENous Q8H    furosemide  40 mg IntraVENous BID    busPIRone  5 mg Oral BID    lidocaine PF  5 mL IntraDERmal See Admin Instructions    sodium chloride flush  5-40 mL IntraVENous 2 times per day    cefepime  2,000 mg IntraVENous Q12H    fluconazole  100 mg IntraVENous Q24H    vancomycin  1,250 mg IntraVENous Q12H    vitamin D  50,000 Units Oral Weekly    dexamethasone  10 mg IntraVENous Daily    lisinopril  2.5 mg Oral Daily    albuterol sulfate HFA  2 puff Inhalation 4x daily    [Held by provider] HYDROcodone 5 mg - acetaminophen  1 tablet Oral Q12H  vitamin C  1,000 mg Oral BID    zinc sulfate  50 mg Oral BID    Vitamin D  1,000 Units Oral Daily    sodium chloride flush  5-40 mL IntraVENous 2 times per day    enoxaparin  30 mg SubCUTAneous BID    bisacodyl  5 mg Oral Daily    carvedilol  6.25 mg Oral BID    [Held by provider] divalproex  250 mg Oral Daily    pantoprazole  40 mg Oral QAM AC    montelukast  10 mg Oral QPM    fluticasone  2 puff Inhalation BID    [Held by provider] pravastatin  40 mg Oral Dinner    tiotropium-olodaterol  2 puff Inhalation BID    [Held by provider] diclofenac-miSOPROStol  1 tablet Oral BID      Infusions:    dilTIAZem (CARDIZEM) 100 mg in dextrose 5% 100 mL ([facility]) 10 mg/hr (10/10/21 0315)    sodium chloride      sodium chloride Stopped (10/08/21 1721)     PRN Meds: dilTIAZem, 25 mg, Once PRN  sodium chloride flush, 5-40 mL, PRN  sodium chloride, 25 mL, PRN  magic (miracle) mouthwash, 5 mL, 4x Daily PRN  metoprolol, 2.5 mg, Q4H PRN  LORazepam, 1 mg, Q6H PRN  morphine, 2 mg, Q4H PRN  sodium chloride, 1 spray, PRN  senna, 1 tablet, BID PRN  polyvinyl alcohol, 1 drop, Q1H PRN  albuterol sulfate HFA, 2 puff, Q4H PRN  LORazepam, 1 mg, Q6H PRN  benzonatate, 100 mg, TID PRN  sodium chloride flush, 5-40 mL, PRN  sodium chloride, 25 mL, PRN  ondansetron, 4 mg, Q8H PRN   Or  ondansetron, 4 mg, Q6H PRN  polyethylene glycol, 17 g, Daily PRN  [Held by provider] acetaminophen, 650 mg, Q6H PRN   Or  [Held by provider] acetaminophen, 650 mg, Q6H PRN  guaiFENesin-dextromethorphan, 5 mL, Q4H PRN  methocarbamol, 500 mg, Q8H PRN        Recent Labs     10/09/21  0603 10/09/21  2345 10/10/21  0616   WBC 13.3* 15.8* 14.3*   HGB 15.2 16.0 16.1   HCT 47.6 49.0 49.9    200 176      Recent Labs     10/09/21  0603 10/09/21  2345 10/10/21  0616   * 133* 139   K 4.5 4.5 4.5   CL 98* 97* 102   CO2 22 19* 16*   BUN 37* 44* 50*   CREATININE 0.5* 0.6* 0.7*     Recent Labs     10/09/21  0603 10/09/21  2345 10/10/21  0616   AST 423* 503* 528*   ALT 1,339* 1,686* 1,767*   BILITOT 0.9 1.1* 1.2*   ALKPHOS 189* 210* 214*     Imaging reviewed    Electronically signed by Shirin Soni MD on 10/10/2021 at 11:32 AM

## 2021-10-10 NOTE — SIGNIFICANT EVENT
Rapid Response Team Note    Date of event: 10/9/2021   Time of event: Marija Perrin [age]y.o. year old male   YOB: 1955   Admit date:  9/14/2021   Location: 2004/2004-A   Witnessed? : []Yes  [] No  Monitored? : [x]Yes  [] No  Code status: [x] Full  [] DNR-CCA  []DNR-CC  ______________________________________________________________________  Reason for RRT:    [] RR < 8     [] RR > 28   [] SBP < 90 mmHg    [] HR < 40 bpm   [x] HR > 130 bpm  [] Seizures    [] SpO2 <90%   [] LOC   [] Code Stroke  [] Significant Bleeding Event    [] Other:     Subjective:   CTSP regarding the above event, rapid response was called patient for heart rate in the high 170s. Nurse noted patient to be in SVT. Prior to arrival as needed dose of 2.5 mg IV metoprolol was given. On arrival patient still noted to be in SVT on bedside monitor. 6 mg adenosine was administered with no response. 12mg adenosine was ordered and given with minimal response. At that time bedside monitor and EKG showed what appeared to now be sinus tachycardia. Patient's heart rate did drop to 140 likely secondary to the metoprolol was given above. 0.25 mg/kg Cardizem IV push which was rounded down to 25 mg ordered and given. Patient had good response in the 70s. Systolic roughly 271. We will hold off on Cardizem drip at this time and order Cardizem 25 mg IV push as needed x1 dose with parameters and nursing note to notify physician if needed to use medication.     Objective:   Vital signs: Temp: 90 8F BP: 116/81 RR: 30 HR: 170s    Initial Condition:  Conscious   [x] Yes  [] No     Breathing [x] Yes  [] No     Pulse  [x] Yes  [] No    Airway:   [] Open/ Clear     Intervention: [] None  [] Pooled secretions     [] Suctioned  [x]On BiPAP      [] Intubation    Lungs:   [x] Symmetrical chest rise/ CTABL Intervention: [] None  [] Use of accessory muscles    [] NIV (CPAP/BiPAP)  [] Cyanosis      [] Nasal Oxygen/Mask  [] Wheezing       [] ABG             [] CXR  [x] Other: Continue BiPAP    Circulation:   Rhythm:      Intervention: [] None      [] Sinus      [] IV Access  [] Peripheral      [x] Other: Question SVT. Appears to be sinus tach  [] Central            [] EKG            [] Cardioversion            [] Defibrillation       Capillary Refill:  [x] > 2 seconds [] < 2 seconds    Diagnostic Test:    EKG:    Sinus tachycardia  ABG:      Lab Results   Component Value Date    PH 7.48 10/05/2021    O2SAT 85.0 10/05/2021       Medication(s) Given:  []  Narcan (Naloxone)   []  Romazicon (Flumazenil)    []  Breathing Treatment    []  Steroids (Prednisone, Solu-Medrol)  [x]  Adenosine  [x] Cardiac Medicines: Diltiazem 25 mg IV push    Infusion(s):   [] Normal Saline    Amount:  cc/hr      [] Lactate Ringers      [] Other:     Imaging:   [] CXR:   [] Normal         [] Pneumothorax         [] Pulmonary Edema  [] Infiltrate          [] CT Head  [] Normal          [] ICB          [] SAH          [] Other:          [] CTA Pulmonary [] Normal          [] Pulmonary embolus          [] Other:       Laboratory Tests:   CBC, CMP, and troponin series      Teams Assessment and Plan:  1. Sinus tachycardia  a. Initially patient appeared to be in SVT. Picture is clouded given that 2.5 mg IV metoprolol was given prior to arrival.  6 mg adenosine followed by 12 mg adenosine and no effect. EKG did show sinus tachycardia and at that time 25 mg IV diltiazem was given with appropriate response. Patient tolerated medication well. At this time given borderline soft SBP we will hold off on Cardizem gtt. and order one-time as needed dose of diltiazem 25 mg IV with parameters and communication to notify physician if the medication needs to be pushed. 2. Hypoxia  a. During event patient's O2 saturation dropped to 88%. Anticipate slight improvement after treatment. We will continue current therapy with BiPAP with close monitoring.   ?  ?  Disposition:  [x] No transfer   []

## 2021-10-10 NOTE — PROGRESS NOTES
10/10/21 0812   NIV Type   $NIV $Daily Charge   NIV Started/Stopped On   Equipment Type v60   Mode Bilevel   Mask Type Full face mask   Mask Size Medium   Settings/Measurements   IPAP 18 cmH20   CPAP/EPAP 12 cmH2O   Rate Ordered 12   Resp 30   Insp Rise Time (%) 3 %   FiO2  100 %   I Time/ I Time % 1.15 s   Vt Exhaled 585 mL   Minute Volume 18.3 Liters   Mask Leak (lpm) 70 lpm   Comfort Level Good   Using Accessory Muscles No   SpO2 89   Alarm Settings   Alarms On Y   Press Low Alarm 5 cmH2O   High Pressure Alarm 25 cmH2O   Apnea (secs) 20 secs   Resp Rate Low Alarm 13   High Respiratory Rate 40 br/min

## 2021-10-10 NOTE — PROGRESS NOTES
[address] [facility]  consulted by Dr. Jermaine Pelletier for monitoring and adjustment. Indication for treatment: Pneumonia  Goal trough: 15-20 mcg/mL  AUC/MARYSOL: 400-600    Pertinent Laboratory Values:   Temp Readings from Last 3 Encounters:   10/10/21 97.5 °F (36.4 °C) (Axillary)   12/23/16 98.8 °F (37.1 °C) (Oral)     Recent Labs     10/09/21  0603 10/09/21  2345 10/10/21  0616   WBC 13.3* 15.8* 14.3*     Recent Labs     10/09/21  0603 10/09/21  2345 10/10/21  0616   BUN 37* 44* 50*   CREATININE 0.5* 0.6* 0.7*     Estimated Creatinine Clearance: 136 mL/min (A) (based on SCr of 0.7 mg/dL (L)). Intake/Output Summary (Last 24 hours) at 10/10/2021 1846  Last data filed at 10/10/2021 1700  Gross per 24 hour   Intake 396.11 ml   Output 4600 ml   Net -4203.89 ml     Pertinent Cultures:  Date    Source    Results  10/5   MRSA nasal   Ordered    Vancomycin level:   TROUGH:    No results for input(s): VANCOTROUGH in the last 72 hours.   RANDOM:    Recent Labs     10/09/21  0603   VANCORANDOM 15.9     Assessment:  · WBC and temperature: WBC elevated/afebrile  · SCr, BUN, and urine output: Stable, at baseline 0.4-0.6   · Day(s) of therapy: 6  · Vancomycin concentration:   · 10/07: 6.5 (~ 29 hours post-dose)  · 10/09: 15.9, therapeutic    Plan:  · Vancomycin 1250mg q12h   · Predicted , trough 18.3  · Repeat next level in 24h  · Pharmacy will continue to monitor patient and adjust therapy as indicated    VANCOMYCIN CONCENTRATION SCHEDULED FOR 10/11 @ 0600    Thank you for the consult,  Darryl Rodriguez [facility], PharmD  10/10/2021 6:46 PM

## 2021-10-10 NOTE — CONSULTS
Consult completed. Procedure/rationale explained to pt, including benefits vs potential risks; questions answered & consent obtained. Triple Lumen #6 Fr ArrowG+rambo Blue Advance Midline initiated to RUE Basilic Vein using sterile, UltraSound-guided technique without difficulty/complications. Sterile dressing applied with SkinPrep, StatLock, BioPatch, and SwabCaps. Pt tolerated well & denies other c/o or needs. Primary RN notified.

## 2021-10-10 NOTE — PROGRESS NOTES
PICC RN at bedside. Cardizem gtt started as patient's HR was climbing into the the high 90s. Explained medication to patient and he verbalized understanding. Will continue to monitor.

## 2021-10-11 PROBLEM — I47.1 SVT (SUPRAVENTRICULAR TACHYCARDIA) (HCC): Status: ACTIVE | Noted: 2021-01-01

## 2021-10-11 NOTE — PROGRESS NOTES
Pt Rapid response @ 7041 for SVT HR 170s.  25mg IV Cardizem given at bedside, pt HR returned NSR and Cardizem gtt restarted per hospitalist.

## 2021-10-11 NOTE — PROGRESS NOTES
Comprehensive Nutrition Assessment    Type and Reason for Visit:  Reassess    Nutrition Recommendations/Plan:   · Continue current diet and supplements  · Please consider a PEG tube    Nutrition Assessment:  Pt intake has now declined as he is unable to remove the BiPAP long enough to consume food. At this time, due to the pt wanting to remain a full code, we will need to start the discussion around a PEG tube as the pt is losing a significant amount of weight during his los    Malnutrition Assessment:  Malnutrition Status:  Severe malnutrition    Context:  Acute Illness     Findings of the 6 clinical characteristics of malnutrition:  Energy Intake:  1 - 75% or less of estimated energy requirements for 7 or more days  Weight Loss:  7 - Greater than 5% over 1 month     Body Fat Loss:  Unable to assess     Muscle Mass Loss:  Unable to assess    Fluid Accumulation:  1 - Mild Generalized   Strength:  Not Performed    Estimated Daily Nutrient Needs:  Energy (kcal):  0022-9341 (Ap Guise w/ stress factor 1.0-1.2); Weight Used for Energy Requirements:  Current     Protein (g):  80-95 (1.1-1.3 g/kg); Weight Used for Protein Requirements:  Ideal        Fluid (ml/day):  2000; Method Used for Fluid Requirements:  1 ml/kcal      Nutrition Related Findings:  COVID unit, still with hgh O2 needs, bipap with discussion for intubation, meds noted: vitamin D, C, decadron      Wounds:  None       Current Nutrition Therapies:    ADULT DIET; Regular  Adult Oral Nutrition Supplement; Frozen Oral Supplement  Adult Oral Nutrition Supplement;  Low Calorie/High Protein Oral Supplement    Anthropometric Measures:  · Height: 5' 9.02\" (175.3 cm)  · Current Body Weight: 237 lb (107.5 kg)   · Admission Body Weight: 272 lb 4.3 oz (123.5 kg)    · Usual Body Weight: 272 lb (123.4 kg)     · Ideal Body Weight: 160 lbs; % Ideal Body Weight 148.1 %   · BMI: 35  · BMI Categories: Obese Class 2 (BMI 35.0 -39.9)       Nutrition Diagnosis: · Severe malnutrition, In context of acute illness or injury related to inadequate protein-energy intake as evidenced by weight loss, intake 51-75%      Nutrition Interventions:   Food and/or Nutrient Delivery:  Continue Current Diet, Continue Oral Nutrition Supplement, Start Tube Feeding  Nutrition Education/Counseling:  No recommendation at this time   Coordination of Nutrition Care:  Continue to monitor while inpatient    Goals:  pt will consume greater than 50-75% of meals and supplements       Nutrition Monitoring and Evaluation:   Behavioral-Environmental Outcomes:  None Identified   Food/Nutrient Intake Outcomes:  Diet Advancement/Tolerance, Food and Nutrient Intake, Supplement Intake  Physical Signs/Symptoms Outcomes:  Biochemical Data, Meal Time Behavior, Skin, Weight     Discharge Planning:     Too soon to determine     Electronically signed by Alessandra Govea RD, LD on 20/30/82 at 7:24 PM EDT    Contact: [phone]

## 2021-10-11 NOTE — PROGRESS NOTES
Infectious Disease Progress Note  10/11/2021   Patient Name: Nguyễn Nettles : 1955       Reason for visit: F/u COVID-19 pneumonia, acute respiratory failure? History:? Interval history noted   quite fatigued and remains on BiPAP  Physical Exam:  Vital Signs: /65   Pulse 86   Temp 97.5 °F (36.4 °C) (Axillary)   Resp 29   Ht 5' 9.02\" (1.753 m)   Wt 237 lb 14 oz (107.9 kg)   SpO2 90%   BMI 35.11 kg/m²     Gen: alert and oriented X3, on BiPAP  Skin: no stigmata of endocarditis  Wounds: C/D/I  HEMT: AT/NC Oropharynx is dry and cracked  Eyes: PERRLA, EOMI, conjunctiva pink, sclera anicteric. Neck: Supple. Trachea midline. No LAD. Chest: deferred  Heart: RRR  Abd: soft, non-distended, no tenderness, no hepatomegaly. Normoactive bowel sounds. Ext: no clubbing, cyanosis, or edema  Catheter Site: without erythema or tenderness  LDA: CVC:  Neuro: Mental status intact. CN 2-12 intact and no focal sensory or motor deficits     Radiologic / Imaging / TESTING  Collected: 10/04/21 1504 Updated: 10/04/21 1508 Narrative:  EXAMINATION:   ONE XRAY VIEW OF THE CHEST     10/4/2021 1:46 pm     COMPARISON:   2021     HISTORY:   ORDERING SYSTEM PROVIDED HISTORY: covid 23   TECHNOLOGIST PROVIDED HISTORY:   Reason for exam:->covid 23   Reason for Exam: covid 19   Acuity: Acute   Type of Exam: Initial     FINDINGS:   Mild cardiomegaly. Cardiomediastinal silhouette appears unchanged. Multifocal bibasilar predominant airspace disease. No definite effusion. No   pneumothorax or subdiaphragmatic free air. No acute osseous abnormality   identified. Impression:  Multifocal bibasilar predominant airspace disease has progressed slightly   since prior study.            Labs:    Recent Results (from the past 24 hour(s))   Blood gas, arterial    Collection Time: 10/10/21  3:00 PM   Result Value Ref Range    pH, Bld 7.42 7.34 - 7.45    pCO2, Arterial 29.0 (L) 32 - 45 MMHG    pO2, Arterial 57 (L) 75 - 100 MMHG Base Excess 4 (H) 0 - 3.3    HCO3, Arterial 18.8 18 - 23 MMOL/L    CO2 Content 19.7 19 - 24 MMOL/L    O2 Sat 89.5 (L) 96 - 97 %    Carbon Monoxide, Blood 2.1 0 - 5 %    Methemoglobin, Arterial 1.4 <1.5 %    Comment BIPAP  18  12 100    Comprehensive metabolic panel    Collection Time: 10/11/21  6:00 AM   Result Value Ref Range    Sodium 142 135 - 145 MMOL/L    Potassium 4.3 3.5 - 5.1 MMOL/L    Chloride 104 99 - 110 mMol/L    CO2 17 (L) 21 - 32 MMOL/L    BUN 82 (H) 6 - 23 MG/DL    CREATININE 1.3 0.9 - 1.3 MG/DL    Glucose 169 (H) 70 - 99 MG/DL    Calcium 9.0 8.3 - 10.6 MG/DL    Albumin 3.3 (L) 3.4 - 5.0 GM/DL    Total Protein 6.7 6.4 - 8.2 GM/DL    Total Bilirubin 1.2 (H) 0.0 - 1.0 MG/DL    ALT 1,627 (H) 10 - 40 U/L     (H) 15 - 37 IU/L    Alkaline Phosphatase 196 (H) 40 - 129 IU/L    GFR Non- 56 (L) >60 mL/min/1.73m2    GFR African American >60 >60 mL/min/1.73m2    Anion Gap 21 (H) 4 - 16   CBC auto differential    Collection Time: 10/11/21  6:00 AM   Result Value Ref Range    WBC 16.4 (H) 4.0 - 10.5 K/CU MM    RBC 5.42 4.6 - 6.2 M/CU MM    Hemoglobin 15.3 13.5 - 18.0 GM/DL    Hematocrit 47.2 42 - 52 %    MCV 87.1 78 - 100 FL    MCH 28.2 27 - 31 PG    MCHC 32.4 32.0 - 36.0 %    RDW 15.9 (H) 11.7 - 14.9 %    Platelets 578 312 - 415 K/CU MM    MPV 13.9 (H) 7.5 - 11.1 FL   Magnesium    Collection Time: 10/11/21  6:00 AM   Result Value Ref Range    Magnesium 2.4 1.8 - 2.4 mg/dl   Vancomycin, random    Collection Time: 10/11/21  6:00 AM   Result Value Ref Range    Vancomycin Rm 48.3 UG/ML    DOSE AMOUNT DOSE AMT.  GIVEN - `     DOSE TIME DOSE TIME GIVEN - `      CULTURE results: Invalid input(s): BLOOD CULTURE,  URINE CULTURE, SURGICAL CULTURE    Diagnosis:  Patient Active Problem List   Diagnosis    Obstructive sleep apnea syndrome    Arteriosclerosis of coronary artery    Acute bronchitis with chronic obstructive pulmonary disease (COPD) ([facility] [address]    COVID-19    Tobacco dependence in remission  Obesity    Hyperlipidemia    Nonischemic cardiomyopathy ([facility] [address]    Aneurysm of thoracic aorta (HCC)    Acute respiratory failure with hypoxia (HCC)    Severe malnutrition (HCC)    Vitamin D deficiency       Active Problems  Principal Problem:    COVID-19  Active Problems:    Obstructive sleep apnea syndrome    Tobacco dependence in remission    Obesity    Hyperlipidemia    Nonischemic cardiomyopathy (HCC)    Acute respiratory failure with hypoxia (HCC)    Severe malnutrition (HCC)    Vitamin D deficiency  Resolved Problems:    * No resolved hospital problems. *      Impression and plan   Summary and rationale: Patient is a 63 y.o.  male with a medical hx of obesity, HLD , thoracic aorta aneurysm admitted on 9/14/2021 with SOB, cough, fever, chills, anorexia for 5 days prior to admission. Diagnosed with severe covid-19 pneumonia and respiratory failure   Shock   QuantiFERON-TB test negative; IgG, IgA, IgM all within normal limits.  Vitamin D deficiency   Candida esophagitis   Fatty liver disease present and this may double the risk of mortality   ? HSV, CMV hepatitis.  COVID-19 symptom onset: 9/9/2021   COVID-19 test date: 9/14/2021   Expected discontinuation of isolation precautions: 9/29/2021   Clinical status: Day 29 of disease, remains on BiPAP, now on Levophed, for shock. He was taken off dexamethasone. ? Adrenal insufficiency. Worsening liver enzymes. Elevated CRP and procalcitonin. With the use of corticosteroids, will need to consider possibility of CMV and HSV hepatitis.  Transaminitis: Hepatitis A, B and C serology test are negative. CMV IgG present, CMV IgM within normal limits, HSV 1 and 2 IgM negative, HSV IgG is elevated. Jenniffer Mejia Therapeutic:  o Ongoing antibiotics: Vancomycin and cefepime. Acyclovir. fluconazole  o Fluconazole 200 mg daily for 14 days.   o Immunomodulators:  o Dexamethasone: 9/14-20 (7 days)  - Solumedrol 40 mg q 12h, (9/20-10/1); dexamethasone 10/2-  - Baracitinib: 9/15 , d/red due to transaminitis  o Completed antibiotics: azithromycin  o Other agents:    Diagnostic:    F/u: Check CMV DNA PCR, repeat liver ultrasound   Other: Taper and discontinue corticosteroids.       Electronically signed by: Electronically signed by Eloise Martines MD on 10/11/2021 at 9:57 AM

## 2021-10-11 NOTE — PROGRESS NOTES
Pt is resting well and tolerating bipap well. Pt has been unable to drink safely most of the day and unable to eat due to oxygen demands. VSS with frequent PVC's noted. Continuing on Cardizem gtt. SO updated today on possible need to intubate him.

## 2021-10-11 NOTE — CARE COORDINATION
Chart reviewed. Patient remains on BIPAP FiO2 100%. Plan remains home - will follow for needs.  Barrington Zhao RN

## 2021-10-11 NOTE — PROGRESS NOTES
pulmonary      SUBJECTIVE:  No improveement and worsening     OBJECTIVE    VITALS:  /65   Pulse 86   Temp 97.5 °F (36.4 °C) (Axillary)   Resp (!) 31   Ht 5' 9.02\" (1.753 m)   Wt 237 lb 14 oz (107.9 kg)   SpO2 90%   BMI 35.11 kg/m²   HEAD AND FACE EXAM:  No throat injection, no active exudate,no thrush  NECK EXAM;No JVD, no masses, symmetrical  CHEST EXAM; Expansion equal and symmetrical, no masses  LUNG EXAM; Good breath sounds bilaterally. There are expiratory wheezes both lungs, there are crackles at both lung bases  CARDIOVASCULAR EXAM: Positive S1 and S2, no S3 or S4, no clicks ,no murmurs  RIGHT AND LEFT LOWER EXTRIMITY EXAM: No edema, no swelling, no inflamation  CNS EXAM: Alert and oriented X3          LABS   Lab Results   Component Value Date    WBC 16.4 (H) 10/11/2021    HGB 15.3 10/11/2021    HCT 47.2 10/11/2021    MCV 87.1 10/11/2021     10/11/2021     Lab Results   Component Value Date    CREATININE 1.3 10/11/2021    BUN 82 (H) 10/11/2021     10/11/2021    K 4.3 10/11/2021     10/11/2021    CO2 17 (L) 10/11/2021     Lab Results   Component Value Date    INR 0.95 09/26/2021    PROTIME 12.2 09/26/2021          Lab Results   Component Value Date    PHOS 3.5 10/05/2021    PHOS 3.4 09/19/2021        Recent Labs     10/10/21  1500   PH 7.42   PO2ART 57*   TXI3TBS 29.0*   O2SAT 89.5*         Wt Readings from Last 3 Encounters:   10/11/21 237 lb 14 oz (107.9 kg)   12/23/16 263 lb (119.3 kg)               ASSESMENT  Ac resp failure  covid pneumonia  copd        PLAN  1. cpm  2. Cont pap/airvo. I dont have any issues with elective intubation. Prognosis is guarded  3.  Cont pressors    10/11/2021  Cassidy Lord MD, MCHRISTA.

## 2021-10-11 NOTE — PROGRESS NOTES
[facility]  consulted by Dr. Zonia Garcia for monitoring and adjustment. Indication for treatment: Pneumonia  Goal trough: 15-20 mcg/mL  AUC/MARYSOL: 400-600    Pertinent Laboratory Values:   Temp Readings from Last 3 Encounters:   10/11/21 97.5 °F (36.4 °C) (Axillary)   12/23/16 98.8 °F (37.1 °C) (Oral)     Recent Labs     10/09/21  2345 10/10/21  0616 10/11/21  0600   WBC 15.8* 14.3* 16.4*     Recent Labs     10/09/21  2345 10/10/21  0616 10/11/21  0600   BUN 44* 50* 82*   CREATININE 0.6* 0.7* 1.3     Estimated Creatinine Clearance: 70 mL/min (based on SCr of 1.3 mg/dL). Intake/Output Summary (Last 24 hours) at 10/11/2021 1421  Last data filed at 10/11/2021 0850  Gross per 24 hour   Intake 1456.6 ml   Output 850 ml   Net 606.6 ml     Pertinent Cultures:  Date    Source    Results  10/5   MRSA nasal   Ordered    Vancomycin level:   TROUGH:    No results for input(s): VANCOTROUGH in the last 72 hours. RANDOM:    Recent Labs     10/09/21  0603 10/11/21  0600   VANCORANDOM 15.9 48.3     Assessment:  · WBC and temperature: WBC elevated/afebrile  · SCr, BUN, and urine output: Stable, at baseline 0.4-0.6   · Day(s) of therapy: 6  · Vancomycin concentration:   · 10/07: 6.5 (~ 29 hours post-dose)        *    10/09: 15.9, therapeutic        *    10/11: 48.3 = high   Plan:  · Renal recently decreased some, scr= 1.3, crcl= 70.   · Vanco RANDOM / Trough this am = 48.3;  · Thus Temporarily HOLD/ DC Vancomycin iv  · Pharmacy will continue to monitor patient and adjust therapy as indicated    VANCOMYCIN CONCENTRATION SCHEDULED FOR 10/12 @ 0600    Thank you for the consult,  Ryan Kline, [address] [place] [address]  10/11/2021 2:21 PM

## 2021-10-11 NOTE — PROGRESS NOTES
Cardiology Progress Note     Admit Date:  9/14/2021    Consult reason/ Seen today for :   nonCardiomyopathy ejection fraction of 30%  SVT  Tachycardia  Respiratory failure    Subjective and  Overnight Events : Still on BiPAP and respiratory support showing respiratory distress. On telemetry noted to have runs of wide-complex tachycardia and SVT started on a Cardizem drip      Chief complain on admission : 63 y. o.year old who is admitted for  Chief Complaint   Patient presents with    Nausea     has not eaten in 5 days    Fever     headache, eye pain    Shortness of Breath    Rectal Bleeding     black diarrhea for 3 days      Assessment / Plan:  Nonischemic cardiomyopathy followed as at [place] avoid Cardizem due to negative inotropic effects. Check BNP get chest x-ray increase diuretics if chest x-ray looks worse or BNP higher  Stop Cardizem start metoprolol 4 times daily ideally should be on Toprol-XL if he can take p.o. Start amiodarone drip if he continues to have more arrhythmias  Respiratory failure continue supportive care BiPAP intubated as needed  DVT Prophylaxis if no contraindication    Past medical history:    has a past medical history of Asthma, COPD (chronic obstructive pulmonary disease) ([address] and COVID-19. Past surgical history:   has a past surgical history that includes Wrist surgery (Left, 1976); hernia repair; and Cardiac catheterization (2013). Social History:   reports that he quit smoking about 16 years ago. His smoking use included cigarettes. He started smoking about 50 years ago. He smoked 0.50 packs per day. He has never used smokeless tobacco. He reports that he does not drink alcohol and does not use drugs. Family history:  family history is not on file.     Allergies   Allergen Reactions    Percocet [Oxycodone-Acetaminophen] Nausea And Vomiting       Review of Systems:    All 14 systems were reviewed and are negative  Except for the positive findings  which as documented     /65   Pulse 86   Temp 97.5 °F (36.4 °C) (Axillary)   Resp 30   Ht 5' 9.02\" (1.753 m)   Wt 237 lb 14 oz (107.9 kg)   SpO2 90%   BMI 35.11 kg/m²       Intake/Output Summary (Last 24 hours) at 10/11/2021 1550  Last data filed at 10/11/2021 0850  Gross per 24 hour   Intake 1456.6 ml   Output 850 ml   Net 606.6 ml     Physical Exam:  Constitutional:  Well developed, Well nourished, No acute distress, Non-toxic appearance. HENT:  Normocephalic, Atraumatic, Bilateral external ears normal, Oropharynx moist, No oral exudates, Nose normal. Neck- Normal range of motion, No tenderness, Supple, No stridor. Eyes:  PERRL, EOMI, Conjunctiva normal, No discharge. Respiratory:  Normal breath sounds, No respiratory distress, No wheezing, No chest tenderness. Cardiovascular:  Normal heart rate, Normal rhythm, No murmurs, No rubs, No gallops, JVP not elevated  Abdomen/GI:  Bowel sounds normal, Soft, No tenderness, No masses, No pulsatile masses. Musculoskeletal:  Intact distal pulses, No edema, No tenderness, No cyanosis, No clubbing. Good range of motion in all major joints. No tenderness to palpation or major deformities noted. Back- No tenderness. Integument:  Warm, Dry, No erythema, No rash. Lymphatic:  No lymphadenopathy noted. Neurologic:  Alert & oriented x 3, Normal motor function, Normal sensory function, No focal deficits noted.    Psychiatric:  Affect  and  Mood :no change    Medications:    vancomycin (VANCOCIN) intermittent dosing (placeholder)   Other RX Placeholder    metoprolol (LOPRESSOR) ivpb  5 mg IntraVENous Q6H    acyclovir  10 mg/kg (Ideal) IntraVENous Q8H    dexamethasone  6 mg IntraVENous Daily    furosemide  40 mg IntraVENous BID    busPIRone  5 mg Oral BID    lidocaine PF  5 mL IntraDERmal See Admin Instructions    sodium chloride flush  5-40 mL IntraVENous 2 times per day    cefepime  2,000 mg IntraVENous Q12H    fluconazole  100 mg IntraVENous Q24H    vitamin D  50,000 Units Oral Weekly    lisinopril  2.5 mg Oral Daily    albuterol sulfate HFA  2 puff Inhalation 4x daily    [Held by provider] HYDROcodone 5 mg - acetaminophen  1 tablet Oral Q12H    vitamin C  1,000 mg Oral BID    zinc sulfate  50 mg Oral BID    Vitamin D  1,000 Units Oral Daily    sodium chloride flush  5-40 mL IntraVENous 2 times per day    enoxaparin  30 mg SubCUTAneous BID    bisacodyl  5 mg Oral Daily    [Held by provider] divalproex  250 mg Oral Daily    pantoprazole  40 mg Oral QAM AC    montelukast  10 mg Oral QPM    fluticasone  2 puff Inhalation BID    [Held by provider] pravastatin  40 mg Oral Dinner    tiotropium-olodaterol  2 puff Inhalation BID    [Held by provider] diclofenac-miSOPROStol  1 tablet Oral BID      norepinephrine 16 mcg/min (10/11/21 1500)    sodium chloride      sodium chloride 25 mL (10/11/21 0546)     sodium chloride flush, sodium chloride, magic (miracle) mouthwash, LORazepam, morphine, sodium chloride, senna, polyvinyl alcohol, albuterol sulfate HFA, LORazepam, benzonatate, sodium chloride flush, sodium chloride, ondansetron **OR** ondansetron, polyethylene glycol, [Held by provider] acetaminophen **OR** [Held by provider] acetaminophen, guaiFENesin-dextromethorphan, methocarbamol    Lab Data:  CBC:   Recent Labs     10/09/21  2345 10/10/21  0616 10/11/21  0600   WBC 15.8* 14.3* 16.4*   HGB 16.0 16.1 15.3   HCT 49.0 49.9 47.2   MCV 86.1 87.1 87.1    176 185     BMP:   Recent Labs     10/09/21  2345 10/10/21  0616 10/11/21  0600   * 139 142   K 4.5 4.5 4.3   CL 97* 102 104   CO2 19* 16* 17*   BUN 44* 50* 82*   CREATININE 0.6* 0.7* 1.3     PT/INR: No results for input(s): PROTIME, INR in the last 72 hours. BNP:  No results for input(s): PROBNP in the last 72 hours.   TROPONIN:   Recent Labs     10/09/21  2345 10/10/21  0616   TROPONINT <0.010 <0.010        ECHO : echocardiogram    Assessment:  63 y. o.year old who is admitted for  Chief Complaint   Patient presents with    Nausea     has not eaten in 5 days    Fever     headache, eye pain    Shortness of Breath    Rectal Bleeding     black diarrhea for 3 days    , active issues as noted below:  Impression:  Principal Problem:    COVID-19  Active Problems:    Obstructive sleep apnea syndrome    Tobacco dependence in remission    Obesity    Hyperlipidemia    Nonischemic cardiomyopathy ([facility] [address]    Acute respiratory failure with hypoxia (HCC)    Severe malnutrition (HCC)    Vitamin D deficiency  Resolved Problems:    * No resolved hospital problems. *            All labs, medications and tests reviewed by myself , continue all other medications of all above medical condition listed as is except for changes mentioned above. Thank you very much for consult , please call with questions.     Darshan Dominguez MD, MD 10/11/2021 3:50 PM

## 2021-10-11 NOTE — PROGRESS NOTES
Hospitalist Progress Note      Name:  Perez Calderon /Age/Sex: 1955  (3 y.o. male)   MRN & CSN:  [id number] & [id number] Admission Date/Time: 2021  1:13 PM   Location:  -A PCP: No primary care provider on file. Hospital Day: 28    Assessment and Plan:   Perez Calderon is a 63 y.o.  male  who presents with COVID-19    Acute Hypoxic respiratory Failure  Severe COVID-19 Pneumonia  Possible secondary bacterial infection  - Elevated HSV IgG and CMV IgG  - Onset:   - On Bipap this AM  - Continue Decadron  - Vanc and Cefepime continued to concern for bacterial superimposed infection  - Acyclovir  - Was on Baricitinib however to due to worsening trasaminitis has been discontinued  - ID and Pulmonology following, appreciate recs    Acute on Chronic systolic congestive Heart Failure  Nonischemic cardiomyopathy  - Echo with ejection fraction 30%, mild to moderate aortic regurgitation  - Continue Lasix, lisinopril, carvedilol.  - Strict I and O. Daily weight. - Cardiology following    Hypotension- Likely Septic Shock  - On low dose Levophed this AM; wean as tolerated; can also consider switching to ceasar in light of SVT  - Continue to wean levo as able    Sinus tachycardia   SVT  - Run of SVT overnight and was started on Cardizem drip  - On drip this AM  - Cardiology following, appreciate recs  - Monitor daily electrolytes    Liver dysfunction  - Likely secondary to COVID-19  - LFTs slightly improved this AM, monitor  - Liver ultrasound with fatty liver  - Hepatitis panel negative  - Avoid hepatotoxins  - Seen by GI; recommended no treatment as was secondary to COVID; if LFT's continue to rise recommended to hold Vanc    Candida esophagitis   - Cnt fluconazole    Morbid obesity  Obstructive sleep apnea  Tobacco dependence    Diet ADULT DIET; Regular  Adult Oral Nutrition Supplement; Frozen Oral Supplement  Adult Oral Nutrition Supplement;  Low Calorie/High Protein Oral Supplement   DVT Prophylaxis [x] Lovenox, []  Heparin, [] SCDs, [] Warfarin  [] NOAC     GI Prophylaxis [x] PPI,  [] H2 Blocker,  [] Carafate,  [] Diet/Tube Feeds   Code Status Full Code   MDM [] Low, [] Moderate,[x]  High     Objective:   Overnight patient went into SVT; was started on Cardizem drip with rate improved; on Bipap this AM in moderate distress      Intake/Output Summary (Last 24 hours) at 10/11/2021 0949  Last data filed at 10/11/2021 0850  Gross per 24 hour   Intake 1456.6 ml   Output 1950 ml   Net -493.4 ml      Vitals:   Vitals:    10/11/21 0906   BP:    Pulse:    Resp:    Temp: 97.5 °F (36.4 °C)   SpO2:      Physical Exam:   GEN awake, in respiratory distress, on BiPAP  EYES Pupils are equally round. No scleral erythema, discharge, or conjunctivitis. NECK Supple, no apparent thyromegaly or masses. RESP rales bilaterally, no wheezing  CARDIO/VASC S1/S2 auscultated. Regular rate without appreciable murmurs, rubs, or gallops. No peripheral edema. GI Abdomen is soft without significant tenderness, masses, or guarding  MSK No gross joint deformities. SKIN Normal coloration, warm, dry.     Medications:   Medications:    acyclovir  10 mg/kg (Ideal) IntraVENous Q8H    dexamethasone  6 mg IntraVENous Daily    furosemide  40 mg IntraVENous BID    busPIRone  5 mg Oral BID    lidocaine PF  5 mL IntraDERmal See Admin Instructions    sodium chloride flush  5-40 mL IntraVENous 2 times per day    cefepime  2,000 mg IntraVENous Q12H    fluconazole  100 mg IntraVENous Q24H    vancomycin  1,250 mg IntraVENous Q12H    vitamin D  50,000 Units Oral Weekly    lisinopril  2.5 mg Oral Daily    albuterol sulfate HFA  2 puff Inhalation 4x daily    [Held by provider] HYDROcodone 5 mg - acetaminophen  1 tablet Oral Q12H    vitamin C  1,000 mg Oral BID    zinc sulfate  50 mg Oral BID    Vitamin D  1,000 Units Oral Daily    sodium chloride flush  5-40 mL IntraVENous 2 times per day    enoxaparin  30 mg SubCUTAneous BID    bisacodyl  5 mg Oral Daily    carvedilol  6.25 mg Oral BID    [Held by provider] divalproex  250 mg Oral Daily    pantoprazole  40 mg Oral QAM AC    montelukast  10 mg Oral QPM    fluticasone  2 puff Inhalation BID    [Held by provider] pravastatin  40 mg Oral Dinner    tiotropium-olodaterol  2 puff Inhalation BID    [Held by provider] diclofenac-miSOPROStol  1 tablet Oral BID      Infusions:    dilTIAZem (CARDIZEM) 100 mg in dextrose 5% 100 mL (ADD-Reading) 5 mg/hr (10/11/21 0658)    norepinephrine 11 mcg/min (10/11/21 6657)    sodium chloride      sodium chloride 25 mL (10/11/21 3579)     PRN Meds: sodium chloride flush, 5-40 mL, PRN  sodium chloride, 25 mL, PRN  magic (miracle) mouthwash, 5 mL, 4x Daily PRN  metoprolol, 2.5 mg, Q4H PRN  LORazepam, 1 mg, Q6H PRN  morphine, 2 mg, Q4H PRN  sodium chloride, 1 spray, PRN  senna, 1 tablet, BID PRN  polyvinyl alcohol, 1 drop, Q1H PRN  albuterol sulfate HFA, 2 puff, Q4H PRN  LORazepam, 1 mg, Q6H PRN  benzonatate, 100 mg, TID PRN  sodium chloride flush, 5-40 mL, PRN  sodium chloride, 25 mL, PRN  ondansetron, 4 mg, Q8H PRN   Or  ondansetron, 4 mg, Q6H PRN  polyethylene glycol, 17 g, Daily PRN  [Held by provider] acetaminophen, 650 mg, Q6H PRN   Or  [Held by provider] acetaminophen, 650 mg, Q6H PRN  guaiFENesin-dextromethorphan, 5 mL, Q4H PRN  methocarbamol, 500 mg, Q8H PRN        Recent Labs     10/09/21  2345 10/10/21  0616 10/11/21  0600   WBC 15.8* 14.3* 16.4*   HGB 16.0 16.1 15.3   HCT 49.0 49.9 47.2    176 185      Recent Labs     10/09/21  2345 10/10/21  0616 10/11/21  0600   * 139 142   K 4.5 4.5 4.3   CL 97* 102 104   CO2 19* 16* 17*   BUN 44* 50* 82*   CREATININE 0.6* 0.7* 1.3     Recent Labs     10/09/21  2345 10/10/21  0616 10/11/21  0600   * 528* 401*   ALT 1,686* 1,767* 1,627*   BILITOT 1.1* 1.2* 1.2*   ALKPHOS 210* 214* 196*     Imaging reviewed    Electronically signed by Christiane Rodriguez MD on 10/11/2021

## 2021-10-11 NOTE — PROGRESS NOTES
10/11/21 0349   NIV Type   NIV Started/Stopped On   Equipment Type v60   Mode Bilevel   Mask Type Full face mask   Mask Size Medium   Bonnet size Medium   Settings/Measurements   IPAP 18 cmH20   CPAP/EPAP 12 cmH2O   Rate Ordered 12   Resp 28   FiO2  100 %   I Time/ I Time % 1.15 s   Vt Exhaled 449 mL   Minute Volume 12.5 Liters   Mask Leak (lpm) 106 lpm   Comfort Level Fair   Using Accessory Muscles No   SpO2 91   Patient Observation   Observations Pt sleeping   Alarm Settings   Alarms On Y   Press Low Alarm 5 cmH2O   High Pressure Alarm 25 cmH2O   Delay Alarm 20 sec(s)   Apnea (secs) 20 secs   Resp Rate Low Alarm 13   High Respiratory Rate 40 br/min

## 2021-10-11 NOTE — SIGNIFICANT EVENT
Significant event documentation    Time of event: 0625 on 10/11/21     Event: SVT. Patient patient transitioned into SVT with heart rates around the 170s. Similar to events overnight on 10/9/2021. Given his initial response on 10/9/2021 to diltiazem we will again give 25 mg IV Cardizem. Patient responded well and we will place patient on Cardizem gtt. again. Cardiology was consulted. Patient likely needs oral Cardizem. Patient denied worsening shortness of breath, chest pains, or nausea with this event. Patient tolerated above-stated interventions well. Discussed with nurse and updated her on plan. Radiology results:  XR CHEST PORTABLE   Final Result   Persistent multifocal airspace consolidation, consistent with the clinical   history of COVID-19 pneumonia. XR CHEST PORTABLE   Final Result   Multifocal bibasilar predominant airspace disease has progressed slightly   since prior study. XR CHEST PORTABLE   Final Result   Mild bilateral pulmonary vascular congestion. XR CHEST PORTABLE   Final Result   No significant change since the prior exam 09/24/2021         XR CHEST PORTABLE   Final Result   1. Minimal prominence of the pulmonary vasculature bilaterally. 2. Bibasilar opacification, right greater than left. US ABDOMEN LIMITED   Final Result   Fatty liver. Small amount of pericholecystic fluid but no gallstones identified. No   sonographic Boo's sign elicited during the exam.         XR CHEST PORTABLE   Final Result   Mild pulmonary vascular congestion. CTA PULMONARY W CONTRAST   Final Result   Negative for acute pulmonary embolism      Predominately peripheral ground-glass opacities in both lungs could represent   COVID pneumonia in the proper clinical setting         XR CHEST PORTABLE   Final Result   1.  Increased interstitial opacities bilaterally when compared with previous   exam.  Mild pulmonary edema or atypical/viral infectious process cannot be   excluded in the appropriate clinical setting. 2. Nonspecific fullness of the right hilar region which is new compared with   previous exam.  Findings could reflect infiltrate or possible   lymphadenopathy. Underlying mass lesion can also not be excluded on   radiograph. Recommend further evaluation with dedicated CT chest with   contrast if clinically feasible. Electronically signed by Demetrio Arauz DO on 10/11/2021 at 7:23 AM    This dictation was created with voice recognition software. While attempts have been made to review the dictation as it is transcribed, on occasion the spoken word can be misinterpreted by the technology leading to omissions or inappropriate words, phrases or sentences.

## 2021-10-12 NOTE — PROGRESS NOTES
[address] [facility]  10/12/2021  [id number]    Noted clinical dysphagia evaluation ordered for Chi Mask with RN, who reports pt is not appropriate to participate at this time d/t declining respiratory status and probable need for intubation. SLP will monitor chart and reattempt as indicated.     Alden Lafleur MA CCC-SLP  10/12/2021  10:22 AM

## 2021-10-12 NOTE — PROGRESS NOTES
pulmonary      SUBJECTIVE: worsening aqnd struggling to breath pulse ox 88 and r/r in 40s     OBJECTIVE    VITALS:  /74   Pulse 104   Temp 98.6 °F (37 °C) (Axillary)   Resp (!) 37   Ht 5' 9.02\" (1.753 m)   Wt 237 lb 14 oz (107.9 kg)   SpO2 90%   BMI 35.11 kg/m²   HEAD AND FACE EXAM:  No throat injection, no active exudate,no thrush  NECK EXAM;No JVD, no masses, symmetrical  CHEST EXAM; Expansion equal and symmetrical, no masses  LUNG EXAM; Good breath sounds bilaterally. There are expiratory wheezes both lungs, there are crackles at both lung bases  CARDIOVASCULAR EXAM: Positive S1 and S2, no S3 or S4, no clicks ,no murmurs  RIGHT AND LEFT LOWER EXTRIMITY EXAM: No edema, no swelling, no inflamation            LABS   Lab Results   Component Value Date    WBC 16.5 (H) 10/12/2021    HGB 15.1 10/12/2021    HCT 46.7 10/12/2021    MCV 86.6 10/12/2021     10/12/2021     Lab Results   Component Value Date    CREATININE 2.1 (H) 10/12/2021     (H) 10/12/2021     10/12/2021    K 4.3 10/12/2021     10/12/2021    CO2 16 (L) 10/12/2021     Lab Results   Component Value Date    INR 0.95 09/26/2021    PROTIME 12.2 09/26/2021          Lab Results   Component Value Date    PHOS 3.5 10/05/2021    PHOS 3.4 09/19/2021        Recent Labs     10/10/21  1500 10/12/21  0700   PH 7.42 7.30*   PO2ART 57* 79   [id number] 29.0* 34.0   O2SAT 89.5* 94.4*         Wt Readings from Last 3 Encounters:   10/11/21 237 lb 14 oz (107.9 kg)   12/23/16 263 lb (119.3 kg)               ASSESMENT  Ac resp failure  covid pneumonia  Bact pneumonia  Ac copd        PLAN  1. cpm  2. D/w pt daughter over the phone  3. We will intubate and ventilate  4.  Vent protocol    10/12/2021  Alysa Harris MD, M.D.

## 2021-10-12 NOTE — PROGRESS NOTES
Infectious Disease Progress Note  10/12/2021   Patient Name: Mara Hahn : 1955       Reason for visit: F/u COVID-19 pneumonia, acute respiratory failure? History:? Interval history noted   quite fatigued and remains on BiPAP  Physical Exam:  Vital Signs: /78   Pulse 103   Temp 98.6 °F (37 °C) (Axillary)   Resp (!) 32   Ht 5' 9.02\" (1.753 m)   Wt 237 lb 14 oz (107.9 kg)   SpO2 90%   BMI 35.11 kg/m²     Gen: intubated and sedated   Skin: no stigmata of endocarditis  Wounds: C/D/I  HEMT: AT/NC ETT  Eyes: PERRLA, EOMI, conjunctiva pink, sclera anicteric. Neck: Supple. Trachea midline. No LAD. Chest: deferred  Heart: RRR  Abd: soft, non-distended, no tenderness, no hepatomegaly. Normoactive bowel sounds. Ext: no clubbing, cyanosis, or edema  Catheter Site: without erythema or tenderness  LDA: CVC:  Neuro: intubated and mechanically ventilated. Radiologic / Imaging / TESTING  Collected: 10/04/21 1504 Updated: 10/04/21 1508 Narrative:  EXAMINATION:   ONE XRAY VIEW OF THE CHEST     10/4/2021 1:46 pm     COMPARISON:   2021     HISTORY:   ORDERING SYSTEM PROVIDED HISTORY: covid 23   TECHNOLOGIST PROVIDED HISTORY:   Reason for exam:->covid 23   Reason for Exam: covid 19   Acuity: Acute   Type of Exam: Initial     FINDINGS:   Mild cardiomegaly. Cardiomediastinal silhouette appears unchanged. Multifocal bibasilar predominant airspace disease. No definite effusion. No   pneumothorax or subdiaphragmatic free air. No acute osseous abnormality   identified. Impression:  Multifocal bibasilar predominant airspace disease has progressed slightly   since prior study.            Labs:    Recent Results (from the past 24 hour(s))   EKG 12 Lead    Collection Time: 10/12/21  2:43 AM   Result Value Ref Range    Ventricular Rate 109 BPM    Atrial Rate 109 BPM    P-R Interval 208 ms    QRS Duration 94 ms    Q-T Interval 296 ms    QTc Calculation (Bazett) 398 ms    P Axis 25 degrees    R Axis -10 degrees    T Axis 175 degrees    Diagnosis       Sinus tachycardia  Possible Left atrial enlargement  Left ventricular hypertrophy with repolarization abnormality  Abnormal ECG  When compared with ECG of 11-OCT-2021 06:50,  premature ventricular complexes are no longer present  premature supraventricular complexes are no longer present  Minimal criteria for Inferior infarct are no longer present     Comprehensive metabolic panel    Collection Time: 10/12/21  4:05 AM   Result Value Ref Range    Sodium 137 135 - 145 MMOL/L    Potassium 4.3 3.5 - 5.1 MMOL/L    Chloride 100 99 - 110 mMol/L    CO2 16 (L) 21 - 32 MMOL/L     (H) 6 - 23 MG/DL    CREATININE 2.1 (H) 0.9 - 1.3 MG/DL    Glucose 385 (H) 70 - 99 MG/DL    Calcium 8.9 8.3 - 10.6 MG/DL    Albumin 3.1 (L) 3.4 - 5.0 GM/DL    Total Protein 6.4 6.4 - 8.2 GM/DL    Total Bilirubin 1.1 (H) 0.0 - 1.0 MG/DL    ALT 1,441 (H) 10 - 40 U/L     (H) 15 - 37 IU/L    Alkaline Phosphatase 204 (H) 40 - 128 IU/L    GFR Non- 32 (L) >60 mL/min/1.73m2    GFR  39 (L) >60 mL/min/1.73m2    Anion Gap 21 (H) 4 - 16   CBC auto differential    Collection Time: 10/12/21  4:05 AM   Result Value Ref Range    WBC 16.5 (H) 4.0 - 10.5 K/CU MM    RBC 5.39 4.6 - 6.2 M/CU MM    Hemoglobin 15.1 13.5 - 18.0 GM/DL    Hematocrit 46.7 42 - 52 %    MCV 86.6 78 - 100 FL    MCH 28.0 27 - 31 PG    MCHC 32.3 32.0 - 36.0 %    RDW 16.5 (H) 11.7 - 14.9 %    Platelets 708 517 - 042 K/CU MM    MPV 13.7 (H) 7.5 - 11.1 FL    Myelocyte Percent 1 (H) 0.0 %    Metamyelocytes Relative 1 (H) 0.0 %    Bands Relative 12 (H) 5 - 11 %    Segs Relative 74.0 (H) 36 - 66 %    Lymphocytes % 6.0 (L) 24 - 44 %    Monocytes % 6.0 (H) 0 - 4 %    Myelocytes Absolute 0.17 K/CU MM    Metamyelocytes Absolute 0.17 K/CU MM    Bands Absolute 1.98 K/CU MM    Segs Absolute 12.2 K/CU MM    Lymphocytes Absolute 1.0 K/CU MM    Monocytes Absolute 1.0 K/CU MM    Differential Type MANUAL DIFFERENTIAL     RBC Morphology OCCASIONAL     Dohle Bodies PRESENT    Magnesium    Collection Time: 10/12/21  4:05 AM   Result Value Ref Range    Magnesium 2.6 (H) 1.8 - 2.4 mg/dl   Procalcitonin    Collection Time: 10/12/21  4:05 AM   Result Value Ref Range    Procalcitonin 0.839    Vancomycin, random    Collection Time: 10/12/21  4:05 AM   Result Value Ref Range    Vancomycin Rm 39.9 UG/ML    DOSE AMOUNT DOSE AMT. GIVEN - . DOSE TIME DOSE TIME GIVEN - .    Blood gas, arterial    Collection Time: 10/12/21  7:00 AM   Result Value Ref Range    pH, Bld 7.30 (L) 7.34 - 7.45    pCO2, Arterial 34.0 32 - 45 MMHG    pO2, Arterial 79 75 - 100 MMHG    Base Excess 9 (H) 0 - 3.3    HCO3, Arterial 16.7 (L) 18 - 23 MMOL/L    CO2 Content 17.7 (L) 19 - 24 MMOL/L    O2 Sat 94.4 (L) 96 - 97 %    Carbon Monoxide, Blood 1.8 0 - 5 %    Methemoglobin, Arterial 1.4 <1.5 %    Comment BIPAP, 18/12, SET RR 12, FIO2 100%, SPO2 93%      CULTURE results: Invalid input(s): BLOOD CULTURE,  URINE CULTURE, SURGICAL CULTURE    Diagnosis:  Patient Active Problem List   Diagnosis    Obstructive sleep apnea syndrome    Arteriosclerosis of coronary artery    Acute bronchitis with chronic obstructive pulmonary disease (COPD) (HCC)    COVID-19    Tobacco dependence in remission    Obesity    Hyperlipidemia    Nonischemic cardiomyopathy ([address]    Aneurysm of thoracic aorta (HCC)    Acute respiratory failure with hypoxia (HCC)    Severe malnutrition (HCC)    Vitamin D deficiency    SVT (supraventricular tachycardia) (HCC)       Active Problems  Principal Problem:    COVID-19  Active Problems:    Obstructive sleep apnea syndrome    Tobacco dependence in remission    Obesity    Hyperlipidemia    Nonischemic cardiomyopathy (HCC)    Acute respiratory failure with hypoxia (HCC)    Severe malnutrition (HCC)    Vitamin D deficiency    SVT (supraventricular tachycardia) (HCC)  Resolved Problems:    * No resolved hospital problems. *      Impression and plan   Summary and rationale: Patient is a 63 y.o.  male with a medical hx of obesity, HLD , thoracic aorta aneurysm admitted on 9/14/2021 with SOB, cough, fever, chills, anorexia for 5 days prior to admission. Diagnosed with severe covid-19 pneumonia and respiratory failure   Shock   QuantiFERON-TB test negative; IgG, IgA, IgM all within normal limits.  Vitamin D deficiency   Candida esophagitis   Fatty liver disease present and this may double the risk of mortality   ? HSV, CMV hepatitis.  COVID-19 symptom onset: 9/9/2021   COVID-19 test date: 9/14/2021   Expected discontinuation of isolation precautions: 9/29/2021   Clinical status: Day 33 of disease, intubated, now on Levophed, for shock. He was taken off dexamethasone. ? Adrenal insufficiency. Worsening liver enzymes. Elevated CRP and procalcitonin. With the use of corticosteroids, will need to consider possibility of CMV and HSV hepatitis.  Transaminitis: Hepatitis A, B and C serology test are negative. CMV IgG present, CMV IgM within normal limits, HSV 1 and 2 IgM negative, HSV IgG is elevated. Jessie Shown Therapeutic:  o Ongoing antibiotics: Vancomycin and cefepime. Acyclovir. Fluconazole. Reduce dose of acyclovir  o Fluconazole 200 mg daily for 14 days. o Immunomodulators:  o Dexamethasone: 9/14-20 (7 days)  - Solumedrol 40 mg q 12h, (9/20-10/1); dexamethasone 10/2-  - Baracitinib: 9/15 , d/red due to transaminitis  o Completed antibiotics: azithromycin  o Other agents:    Diagnostic:    F/u: Check CMV DNA PCR,    Other: Taper and discontinue corticosteroids.       Electronically signed by: Electronically signed by Master Galindo MD on 10/12/2021 at 10:22 AM

## 2021-10-12 NOTE — PROGRESS NOTES
CRNA and Respiratory Therapist are currently at the bedside to intubate Pt. Dr. Travon Gerber was just on the unit and he saw Pt is stated he needs to be intubated. This RN called Pt's daughter, Rita Jimenez, and let her talk with Dr. Travon Gerber over the telephone. Sarah stated she is fine with Pt getting intubated if he needs it and she wants him to have a Temp Vas Cath placed if he needs to receive dialysis. She also stated if he needs to receive dialysis then she is okay with that and wants him to receive dialysis. 126

## 2021-10-12 NOTE — PROGRESS NOTES
Pt's significant other, Bozena Perales, is currently at the bedside seeing him. Updated her on Pt's current status and informed her that the Hospitalist and Pulmonlogist both state Pt should be intubated today since his respirations are in the 40's and are labored, he is lethargic, he is very weak and is unable to swallow water from a straw and say words. He is on 100% FiO2 on continuous Bipap and his O2 sats are around 88%. She voiced understanding of all of this.

## 2021-10-12 NOTE — PROGRESS NOTES
Upon PICC assessment, patient noted to have significan swelling/edema of neck. Shell Farr RN notified and Dr. Sandi Chauhan notifed. CT of neck without contrast ordered.

## 2021-10-12 NOTE — CONSULTS
Nephrology Service Consultation    Patient:  Domingo Inman  MRN: [id number]  Consulting physician:  Leonila Marrufo MD  Reason for Consult: Acute renal failure    History Obtained From:  patient, electronic medical record, daughter Keena Lux  PCP: No primary care provider on file. HISTORY OF PRESENT ILLNESS:   The patient is a 63 y.o. male who presents with weakness and shortness of breath about 1 month ago. Patient has had fevers and chills and poor appetite. Apparently did not have his Covid vaccine done. With worsening difficulty in breathing or shortness of breath noted Covid positive and admitted to intensive care unit Covid unit. Over the course the next 2 weeks patient has not taken off isolation and is on antibiotics as well as antiviral therapy. Noted to have increased LFTs. Patient had been taking oral diet but over the last few days patient has become more lethargic still on a BiPAP and arousable. Is a full code not want to be intubated yet but if needed will do. His daughter Keena Lux is a dialysis nurse in [place]. I discussed his case with her. She is agreeable to have him do dialysis if needed and he would agree to a ventilator if needed temporarily. For now patient is about 22 L negative balance from fluid but despite that still requiring heavy amounts of oxygen. Now noted to have worsening acute renal failure but acyclovir is on board as well as high vancomycin levels. Multitude of factors leading to ATN in the above setting. Discussed with daughter about dialysis and other options as well if patient not respond. With above renal was asked to evaluate and history more from the daughter as patient is arousable but unable to give any detailed information.     Past Medical History:        Diagnosis Date    Asthma     COPD (chronic obstructive pulmonary disease) ([facility] [address]     COVID-19 09/14/2021       Past Surgical History:        Procedure Laterality Date    CARDIAC CATHETERIZATION  2013    HERNIA REPAIR      WRIST SURGERY Left 1976       Medications:   Scheduled Meds:   bumetanide  0.5 mg IntraVENous BID    vancomycin (VANCOCIN) intermittent dosing (placeholder)   Other RX Placeholder    metoprolol (LOPRESSOR) ivpb  5 mg IntraVENous Q6H    acyclovir  10 mg/kg (Ideal) IntraVENous Q8H    dexamethasone  6 mg IntraVENous Daily    busPIRone  5 mg Oral BID    lidocaine PF  5 mL IntraDERmal See Admin Instructions    sodium chloride flush  5-40 mL IntraVENous 2 times per day    cefepime  2,000 mg IntraVENous Q12H    fluconazole  100 mg IntraVENous Q24H    vitamin D  50,000 Units Oral Weekly    albuterol sulfate HFA  2 puff Inhalation 4x daily    vitamin C  1,000 mg Oral BID    zinc sulfate  50 mg Oral BID    Vitamin D  1,000 Units Oral Daily    sodium chloride flush  5-40 mL IntraVENous 2 times per day    enoxaparin  30 mg SubCUTAneous BID    bisacodyl  5 mg Oral Daily    [Held by provider] divalproex  250 mg Oral Daily    pantoprazole  40 mg Oral QAM AC    montelukast  10 mg Oral QPM    fluticasone  2 puff Inhalation BID    [Held by provider] pravastatin  40 mg Oral Dinner    tiotropium-olodaterol  2 puff Inhalation BID    [Held by provider] diclofenac-miSOPROStol  1 tablet Oral BID     Continuous Infusions:   amiodarone 0.5 mg/min (10/12/21 0904)    norepinephrine 16 mcg/min (10/12/21 0905)    sodium chloride      sodium chloride Stopped (10/12/21 0901)     PRN Meds:.sodium chloride flush, sodium chloride, magic (miracle) mouthwash, LORazepam, morphine, sodium chloride, senna, polyvinyl alcohol, albuterol sulfate HFA, LORazepam, benzonatate, sodium chloride flush, sodium chloride, ondansetron **OR** ondansetron, polyethylene glycol, [Held by provider] acetaminophen **OR** [Held by provider] acetaminophen, guaiFENesin-dextromethorphan, methocarbamol    Allergies:  Percocet [oxycodone-acetaminophen]    Social History:   TOBACCO:   reports that he quit smoking about 16 years ago. His smoking use included cigarettes. He started smoking about 50 years ago. He smoked 0.50 packs per day. He has never used smokeless tobacco.  ETOH:   reports no history of alcohol use. OCCUPATION:      Family History:   History reviewed. No pertinent family history. REVIEW OF SYSTEMS:  Negative except for weak poor abiliity to communciate resp failure. Physical Exam:    I/O: 10/11 0701 - 10/12 0700  In: 3591.7 [I.V.:2117.9]  Out: 9713 [Urine:1425]    Vitals: /66   Pulse 103   Temp 98.6 °F (37 °C) (Axillary)   Resp (!) 38   Ht 5' 9.02\" (1.753 m)   Wt 237 lb 14 oz (107.9 kg)   SpO2 (!) 86%   BMI 35.11 kg/m²   General appearance: awake weak anxious lethargic on BiPAP  HEENT: Head: Normal, normocephalic, atraumatic. Neck: supple, symmetrical, trachea midline  Lungs: diminished breath sounds bilaterally  Heart: S1, S2 normal  Abdomen: abnormal findings:  soft NT obese  Extremities: edema trace  Neurologic: Mental status: alertness: Lethargic      CBC:   Recent Labs     10/10/21  0616 10/11/21  0600 10/12/21  0405   WBC 14.3* 16.4* 16.5*   HGB 16.1 15.3 15.1    185 206     BMP:    Recent Labs     10/10/21  0616 10/11/21  0600 10/12/21  0405    142 137   K 4.5 4.3 4.3    104 100   CO2 16* 17* 16*   BUN 50* 82* 101*   CREATININE 0.7* 1.3 2.1*   GLUCOSE 130* 169* 385*     Hepatic:   Recent Labs     10/10/21  0616 10/11/21  0600 10/12/21  0405   * 401* 343*   ALT 1,767* 1,627* 1,441*   BILITOT 1.2* 1.2* 1.1*   ALKPHOS 214* 196* 204*     Troponin: No results for input(s): TROPONINI in the last 72 hours. BNP: No results for input(s): BNP in the last 72 hours. Lipids: No results for input(s): CHOL, HDL in the last 72 hours. Invalid input(s): LDLCALCU  ABGs:   Lab Results   Component Value Date    PO2ART 79 10/12/2021    [id number] 34.0 10/12/2021     INR: No results for input(s): INR in the last 72 hours.   Renal Labs  Albumin:    Lab Results significantly positive. We will place a temporary dialysis catheter and if renal function worsening in the next 24 hours we will start on dialysis. We will keep on Bumex IV at this time to help with diuresis. 2.  Currently off isolation and has been treated and monitored in the setting of Covid  3. Monitor LFTs and treating for possible viral hepatitis  4. Monitor oxygenation on BiPAP if need to be intubated he is agreeable. We will monitor for now and also do a swallow evaluation as well  5. Cardiac monitor maintain negative balance for now  6. Maintain supportive care I discussed with infectious disease as well as daughter are aware of current situation and plan.         Electronically signed by Marvin Harrell MD on 10/12/2021 at 10:13 AM

## 2021-10-12 NOTE — PROGRESS NOTES
ANESTHESIA/SEDATION MANAGEMENT:           Local: Lidocaine given by Dr          Sedation:              Fentanyl:             Versed:     INTRAOPERATIVE:           ACCESS TIME:           US/FLUORO:           WIRE USED:           SHEATH USED:           CATHETER USED:           FINAL IMAGE TAKEN TO CONFIRM PLACEMENT OF:           CONTRAST/CC:     STERILE DRESSINGS:     SPECIMENS:     EBL:    FOLLOW- UP X-RAY:     COMPLICATIONS/ OUTCOME:    STAFF PRESENT DURING PROCEDURE:     ERICK SCORE POST PROCEDURE:     REPORT CALLED TO:     PT LEFT ROOM AT WHAT TIME:

## 2021-10-12 NOTE — CONSULTS
Patient noted on daily rounds to have a Triple lumen Midline cutdown in RUE. Patient now intubated/sedated and on multiple vesicant medications that are not safe to run through midline for greater than 24 hours. Dr. Tish King notified that patient requires central venous access in addition to temp dialysis catheetr. Orders received to attempt PICC placement on left arm. Consent signed by medical necessity. Time out done @ 1615. PICC inserted in LUE Basilic vessel without difficulty per protocol. Placement verified via VPSG4 monitoring system. Good blood return and flushes easily on three ports. Patient tolerated well. Education pamphlets left in chart. Midline removed from RUE. PIV removed from Left AC. Ultrasound photos of vein diameter and doppler signature placed in chart. Please consult IV/PICC team if patient's needs change. PICC OK to use.

## 2021-10-12 NOTE — PROGRESS NOTES
Hospitalist Progress Note      Name:  Ronyn Chou /Age/Sex: 1955  (89 y.o. male)   MRN & CSN:  [id number] & [id number] Admission Date/Time: 2021  1:13 PM   Location:  -A PCP: No primary care provider on file. Hospital Day: 29    Assessment and Plan:       Acute Hypoxic respiratory Failure  Severe COVID-19 Pneumonia  Possible secondary bacterial infection  - Elevated HSV IgG and CMV IgG  - Onset:   - On Bipap   - Continue Decadron  - Vanc and Cefepime continued to concern for bacterial superimposed infection  - Acyclovir    fluconazole 200 mg daily for 14 days  - Was on Baricitinib however to due to worsening trasaminitis has been discontinued  - ID and Pulmonology following, appreciate recs     Acute on Chronic systolic congestive Heart Failure  Nonischemic cardiomyopathy  - Echo with ejection fraction 30%, mild to moderate aortic regurgitation  -Continue carvedilol. I  hold Lasix and lisinopril because of the worsening kidney function today  - Strict I and O. Daily weight. - Cardiology following     Hypotension- Likely Septic Shock  - On low dose Levophed this AM; wean as tolerated; can also consider switching to ceasar in light of SVT  - Continue to wean levo as able   Hold lisinopril     Sinus tachycardia   SVT  - Run of SVT  and was started initially on Cardizem drip  -Switch to amiodarone drip by cardiologist  - Cardiology following, appreciate recs  - Monitor daily electrolytes     Liver dysfunction  - Likely secondary to COVID-19  - Worsening liver enzymes this a.m. with ALT 1400  - Liver ultrasound with fatty liver  - Hepatitis panel negative  - Avoid hepatotoxins  - Seen by GI; recommended no treatment as was secondary to COVID; if LFT's continue to rise recommended to hold Vanc if okay with ID?   Avoid hepatotoxic medication like Tylenol      Acute kidney injury: Creatinine trending up to 2.1 from 1.3 yesterday  Be due to overdiuresis or to the COVID infection? Hold Lasix and lisinopril for now  Check postvoid bladder scan  Consult nephrologist       Candida esophagitis   - Cnt fluconazole     Morbid obesity  Obstructive sleep apnea  Tobacco dependence    Diet ADULT DIET; Regular  Adult Oral Nutrition Supplement; Frozen Oral Supplement  Adult Oral Nutrition Supplement; Low Calorie/High Protein Oral Supplement   DVT Prophylaxis [x] Lovenox, []  Heparin, [] SCDs, [] Ambulation   GI Prophylaxis [x] PPI,  [] H2 Blocker,  [] Carafate,  [] Diet/Tube Feeds   Code Status Full Code   Disposition Patient requires continued admission due to    MDM [] Low, [] Moderate,[]  High  Patient's risk as above due to      History of Present Illness:   Patient was seen and examined at the bedside  Continue on BiPAP  Patient is alert but confused  Continue on Levophed and amiodarone drip    Objective:      Intake/Output Summary (Last 24 hours) at 10/12/2021 0850  Last data filed at 10/12/2021 0700  Gross per 24 hour   Intake 3591.65 ml   Output 1325 ml   Net 2266.65 ml      Vitals:   Vitals:    10/12/21 0700   BP: 123/75   Pulse: 102   Resp: 28   Temp:    SpO2: 93%     Physical Exam:   GEN alert but confused responding to verbal stimuli, in moderate respiratory distress, not in pain  HEENT: PEERLA, , supple neck,   Chest: air entry equal bilaterally, no wheezing or crepitation, decreased breathing bilaterally  Heart: S1 and S2 heard, no murmur, no gallop or rub, regular rate  Abdomen: soft, ND , Nt, +BS  Extremities: no cyanosis, tenderness or erythema, peripheral pulses audible  Neurology: alert, and confused able to move 4 limbs    Medications:   Medications:    [Held by provider] furosemide  20 mg IntraVENous BID    vancomycin (VANCOCIN) intermittent dosing (placeholder)   Other RX Placeholder    metoprolol (LOPRESSOR) ivpb  5 mg IntraVENous Q6H    acyclovir  10 mg/kg (Ideal) IntraVENous Q8H    dexamethasone  6 mg IntraVENous Daily    busPIRone  5 mg Oral BID    lidocaine PF 5 mL IntraDERmal See Admin Instructions    sodium chloride flush  5-40 mL IntraVENous 2 times per day    cefepime  2,000 mg IntraVENous Q12H    fluconazole  100 mg IntraVENous Q24H    vitamin D  50,000 Units Oral Weekly    lisinopril  2.5 mg Oral Daily    albuterol sulfate HFA  2 puff Inhalation 4x daily    [Held by provider] HYDROcodone 5 mg - acetaminophen  1 tablet Oral Q12H    vitamin C  1,000 mg Oral BID    zinc sulfate  50 mg Oral BID    Vitamin D  1,000 Units Oral Daily    sodium chloride flush  5-40 mL IntraVENous 2 times per day    enoxaparin  30 mg SubCUTAneous BID    bisacodyl  5 mg Oral Daily    [Held by provider] divalproex  250 mg Oral Daily    pantoprazole  40 mg Oral QAM AC    montelukast  10 mg Oral QPM    fluticasone  2 puff Inhalation BID    [Held by provider] pravastatin  40 mg Oral Dinner    tiotropium-olodaterol  2 puff Inhalation BID    [Held by provider] diclofenac-miSOPROStol  1 tablet Oral BID      Infusions:    amiodarone 1 mg/min (10/12/21 0700)    Followed by   [address] [place] [address] amiodarone      norepinephrine 20 mcg/min (10/12/21 0700)    sodium chloride      sodium chloride 10 mL/hr at 10/12/21 0700     PRN Meds: sodium chloride flush, 5-40 mL, PRN  sodium chloride, 25 mL, PRN  magic (miracle) mouthwash, 5 mL, 4x Daily PRN  LORazepam, 1 mg, Q6H PRN  morphine, 2 mg, Q4H PRN  sodium chloride, 1 spray, PRN  senna, 1 tablet, BID PRN  polyvinyl alcohol, 1 drop, Q1H PRN  albuterol sulfate HFA, 2 puff, Q4H PRN  LORazepam, 1 mg, Q6H PRN  benzonatate, 100 mg, TID PRN  sodium chloride flush, 5-40 mL, PRN  sodium chloride, 25 mL, PRN  ondansetron, 4 mg, Q8H PRN   Or  ondansetron, 4 mg, Q6H PRN  polyethylene glycol, 17 g, Daily PRN  [Held by provider] acetaminophen, 650 mg, Q6H PRN   Or  [Held by provider] acetaminophen, 650 mg, Q6H PRN  guaiFENesin-dextromethorphan, 5 mL, Q4H PRN  methocarbamol, 500 mg, Q8H PRN          Electronically signed by Cj Fry MD on 10/12/2021 at 8:50 AM

## 2021-10-12 NOTE — PROGRESS NOTES
Pt intubated by CRNA [place] with this RT assisting. No complications noted. 7.5 ETT secured at [email or web address] via commercial device. Colorimetric CO2 detection, BBS and tubing condensation used to verify tube placement. CXR ordered to further verify ETT placement. Pt placed on vent   ACVC  F16  Vt 550   fio2 100%   Peep+10.

## 2021-10-12 NOTE — CONSULTS
RENAL LAB EVALUATION  Estimated Creatinine Clearance: 44 mL/min (A) (based on SCr of 2.1 mg/dL (H)).   Recent Labs     10/10/21  0616 10/11/21  0600 10/12/21  0405   BUN 50* 82* 101*   CREATININE 0.7* 1.3 2.1*     Cefepime 2g q12h appropriate     Acyclovir 10mg/kg (700mg) q8h switch to acyclovir 10mg/kg (700mg) q12h     Fluconazole 200mg daily changed to 100mg daily by ID, will need increased if renal function improves     All other meds dosed appropriately    Magda Blair, [facility]   10/12/2021 10:55 AM

## 2021-10-12 NOTE — ANESTHESIA PROCEDURE NOTES
Airway  Date/Time: 10/12/2021 11:13 AM  Urgency: emergent    Difficult airway    General Information and Staff    Patient location during procedure: ICU  Anesthesiologist: Linda Manjarrez DO  Resident/CRNA: DANIELLE Mack - CYNDEE    Consent for Airway (if performed for an anesthetic, see related documentation for consents)  Patient identity confirmed: per hospital policy  Consent: The procedure was performed in an emergent situation. Verbal consent not obtained. Written consent not obtained.   Risks and benefits: risks, benefits and alternatives were not discussed      Code status verified:yes  Indications and Patient Condition  Indications for airway management: respiratory distress  Spontaneous ventilation: present  Sedation level: minimal  Preoxygenated: yes  Patient position: sniffing  MILS not maintained throughout  Mask difficulty assessment: vent by bag mask    Final Airway Details  Final airway type: endotracheal airway      Successful airway: ETT  Cuffed: yes   Successful intubation technique: video laryngoscopy  Endotracheal tube insertion site: oral  Blade size: #4  Cormack-Lehane Classification: grade I - full view of glottis  Inital cuff pressure (cm H2O): 5  Measured from: lips  ETT to lips (cm): 23  Number of attempts at approach: 1  Ventilation between attempts: bag mask  Number of other approaches attempted: 0

## 2021-10-12 NOTE — PROGRESS NOTES
[facility]  consulted by Dr. Hannah Patel for monitoring and adjustment. Indication for treatment: Pneumonia  Goal trough: 15-20 mcg/mL  AUC/MARYSOL: 400-600    Pertinent Laboratory Values:   Temp Readings from Last 3 Encounters:   10/12/21 98.6 °F (37 °C) (Axillary)   12/23/16 98.8 °F (37.1 °C) (Oral)     Recent Labs     10/10/21  0616 10/11/21  0600 10/12/21  0405   WBC 14.3* 16.4* 16.5*     Recent Labs     10/10/21  0616 10/11/21  0600 10/12/21  0405   BUN 50* 82* 101*   CREATININE 0.7* 1.3 2.1*     Estimated Creatinine Clearance: 44 mL/min (A) (based on SCr of 2.1 mg/dL (H)). Intake/Output Summary (Last 24 hours) at 10/12/2021 1406  Last data filed at 10/12/2021 0700  Gross per 24 hour   Intake 3591.65 ml   Output 1225 ml   Net 2366.65 ml     Pertinent Cultures:  Date    Source    Results  10/5   MRSA nasal   Sent     Vancomycin level:   TROUGH:    No results for input(s): VANCOTROUGH in the last 72 hours.   RANDOM:    Recent Labs     10/11/21  0600 10/12/21  0405   VANCORANDOM 48.3 39.9     Assessment:  · WBC and temperature: WBC elevated/afebrile  · SCr, BUN, and urine output: PRINCESS  · Day(s) of therapy: 7  · Vancomycin concentration: 39.9    Plan:  · Hold vancomycin   · Pharmacy will continue to monitor patient and adjust therapy as indicated    [address] 10/14 @ 0600    Thank you for the consult,  Shruti Jean Baptiste, [address] [place] [address]  10/12/2021 2:06 PM

## 2021-10-13 NOTE — PROGRESS NOTES
Pt's daughter, Catherine Capellan, is currently at the bedside seeing him. Updated her on Pt's condition and answered all of her questions.

## 2021-10-13 NOTE — PROGRESS NOTES
Pt's significant other, Rissa Leal, is currently at the bedside. Updated her on Pt's condition and answered all of her questions.

## 2021-10-13 NOTE — CARE COORDINATION
Patient now ventilated on day 27 of admission; will follow for post extubation needs.  Odessa Mcneill RN

## 2021-10-13 NOTE — PROGRESS NOTES
Pt arrived to room 2104 from 2E, intubated, on ventilator, benjamin, RT at bedside and connected to ventilator. Transferred monitors. Pt with propofol, fentanyl, and levophed gtt infusing, double tap with offgoing RN, see MAR. Pt responds to pain and with movement. OGT removed d/t coiling in mouth, NGT placed per Harpreet Chavez RN. Will verify placement before TF initiated.

## 2021-10-13 NOTE — PROGRESS NOTES
displayed. BMP   Recent Labs     10/11/21  0600 10/12/21  0405 10/13/21  0209    137 145   K 4.3 4.3 4.3    100 108   CO2 17* 16* 19*   PHOS  --   --  7.6*   BUN 82* 101* 121*   CREATININE 1.3 2.1* 2.7*     Hepatic:   Recent Labs     10/11/21  0600 10/12/21  0405   * 343*   ALT 1,627* 1,441*   BILITOT 1.2* 1.1*   ALKPHOS 196* 204*     Troponin: No results for input(s): TROPONINI in the last 72 hours. BNP: No results for input(s): BNP in the last 72 hours. Lipids: No results for input(s): CHOL, HDL in the last 72 hours.     Invalid input(s): LDLCALCU  ABGs:   Lab Results   Component Value Date    PO2ART 189 10/13/2021    AQD6BNH 39.0 10/13/2021     INR:   Recent Labs     10/12/21  1905   INR 0.97     Renal Labs  Albumin:    Lab Results   Component Value Date    LABALBU 2.9 10/13/2021     Calcium:    Lab Results   Component Value Date    CALCIUM 9.2 10/13/2021     Phosphorus:    Lab Results   Component Value Date    PHOS 7.6 10/13/2021     U/A:    Lab Results   Component Value Date    NITRU NEGATIVE 10/12/2021    COLORU YELLOW 10/12/2021    WBCUA 5 10/12/2021    RBCUA 4 10/12/2021    MUCUS OCCASIONAL 10/12/2021    TRICHOMONAS NONE SEEN 10/12/2021    BACTERIA OCCASIONAL 10/12/2021    CLARITYU HAZY 10/12/2021    SPECGRAV 1.021 10/12/2021    UROBILINOGEN NEGATIVE 10/12/2021    BILIRUBINUR NEGATIVE 10/12/2021    BLOODU SMALL 10/12/2021    KETUA NEGATIVE 10/12/2021     ABG:    Lab Results   Component Value Date    GQZ8KCF 39.0 10/13/2021    PO2ART 189 10/13/2021    ZDP8KHU 18.8 10/13/2021     HgBA1c:  No results found for: LABA1C  Microalbumen/Creatinine ratio:  No components found for: RUCREAT  TSH:  No results found for: TSH  IRON:  No results found for: IRON  Iron Saturation:  No components found for: PERCENTFE  TIBC:  No results found for: TIBC  FERRITIN:  No results found for: FERRITIN  RPR:  No results found for: RPR  ALEXANDR:  No results found for: ANATITER, ALEXANDR  24 Hour Urine for Creatinine Clearance:  No components found for: CREAT4, UHRS10, UTV10      Objective:   I/O: 10/12 0701 - 10/13 0700  In: 2308.5 [I.V.:1990.1]  Out: 1240 [Urine:890]  I/O last 3 completed shifts: In: 2308.5 [I.V.:1990.1; IV Piggyback:318.3]  Out: 1240 [Urine:890; Emesis/NG output:350]  No intake/output data recorded. Vitals: BP (!) 89/59   Pulse 103   Temp 99.3 °F (37.4 °C) (Rectal)   Resp 12   Ht 5' 9.02\" (1.753 m)   Wt 237 lb 14 oz (107.9 kg)   SpO2 91%   BMI 35.11 kg/m²  {  General appearance: Sedated ET tube  HEENT: Head: Normal, normocephalic, atraumatic. Neck: supple, symmetrical, trachea midline  Lungs: diminished breath sounds bilaterally  Heart: S1, S2 normal  Abdomen: abnormal findings:  soft nt  Extremities: edema trace  Neurologic: Mental status: alertness: Sedated        Assessment and Plan:      IMP:  1. Acute renal failure from ATN  2. Covid positive  3. Increased LFTs  4. Respiratory failure  5. Nonischemic cardiomyopathy    Plan     #1 renal function not improving at this time. Has a temporary dialysis catheter in place and will start on CRRT dialysis. 2.  Treat with protocols for Covid positive. 3.  Monitor LFTs in setting of shock and most likely need supportive care and therapy for now   #4 was intubated last night and will monitor currently on the ventilator on pressor support as well maintain for now  5. Unable to diurese with just diuretics alone we will stop IV fluids and start on CRRT to help with fluid removal as well as help with uremia  6. Follow-up GI recommendations if can start on tube feeds versus starting on TPN for nutrition  I try to call daughter Baylee Swenson bed went to voicemail left her message at this time to update her.          Simon Alva MD, MD

## 2021-10-13 NOTE — PROGRESS NOTES
Speech Language Pathology        Swallow evaluation order discontinued at this time due to pt sedated, on mechanical ventilation. Please re-order following extubation if needed.     Daron Anders MA [facility] SLP  Speech Language Pathologist

## 2021-10-13 NOTE — PROGRESS NOTES
During mouth care, noted that NGT coiled in patient's mouth. This is 3rd time coiled in mouth after initially placed in stomach. Will let Dr. Alexandra Nicole know.

## 2021-10-13 NOTE — PROGRESS NOTES
pulmonary      SUBJECTIVE:  Intubated on vent     OBJECTIVE    VITALS:  /64   Pulse 86   Temp 98.4 °F (36.9 °C) (Rectal)   Resp 14   Ht 5' 9.02\" (1.753 m)   Wt 237 lb 14 oz (107.9 kg)   SpO2 94%   BMI 35.11 kg/m²   HEAD AND FACE EXAM:  No throat injection, no active exudate,no thrush  NECK EXAM;No JVD, no masses, symmetrical  CHEST EXAM; Expansion equal and symmetrical, no masses  LUNG EXAM; Good breath sounds bilaterally. There are expiratory wheezes both lungs, there are crackles at both lung bases  CARDIOVASCULAR EXAM: Positive S1 and S2, no S3 or S4, no clicks ,no murmurs  RIGHT AND LEFT LOWER EXTRIMITY EXAM: No edema, no swelling, no inflamation            LABS   Lab Results   Component Value Date    WBC 18.0 (H) 10/13/2021    HGB 14.1 10/13/2021    HCT 45.4 10/13/2021    MCV 89.7 10/13/2021     10/13/2021     Lab Results   Component Value Date    CREATININE 2.1 (H) 10/13/2021     (H) 10/13/2021     10/13/2021    K 4.0 10/13/2021     10/13/2021    CO2 17 (L) 10/13/2021     Lab Results   Component Value Date    INR 0.97 10/12/2021    PROTIME 12.5 10/12/2021          Lab Results   Component Value Date    PHOS 7.6 10/13/2021    PHOS 3.5 10/05/2021    PHOS 3.4 09/19/2021        Recent Labs     10/12/21  0700 10/12/21  1230 10/13/21  0600   PH 7.30* 7.24* 7.29*   PO2ART 79 90 189*   VQA4HOK 34.0 45.0 39.0   O2SAT 94.4* 95.0* 95.9*         Wt Readings from Last 3 Encounters:   10/11/21 237 lb 14 oz (107.9 kg)   12/23/16 263 lb (119.3 kg)               ASSESMENT  Ac resp failure  covid pneumonia  Copd  Bact pneumonia        PLAN  1. cpm  2. Cont full vent support  3.  No cxr today will order for tomorrow    10/13/2021  Jessica Sullivan MD, M.D.

## 2021-10-13 NOTE — PROGRESS NOTES
Infectious Disease Progress Note  10/13/2021   Patient Name: Van Art : 1955       Reason for visit: F/u COVID-19 pneumonia, acute respiratory failure? History:? Interval history noted, out of isolation window  Intubated and on mechanical sedation   Physical Exam:  Vital Signs: /68   Pulse 96   Temp 98.2 °F (36.8 °C) (Rectal)   Resp 16   Ht 5' 9.02\" (1.753 m)   Wt 237 lb 14 oz (107.9 kg)   SpO2 93%   BMI 35.11 kg/m²     Gen: intubated and sedated   Skin: no stigmata of endocarditis  Wounds: C/D/I  HEMT: AT/NC ETT  Eyes: PERRLA, EOMI, conjunctiva pink, sclera anicteric. Neck: Supple. Trachea midline. No LAD. Chest: deferred  Heart: RRR  Abd: soft, non-distended, no tenderness, no hepatomegaly. Normoactive bowel sounds. Ext: no clubbing, cyanosis, or edema  Catheter Site: without erythema or tenderness  LDA: CVC: left basilic PICC line. Neuro: intubated and mechanically ventilated. Radiologic / Imaging / TESTING  Collected: 10/04/21 1504 Updated: 10/04/21 1508 Narrative:  EXAMINATION:   ONE XRAY VIEW OF THE CHEST     10/4/2021 1:46 pm     COMPARISON:   2021     HISTORY:   ORDERING SYSTEM PROVIDED HISTORY: covid 23   TECHNOLOGIST PROVIDED HISTORY:   Reason for exam:->covid 23   Reason for Exam: covid 19   Acuity: Acute   Type of Exam: Initial     FINDINGS:   Mild cardiomegaly. Cardiomediastinal silhouette appears unchanged. Multifocal bibasilar predominant airspace disease. No definite effusion. No   pneumothorax or subdiaphragmatic free air. No acute osseous abnormality   identified. Impression:  Multifocal bibasilar predominant airspace disease has progressed slightly   since prior study.            Labs:    Recent Results (from the past 24 hour(s))   Hemoglobin and hematocrit, blood    Collection Time: 10/12/21  6:04 PM   Result Value Ref Range    Hemoglobin 14.8 13.5 - 18.0 GM/DL    Hematocrit 46.5 42 - 52 %   Protime-INR    Collection Time: 10/12/21  7:05 PM   Result Value Ref Range    Protime 12.5 11.7 - 14.5 SECONDS    INR 0.97 INDEX   CBC auto differential    Collection Time: 10/13/21  2:09 AM   Result Value Ref Range    WBC 18.0 (H) 4.0 - 10.5 K/CU MM    RBC 5.06 4.6 - 6.2 M/CU MM    Hemoglobin 14.1 13.5 - 18.0 GM/DL    Hematocrit 45.4 42 - 52 %    MCV 89.7 78 - 100 FL    MCH 27.9 27 - 31 PG    MCHC 31.1 (L) 32.0 - 36.0 %    RDW 17.5 (H) 11.7 - 14.9 %    Platelets 853 489 - 267 K/CU MM    MPV 14.8 (H) 7.5 - 11.1 FL    Metamyelocytes Relative 1 (H) 0.0 %    Bands Relative 13 (H) 5 - 11 %    Segs Relative 76.0 (H) 36 - 66 %    Eosinophils % 1.0 0 - 3 %    Lymphocytes % 3.0 (L) 24 - 44 %    Monocytes % 6.0 (H) 0 - 4 %    Metamyelocytes Absolute 0.18 K/CU MM    Bands Absolute 2.34 K/CU MM    Segs Absolute 13.7 K/CU MM    Eosinophils Absolute 0.2 K/CU MM    Lymphocytes Absolute 0.5 K/CU MM    Monocytes Absolute 1.1 K/CU MM    Differential Type MANUAL DIFFERENTIAL     RBC Morphology OCCASIONAL     Anisocytosis 1+     Toxic Granulation PRESENT     Dohle Bodies PRESENT    Magnesium    Collection Time: 10/13/21  2:09 AM   Result Value Ref Range    Magnesium 2.9 (H) 1.8 - 2.4 mg/dl   Renal Function Panel    Collection Time: 10/13/21  2:09 AM   Result Value Ref Range    Sodium 145 135 - 145 MMOL/L    Potassium 4.3 3.5 - 5.1 MMOL/L    Chloride 108 99 - 110 mMol/L    CO2 19 (L) 21 - 32 MMOL/L    Anion Gap 18 (H) 4 - 16     (H) 6 - 23 MG/DL    CREATININE 2.7 (H) 0.9 - 1.3 MG/DL    Glucose 180 (H) 70 - 99 MG/DL    Calcium 9.2 8.3 - 10.6 MG/DL    GFR Non- 24 (L) >60 mL/min/1.73m2    GFR  29 (L) >60 mL/min/1.73m2    Albumin 2.9 (L) 3.4 - 5.0 GM/DL    Phosphorus 7.6 (H) 2.5 - 4.9 MG/DL   Blood gas, arterial    Collection Time: 10/13/21  6:00 AM   Result Value Ref Range    pH, Bld 7.29 (L) 7.34 - 7.45    pCO2, Arterial 39.0 32 - 45 MMHG    pO2, Arterial 189 (H) 75 - 100 MMHG    Base Excess 7 (H) 0 - 3.3    HCO3, Arterial 18.8 18 - 23 MMOL/L CO2 Content 20.0 19 - 24 MMOL/L    O2 Sat 95.9 (L) 96 - 97 %    Carbon Monoxide, Blood 2.1 0 - 5 %    Methemoglobin, Arterial 1.9 (H) <1.5 %    Comment AC/VC+, 16, 550, 95%, +7, SPO2 96%    Comprehensive Metabolic Panel w/ Reflex to MG    Collection Time: 10/13/21  9:26 AM   Result Value Ref Range    Sodium 140 135 - 145 MMOL/L    Potassium 4.0 3.5 - 5.1 MMOL/L    Chloride 108 99 - 110 mMol/L    CO2 17 (L) 21 - 32 MMOL/L     (H) 6 - 23 MG/DL    CREATININE 2.1 (H) 0.9 - 1.3 MG/DL    Glucose 140 (H) 70 - 99 MG/DL    Calcium 8.4 8.3 - 10.6 MG/DL    Albumin 2.7 (L) 3.4 - 5.0 GM/DL    Total Protein 5.6 (L) 6.4 - 8.2 GM/DL    Total Bilirubin 1.2 (H) 0.0 - 1.0 MG/DL    ALT 1,134 (H) 10 - 40 U/L     (H) 15 - 37 IU/L    Alkaline Phosphatase 164 (H) 40 - 129 IU/L    GFR Non- 32 (L) >60 mL/min/1.73m2    GFR  39 (L) >60 mL/min/1.73m2    Anion Gap 15 4 - 16   Calcium, Ionized    Collection Time: 10/13/21  9:26 AM   Result Value Ref Range    Ionized Ca 1.17 1.12 - 1.32 mMOL/L    Calcium, Ion 4.68 4.48 - 5.28 MG/DL   Magnesium    Collection Time: 10/13/21  9:26 AM   Result Value Ref Range    Magnesium 2.5 (H) 1.8 - 2.4 mg/dl   Hemoglobin and Hematocrit, Blood    Collection Time: 10/13/21  1:30 PM   Result Value Ref Range    Hemoglobin 13.1 (L) 13.5 - 18.0 GM/DL    Hematocrit 40.6 (L) 42 - 52 %   Calcium, Ionized    Collection Time: 10/13/21  1:30 PM   Result Value Ref Range    Ionized Ca 1.19 1.12 - 1.32 mMOL/L    Calcium, Ion 4.76 4.48 - 5.28 MG/DL     CULTURE results: Invalid input(s): BLOOD CULTURE,  URINE CULTURE, SURGICAL CULTURE    Diagnosis:  Patient Active Problem List   Diagnosis    Obstructive sleep apnea syndrome    Arteriosclerosis of coronary artery    Acute bronchitis with chronic obstructive pulmonary disease (COPD) (HCC)    COVID-19    Tobacco dependence in remission    Obesity    Hyperlipidemia    Nonischemic cardiomyopathy ([address]    Aneurysm of thoracic aorta ([address]    Acute respiratory failure with hypoxia (HCC)    Severe malnutrition (HCC)    Vitamin D deficiency    SVT (supraventricular tachycardia) (HCC)       Active Problems  Principal Problem:    COVID-19  Active Problems:    Obstructive sleep apnea syndrome    Tobacco dependence in remission    Obesity    Hyperlipidemia    Nonischemic cardiomyopathy (HCC)    Acute respiratory failure with hypoxia (HCC)    Severe malnutrition (HCC)    Vitamin D deficiency    SVT (supraventricular tachycardia) (HCC)  Resolved Problems:    * No resolved hospital problems. *      Impression and plan   Summary and rationale: Patient is a 63 y.o.  male with a medical hx of obesity, HLD , thoracic aorta aneurysm admitted on 9/14/2021 with SOB, cough, fever, chills, anorexia for 5 days prior to admission. Diagnosed with severe covid-19 pneumonia and respiratory failure   Shock   QuantiFERON-TB test negative; IgG, IgA, IgM all within normal limits.  Vitamin D deficiency   Candida esophagitis   Fatty liver disease present and this may double the risk of mortality   ? HSV, CMV hepatitis.  COVID-19 symptom onset: 9/9/2021   COVID-19 test date: 9/14/2021   Expected discontinuation of isolation precautions: 9/29/2021   Clinical status: Day 35 of disease, intubated, now on Levophed, for shock. Off amiodarone, ALT elevated but now on a dwt.  Transaminitis: ALT on DWT Hepatitis A, B and C serology test are negative. CMV IgG present, CMV IgM within normal limits, HSV 1 and 2 IgM negative, HSV IgG is elevated. Amiodarone off, may have been contributing to this. [place] Therapeutic:  o Ongoing antibiotics: Vancomycin and cefepime. Acyclovir. Fluconazole. Reduce dose of acyclovir  o Fluconazole 200 mg daily for 14 days.   o Immunomodulators:  o Dexamethasone: 9/14-20 (7 days)  - Solumedrol 40 mg q 12h, (9/20-10/1); dexamethasone 10/2-  - Baracitinib: 9/15 , d/red due to transaminitis  o Completed antibiotics: azithromycin  o Other agents:    Diagnostic:    F/u: Check CMV DNA PCR,    Other: Taper and discontinue corticosteroids.       Electronically signed by: Electronically signed by Kamila Beach MD on 10/13/2021 at 3:02 PM

## 2021-10-13 NOTE — PROGRESS NOTES
Cardiology Progress Note     Admit Date:  9/14/2021    Consult reason/ Seen today for :   nonCardiomyopathy ejection fraction of 30%  SVT  Tachycardia  Respiratory failure    Subjective and  Overnight Events : Got intubated also noted to have coffee-ground emesis evaluated by GI      Chief complain on admission : 63 y. o.year old who is admitted for  Chief Complaint   Patient presents with    Nausea     has not eaten in 5 days    Fever     headache, eye pain    Shortness of Breath    Rectal Bleeding     black diarrhea for 3 days      Assessment / Plan:  Nonischemic cardiomyopathy followed as at [place] avoid Cardizem due to negative inotropic effects. Probably elevated LFTs in the setting of COVID and cardiomyopathy. Amiodarone  Check BNP get chest x-ray increase diuretics if chest x-ray looks worse or BNP higher  Stop Cardizem start metoprolol 4 times daily ideally should be on Toprol-XL if he can take p.o. Stop amiodarone  Respiratory failure continue supportive care BiPAP intubated   DVT Prophylaxis if no contraindication    Past medical history:    has a past medical history of Asthma, COPD (chronic obstructive pulmonary disease) ([address] and COVID-19. Past surgical history:   has a past surgical history that includes Wrist surgery (Left, 1976); hernia repair; and Cardiac catheterization (2013). Social History:   reports that he quit smoking about 16 years ago. His smoking use included cigarettes. He started smoking about 50 years ago. He smoked 0.50 packs per day. He has never used smokeless tobacco. He reports that he does not drink alcohol and does not use drugs. Family history:  family history is not on file.     Allergies   Allergen Reactions    Percocet [Oxycodone-Acetaminophen] Nausea And Vomiting       Review of Systems:    All 14 systems were reviewed and are negative  Except for the positive findings  which as documented /61   Pulse 90   Temp 99.3 °F (37.4 °C) (Rectal)   Resp 16   Ht 5' 9.02\" (1.753 m)   Wt 237 lb 14 oz (107.9 kg)   SpO2 95%   BMI 35.11 kg/m²       Intake/Output Summary (Last 24 hours) at 10/12/2021 2048  Last data filed at 10/12/2021 1845  Gross per 24 hour   Intake 2700.15 ml   Output 1650 ml   Net 1050.15 ml     Physical Exam:  Constitutional:  Well developed, Well nourished, No acute distress, Non-toxic appearance. HENT:  Normocephalic, Atraumatic, Bilateral external ears normal, Oropharynx moist, No oral exudates, Nose normal. Neck- Normal range of motion, No tenderness, Supple, No stridor. Eyes:  PERRL, EOMI, Conjunctiva normal, No discharge. Respiratory:  Normal breath sounds, No respiratory distress, No wheezing, No chest tenderness. Cardiovascular:  Normal heart rate, Normal rhythm, No murmurs, No rubs, No gallops, JVP not elevated  Abdomen/GI:  Bowel sounds normal, Soft, No tenderness, No masses, No pulsatile masses. Musculoskeletal:  Intact distal pulses, No edema, No tenderness, No cyanosis, No clubbing. Good range of motion in all major joints. No tenderness to palpation or major deformities noted. Back- No tenderness. Integument:  Warm, Dry, No erythema, No rash. Lymphatic:  No lymphadenopathy noted. Neurologic:  Alert & oriented x 3, Normal motor function, Normal sensory function, No focal deficits noted.    Psychiatric:  Affect  and  Mood :no change    Medications:    bumetanide  0.5 mg IntraVENous BID    acyclovir  10 mg/kg (Ideal) IntraVENous Q12H    pantoprazole  40 mg IntraVENous BID    vancomycin (VANCOCIN) intermittent dosing (placeholder)   Other RX Placeholder    metoprolol (LOPRESSOR) ivpb  5 mg IntraVENous Q6H    dexamethasone  6 mg IntraVENous Daily    busPIRone  5 mg Oral BID    lidocaine PF  5 mL IntraDERmal See Admin Instructions    sodium chloride flush  5-40 mL IntraVENous 2 times per day    cefepime  2,000 mg IntraVENous Q12H    fluconazole  100 mg IntraVENous Q24H    vitamin D  50,000 Units Oral Weekly    albuterol sulfate HFA  2 puff Inhalation 4x daily    vitamin C  1,000 mg Oral BID    zinc sulfate  50 mg Oral BID    Vitamin D  1,000 Units Oral Daily    sodium chloride flush  5-40 mL IntraVENous 2 times per day    enoxaparin  30 mg SubCUTAneous BID    bisacodyl  5 mg Oral Daily    [Held by provider] divalproex  250 mg Oral Daily    montelukast  10 mg Oral QPM    fluticasone  2 puff Inhalation BID    [Held by provider] pravastatin  40 mg Oral Dinner    tiotropium-olodaterol  2 puff Inhalation BID    [Held by provider] diclofenac-miSOPROStol  1 tablet Oral BID      amiodarone 0.5 mg/min (10/12/21 1821)    propofol 45 mcg/kg/min (10/12/21 1821)    fentaNYL 25 mcg/hr (10/12/21 1821)    sodium chloride 75 mL/hr at 10/12/21 1908    norepinephrine 28 mcg/min (10/12/21 2034)    sodium chloride      sodium chloride Stopped (10/12/21 0858)     sodium chloride flush, sodium chloride, magic (miracle) mouthwash, LORazepam, morphine, sodium chloride, senna, polyvinyl alcohol, albuterol sulfate HFA, LORazepam, benzonatate, sodium chloride flush, sodium chloride, ondansetron **OR** ondansetron, polyethylene glycol, [Held by provider] acetaminophen **OR** [Held by provider] acetaminophen, guaiFENesin-dextromethorphan, methocarbamol    Lab Data:  CBC:   Recent Labs     10/10/21  0616 10/10/21  0616 10/11/21  0600 10/12/21  0405 10/12/21  1804   WBC 14.3*  --  16.4* 16.5*  --    HGB 16.1   < > 15.3 15.1 14.8   HCT 49.9   < > 47.2 46.7 46.5   MCV 87.1  --  87.1 86.6  --      --  185 206  --     < > = values in this interval not displayed.      BMP:   Recent Labs     10/10/21  0616 10/11/21  0600 10/12/21  0405    142 137   K 4.5 4.3 4.3    104 100   CO2 16* 17* 16*   BUN 50* 82* 101*   CREATININE 0.7* 1.3 2.1*     PT/INR:   Recent Labs     10/12/21  1905   PROTIME 12.5   INR 0.97     BNP:  No results for input(s): PROBNP in the last 72 hours. TROPONIN:   Recent Labs     10/09/21  2345 10/10/21  0616   TROPONINT <0.010 <0.010        ECHO :   echocardiogram    Assessment:  63 y. o.year old who is admitted for  Chief Complaint   Patient presents with    Nausea     has not eaten in 5 days    Fever     headache, eye pain    Shortness of Breath    Rectal Bleeding     black diarrhea for 3 days    , active issues as noted below:  Impression:  Principal Problem:    COVID-19  Active Problems:    Obstructive sleep apnea syndrome    Tobacco dependence in remission    Obesity    Hyperlipidemia    Nonischemic cardiomyopathy ([facility]    Acute respiratory failure with hypoxia (HCC)    Severe malnutrition (HCC)    Vitamin D deficiency    SVT (supraventricular tachycardia) ([facility]  Resolved Problems:    * No resolved hospital problems. *            All labs, medications and tests reviewed by myself , continue all other medications of all above medical condition listed as is except for changes mentioned above. Thank you very much for consult , please call with questions.     Issac Garcia MD, MD 10/12/2021 8:48 PM

## 2021-10-13 NOTE — PROGRESS NOTES
Hospitalist Progress Note      Name:  Ching Abrams DOB/Age/Sex: 1955  (86 y.o. male)   MRN & CSN:  [id number] & [id number] Admission Date/Time: 2021  1:13 PM   Location:  -A PCP: No primary care provider on file. Hospital Day: 30    Assessment and Plan:       Acute Hypoxic respiratory Failure  Severe COVID-19 Pneumonia  Status post intubation on mechanical ventilator and sedation  Possible secondary bacterial infection  - Elevated HSV IgG and CMV IgG  - Onset:   - Continue Decadron  - Vanc and Cefepime continued to concern for bacterial superimposed infection  - Acyclovir , ID decrease the dose due to increasing liver enzymes   fluconazole 200 mg daily for 14 days  - Was on Baricitinib however to due to worsening trasaminitis has been discontinued  - ID and Pulmonology following, appreciate recs  Plan to start on tube feeding today     Acute on Chronic systolic congestive Heart Failure  Nonischemic cardiomyopathy  - Echo with ejection fraction 30%, mild to moderate aortic regurgitation  -Continue carvedilol. Hold lisinopril  Nephrology start the patient on Bumex  Plan for CRRT today  - Strict I and O. Daily weight. - Cardiology following     Hypotension- Likely Septic Shock  - On low dose Levophed this AM; wean as tolerated; can also consider switching to ceasar in light of SVT  - Continue to wean levo as able   Hold lisinopril     Sinus tachycardia   SVT  - Run of SVT  and was started initially on Cardizem drip  -Amiodarone stopped due to increasing liver enzymes  - Cardiology following, appreciate recs  - Monitor daily electrolytes     Liver dysfunction  - Likely secondary to COVID-19  - Worsening liver enzymes this a.m. with ALT 1400  - Liver ultrasound with fatty liver  - Hepatitis panel negative  - Avoid hepatotoxins  - Seen by GI; recommended no treatment as was secondary to COVID; if LFT's continue to rise recommended to hold Vanc if okay with ID?   Avoid hepatotoxic medication like Tylenol  Amiodarone stopped  Hold NSAID and Depakote  Monitor LFT      Acute kidney injury: Creatinine trending up to 2.7   Poor urine output and poor response to diuresis  Temporary hemodialysis catheter placed yesterday  Plan for CRRT today  Nephrologist on board      Coffee-ground emesis from the NG tube suctioning likely secondary to oropharyngeal trauma from multiple attempts at NG or could be stress gastritis/PUD  Continue to monitor OG tube output  H&H  Currently hemoglobin stable at 14  If worsen Hb,  will need to stop Lovenox  Continue on PPI iv bid        Candida esophagitis   - Cnt fluconazole     Morbid obesity  Obstructive sleep apnea  Tobacco dependence    Diet ADULT DIET; Regular  Adult Oral Nutrition Supplement; Frozen Oral Supplement  Adult Oral Nutrition Supplement; Low Calorie/High Protein Oral Supplement   DVT Prophylaxis [x] Lovenox, []  Heparin, [] SCDs, [] Ambulation   GI Prophylaxis [x] PPI,  [] H2 Blocker,  [] Carafate,  [] Diet/Tube Feeds   Code Status Full Code   Disposition Patient requires continued admission due to    MDM [] Low, [] Moderate,[]  High  Patient's risk as above due to      History of Present Illness:   Patient was seen and examined at the bedside  Patient was intubated yesterday on mechanical ventilator and sedation  Also temporary hemodialysis catheter was placed  Plan to start on CRRT today  There is a small mass front of the neck which could be hematoma or swollen oropharyngeal structure  I did order CT of neck without contrast  Patient also did develop coffee-ground emesis from the NG tube suctioning  GI consulted  Hemoglobin stable at 14.1    Objective:        Intake/Output Summary (Last 24 hours) at 10/13/2021 0911  Last data filed at 10/13/2021 0700  Gross per 24 hour   Intake 2308.45 ml   Output 1240 ml   Net 1068.45 ml      Vitals:   Vitals:    10/13/21 0900   BP: 87/64   Pulse: 100   Resp: 11   Temp: 98.4 °F (36.9 °C)   SpO2: 92% Physical Exam:   GEN patient intubated sedated on mechanical ventilator  HEENT: PEERLA, , supple neck, there is small hard swelling in front of the neck  Chest: air entry equal bilaterally, no wheezing or crepitation, decreased breathing bilaterally  Heart: S1 and S2 heard, no murmur, no gallop or rub, regular rate  Abdomen: soft, ND ,, +BS  Extremities: no cyanosis, or erythema, peripheral pulses audible  Neurology: Sedated on mechanical ventilator    Medications:   Medications:    bumetanide  0.5 mg IntraVENous BID    acyclovir  10 mg/kg (Ideal) IntraVENous Q12H    pantoprazole  40 mg IntraVENous BID    vancomycin (VANCOCIN) intermittent dosing (placeholder)   Other RX Placeholder    metoprolol (LOPRESSOR) ivpb  5 mg IntraVENous Q6H    dexamethasone  6 mg IntraVENous Daily    busPIRone  5 mg Oral BID    lidocaine PF  5 mL IntraDERmal See Admin Instructions    sodium chloride flush  5-40 mL IntraVENous 2 times per day    cefepime  2,000 mg IntraVENous Q12H    fluconazole  100 mg IntraVENous Q24H    vitamin D  50,000 Units Oral Weekly    albuterol sulfate HFA  2 puff Inhalation 4x daily    vitamin C  1,000 mg Oral BID    zinc sulfate  50 mg Oral BID    Vitamin D  1,000 Units Oral Daily    sodium chloride flush  5-40 mL IntraVENous 2 times per day    enoxaparin  30 mg SubCUTAneous BID    bisacodyl  5 mg Oral Daily    [Held by provider] divalproex  250 mg Oral Daily    montelukast  10 mg Oral QPM    fluticasone  2 puff Inhalation BID    [Held by provider] pravastatin  40 mg Oral Dinner    tiotropium-olodaterol  2 puff Inhalation BID    [Held by provider] diclofenac-miSOPROStol  1 tablet Oral BID      Infusions:    prismaSATE BGK 4/2.5      prismaSATE BGK 4/2.5      prismaSATE BGK 4/2.5      propofol 25 mcg/kg/min (10/13/21 0900)    fentaNYL 50 mcg/hr (10/13/21 0700)    norepinephrine 20 mcg/min (10/13/21 0801)    sodium chloride      sodium chloride Stopped (10/12/21 0858)     PRN Meds: potassium chloride, 20 mEq, PRN  magnesium sulfate, 1,000 mg, PRN  calcium gluconate, 1,000 mg, PRN   Or  calcium gluconate, 2,000 mg, PRN   Or  calcium gluconate, 3,000 mg, PRN   Or  calcium gluconate, 4,000 mg, PRN  sodium phosphate IVPB, 6 mmol, PRN   Or  sodium phosphate IVPB, 12 mmol, PRN   Or  sodium phosphate IVPB, 18 mmol, PRN   Or  sodium phosphate IVPB, 24 mmol, PRN  sodium chloride flush, 5-40 mL, PRN  sodium chloride, 25 mL, PRN  magic (miracle) mouthwash, 5 mL, 4x Daily PRN  LORazepam, 1 mg, Q6H PRN  morphine, 2 mg, Q4H PRN  sodium chloride, 1 spray, PRN  senna, 1 tablet, BID PRN  polyvinyl alcohol, 1 drop, Q1H PRN  albuterol sulfate HFA, 2 puff, Q4H PRN  LORazepam, 1 mg, Q6H PRN  benzonatate, 100 mg, TID PRN  sodium chloride flush, 5-40 mL, PRN  sodium chloride, 25 mL, PRN  ondansetron, 4 mg, Q8H PRN   Or  ondansetron, 4 mg, Q6H PRN  polyethylene glycol, 17 g, Daily PRN  [Held by provider] acetaminophen, 650 mg, Q6H PRN   Or  [Held by provider] acetaminophen, 650 mg, Q6H PRN  guaiFENesin-dextromethorphan, 5 mL, Q4H PRN  methocarbamol, 500 mg, Q8H PRN          Electronically signed by Cj Fry MD on 10/13/2021 at 9:11 AM

## 2021-10-13 NOTE — PROGRESS NOTES
RENAL LAB EVALUATION  Estimated Creatinine Clearance: 44 mL/min (A) (based on SCr of 2.1 mg/dL (H)).   Recent Labs     10/12/21  0405 10/13/21  0209 10/13/21  0926   * 121* 120*   CREATININE 2.1* 2.7* 2.1*     CVVHD-F Started today (10/13/21)     Cefepime 2g q12h increase to cefepime 2g q8h while on CVVHDF    Acyclovir 10mg/kg (700mg) q12h - higher end of dosing range for CVVHDF    Fluconazole 100mg increased to 400mg daily while on CVVHDF due to lack of tubular reabsorption, which increases clearance while on CVVHF     All other meds dosed appropriately    SOWMYA Treadwell [facility] [place]   10/12/2021 10:55 AM

## 2021-10-14 NOTE — PROGRESS NOTES
Infectious Disease Progress Note  10/14/2021   Patient Name: Tanvi Johnson : 1955       Reason for visit: F/u COVID-19 pneumonia, acute respiratory failure? History:? Interval history noted, out of isolation window  Intubated and on mechanical sedation   Physical Exam:  Vital Signs: /68   Pulse 117   Temp 97.5 °F (36.4 °C) (Rectal)   Resp 15   Ht 5' 9.02\" (1.753 m)   Wt 238 lb 8.6 oz (108.2 kg)   SpO2 91%   BMI 35.21 kg/m²     Gen: intubated and sedated   Skin: no stigmata of endocarditis  Wounds: C/D/I  HEMT: AT/NC ETT  Eyes: PERRLA, EOMI, conjunctiva pink, sclera anicteric. Neck: Supple. Trachea midline. No LAD. Chest: deferred  Heart: RRR  Abd: soft, non-distended, no tenderness, no hepatomegaly. Normoactive bowel sounds. Ext: no clubbing, cyanosis, or edema  Catheter Site: without erythema or tenderness  LDA: CVC: left basilic PICC line. Neuro: intubated and mechanically ventilated. Radiologic / Imaging / TESTING  Collected: 10/04/21 1504 Updated: 10/04/21 1508 Narrative:  EXAMINATION:   ONE XRAY VIEW OF THE CHEST     10/4/2021 1:46 pm     COMPARISON:   2021     HISTORY:   ORDERING SYSTEM PROVIDED HISTORY: covid 23   TECHNOLOGIST PROVIDED HISTORY:   Reason for exam:->covid 23   Reason for Exam: covid 19   Acuity: Acute   Type of Exam: Initial     FINDINGS:   Mild cardiomegaly. Cardiomediastinal silhouette appears unchanged. Multifocal bibasilar predominant airspace disease. No definite effusion. No   pneumothorax or subdiaphragmatic free air. No acute osseous abnormality   identified. Impression:  Multifocal bibasilar predominant airspace disease has progressed slightly   since prior study.            Labs:    Recent Results (from the past 24 hour(s))   Hemoglobin and Hematocrit, Blood    Collection Time: 10/13/21  1:30 PM   Result Value Ref Range    Hemoglobin 13.1 (L) 13.5 - 18.0 GM/DL    Hematocrit 40.6 (L) 42 - 52 %   Calcium, Ionized    Collection Time: 10/13/21  1:30 PM   Result Value Ref Range    Ionized Ca 1.19 1.12 - 1.32 mMOL/L    Calcium, Ion 4.76 4.48 - 5.28 MG/DL   Blood gas, arterial    Collection Time: 10/13/21  6:30 PM   Result Value Ref Range    pH, Bld 7.22 (L) 7.34 - 7.45    pCO2, Arterial 53.0 (H) 32 - 45 MMHG    pO2, Arterial 60 (L) 75 - 100 MMHG    Base Excess 7 (H) 0 - 3.3    HCO3, Arterial 21.7 18 - 23 MMOL/L    CO2 Content 23.3 19 - 24 MMOL/L    O2 Sat 90.9 (L) 96 - 97 %    Carbon Monoxide, Blood 2.2 0 - 5 %    Methemoglobin, Arterial 1.0 <1.5 %    Comment 16 500 .80 P7    POCT Glucose    Collection Time: 10/13/21 11:34 PM   Result Value Ref Range    POC Glucose 170 (H) 70 - 99 MG/DL   Blood gas, arterial    Collection Time: 10/13/21 11:45 PM   Result Value Ref Range    pH, Bld 7.25 (L) 7.34 - 7.45    pCO2, Arterial 50.0 (H) 32 - 45 MMHG    pO2, Arterial 62 (L) 75 - 100 MMHG    Base Excess 6 (H) 0 - 3.3    HCO3, Arterial 21.9 18 - 23 MMOL/L    CO2 Content 23.4 19 - 24 MMOL/L    O2 Sat 91.5 (L) 96 - 97 %    Carbon Monoxide, Blood 2.3 0 - 5 %    Methemoglobin, Arterial 1.4 <1.5 %    Comment ACVC 18    Hemoglobin and Hematocrit, Blood    Collection Time: 10/14/21 12:30 AM   Result Value Ref Range    Hemoglobin 13.6 13.5 - 18.0 GM/DL    Hematocrit 43.8 42 - 52 %   Calcium, Ionized    Collection Time: 10/14/21 12:30 AM   Result Value Ref Range    Ionized Ca 1.21 1.12 - 1.32 mMOL/L    Calcium, Ion 4.84 4.48 - 5.28 MG/DL   Blood gas, arterial    Collection Time: 10/14/21  6:00 AM   Result Value Ref Range    pH, Bld 7.23 (L) 7.34 - 7.45    pCO2, Arterial 56.0 (H) 32 - 45 MMHG    pO2, Arterial 76 75 - 100 MMHG    Base Excess 5 (H) 0 - 3.3    HCO3, Arterial 23.5 (H) 18 - 23 MMOL/L    CO2 Content 25.2 (H) 19 - 24 MMOL/L    O2 Sat 93.9 (L) 96 - 97 %    Carbon Monoxide, Blood 2.3 0 - 5 %    Methemoglobin, Arterial 1.4 <1.5 %    Comment ACVC+ 18    CBC auto differential    Collection Time: 10/14/21  6:15 AM   Result Value Ref Range    WBC 16.2 (H) 4.0 - 10.5 K/CU MM    RBC 4.75 4.6 - 6.2 M/CU MM    Hemoglobin 13.5 13.5 - 18.0 GM/DL    Hematocrit 43.1 42 - 52 %    MCV 90.7 78 - 100 FL    MCH 28.4 27 - 31 PG    MCHC 31.3 (L) 32.0 - 36.0 %    RDW 17.6 (H) 11.7 - 14.9 %    Platelets 239 185 - 382 K/CU MM    MPV 13.9 (H) 7.5 - 11.1 FL    Metamyelocytes Relative 3 (H) 0.0 %    Bands Relative 16 (H) 5 - 11 %    Segs Relative 73.0 (H) 36 - 66 %    Lymphocytes % 5.0 (L) 24 - 44 %    Monocytes % 3.0 0 - 4 %    nRBC 5     Metamyelocytes Absolute 0.49 K/CU MM    Bands Absolute 2.59 K/CU MM    Segs Absolute 11.8 K/CU MM    Lymphocytes Absolute 0.8 K/CU MM    Monocytes Absolute 0.5 K/CU MM    Differential Type MANUAL DIFFERENTIAL     Anisocytosis 1+     Polychromasia 1+     Basophilic Stippling PRESENT     Toxic Granulation PRESENT     Hypersegmented Neutrophils PRESENT     WBC Morphology VACUOLATED NEUTROPHILS (SEGS)     PLT Morphology MANY LARGE PLATELETS    Magnesium    Collection Time: 10/14/21  6:15 AM   Result Value Ref Range    Magnesium 2.6 (H) 1.8 - 2.4 mg/dl   Renal Function Panel    Collection Time: 10/14/21  6:15 AM   Result Value Ref Range    Sodium 137 135 - 145 MMOL/L    Potassium 4.8 3.5 - 5.1 MMOL/L    Chloride 102 99 - 110 mMol/L    CO2 23 21 - 32 MMOL/L    Anion Gap 12 4 - 16    BUN 57 (H) 6 - 23 MG/DL    CREATININE 1.7 (H) 0.9 - 1.3 MG/DL    Glucose 206 (H) 70 - 99 MG/DL    Calcium 8.7 8.3 - 10.6 MG/DL    GFR Non- 41 (L) >60 mL/min/1.73m2    GFR  50 (L) >60 mL/min/1.73m2    Albumin 3.2 (L) 3.4 - 5.0 GM/DL    Phosphorus 4.3 2.5 - 4.9 MG/DL   Vancomycin, random    Collection Time: 10/14/21  6:15 AM   Result Value Ref Range    Vancomycin Rm 15.8 UG/ML    DOSE AMOUNT DOSE AMT.  GIVEN - 12     DOSE TIME DOSE TIME GIVEN - 10/11    Lactic Acid, Plasma    Collection Time: 10/14/21  6:15 AM   Result Value Ref Range    Lactate 2.1 (HH) 0.4 - 2.0 mMOL/L   Calcium, Ionized    Collection Time: 10/14/21  6:15 AM   Result Value Ref Range    Ionized Ca 1.17 1.12 - 1.32 mMOL/L    Calcium, Ion 4.68 4.48 - 5.28 MG/DL     CULTURE results: Invalid input(s): BLOOD CULTURE,  URINE CULTURE, SURGICAL CULTURE    Diagnosis:  Patient Active Problem List   Diagnosis    Obstructive sleep apnea syndrome    Arteriosclerosis of coronary artery    Acute bronchitis with chronic obstructive pulmonary disease (COPD) ([facility] [address]    COVID-19    Tobacco dependence in remission    Obesity    Hyperlipidemia    [facility]ischemic [facility] ([facility]    Aneurysm of thoracic aorta (HCC)    Acute respiratory failure with hypoxia (HCC)    Severe malnutrition (HCC)    Vitamin D deficiency    SVT (supraventricular tachycardia) (HCC)       Active Problems  Principal Problem:    COVID-19  Active Problems:    Obstructive sleep apnea syndrome    Tobacco dependence in remission    Obesity    Hyperlipidemia    Nonischemic cardiomyopathy ([facility] [address]    Acute respiratory failure with hypoxia (HCC)    Severe malnutrition (HCC)    Vitamin D deficiency    SVT (supraventricular tachycardia) (HCC)  Resolved Problems:    * No resolved hospital problems. *      Impression and plan   Summary and rationale: Patient is a 63 y.o.  male with a medical hx of obesity, HLD , thoracic aorta aneurysm admitted on 9/14/2021 with SOB, cough, fever, chills, anorexia for 5 days prior to admission. Diagnosed with severe covid-19 pneumonia and respiratory failure   Shock   QuantiFERON-TB test negative; IgG, IgA, IgM all within normal limits.  Vitamin D deficiency   Candida esophagitis   Fatty liver disease present and this may double the risk of mortality   ? HSV, CMV hepatitis.  COVID-19 symptom onset: 9/9/2021   COVID-19 test date: 9/14/2021   Expected discontinuation of isolation precautions: 9/29/2021   Clinical status: Day 35 of disease, intubated, now on Levophed, for shock. Off amiodarone, ALT elevated but now on a dwt. Prognosis is dire.     Transaminitis: ALT on DWT Hepatitis A, B and C serology test are negative. CMV IgG present, CMV IgM within normal limits, HSV 1 and 2 IgM negative, HSV IgG is elevated. Amiodarone off, may have been contributing to this. CMV DNA pcr negative.  Therapeutic:  o Ongoing antibiotics: Vancomycin and cefepime. Acyclovir. Fluconazole. Discontinue acyclovir  o Fluconazole 200 mg daily for 14 days. o Immunomodulators:  o Dexamethasone: 9/14-20 (7 days)  - Solumedrol 40 mg q 12h, (9/20-10/1); dexamethasone 10/2-  - Baracitinib: 9/15 , d/red due to transaminitis  o Completed antibiotics: azithromycin  o Other agents:    Diagnostic:    F/u:    Other: Taper and discontinue corticosteroids.       Electronically signed by: Electronically signed by Asuncion Riedel, MD on 10/14/2021 at 12:37 PM

## 2021-10-14 NOTE — PROGRESS NOTES
Cardiology Progress Note     Admit Date:  9/14/2021    Consult reason/ Seen today for :   Non ishemic Cardiomyopathy ejection fraction of 30%  SVT  Tachycardia  Respiratory failure    Subjective and  Overnight Events : had few runs of wide complex tachycardia , requiring a lot of PEEP and high O2 requirement   Amiodarone on hold due to abnormal LFTs coffee-ground emesis improved hematocrit stable  On CRRT  Overall tachycardia and SVT runs dramatically better after  intubation    Chief complain on admission : 63 y. o.year old who is admitted for  Chief Complaint   Patient presents with    Nausea     has not eaten in 5 days    Fever     headache, eye pain    Shortness of Breath    Rectal Bleeding     black diarrhea for 3 days      Assessment / Plan:  Nonischemic cardiomyopathy followed as at [place] avoid Cardizem due to negative inotropic effects. Probably elevated LFTs in the setting of COVID and cardiomyopathy. Amiodarone  Check BNP get chest x-ray increase diuretics if chest x-ray looks worse or BNP higher  Stop Cardizem start metoprolol 4 times daily ideally should be on Toprol-XL if he can take p.o. Stop amiodarone due to concerns for abnormal LFTs  Respiratory failure continue supportive care BiPAP intubated   DVT Prophylaxis if no contraindication  Prognosis is guarded    Past medical history:    has a past medical history of Asthma, COPD (chronic obstructive pulmonary disease) ([facility] [address] and COVID-19. Past surgical history:   has a past surgical history that includes Wrist surgery (Left, 1976); hernia repair; Cardiac catheterization (2013); and IR NONTUNNELED VASCULAR CATHETER > 5 YEARS (10/12/2021). Social History:   reports that he quit smoking about 16 years ago. His smoking use included cigarettes. He started smoking about 50 years ago. He smoked 0.50 packs per day.  He has never used smokeless tobacco. He reports that he does not drink alcohol and does not use drugs. Family history:  family history is not on file. Allergies   Allergen Reactions    Percocet [Oxycodone-Acetaminophen] Nausea And Vomiting       Review of Systems:    All 14 systems were reviewed and are negative  Except for the positive findings  which as documented     /65   Pulse 113   Temp 100.2 °F (37.9 °C) (Rectal)   Resp 16   Ht 5' 9.02\" (1.753 m)   Wt 238 lb 8.6 oz (108.2 kg)   SpO2 90%   BMI 35.21 kg/m²       Intake/Output Summary (Last 24 hours) at 10/14/2021 1502  Last data filed at 10/14/2021 1400  Gross per 24 hour   Intake 1907.7 ml   Output 2813 ml   Net -905.3 ml     Physical Exam:  Constitutional:  Well developed, Well nourished, No acute distress, Non-toxic appearance. HENT:  Normocephalic, Atraumatic, Bilateral external ears normal, Oropharynx moist, No oral exudates, Nose normal. Neck- Normal range of motion, No tenderness, Supple, No stridor. Eyes:  PERRL, EOMI, Conjunctiva normal, No discharge. Respiratory:  Normal breath sounds, No respiratory distress, No wheezing, No chest tenderness. Cardiovascular:  Normal heart rate, Normal rhythm, No murmurs, No rubs, No gallops, JVP not elevated  Abdomen/GI:  Bowel sounds normal, Soft, No tenderness, No masses, No pulsatile masses. Musculoskeletal:  Intact distal pulses, No edema, No tenderness, No cyanosis, No clubbing. Good range of motion in all major joints. No tenderness to palpation or major deformities noted. Back- No tenderness. Integument:  Warm, Dry, No erythema, No rash. Lymphatic:  No lymphadenopathy noted. Neurologic:  Alert & oriented x 3, Normal motor function, Normal sensory function, No focal deficits noted.    Psychiatric:  Affect  and  Mood :no change    Medications:    cefepime  2,000 mg IntraVENous Q8H    fluconazole  400 mg IntraVENous Q24H    neomycin-bacitracin-polymyxin   Topical BID    acyclovir  10 mg/kg (Ideal) IntraVENous Q12H    pantoprazole  40 mg IntraVENous BID    vancomycin (VANCOCIN) intermittent dosing (placeholder)   Other RX Placeholder    metoprolol (LOPRESSOR) ivpb  5 mg IntraVENous Q6H    dexamethasone  6 mg IntraVENous Daily    busPIRone  5 mg Oral BID    lidocaine PF  5 mL IntraDERmal See Admin Instructions    sodium chloride flush  5-40 mL IntraVENous 2 times per day    vitamin D  50,000 Units Oral Weekly    albuterol sulfate HFA  2 puff Inhalation 4x daily    vitamin C  1,000 mg Oral BID    zinc sulfate  50 mg Oral BID    Vitamin D  1,000 Units Oral Daily    sodium chloride flush  5-40 mL IntraVENous 2 times per day    enoxaparin  30 mg SubCUTAneous BID    bisacodyl  5 mg Oral Daily    [Held by provider] divalproex  250 mg Oral Daily    montelukast  10 mg Oral QPM    fluticasone  2 puff Inhalation BID    [Held by provider] pravastatin  40 mg Oral Dinner    [Held by provider] diclofenac-miSOPROStol  1 tablet Oral BID      IV infusion builder 75 mL/hr at 10/14/21 0800    prismaSATE BGK 4/2.5 1,600 mL/hr at 10/14/21 1355    prismaSATE BGK 4/2.5 200 mL/hr at 10/13/21 1746    prismaSATE BGK 4/2.5 800 mL/hr at 10/14/21 1052    vasopressin (Septic Shock) infusion 0.04 Units/min (10/14/21 0909)    propofol 20 mcg/kg/min (10/14/21 1042)    fentaNYL 50 mcg/hr (10/14/21 0800)    norepinephrine 30 mcg/min (10/14/21 0800)    sodium chloride      sodium chloride Stopped (10/13/21 1045)     potassium chloride, magnesium sulfate, calcium gluconate **OR** calcium gluconate **OR** calcium gluconate **OR** calcium gluconate, sodium phosphate IVPB **OR** sodium phosphate IVPB **OR** sodium phosphate IVPB **OR** sodium phosphate IVPB, sodium chloride flush, sodium chloride, magic (miracle) mouthwash, LORazepam, morphine, sodium chloride, senna, polyvinyl alcohol, albuterol sulfate HFA, LORazepam, benzonatate, sodium chloride flush, sodium chloride, ondansetron **OR** ondansetron, polyethylene glycol, [Held by provider] acetaminophen **OR** [Held by provider] acetaminophen, guaiFENesin-dextromethorphan, methocarbamol    Lab Data:  CBC:   Recent Labs     10/12/21  0405 10/12/21  1804 10/13/21  0209 10/13/21  1330 10/14/21  0030 10/14/21  0615 10/14/21  1250   WBC 16.5*  --  18.0*  --   --  16.2*  --    HGB 15.1   < > 14.1   < > 13.6 13.5 13.3*   HCT 46.7   < > 45.4   < > 43.8 43.1 42.6   MCV 86.6  --  89.7  --   --  90.7  --      --  197  --   --  172  --     < > = values in this interval not displayed. BMP:   Recent Labs     10/13/21  0209 10/13/21  0926 10/14/21  0615    140 137   K 4.3 4.0 4.8    108 102   CO2 19* 17* 23   PHOS 7.6*  --  4.3   * 120* 57*   CREATININE 2.7* 2.1* 1.7*     PT/INR:   Recent Labs     10/12/21  1905   PROTIME 12.5   INR 0.97     BNP:  No results for input(s): PROBNP in the last 72 hours. TROPONIN:   No results for input(s): TROPONINT in the last 72 hours. ECHO :   echocardiogram    Assessment:  63 y. o.year old who is admitted for  Chief Complaint   Patient presents with    Nausea     has not eaten in 5 days    Fever     headache, eye pain    Shortness of Breath    Rectal Bleeding     black diarrhea for 3 days    , active issues as noted below:  Impression:  Principal Problem:    COVID-19  Active Problems:    Obstructive sleep apnea syndrome    Tobacco dependence in remission    Obesity    Hyperlipidemia    Nonischemic cardiomyopathy ([facility] [address]    Acute respiratory failure with hypoxia (HCC)    Severe malnutrition (HCC)    Vitamin D deficiency    SVT (supraventricular tachycardia) ([facility] [address]  Resolved Problems:    * No resolved hospital problems. *            All labs, medications and tests reviewed by myself , continue all other medications of all above medical condition listed as is except for changes mentioned above. Thank you very much for consult , please call with questions.     Pretty Chris MD, MD 10/14/2021 3:02 PM

## 2021-10-14 NOTE — PLAN OF CARE
Hospital Medicine Progress Note     Date:  10/14/2021    PCP: No primary care provider on file. (Tel: None)    Date of Admission: 9/14/2021    Chief complaint:   Chief Complaint   Patient presents with    Nausea     has not eaten in 5 days    Fever     headache, eye pain    Shortness of Breath    Rectal Bleeding     black diarrhea for 3 days       Brief hospital course: Patient admitted with COVID-19 pneumonia, acute respiratory failure with hypoxia. Assessment/plan:  1. COVID-19 pneumonia. Currently on day #30 of hospitalization. Still On IV Decadron. 2. Possible superimposed bacterial pneumonia, currently on vancomycin and cefepime. Also on acyclovir for possible superimposed viral infection  3. Esophageal candidiasis. On Diflucan. 4. Acute respiratory failure with hypoxia. Secondary to underlying pulmonary process. Currently endotracheally intubated, on ventilator support. 5. Acute on chronic systolic heart failure. Been on diuretics and nephrology is started CRRT. 6. Septic shock. Currently on Levophed infusion. 7. Patient has had runs of supraventricular tachycardia during this hospitalization. Was previously on amiodarone which was discontinued due to elevated LFTs. Has been on Cardizem infusion. Cardiology on board and assisting with management. 8. Elevated transaminitis. Likely secondary to shock liver and/underlying sepsis. Avoid hepatotoxic medications. Ultrasound consistent with fatty liver. GI on board and assisting with management. 9. Acute kidney injury. Nephrology on board for CRRT. 10. Coffee-ground emesis from NG tube suctioning during his hospital stay. Likely select oropharyngeal trauma. Continue PPI. Monitor H&H closely. Diet: Adult Oral Nutrition Supplement; Low Calorie/High Protein Oral Supplement  ADULT TUBE FEEDING; Orogastric;  Other Tube Feeding (specify); renal, nepro; Continuous; 20; Yes; 10; Q 12 hours; 40; 30; Q 4 hours    Code status: Full Code   ----------  Subjective  On vent. Objective  Physical exam:  Vitals: BP 99/65   Pulse 90   Temp 97.5 °F (36.4 °C) (Rectal)   Resp 17   Ht 5' 9.02\" (1.753 m)   Wt 238 lb 8.6 oz (108.2 kg)   SpO2 94%   BMI 35.21 kg/m²   Gen/overall appearance: Sedated, intubated. Head: Normocephalic, atraumatic  Eyes: Pupils equal bilaterally  ENT: ETT in place  Neck: No JVD, thyromegaly  CVS: Nml S1S2, no MRG, RRR  Pulm: Clear bilaterally. No crackles/wheezes  Gastrointestinal: Soft, NT/ND, +BS  Musculoskeletal: No edema. Warm  Neuro: Sedated, intubated  Psychiatry: Unable to assess  Skin: No obvious rashes noted       24HR INTAKE/OUTPUT:      Intake/Output Summary (Last 24 hours) at 10/14/2021 0923  Last data filed at 10/14/2021 0912  Gross per 24 hour   Intake 2027.7 ml   Output 2531 ml   Net -503.3 ml     I/O last 3 completed shifts:   In: 855.8 [I.V.:456.9; NG/GT:120; IV Piggyback:278.9]  Out: 2189 [Urine:615; Emesis/NG output:80]  I/O this shift:  In: 1171.9 [I.V.:799.2; IV Piggyback:372.7]  Out: 342   Meds:    cefepime  2,000 mg IntraVENous Q8H    fluconazole  400 mg IntraVENous Q24H    neomycin-bacitracin-polymyxin   Topical BID    acyclovir  10 mg/kg (Ideal) IntraVENous Q12H    pantoprazole  40 mg IntraVENous BID    vancomycin (VANCOCIN) intermittent dosing (placeholder)   Other RX Placeholder    metoprolol (LOPRESSOR) ivpb  5 mg IntraVENous Q6H    dexamethasone  6 mg IntraVENous Daily    busPIRone  5 mg Oral BID    lidocaine PF  5 mL IntraDERmal See Admin Instructions    sodium chloride flush  5-40 mL IntraVENous 2 times per day    vitamin D  50,000 Units Oral Weekly    albuterol sulfate HFA  2 puff Inhalation 4x daily    vitamin C  1,000 mg Oral BID    zinc sulfate  50 mg Oral BID    Vitamin D  1,000 Units Oral Daily    sodium chloride flush  5-40 mL IntraVENous 2 times per day    enoxaparin  30 mg SubCUTAneous BID    bisacodyl  5 mg Oral Daily    [Held by provider] divalproex  250 mg Oral Daily    montelukast  10 mg Oral QPM    fluticasone  2 puff Inhalation BID    [Held by provider] pravastatin  40 mg Oral Dinner    [Held by provider] diclofenac-miSOPROStol  1 tablet Oral BID     Infusions:    IV infusion builder 75 mL/hr at 10/14/21 0800    prismaSATE BGK 4/2.5 1,600 mL/hr at 10/14/21 0738    prismaSATE BGK 4/2.5 200 mL/hr at 10/13/21 1746    prismaSATE BGK 4/2.5 800 mL/hr at 10/14/21 0442    vasopressin (Septic Shock) infusion 0.04 Units/min (10/14/21 0909)    propofol 20 mcg/kg/min (10/14/21 0800)    fentaNYL 50 mcg/hr (10/14/21 0800)    norepinephrine 30 mcg/min (10/14/21 0800)    sodium chloride      sodium chloride Stopped (10/13/21 1045)     PRN Meds: potassium chloride, magnesium sulfate, calcium gluconate **OR** calcium gluconate **OR** calcium gluconate **OR** calcium gluconate, sodium phosphate IVPB **OR** sodium phosphate IVPB **OR** sodium phosphate IVPB **OR** sodium phosphate IVPB, sodium chloride flush, sodium chloride, magic (miracle) mouthwash, LORazepam, morphine, sodium chloride, senna, polyvinyl alcohol, albuterol sulfate HFA, LORazepam, benzonatate, sodium chloride flush, sodium chloride, ondansetron **OR** ondansetron, polyethylene glycol, [Held by provider] acetaminophen **OR** [Held by provider] acetaminophen, guaiFENesin-dextromethorphan, methocarbamol    Labs/imaging:  CBC:   Recent Labs     10/12/21  0405 10/12/21  1804 10/13/21  0209 10/13/21  0209 10/13/21  1330 10/14/21  0030 10/14/21  0615   WBC 16.5*  --  18.0*  --   --   --  16.2*   HGB 15.1   < > 14.1   < > 13.1* 13.6 13.5     --  197  --   --   --  172    < > = values in this interval not displayed.      BMP:    Recent Labs     10/13/21  0209 10/13/21  0926 10/14/21  0615    140 137   K 4.3 4.0 4.8    108 102   CO2 19* 17* 23   * 120* 57*   CREATININE 2.7* 2.1* 1.7*   GLUCOSE 180* 140* 206*     Hepatic:   Recent Labs     10/12/21  0405 10/13/21  0926   * 236* ALT 1,441* 1,134*   ROGER 1.1* 1.2*   ALKPHOS 204* 164*       Jud Mercado MD  -------------------------------  Rounding hospitalist

## 2021-10-14 NOTE — PROGRESS NOTES
Comprehensive Nutrition Assessment    Type and Reason for Visit:  Reassess    Nutrition Recommendations/Plan:   · Recommend placing consult to RD for TF order and manage   · TF recommendation       Formula: Immune enhancing (Pivot 1.5)       @goal rate: 60 ml/hr. Begin @ 20 ml/ hr. Advance to goal rate as tolerated. Route: orogastric tube        EN will provide: 2,500 kcal (including additional 340.56 calories from current propofol rate) and           ~135g pf protein        EN will provide: ~100% of energy needs and ~74% of patient's protein needs  · Monitor biochemical data, weights, edema, poc. Nutrition Assessment:  Pt vented and intubated yesterday (10/13/21). Worsening renal function, pt to be started on CRRT per nephrologist. TF (Nepro) @ 20 ml/hr ordered by nephrologist yesterday 10/13/21. Pt tolerating TF @ low rate currently. Per chart review TF running @10/14/21 at 3:30. Current TF rate does not meet patient's energy and protein needs. Recommend placing consult to RD for TF order and manage. Will monitor patient at high risk. Malnutrition Assessment:  Malnutrition Status:  Severe malnutrition    Context:  Acute Illness       Estimated Daily Nutrient Needs:  Energy (kcal):2,181-2,545 (30-35 kcal/kg/day); Weight Used for Energy Requirements:  Ideal    Protein (g):  182 (2.5+g/kg/day); Weight Used for Protein Requirements:  Ideal        Fluid (ml/day):  Fluid managed per physician    Nutrition Related Findings:  MAP 81, BUN 57H, Creatinine 1.7H, Mg 2.6L, Glucose 206H, lactate 2.1H, Hgb 13.2 WNL, pH 7.23L, pCO2 56H,. Wounds:  None       Current Nutrition Therapies:    Adult Oral Nutrition Supplement; Low Calorie/High Protein Oral Supplement  ADULT TUBE FEEDING; Orogastric;  Other Tube Feeding (specify); renal, nepro; Continuous; 20; Yes; 10; Q 12 hours; 40; 30; Q 4 hours  Current Tube Feeding (TF) Orders:  · Feeding Route: Orogastric  · Formula: Renal Formula (Nepro)  · Schedule: Continuous · Current TF & Flush Orders @ 20 mL/hr, provides:~850 kcals/day, ~39g of protein/day   · Goal TF & Flush Orders will provide @ 40 mL/hr. Will provide ~1,700 kcal/day, ~78g protein/day    Additional Calorie Sources:   Propofol @ 12.9 mL/hr provides ~341 kcal/day    Anthropometric Measures:  · Height: 5' 9.02\" (175.3 cm)  · Current Body Weight: 238 lb 8.6 oz (108.2 kg)   · Admission Body Weight: 272 lb 4.3 oz (123.5 kg) (First measuerd weight)    · Usual Body Weight:  (n/a)     · Ideal Body Weight: 160 lbs; % Ideal Body Weight 149.1 %   · BMI: 35.2  · BMI Categories: Obese Class 2 (BMI 35.0 -39.9)       Nutrition Diagnosis:   · Inadequate oral intake related to impaired respiratory function, renal dysfunction as evidenced by NPO or clear liquid status due to medical condition      Nutrition Interventions:   Food and/or Nutrient Delivery:  Modify Tube Feeding  Nutrition Education/Counseling:  Education not indicated   Coordination of Nutrition Care:  Continue to monitor while inpatient    Goals:  Pt will tolerate new TF       Nutrition Monitoring and Evaluation:   Behavioral-Environmental Outcomes:  None Identified   Food/Nutrient Intake Outcomes:  Enteral Nutrition Intake/Tolerance  Physical Signs/Symptoms Outcomes:  Biochemical Data, Weight, Fluid Status or Edema, GI Status, Hemodynamic Status     Discharge Planning:     Too soon to determine     Electronically signed by Ar Diaz on 10/14/21 at 1:15 PM EDT

## 2021-10-14 NOTE — FLOWSHEET NOTE
O2 sat 89%. Increased peep to 10 per [facility] rt/ per Dr. Quintana Mo orders.

## 2021-10-14 NOTE — PLAN OF CARE
Nutrition Problem #1: Inadequate oral intake  Intervention: Food and/or Nutrient Delivery: Modify Tube Feeding  Nutritional Goals: Pt will tolerate new TF

## 2021-10-14 NOTE — PROGRESS NOTES
Hospital Medicine Progress Note     Date:  10/14/2021    PCP: No primary care provider on file. (Tel: None)    Date of Admission: 9/14/2021    Chief complaint:   Chief Complaint   Patient presents with    Nausea     has not eaten in 5 days    Fever     headache, eye pain    Shortness of Breath    Rectal Bleeding     black diarrhea for 3 days       Brief hospital course: Patient admitted with COVID-19 pneumonia, acute respiratory failure with hypoxia. Assessment/plan:  1. COVID-19 pneumonia. Currently on day #30 of hospitalization. Still On IV Decadron. 2. Possible superimposed bacterial pneumonia, currently on vancomycin and cefepime. Also on acyclovir for possible superimposed viral infection  3. Esophageal candidiasis. On Diflucan. 4. Acute respiratory failure with hypoxia. Secondary to underlying pulmonary process. Currently endotracheally intubated, on ventilator support. 5. Acute on chronic systolic heart failure. Been on diuretics and nephrology is started CRRT. 6. Septic shock. Currently on Levophed infusion. 7. Patient has had runs of supraventricular tachycardia during this hospitalization. Was previously on amiodarone which was discontinued due to elevated LFTs. Has been on Cardizem infusion. Cardiology on board and assisting with management. 8. Elevated transaminitis. Likely secondary to shock liver and/underlying sepsis. Avoid hepatotoxic medications. Ultrasound consistent with fatty liver. GI on board and assisting with management. 9. Acute kidney injury. Nephrology on board for CRRT. 10. Coffee-ground emesis from NG tube suctioning during his hospital stay. Likely select oropharyngeal trauma. Continue PPI. Monitor H&H closely. Diet: Adult Oral Nutrition Supplement; Low Calorie/High Protein Oral Supplement  ADULT TUBE FEEDING; Orogastric;  Other Tube Feeding (specify); renal, nepro; Continuous; 20; Yes; 10; Q 12 hours; 40; 30; Q 4 hours    Code status: Full Code   ----------  Subjective  On vent. Objective  Physical exam:  Vitals: BP 99/65   Pulse 90   Temp 97.5 °F (36.4 °C) (Rectal)   Resp 17   Ht 5' 9.02\" (1.753 m)   Wt 238 lb 8.6 oz (108.2 kg)   SpO2 94%   BMI 35.21 kg/m²   Gen/overall appearance: Sedated, intubated. Head: Normocephalic, atraumatic  Eyes: Pupils equal bilaterally  ENT: ETT in place  Neck: No JVD, thyromegaly  CVS: Nml S1S2, no MRG, RRR  Pulm: Clear bilaterally. No crackles/wheezes  Gastrointestinal: Soft, NT/ND, +BS  Musculoskeletal: No edema. Warm  Neuro: Sedated, intubated  Psychiatry: Unable to assess  Skin: No obvious rashes noted       24HR INTAKE/OUTPUT:      Intake/Output Summary (Last 24 hours) at 10/14/2021 1009  Last data filed at 10/14/2021 0912  Gross per 24 hour   Intake 2027.7 ml   Output 2451 ml   Net -423.3 ml     I/O last 3 completed shifts:   In: 855.8 [I.V.:456.9; NG/GT:120; IV Piggyback:278.9]  Out: 2189 [Urine:615; Emesis/NG output:80]  I/O this shift:  In: 1171.9 [I.V.:799.2; IV Piggyback:372.7]  Out: 342   Meds:    cefepime  2,000 mg IntraVENous Q8H    fluconazole  400 mg IntraVENous Q24H    neomycin-bacitracin-polymyxin   Topical BID    acyclovir  10 mg/kg (Ideal) IntraVENous Q12H    pantoprazole  40 mg IntraVENous BID    vancomycin (VANCOCIN) intermittent dosing (placeholder)   Other RX Placeholder    metoprolol (LOPRESSOR) ivpb  5 mg IntraVENous Q6H    dexamethasone  6 mg IntraVENous Daily    busPIRone  5 mg Oral BID    lidocaine PF  5 mL IntraDERmal See Admin Instructions    sodium chloride flush  5-40 mL IntraVENous 2 times per day    vitamin D  50,000 Units Oral Weekly    albuterol sulfate HFA  2 puff Inhalation 4x daily    vitamin C  1,000 mg Oral BID    zinc sulfate  50 mg Oral BID    Vitamin D  1,000 Units Oral Daily    sodium chloride flush  5-40 mL IntraVENous 2 times per day    enoxaparin  30 mg SubCUTAneous BID    bisacodyl  5 mg Oral Daily    [Held by provider] divalproex  250 mg Oral Daily    montelukast  10 mg Oral QPM    fluticasone  2 puff Inhalation BID    [Held by provider] pravastatin  40 mg Oral Dinner    [Held by provider] diclofenac-miSOPROStol  1 tablet Oral BID     Infusions:    IV infusion builder 75 mL/hr at 10/14/21 0800    prismaSATE BGK 4/2.5 1,600 mL/hr at 10/14/21 0738    prismaSATE BGK 4/2.5 200 mL/hr at 10/13/21 1746    prismaSATE BGK 4/2.5 800 mL/hr at 10/14/21 0442    vasopressin (Septic Shock) infusion 0.04 Units/min (10/14/21 0909)    propofol 20 mcg/kg/min (10/14/21 0800)    fentaNYL 50 mcg/hr (10/14/21 0800)    norepinephrine 30 mcg/min (10/14/21 0800)    sodium chloride      sodium chloride Stopped (10/13/21 1045)     PRN Meds: potassium chloride, magnesium sulfate, calcium gluconate **OR** calcium gluconate **OR** calcium gluconate **OR** calcium gluconate, sodium phosphate IVPB **OR** sodium phosphate IVPB **OR** sodium phosphate IVPB **OR** sodium phosphate IVPB, sodium chloride flush, sodium chloride, magic (miracle) mouthwash, LORazepam, morphine, sodium chloride, senna, polyvinyl alcohol, albuterol sulfate HFA, LORazepam, benzonatate, sodium chloride flush, sodium chloride, ondansetron **OR** ondansetron, polyethylene glycol, [Held by provider] acetaminophen **OR** [Held by provider] acetaminophen, guaiFENesin-dextromethorphan, methocarbamol    Labs/imaging:  CBC:   Recent Labs     10/12/21  0405 10/12/21  1804 10/13/21  0209 10/13/21  0209 10/13/21  1330 10/14/21  0030 10/14/21  0615   WBC 16.5*  --  18.0*  --   --   --  16.2*   HGB 15.1   < > 14.1   < > 13.1* 13.6 13.5     --  197  --   --   --  172    < > = values in this interval not displayed.      BMP:    Recent Labs     10/13/21  0209 10/13/21  0926 10/14/21  0615    140 137   K 4.3 4.0 4.8    108 102   CO2 19* 17* 23   * 120* 57*   CREATININE 2.7* 2.1* 1.7*   GLUCOSE 180* 140* 206*     Hepatic:   Recent Labs     10/12/21  0405 10/13/21  0926   * 236* ALT 1,441* 1,134*   BILISABELLE 1.1* 1.2*   ALKPHOS 204* 164*       Starr Johnson MD  -------------------------------  Rounding hospitalist

## 2021-10-14 NOTE — PROGRESS NOTES
[address] [facility]  consulted by Dr. Jermaine Pelletier for monitoring and adjustment. Indication for treatment: Pneumonia  Goal trough: 15-20 mcg/mL  AUC/MARYSOL: 400-600    Pertinent Laboratory Values:   Temp Readings from Last 3 Encounters:   10/14/21 97.5 °F (36.4 °C) (Rectal)   12/23/16 98.8 °F (37.1 °C) (Oral)     Recent Labs     10/12/21  0405 10/13/21  0209 10/14/21  0615   WBC 16.5* 18.0* 16.2*   LACTATE  --   --  2.1*     Recent Labs     10/13/21  0209 10/13/21  0926 10/14/21  0615   * 120* 57*   CREATININE 2.7* 2.1* 1.7*     Estimated Creatinine Clearance: 54 mL/min (A) (based on SCr of 1.7 mg/dL (H)). Intake/Output Summary (Last 24 hours) at 10/14/2021 1157  Last data filed at 10/14/2021 1115  Gross per 24 hour   Intake 1907.7 ml   Output 2683 ml   Net -775.3 ml     Pertinent Cultures:  Date    Source    Results  10/5   MRSA nasal   Sent     Vancomycin level:   TROUGH:    No results for input(s): VANCOTROUGH in the last 72 hours.   RANDOM:    Recent Labs     10/12/21  0405 10/14/21  0615   VANCORANDOM 39.9 15.8     Assessment:  · WBC and temperature: WBC @ 16.2; afebrile  · SCr, BUN, and urine output:   · CVVHD-F started yesterday  · Day(s) of therapy: 9  · Vancomycin concentration:   · 10/12: 39.9, no additional vancomycin given  · 10/14: 15.8, appropriate to re-dose    Plan:  · Intermittent vancomycin dosing while receiving RRT  · Plan to re-dose with vancomycin 1250 mg x1 dose  · Repeat next random level tomorrow AM  · Pharmacy will continue to monitor patient and adjust therapy as indicated    Sb 3 10/15 @ 0600    Thank you for the consult,  Darryl Rodriguez, [address] [place] [address]  10/14/2021 11:57 AM

## 2021-10-14 NOTE — PROGRESS NOTES
pulmonary      SUBJECTIVE:  Intubated on vent     OBJECTIVE    VITALS:  /68   Pulse 108   Temp 97.5 °F (36.4 °C) (Rectal)   Resp 20   Ht 5' 9.02\" (1.753 m)   Wt 238 lb 8.6 oz (108.2 kg)   SpO2 93%   BMI 35.21 kg/m²   HEAD AND FACE EXAM:  No throat injection, no active exudate,no thrush  NECK EXAM;No JVD, no masses, symmetrical  CHEST EXAM; Expansion equal and symmetrical, no masses  LUNG EXAM; Good breath sounds bilaterally.  There are expiratory wheezes both lungs, there are crackles at both lung bases  CARDIOVASCULAR EXAM: Positive S1 and S2, no S3 or S4, no clicks ,no murmurs  RIGHT AND LEFT LOWER EXTRIMITY EXAM: No edema, no swelling, no inflamation            LABS   Lab Results   Component Value Date    WBC 16.2 (H) 10/14/2021    HGB 13.5 10/14/2021    HCT 43.1 10/14/2021    MCV 90.7 10/14/2021     10/14/2021     Lab Results   Component Value Date    CREATININE 1.7 (H) 10/14/2021    BUN 57 (H) 10/14/2021     10/14/2021    K 4.8 10/14/2021     10/14/2021    CO2 23 10/14/2021     Lab Results   Component Value Date    INR 0.97 10/12/2021    PROTIME 12.5 10/12/2021          Lab Results   Component Value Date    PHOS 4.3 10/14/2021    PHOS 7.6 10/13/2021    PHOS 3.5 10/05/2021        Recent Labs     10/13/21  1830 10/13/21  2345 10/14/21  0600   PH 7.22* 7.25* 7.23*   PO2ART 60* 62* 76   BOV2YOW 53.0* 50.0* 56.0*   O2SAT 90.9* 91.5* 93.9*         Wt Readings from Last 3 Encounters:   10/14/21 238 lb 8.6 oz (108.2 kg)   12/23/16 263 lb (119.3 kg)        EXAMINATION:   ONE XRAY VIEW OF THE CHEST       10/14/2021 1:51 am       COMPARISON:   Chest radiograph dated October 13, 2021       HISTORY:   ORDERING SYSTEM PROVIDED HISTORY: pneumonia,on vent   TECHNOLOGIST PROVIDED HISTORY:   Reason for exam:->pneumonia,on vent   Reason for Exam: pneumonia,on vent   Acuity: Unknown   Type of Exam: Subsequent/Follow-up   Additional signs and symptoms: pneumonia,on vent   Relevant Medical/Surgical History: pneumonia,on vent       FINDINGS:   Tip of the ET tube is 4.2 cm above the mary kate.  The enteric tube extends   below the diaphragm.  The cardiomediastinal silhouette is stable.  A   right-sided dialysis catheter is in place.  A left-sided PICC line is seen. Diffuse interstitial opacities are seen without significant change.  There is   no evidence of a pneumothorax.           Impression   Diffuse interstitial opacities without significant change               ASSESMENT  acresp failure  covid pneumonia  Copd  Bact pneumonia        PLAN  1. cpm  2. Cont full vent support.   3. His vent requirement keep increasing so with guarded prognosis    10/14/2021  Patti Alfaro MD, M.D.

## 2021-10-14 NOTE — FLOWSHEET NOTE
Pt. Continues to have agonal breaths on vent and O2 sat slowly dropping. ABG's done as precaution. Results sent to Dr. Mariano Dickerson. Orders given to increase peep to 10 if O2 sats drop below 90%.

## 2021-10-14 NOTE — PROGRESS NOTES
Cardiology Progress Note     Admit Date:  9/14/2021    Consult reason/ Seen today for :   nonCardiomyopathy ejection fraction of 30%  SVT  Tachycardia  Respiratory failure    Subjective and  Overnight Events : Amiodarone on hold due to abnormal LFTs coffee-ground emesis improved hematocrit stable  Overall tachycardia and SVT runs dramatically better after  intubation    Chief complain on admission : 63 y. o.year old who is admitted for  Chief Complaint   Patient presents with    Nausea     has not eaten in 5 days    Fever     headache, eye pain    Shortness of Breath    Rectal Bleeding     black diarrhea for 3 days      Assessment / Plan:  Nonischemic cardiomyopathy followed as at [place] avoid Cardizem due to negative inotropic effects. Probably elevated LFTs in the setting of COVID and cardiomyopathy. Amiodarone  Check BNP get chest x-ray increase diuretics if chest x-ray looks worse or BNP higher  Stop Cardizem start metoprolol 4 times daily ideally should be on Toprol-XL if he can take p.o. Stop amiodarone due to concerns for abnormal LFTs  Respiratory failure continue supportive care BiPAP intubated   DVT Prophylaxis if no contraindication  We will continue see as needed basis call with questions    Past medical history:    has a past medical history of Asthma, COPD (chronic obstructive pulmonary disease) ([facility] [address] and COVID-19. Past surgical history:   has a past surgical history that includes Wrist surgery (Left, 1976); hernia repair; Cardiac catheterization (2013); and IR NONTUNNELED VASCULAR CATHETER > 5 YEARS (10/12/2021). Social History:   reports that he quit smoking about 16 years ago. His smoking use included cigarettes. He started smoking about 50 years ago. He smoked 0.50 packs per day. He has never used smokeless tobacco. He reports that he does not drink alcohol and does not use drugs.   Family history:  family history is not on file. Allergies   Allergen Reactions    Percocet [Oxycodone-Acetaminophen] Nausea And Vomiting       Review of Systems:    All 14 systems were reviewed and are negative  Except for the positive findings  which as documented     BP 95/66   Pulse 94   Temp 96.1 °F (35.6 °C) (Rectal)   Resp 12   Ht 5' 9.02\" (1.753 m)   Wt 235 lb 0.2 oz (106.6 kg)   SpO2 93%   BMI 34.68 kg/m²       Intake/Output Summary (Last 24 hours) at 10/13/2021 2130  Last data filed at 10/13/2021 2114  Gross per 24 hour   Intake 2146.21 ml   Output 1138 ml   Net 1008.21 ml     Physical Exam:  Constitutional:  Well developed, Well nourished, No acute distress, Non-toxic appearance. HENT:  Normocephalic, Atraumatic, Bilateral external ears normal, Oropharynx moist, No oral exudates, Nose normal. Neck- Normal range of motion, No tenderness, Supple, No stridor. Eyes:  PERRL, EOMI, Conjunctiva normal, No discharge. Respiratory:  Normal breath sounds, No respiratory distress, No wheezing, No chest tenderness. Cardiovascular:  Normal heart rate, Normal rhythm, No murmurs, No rubs, No gallops, JVP not elevated  Abdomen/GI:  Bowel sounds normal, Soft, No tenderness, No masses, No pulsatile masses. Musculoskeletal:  Intact distal pulses, No edema, No tenderness, No cyanosis, No clubbing. Good range of motion in all major joints. No tenderness to palpation or major deformities noted. Back- No tenderness. Integument:  Warm, Dry, No erythema, No rash. Lymphatic:  No lymphadenopathy noted. Neurologic:  Alert & oriented x 3, Normal motor function, Normal sensory function, No focal deficits noted.    Psychiatric:  Affect  and  Mood :no change    Medications:    cefepime  2,000 mg IntraVENous Q8H    fluconazole  400 mg IntraVENous Q24H    neomycin-bacitracin-polymyxin   Topical BID    bumetanide  0.5 mg IntraVENous BID    acyclovir  10 mg/kg (Ideal) IntraVENous Q12H    pantoprazole  40 mg IntraVENous BID    vancomycin (VANCOCIN) intermittent dosing (placeholder)   Other RX Placeholder    metoprolol (LOPRESSOR) ivpb  5 mg IntraVENous Q6H    dexamethasone  6 mg IntraVENous Daily    busPIRone  5 mg Oral BID    lidocaine PF  5 mL IntraDERmal See Admin Instructions    sodium chloride flush  5-40 mL IntraVENous 2 times per day    vitamin D  50,000 Units Oral Weekly    albuterol sulfate HFA  2 puff Inhalation 4x daily    vitamin C  1,000 mg Oral BID    zinc sulfate  50 mg Oral BID    Vitamin D  1,000 Units Oral Daily    sodium chloride flush  5-40 mL IntraVENous 2 times per day    enoxaparin  30 mg SubCUTAneous BID    bisacodyl  5 mg Oral Daily    [Held by provider] divalproex  250 mg Oral Daily    montelukast  10 mg Oral QPM    fluticasone  2 puff Inhalation BID    [Held by provider] pravastatin  40 mg Oral Dinner    [Held by provider] diclofenac-miSOPROStol  1 tablet Oral BID      prismaSATE BGK 4/2.5 1,600 mL/hr at 10/13/21 1841    prismaSATE BGK 4/2.5 200 mL/hr at 10/13/21 1746    prismaSATE BGK 4/2.5 800 mL/hr at 10/13/21 1746    vasopressin (Septic Shock) infusion      propofol 30 mcg/kg/min (10/13/21 1809)    fentaNYL 50 mcg/hr (10/13/21 1745)    norepinephrine 30 mcg/min (10/13/21 2017)    sodium chloride      sodium chloride Stopped (10/13/21 1045)     potassium chloride, magnesium sulfate, calcium gluconate **OR** calcium gluconate **OR** calcium gluconate **OR** calcium gluconate, sodium phosphate IVPB **OR** sodium phosphate IVPB **OR** sodium phosphate IVPB **OR** sodium phosphate IVPB, sodium chloride flush, sodium chloride, magic (miracle) mouthwash, LORazepam, morphine, sodium chloride, senna, polyvinyl alcohol, albuterol sulfate HFA, LORazepam, benzonatate, sodium chloride flush, sodium chloride, ondansetron **OR** ondansetron, polyethylene glycol, [Held by provider] acetaminophen **OR** [Held by provider] acetaminophen, guaiFENesin-dextromethorphan, methocarbamol    Lab Data:  CBC:   Recent Labs     10/11/21  0600 10/11/21  0600 10/12/21  0405 10/12/21  0405 10/12/21  1804 10/13/21  0209 10/13/21  1330   WBC 16.4*  --  16.5*  --   --  18.0*  --    HGB 15.3   < > 15.1   < > 14.8 14.1 13.1*   HCT 47.2   < > 46.7   < > 46.5 45.4 40.6*   MCV 87.1  --  86.6  --   --  89.7  --      --  206  --   --  197  --     < > = values in this interval not displayed. BMP:   Recent Labs     10/12/21  0405 10/13/21  0209 10/13/21  0926    145 140   K 4.3 4.3 4.0    108 108   CO2 16* 19* 17*   PHOS  --  7.6*  --    * 121* 120*   CREATININE 2.1* 2.7* 2.1*     PT/INR:   Recent Labs     10/12/21  1905   PROTIME 12.5   INR 0.97     BNP:  No results for input(s): PROBNP in the last 72 hours. TROPONIN:   No results for input(s): TROPONINT in the last 72 hours. ECHO :   echocardiogram    Assessment:  63 y. o.year old who is admitted for  Chief Complaint   Patient presents with    Nausea     has not eaten in 5 days    Fever     headache, eye pain    Shortness of Breath    Rectal Bleeding     black diarrhea for 3 days    , active issues as noted below:  Impression:  Principal Problem:    COVID-19  Active Problems:    Obstructive sleep apnea syndrome    Tobacco dependence in remission    Obesity    Hyperlipidemia    Nonischemic cardiomyopathy ([address]    Acute respiratory failure with hypoxia (HCC)    Severe malnutrition (HCC)    Vitamin D deficiency    SVT (supraventricular tachycardia) ([facility] [address]  Resolved Problems:    * No resolved hospital problems. *            All labs, medications and tests reviewed by myself , continue all other medications of all above medical condition listed as is except for changes mentioned above. Thank you very much for consult , please call with questions.     Lenin Page MD, MD 10/13/2021 9:30 PM

## 2021-10-14 NOTE — PROGRESS NOTES
31 75 62 and 2000 labs drawn and sent per order. BP better now with maps in the 80's. SpO2 also increased to 94%.

## 2021-10-14 NOTE — PROGRESS NOTES
Daughter ANGIE [facility] at bedside. spo2 87-89%. RT called and abg drawn and sent. Decreased BP also noted and increasing levophed gtt as well. See MAR.

## 2021-10-14 NOTE — PROGRESS NOTES
Nephrology Progress Note  10/14/2021 1:31 PM  Subjective: Interval History: Jason Coats is a 63 y.o. male he was on CRRT dialysis and tolerating so far ultrafiltration 25 cc/h.         Data:   Scheduled Meds:   vancomycin  1,250 mg IntraVENous Once    cefepime  2,000 mg IntraVENous Q8H    fluconazole  400 mg IntraVENous Q24H    neomycin-bacitracin-polymyxin   Topical BID    acyclovir  10 mg/kg (Ideal) IntraVENous Q12H    pantoprazole  40 mg IntraVENous BID    vancomycin (VANCOCIN) intermittent dosing (placeholder)   Other RX Placeholder    metoprolol (LOPRESSOR) ivpb  5 mg IntraVENous Q6H    dexamethasone  6 mg IntraVENous Daily    busPIRone  5 mg Oral BID    lidocaine PF  5 mL IntraDERmal See Admin Instructions    sodium chloride flush  5-40 mL IntraVENous 2 times per day    vitamin D  50,000 Units Oral Weekly    albuterol sulfate HFA  2 puff Inhalation 4x daily    vitamin C  1,000 mg Oral BID    zinc sulfate  50 mg Oral BID    Vitamin D  1,000 Units Oral Daily    sodium chloride flush  5-40 mL IntraVENous 2 times per day    enoxaparin  30 mg SubCUTAneous BID    bisacodyl  5 mg Oral Daily    [Held by provider] divalproex  250 mg Oral Daily    montelukast  10 mg Oral QPM    fluticasone  2 puff Inhalation BID    [Held by provider] pravastatin  40 mg Oral Dinner    [Held by provider] diclofenac-miSOPROStol  1 tablet Oral BID     Continuous Infusions:   IV infusion builder 75 mL/hr at 10/14/21 0800    prismaSATE BGK 4/2.5 1,600 mL/hr at 10/14/21 1052    prismaSATE BGK 4/2.5 200 mL/hr at 10/13/21 1746    prismaSATE BGK 4/2.5 800 mL/hr at 10/14/21 1052    vasopressin (Septic Shock) infusion 0.04 Units/min (10/14/21 0909)    propofol 20 mcg/kg/min (10/14/21 1042)    fentaNYL 50 mcg/hr (10/14/21 0800)    norepinephrine 30 mcg/min (10/14/21 0800)    sodium chloride      sodium chloride Stopped (10/13/21 1045)         CBC   Recent Labs     10/12/21  0405 10/12/21  1804 10/13/21  3397 results found for: IRON  Iron Saturation:  No components found for: PERCENTFE  TIBC:  No results found for: TIBC  FERRITIN:  No results found for: FERRITIN  RPR:  No results found for: RPR  ALEXANDR:  No results found for: ANATITER, ALEXANDR  24 Hour Urine for Creatinine Clearance:  No components found for: CREAT4, UHRS10, UTV10      Objective:   I/O: 10/13 0701 - 10/14 0700  In: 855.8 [I.V.:456.9]  Out: 2189 [Urine:615]  I/O last 3 completed shifts: In: 855.8 [I.V.:456.9; NG/GT:120; IV Piggyback:278.9]  Out: 2189 [Urine:615; Emesis/NG output:80]  I/O this shift:  In: 1171.9 [I.V.:799.2; IV Piggyback:372.7]  Out: 904 [Urine:20]  Vitals: /74   Pulse 119   Temp 99.9 °F (37.7 °C) (Rectal)   Resp 16   Ht 5' 9.02\" (1.753 m)   Wt 238 lb 8.6 oz (108.2 kg)   SpO2 91%   BMI 35.21 kg/m²  {  General appearance: Sedated ET tube  HEENT: Head: Normal, normocephalic, atraumatic. Neck: supple, symmetrical, trachea midline  Lungs: diminished breath sounds bilaterally  Heart: S1, S2 normal  Abdomen: abnormal findings:  soft nt  Extremities: edema trace  Neurologic: Mental status: alertness: Sedated        Assessment and Plan:      IMP:  1. Acute renal failure from ATN  2. Covid positive  3. Increased LFTs  4. Respiratory failure  5. Nonischemic cardiomyopathy    Plan     #1 renal function holding stable CRRT dialysis. Added bicarb drip to help with the acidosis  2. Finish course of isolation for Covid  3. Monitor LFTs next #4 wean ventilator as able next #5 the above setting of cardiac disease and cardiomyopathy we will try to keep negative balance  6.   Overall guarded prognosis we will monitor closely         Marvin Harrell MD, MD

## 2021-10-14 NOTE — FLOWSHEET NOTE
Attempted visit. Patient was not available for direct pastoral care. This  provided pastoral present to at patient's bedside. Spiritual care will continue to follow up as needed.

## 2021-10-14 NOTE — PROGRESS NOTES
Orogastric tube in place-- tolerating tube feeding at low rate  Still difficulty with oxygenation  LFT's coming down as of yesterday- today's not back  Abdomen soft, non-tender, non-distended  Impression:    1) GI bleed - stable    2) hepatocellular injury- ?  Med-induced, ?unusual viral etiology        Plan:   1) continue supportive care   2) advance tube feeding as tolerated     I will sign off now-- call if need to see again

## 2021-10-15 NOTE — FLOWSHEET NOTE
Pt. In Asystole. Cardiac arrest confirmed per Tom Caballero and this rn. Ventilator turned off at this time. Emotional support given to family.

## 2021-10-15 NOTE — FLOWSHEET NOTE
Family would like to withdraw care.  Perfect serve message sent to [facility] NP.

## 2021-10-15 NOTE — FLOWSHEET NOTE
Pt. Code status changed to [facility] per Stephie Garcia NP per family wishes. CRRT disconnected. All gtts d/c'd. Pt. Family bedside. Emotional support given.

## 2021-10-15 NOTE — PROGRESS NOTES
Moderate left PTX per AM CXR. Patients daughter Myesha Baer) does not want chest tube at this time. Will hold off on CTS/IR consult.

## 2021-10-15 NOTE — FLOWSHEET NOTE
Notified Jayme Arevalo NP that pt. abp 76/47(58), nbp 89/46(59). O2 sat 85%. OK'd increasing vasopressin gtt to 0.05 units/min and will order Neosynephrine gtt. Jaclyn Peterson

## 2021-10-15 NOTE — FLOWSHEET NOTE
Notified per Christal Potter NP that pt. has a moderate Left pneumothorax per PCXR results. Discussed with family. They do not want CT placed at this time.

## 2021-10-15 NOTE — FLOWSHEET NOTE
Perfect serve message sent to [facility] NP. Notified that vasopressin  @ 0.05 units/min, levophed @ 45 mcg/min, neosynephrine @ 300 mcg/min. Justin Otto Requested possible 4th pressor. Orders placed per [facility] NP for Epinephrine gtt. .  ABG's ordered as well.

## 2021-10-15 NOTE — PROGRESS NOTES
After discussion, family would like to make patient DNR CC at this time. They would also like to discontinue aggressive support. Will stop CRRT and vasopressors at this time. They are also considering compassionate extubation.

## 2021-10-15 NOTE — FLOWSHEET NOTE
Spoke to Rosaura (daughter). Discussed code status. She stated that her dad would not want to be coded if there is no reasonable chance of a good outcome.  Information passed along to Christopher Khalil NP.

## 2021-10-15 NOTE — FLOWSHEET NOTE
Called and spoke to Rosaura (daughter). Updated on pt. Status, added pressors, changes to vent settings, worsening acidosis. Sarah will  notify siblings and Zeke White (significant other) and then will be in. Emotional support given.

## 2021-10-15 NOTE — PROGRESS NOTES
Daughter-Sarah and significant other- Cayetano Angela at bedside. Goals of care discussion including poor prognosis and ongoing critically ill state of patient was discussed with them. BP remains low despite 4 vasopressors with worsening acidosis. Sarah did change patient from full code to DNR CCA prior to her arrival.  Options were discussed including continuing with current aggressive measures until patient's heart stops at which point we would not pursue resuscitative measures. Also discussed comfort care only with discontinuation of current aggressive measures/mechanical ventilation. They would like to discuss amongst themselves at this point. Nurse to notify when decision is reached regarding goals of care.

## 2021-10-18 PROBLEM — R65.21 SEPTIC SHOCK (HCC): Status: ACTIVE | Noted: 2021-10-18

## 2021-10-18 PROBLEM — A41.9 SEPTIC SHOCK (HCC): Status: ACTIVE | Noted: 2021-10-18

## 2021-10-18 NOTE — DISCHARGE SUMMARY
Hospital Discharge Summary    Patient:  Alice Luciano  YOB: 1955    MRN: [id number]   Acct: [de-identified]    Primary Care Physician: No primary care provider on file. Admit date:  9/14/2021    Discharge date:  10/15/2021       Discharge Diagnoses:   COVID-19  Principal Problem:    COVID-19  Active Problems:    Acute respiratory failure with hypoxia (HCC)    Septic shock (HCC)    Nonischemic cardiomyopathy (HCC)    Severe malnutrition (HCC)    SVT (supraventricular tachycardia) ([facility] [address]    Obstructive sleep apnea syndrome    Tobacco dependence in remission    Obesity    Hyperlipidemia    Vitamin D deficiency  Resolved Problems:    * No resolved hospital problems. *        Admitted for: (HPI)63year-old male, unvaccinated, who reports to the emergency department with complaints of shortness of breath, cough, fevers and chills, poor appetite, and general over mild malaise x5 days. Denies recent ill contacts. Endorses poor appetite and intermittent nausea but denies vomiting or diarrhea, denies chest pain, denies numbness or tingling step-by-step bilateral lower extremities. Additional review of symptoms negative unless    Hospital Course:Pt was admitted and treated as follows:  1. COVID-19 pneumonia. Hospitalized for 31 days. Treated with IV Decadron. 2. Possible superimposed bacterial pneumonia: Treated with iv vancomycin and cefepime. Also acyclovir for possible superimposed viral infection  3. Esophageal candidiasis. Treated with Diflucan. 4. Acute respiratory failure with hypoxia. Secondary to underlying pulmonary process. Intubated and on ventilator support. Pulmonary followed  5. Acute on chronic systolic heart failure. Been on diuretics and nephrology started CRRT. 6. Septic shock.:On Levophed infusion. 7. Patient has had runs of supraventricular tachycardia during this hospitalization. Was previously on amiodarone which was discontinued due to elevated LFTs. Has been on Cardizem infusion. Cardiology on board and assisted with management. 8. Elevated transaminitis. Likely secondary to shock liver and/underlying sepsis. Avoided hepatotoxic medications. Ultrasound consistent with fatty liver. GI on board and assisted with management. 9. Acute kidney injuryNephrology on board for CRRT. Coffee-ground emesis from NG tube suctioning during his hospital stay. Likely secondary to oropharyngeal trauma. Treated with PPI. Diet: Adult Oral Nutrition Supplement; Low Calorie/High Protein Oral Supplement  ADULT TUBE FEEDING; Orogastric; Other Tube Feeding (specify); renal, nepro; Continuous; 20; Yes; 10; Q 12 hours; 40; 30; Q 4 hours      I was not involved in the care of this pt. Pt was assigned to me on 10/15/2021. When I tried to review his chart,i noted documentation that showed he  that morning at about 7 AM.   Per note from night APRN on 10/15/21 at 6.43 AM:After discussion, family would like to make patient DNR CC at this time. They would also like to discontinue aggressive support. Will stop CRRT and vasopressors at this time. They are also considering compassionate extubation. 6.51 AM: RN documents:Pt. Code status changed to [facility] per [facility] NP per family wishes. CRRT disconnected. All gtts d/c'd. Pt. Family bedside. Emotional support given. 7.00 AM:SOO Watson documents:Pt. In Asystole. Cardiac arrest confirmed per Beny Shelton and this rn. Ventilator turned off at this time. Emotional support given to family.     Consultants: Pulmonary,Nephrology,cardiolgy,GI,ID,Palliative Care,    Vitals:  Vitals:    10/15/21 0615 10/15/21 0630 10/15/21 0645 10/15/21 0700   BP:  (!) 82/58     Pulse: 78 76 112 (!) 0   Resp: 22 21 21 15   Temp:       TempSrc:       SpO2: (!) 81% (!) 82% (!) 81%    Weight:       Height:           Results for orders placed or performed during the hospital encounter of 21   Culture, Blood 1    Specimen: Blood gases   Result Value Ref Range    Specimen BLOOD Special Requests NONE     Culture NO GROWTH AT 5 DAYS    Culture, Blood 2    Specimen: Blood gases   Result Value Ref Range    Specimen BLOOD     Special Requests NONE     Culture NO GROWTH AT 5 DAYS    COVID-19, Rapid    Specimen: Nasopharyngeal   Result Value Ref Range    Source UNKNOWN     SARS-CoV-2, NAAT DETECTED (A) NOT DETECTED   Legionella antigen, urine    Specimen: Urine   Result Value Ref Range    Legionella Urinary Ag NEGATIVE NEGATIVE   Strep Pneumoniae Antigen    Specimen: CSF   Result Value Ref Range    Strep pneumo Ag URINE NEGATIVE    Quantiferon, Incubated   Result Value Ref Range    Quantiferon(r) TB Gold (Incubated) Negative Negative IU/ML    Quantiferon TB1 Minus NIL 0.00 0.00 - 0.34 IU/mL    Quantiferon TB2 Minus NIL 0.00 0.00 - 0.34 IU/mL    QUANTIFERON MITOGEN MINUS NIL 2.16 IU/mL    QuantiFERON Nil 0.03 IU/mL   CBC Auto Differential   Result Value Ref Range    WBC 4.3 4.0 - 10.5 K/CU MM    RBC 5.39 4.6 - 6.2 M/CU MM    Hemoglobin 15.3 13.5 - 18.0 GM/DL    Hematocrit 46.6 42 - 52 %    MCV 86.5 78 - 100 FL    MCH 28.4 27 - 31 PG    MCHC 32.8 32.0 - 36.0 %    RDW 15.0 (H) 11.7 - 14.9 %    Platelets 168 624 - 084 K/CU MM    MPV 11.3 (H) 7.5 - 11.1 FL    Differential Type AUTOMATED DIFFERENTIAL     Segs Relative 75.2 (H) 36 - 66 %    Lymphocytes % 14.5 (L) 24 - 44 %    Monocytes % 9.6 (H) 0 - 4 %    Eosinophils % 0.0 0 - 3 %    Basophils % 0.2 0 - 1 %    Segs Absolute 3.2 K/CU MM    Lymphocytes Absolute 0.6 K/CU MM    Monocytes Absolute 0.4 K/CU MM    Eosinophils Absolute 0.0 K/CU MM    Basophils Absolute 0.0 K/CU MM    Nucleated RBC % 0.0 %    Total Nucleated RBC 0.0 K/CU MM    Total Immature Neutrophil 0.02 K/CU MM    Immature Neutrophil % 0.5 (H) 0 - 0.43 %   Comprehensive Metabolic Panel   Result Value Ref Range    Sodium 132 (L) 135 - 145 MMOL/L    Potassium 3.7 3.5 - 5.1 MMOL/L    Chloride 94 (L) 99 - 110 mMol/L    CO2 20 (L) 21 - 32 MMOL/L    BUN 12 6 - 23 MG/DL    CREATININE 0.6 (L) 0.9 - 1.3 MG/DL    Glucose 106 (H) 70 - 99 MG/DL    Calcium 8.6 8.3 - 10.6 MG/DL    Albumin 4.1 3.4 - 5.0 GM/DL    Total Protein 7.2 6.4 - 8.2 GM/DL    Total Bilirubin 0.5 0.0 - 1.0 MG/DL    ALT 19 10 - 40 U/L    AST 34 15 - 37 IU/L    Alkaline Phosphatase 108 40 - 129 IU/L    GFR Non-African American >60 >60 mL/min/1.73m2    GFR African American >60 >60 mL/min/1.73m2    Anion Gap 18 (H) 4 - 16   Troponin   Result Value Ref Range    Troponin T <0.010 <0.01 NG/ML   Brain Natriuretic Peptide   Result Value Ref Range    Pro-.8 (H) <300 PG/ML   Lactic Acid, Plasma   Result Value Ref Range    Lactate 1.5 0.4 - 2.0 mMOL/L   Procalcitonin   Result Value Ref Range    Procalcitonin 0.245    Lipase   Result Value Ref Range    Lipase 35 13 - 60 IU/L   D-dimer, Quantitative   Result Value Ref Range    D-Dimer, Quant 332 (H) <230 NG/mL(DDU)   Blood Gas, Venous   Result Value Ref Range    pH, Panda 7.40 7.32 - 7.42    pCO2, Panda 40 38 - 52 mmHG    pO2, Panda 165 (H) 28 - 48 mmHG    Base Exc, Mixed 0 0 - 1.2    HCO3, Venous 24.8 19 - 25 MMOL/L    O2 Sat, Panda 95.8 (H) 50 - 70 %    Comment VENOUS    CBC Auto Differential   Result Value Ref Range    WBC 3.6 (L) 4.0 - 10.5 K/CU MM    RBC 5.54 4.6 - 6.2 M/CU MM    Hemoglobin 15.7 13.5 - 18.0 GM/DL    Hematocrit 48.3 42 - 52 %    MCV 87.2 78 - 100 FL    MCH 28.3 27 - 31 PG    MCHC 32.5 32.0 - 36.0 %    RDW 15.1 (H) 11.7 - 14.9 %    Platelets 372 075 - 512 K/CU MM    MPV 11.9 (H) 7.5 - 11.1 FL    Differential Type AUTOMATED DIFFERENTIAL     Segs Relative 72.6 (H) 36 - 66 %    Lymphocytes % 16.7 (L) 24 - 44 %    Monocytes % 9.5 (H) 0 - 4 %    Eosinophils % 0.0 0 - 3 %    Basophils % 0.6 0 - 1 %    Segs Absolute 2.6 K/CU MM    Lymphocytes Absolute 0.6 K/CU MM    Monocytes Absolute 0.3 K/CU MM    Eosinophils Absolute 0.0 K/CU MM    Basophils Absolute 0.0 K/CU MM    Nucleated RBC % 0.0 %    Total Nucleated RBC 0.0 K/CU MM    Total Immature Neutrophil 0.02 K/CU MM    Immature Neutrophil % 0.6 (H) 0 - 0.43 %   Comprehensive Metabolic Panel w/ Reflex to MG   Result Value Ref Range    Sodium 133 (L) 135 - 145 MMOL/L    Potassium 4.3 3.5 - 5.1 MMOL/L    Chloride 98 (L) 99 - 110 mMol/L    CO2 23 21 - 32 MMOL/L    BUN 13 6 - 23 MG/DL    CREATININE 0.6 (L) 0.9 - 1.3 MG/DL    Glucose 151 (H) 70 - 99 MG/DL    Calcium 8.5 8.3 - 10.6 MG/DL    Albumin 4.0 3.4 - 5.0 GM/DL    Total Protein 7.0 6.4 - 8.2 GM/DL    Total Bilirubin 0.3 0.0 - 1.0 MG/DL    ALT 19 10 - 40 U/L    AST 39 (H) 15 - 37 IU/L    Alkaline Phosphatase 106 40 - 129 IU/L    GFR Non-African American >60 >60 mL/min/1.73m2    GFR African American >60 >60 mL/min/1.73m2    Anion Gap 12 4 - 16   C-Reactive Protein   Result Value Ref Range    CRP, High Sensitivity 102.0 mg/L   Vitamin D 25 Hydroxy   Result Value Ref Range    Vit D, 25-Hydroxy 14.03 (L) >20 NG/ML   High sensitivity CRP   Result Value Ref Range    CRP High Sensitivity 103.3 (H) <5.0 mg/L   Procalcitonin   Result Value Ref Range    Procalcitonin 0.199    CBC auto differential   Result Value Ref Range    WBC 6.0 4.0 - 10.5 K/CU MM    RBC 5.54 4.6 - 6.2 M/CU MM    Hemoglobin 15.5 13.5 - 18.0 GM/DL    Hematocrit 48.2 42 - 52 %    MCV 87.0 78 - 100 FL    MCH 28.0 27 - 31 PG    MCHC 32.2 32.0 - 36.0 %    RDW 15.3 (H) 11.7 - 14.9 %    Platelets 295 438 - 139 K/CU MM    MPV 11.9 (H) 7.5 - 11.1 FL    Differential Type AUTOMATED DIFFERENTIAL     Segs Relative 76.3 (H) 36 - 66 %    Lymphocytes % 15.4 (L) 24 - 44 %    Monocytes % 7.4 (H) 0 - 4 %    Eosinophils % 0.0 0 - 3 %    Basophils % 0.2 0 - 1 %    Segs Absolute 4.6 K/CU MM    Lymphocytes Absolute 0.9 K/CU MM    Monocytes Absolute 0.4 K/CU MM    Eosinophils Absolute 0.0 K/CU MM    Basophils Absolute 0.0 K/CU MM    Nucleated RBC % 0.0 %    Total Nucleated RBC 0.0 K/CU MM    Total Immature Neutrophil 0.04 K/CU MM    Immature Neutrophil % 0.7 (H) 0 - 0.43 %   Comprehensive metabolic panel   Result Value Ref Range    Sodium 141 135 - 145 MMOL/L    Potassium 4.3 3.5 - 5.1 MMOL/L    Chloride 104 99 - 110 mMol/L    CO2 22 21 - 32 MMOL/L    BUN 21 6 - 23 MG/DL    CREATININE 0.8 (L) 0.9 - 1.3 MG/DL    Glucose 168 (H) 70 - 99 MG/DL    Calcium 8.6 8.3 - 10.6 MG/DL    Albumin 3.9 3.4 - 5.0 GM/DL    Total Protein 6.9 6.4 - 8.2 GM/DL    Total Bilirubin 0.5 0.0 - 1.0 MG/DL    ALT 30 10 - 40 U/L    AST 53 (H) 15 - 37 IU/L    Alkaline Phosphatase 118 40 - 128 IU/L    GFR Non-African American >60 >60 mL/min/1.73m2    GFR African American >60 >60 mL/min/1.73m2    Anion Gap 15 4 - 16   D-dimer, quantitative   Result Value Ref Range    D-Dimer, Quant 307 (H) <230 NG/mL(DDU)   High sensitivity CRP   Result Value Ref Range    CRP High Sensitivity 48.9 (H) <5.0 mg/L   Fibrinogen   Result Value Ref Range    Fibrinogen 582 (H) 196.9 - 442.1 MG/DL   CBC auto differential   Result Value Ref Range    WBC 6.9 4.0 - 10.5 K/CU MM    RBC 5.54 4.6 - 6.2 M/CU MM    Hemoglobin 15.6 13.5 - 18.0 GM/DL    Hematocrit 48.4 42 - 52 %    MCV 87.4 78 - 100 FL    MCH 28.2 27 - 31 PG    MCHC 32.2 32.0 - 36.0 %    RDW 15.4 (H) 11.7 - 14.9 %    Platelets 363 834 - 011 K/CU MM    MPV 11.6 (H) 7.5 - 11.1 FL    Differential Type AUTOMATED DIFFERENTIAL     Segs Relative 84.6 (H) 36 - 66 %    Lymphocytes % 6.4 (L) 24 - 44 %    Monocytes % 8.2 (H) 0 - 4 %    Eosinophils % 0.0 0 - 3 %    Basophils % 0.1 0 - 1 %    Segs Absolute 5.8 K/CU MM    Lymphocytes Absolute 0.4 K/CU MM    Monocytes Absolute 0.6 K/CU MM    Eosinophils Absolute 0.0 K/CU MM    Basophils Absolute 0.0 K/CU MM    Nucleated RBC % 0.0 %    Total Nucleated RBC 0.0 K/CU MM    Total Immature Neutrophil 0.05 K/CU MM    Immature Neutrophil % 0.7 (H) 0 - 0.43 %   Comprehensive Metabolic Panel w/ Reflex to MG   Result Value Ref Range    Sodium 141 135 - 145 MMOL/L    Potassium 4.8 3.5 - 5.1 MMOL/L    Chloride 101 99 - 110 mMol/L    CO2 25 21 - 32 MMOL/L    BUN 23 6 - 23 MG/DL    CREATININE 0.7 (L) 0.9 - 1.3 MG/DL    Glucose 127 (H) 70 - 99 MG/DL    Calcium 8.7 8.3 - 10.6 MG/DL    Albumin 3.7 3.4 - 5.0 GM/DL    Total Protein 6.6 6.4 - 8.2 GM/DL    Total Bilirubin 0.6 0.0 - 1.0 MG/DL    ALT 48 (H) 10 - 40 U/L    AST 54 (H) 15 - 37 IU/L    Alkaline Phosphatase 122 40 - 128 IU/L    GFR Non-African American >60 >60 mL/min/1.73m2    GFR African American >60 >60 mL/min/1.73m2    Anion Gap 15 4 - 16   Comprehensive metabolic panel   Result Value Ref Range    Sodium 139 135 - 145 MMOL/L    Potassium 5.1 3.5 - 5.1 MMOL/L    Chloride 102 99 - 110 mMol/L    CO2 22 21 - 32 MMOL/L    BUN 27 (H) 6 - 23 MG/DL    CREATININE 0.7 (L) 0.9 - 1.3 MG/DL    Glucose 127 (H) 70 - 99 MG/DL    Calcium 9.0 8.3 - 10.6 MG/DL    Albumin 3.9 3.4 - 5.0 GM/DL    Total Protein 6.8 6.4 - 8.2 GM/DL    Total Bilirubin 0.7 0.0 - 1.0 MG/DL    ALT 53 (H) 10 - 40 U/L    AST 53 (H) 15 - 37 IU/L    Alkaline Phosphatase 128 40 - 128 IU/L    GFR Non-African American >60 >60 mL/min/1.73m2    GFR African American >60 >60 mL/min/1.73m2    Anion Gap 15 4 - 16   CBC auto differential   Result Value Ref Range    WBC 10.3 4.0 - 10.5 K/CU MM    RBC 5.76 4.6 - 6.2 M/CU MM    Hemoglobin 16.4 13.5 - 18.0 GM/DL    Hematocrit 50.5 42 - 52 %    MCV 87.7 78 - 100 FL    MCH 28.5 27 - 31 PG    MCHC 32.5 32.0 - 36.0 %    RDW 15.1 (H) 11.7 - 14.9 %    Platelets 438 793 - 027 K/CU MM    MPV 11.8 (H) 7.5 - 11.1 FL    Differential Type AUTOMATED DIFFERENTIAL     Segs Relative 84.9 (H) 36 - 66 %    Lymphocytes % 6.6 (L) 24 - 44 %    Monocytes % 7.2 (H) 0 - 4 %    Eosinophils % 0.0 0 - 3 %    Basophils % 0.2 0 - 1 %    Segs Absolute 8.8 K/CU MM    Lymphocytes Absolute 0.7 K/CU MM    Monocytes Absolute 0.7 K/CU MM    Eosinophils Absolute 0.0 K/CU MM    Basophils Absolute 0.0 K/CU MM    Nucleated RBC % 0.0 %    Total Nucleated RBC 0.0 K/CU MM    Total Immature Neutrophil 0.11 K/CU MM    Immature Neutrophil % 1.1 (H) 0 - 0.43 %   C-reactive protein   Result Value Ref Range    CRP, High Sensitivity 32.1 mg/L   Phosphorus   Result Value Ref Range Phosphorus 3.4 2.5 - 4.9 MG/DL   Comprehensive metabolic panel   Result Value Ref Range    Sodium 139 135 - 145 MMOL/L    Potassium 5.2 (H) 3.5 - 5.1 MMOL/L    Chloride 102 99 - 110 mMol/L    CO2 23 21 - 32 MMOL/L    BUN 31 (H) 6 - 23 MG/DL    CREATININE 0.7 (L) 0.9 - 1.3 MG/DL    Glucose 125 (H) 70 - 99 MG/DL    Calcium 8.9 8.3 - 10.6 MG/DL    Albumin 3.7 3.4 - 5.0 GM/DL    Total Protein 7.0 6.4 - 8.2 GM/DL    Total Bilirubin 0.9 0.0 - 1.0 MG/DL    ALT 68 (H) 10 - 40 U/L    AST 57 (H) 15 - 37 IU/L    Alkaline Phosphatase 135 (H) 40 - 128 IU/L    GFR Non-African American >60 >60 mL/min/1.73m2    GFR African American >60 >60 mL/min/1.73m2    Anion Gap 14 4 - 16   CBC auto differential   Result Value Ref Range    WBC 9.1 4.0 - 10.5 K/CU MM    RBC 5.93 4.6 - 6.2 M/CU MM    Hemoglobin 16.6 13.5 - 18.0 GM/DL    Hematocrit 51.8 42 - 52 %    MCV 87.4 78 - 100 FL    MCH 28.0 27 - 31 PG    MCHC 32.0 32.0 - 36.0 %    RDW 15.1 (H) 11.7 - 14.9 %    Platelets 489 177 - 436 K/CU MM    MPV 11.8 (H) 7.5 - 11.1 FL    Differential Type AUTOMATED DIFFERENTIAL     Segs Relative 87.6 (H) 36 - 66 %    Lymphocytes % 5.1 (L) 24 - 44 %    Monocytes % 5.8 (H) 0 - 4 %    Eosinophils % 0.0 0 - 3 %    Basophils % 0.1 0 - 1 %    Segs Absolute 8.0 K/CU MM    Lymphocytes Absolute 0.5 K/CU MM    Monocytes Absolute 0.5 K/CU MM    Eosinophils Absolute 0.0 K/CU MM    Basophils Absolute 0.0 K/CU MM    Nucleated RBC % 0.0 %    Total Nucleated RBC 0.0 K/CU MM    Total Immature Neutrophil 0.13 K/CU MM    Immature Neutrophil % 1.4 (H) 0 - 0.43 %   Comprehensive metabolic panel   Result Value Ref Range    Sodium 140 135 - 145 MMOL/L    Potassium 5.3 (H) 3.5 - 5.1 MMOL/L    Chloride 101 99 - 110 mMol/L    CO2 21 21 - 32 MMOL/L    BUN 45 (H) 6 - 23 MG/DL    CREATININE 0.6 (L) 0.9 - 1.3 MG/DL    Glucose 116 (H) 70 - 99 MG/DL    Calcium 9.1 8.3 - 10.6 MG/DL    Albumin 4.0 3.4 - 5.0 GM/DL    Total Protein 7.3 6.4 - 8.2 GM/DL    Total Bilirubin 2.5 (H) 0.0 - 1.0 MG/DL     (H) 10 - 40 U/L     (H) 15 - 37 IU/L    Alkaline Phosphatase 159 (H) 40 - 129 IU/L    GFR Non-African American >60 >60 mL/min/1.73m2    GFR African American >60 >60 mL/min/1.73m2    Anion Gap 18 (H) 4 - 16   CBC auto differential   Result Value Ref Range    WBC 14.7 (H) 4.0 - 10.5 K/CU MM    RBC 6.23 (H) 4.6 - 6.2 M/CU MM    Hemoglobin 17.5 13.5 - 18.0 GM/DL    Hematocrit 54.1 (H) 42 - 52 %    MCV 86.8 78 - 100 FL    MCH 28.1 27 - 31 PG    MCHC 32.3 32.0 - 36.0 %    RDW 15.4 (H) 11.7 - 14.9 %    Platelets 552 589 - 386 K/CU MM    MPV 12.3 (H) 7.5 - 11.1 FL    Differential Type AUTOMATED DIFFERENTIAL     Segs Relative 89.2 (H) 36 - 66 %    Lymphocytes % 3.8 (L) 24 - 44 %    Monocytes % 4.7 (H) 0 - 4 %    Eosinophils % 0.1 0 - 3 %    Basophils % 0.1 0 - 1 %    Segs Absolute 13.1 K/CU MM    Lymphocytes Absolute 0.6 K/CU MM    Monocytes Absolute 0.7 K/CU MM    Eosinophils Absolute 0.0 K/CU MM    Basophils Absolute 0.0 K/CU MM    Nucleated RBC % 0.0 %    Total Nucleated RBC 0.0 K/CU MM    Total Immature Neutrophil 0.31 K/CU MM    Immature Neutrophil % 2.1 (H) 0 - 0.43 %   Brain Natriuretic Peptide   Result Value Ref Range    Pro-.1 (H) <300 PG/ML   Comprehensive metabolic panel   Result Value Ref Range    Sodium 137 135 - 145 MMOL/L    Potassium 4.9 3.5 - 5.1 MMOL/L    Chloride 101 99 - 110 mMol/L    CO2 21 21 - 32 MMOL/L    BUN 63 (H) 6 - 23 MG/DL    CREATININE 0.8 (L) 0.9 - 1.3 MG/DL    Glucose 181 (H) 70 - 99 MG/DL    Calcium 8.5 8.3 - 10.6 MG/DL    Albumin 3.6 3.4 - 5.0 GM/DL    Total Protein 7.1 6.4 - 8.2 GM/DL    Total Bilirubin 1.4 (H) 0.0 - 1.0 MG/DL     (H) 10 - 40 U/L     (H) 15 - 37 IU/L    Alkaline Phosphatase 160 (H) 40 - 129 IU/L    GFR Non-African American >60 >60 mL/min/1.73m2    GFR African American >60 >60 mL/min/1.73m2    Anion Gap 15 4 - 16   CBC auto differential   Result Value Ref Range    WBC 9.8 4.0 - 10.5 K/CU MM    RBC 5.77 4.6 - 6.2 M/CU MM Hemoglobin 16.2 13.5 - 18.0 GM/DL    Hematocrit 51.0 42 - 52 %    MCV 88.4 78 - 100 FL    MCH 28.1 27 - 31 PG    MCHC 31.8 (L) 32.0 - 36.0 %    RDW 15.2 (H) 11.7 - 14.9 %    Platelets 828 533 - 368 K/CU MM    MPV 12.8 (H) 7.5 - 11.1 FL    Metamyelocytes Relative 1 (H) 0.0 %    Bands Relative 4 (L) 5 - 11 %    Segs Relative 84.0 (H) 36 - 66 %    Lymphocytes % 6.0 (L) 24 - 44 %    Monocytes % 5.0 (H) 0 - 4 %    Metamyelocytes Absolute 0.10 K/CU MM    Bands Absolute 0.39 K/CU MM    Segs Absolute 8.2 K/CU MM    Lymphocytes Absolute 0.6 K/CU MM    Monocytes Absolute 0.5 K/CU MM    Differential Type MANUAL DIFFERENTIAL    Brain Natriuretic Peptide   Result Value Ref Range    Pro-.9 (H) <300 PG/ML   Hepatitis B Surface Antibody   Result Value Ref Range    Hep B S Ab <3.5    Hepatitis Panel, Acute   Result Value Ref Range    Hepatitis B Surface Ag NON REACTIVE NON REACTIVE    Hep A IgM NON REACTIVE NON REACTIVE    Hep B Core Ab, IgM NON REACTIVE NON REACTIVE    Hepatitis C Ab NON REACTIVE NON REACTIVE   Comprehensive metabolic panel   Result Value Ref Range    Sodium 141 135 - 145 MMOL/L    Potassium 5.3 (H) 3.5 - 5.1 MMOL/L    Chloride 103 99 - 110 mMol/L    CO2 21 21 - 32 MMOL/L    BUN 44 (H) 6 - 23 MG/DL    CREATININE 0.8 (L) 0.9 - 1.3 MG/DL    Glucose 157 (H) 70 - 99 MG/DL    Calcium 9.3 8.3 - 10.6 MG/DL    Albumin 3.8 3.4 - 5.0 GM/DL    Total Protein 7.5 6.4 - 8.2 GM/DL    Total Bilirubin 1.2 (H) 0.0 - 1.0 MG/DL     (H) 10 - 40 U/L    AST 88 (H) 15 - 37 IU/L    Alkaline Phosphatase 185 (H) 40 - 128 IU/L    GFR Non-African American >60 >60 mL/min/1.73m2    GFR African American >60 >60 mL/min/1.73m2    Anion Gap 17 (H) 4 - 16   CBC auto differential   Result Value Ref Range    WBC 16.2 (H) 4.0 - 10.5 K/CU MM    RBC 6.36 (H) 4.6 - 6.2 M/CU MM    Hemoglobin 17.5 13.5 - 18.0 GM/DL    Hematocrit 56.1 (H) 42 - 52 %    MCV 88.2 78 - 100 FL    MCH 27.5 27 - 31 PG    MCHC 31.2 (L) 32.0 - 36.0 %    RDW 15.8 (H) 11.7 - 14.9 %    Platelets 628 829 - 429 K/CU MM    MPV 12.9 (H) 7.5 - 11.1 FL    Differential Type AUTOMATED DIFFERENTIAL     Segs Relative 89.3 (H) 36 - 66 %    Lymphocytes % 4.3 (L) 24 - 44 %    Monocytes % 3.4 0 - 4 %    Eosinophils % 0.1 0 - 3 %    Basophils % 0.2 0 - 1 %    Segs Absolute 14.4 K/CU MM    Lymphocytes Absolute 0.7 K/CU MM    Monocytes Absolute 0.6 K/CU MM    Eosinophils Absolute 0.0 K/CU MM    Basophils Absolute 0.0 K/CU MM    Nucleated RBC % 0.0 %    Total Nucleated RBC 0.0 K/CU MM    Total Immature Neutrophil 0.44 K/CU MM    Immature Neutrophil % 2.7 (H) 0 - 0.43 %   Protime-INR   Result Value Ref Range    Protime 12.0 11.7 - 14.5 SECONDS    INR 0.93 INDEX   Comprehensive metabolic panel   Result Value Ref Range    Sodium 138 135 - 145 MMOL/L    Potassium 5.1 3.5 - 5.1 MMOL/L    Chloride 102 99 - 110 mMol/L    CO2 21 21 - 32 MMOL/L    BUN 47 (H) 6 - 23 MG/DL    CREATININE 0.7 (L) 0.9 - 1.3 MG/DL    Glucose 126 (H) 70 - 99 MG/DL    Calcium 8.9 8.3 - 10.6 MG/DL    Albumin 3.7 3.4 - 5.0 GM/DL    Total Protein 6.9 6.4 - 8.2 GM/DL    Total Bilirubin 1.0 0.0 - 1.0 MG/DL     (H) 10 - 40 U/L    AST 84 (H) 15 - 37 IU/L    Alkaline Phosphatase 183 (H) 40 - 129 IU/L    GFR Non-African American >60 >60 mL/min/1.73m2    GFR African American >60 >60 mL/min/1.73m2    Anion Gap 15 4 - 16   CBC auto differential   Result Value Ref Range    WBC 18.2 (H) 4.0 - 10.5 K/CU MM    RBC 5.82 4.6 - 6.2 M/CU MM    Hemoglobin 16.5 13.5 - 18.0 GM/DL    Hematocrit 51.1 42 - 52 %    MCV 87.8 78 - 100 FL    MCH 28.4 27 - 31 PG    MCHC 32.3 32.0 - 36.0 %    RDW 15.1 (H) 11.7 - 14.9 %    Platelets 236 861 - 821 K/CU MM    MPV 12.7 (H) 7.5 - 11.1 FL    Differential Type AUTOMATED DIFFERENTIAL     Segs Relative 91.4 (H) 36 - 66 %    Lymphocytes % 2.5 (L) 24 - 44 %    Monocytes % 3.2 0 - 4 %    Eosinophils % 0.2 0 - 3 %    Basophils % 0.3 0 - 1 %    Segs Absolute 16.6 K/CU MM    Lymphocytes Absolute 0.5 K/CU MM    Monocytes Absolute 0.6 K/CU MM    Eosinophils Absolute 0.0 K/CU MM    Basophils Absolute 0.1 K/CU MM    Nucleated RBC % 0.0 %    Total Nucleated RBC 0.0 K/CU MM    Total Immature Neutrophil 0.44 K/CU MM    Immature Neutrophil % 2.4 (H) 0 - 0.43 %   Brain Natriuretic Peptide   Result Value Ref Range    Pro-.7 (H) <300 PG/ML   Protime-INR   Result Value Ref Range    Protime 12.5 11.7 - 14.5 SECONDS    INR 0.97 INDEX   Comprehensive metabolic panel   Result Value Ref Range    Sodium 137 135 - 145 MMOL/L    Potassium 5.2 (H) 3.5 - 5.1 MMOL/L    Chloride 99 99 - 110 mMol/L    CO2 19 (L) 21 - 32 MMOL/L    BUN 38 (H) 6 - 23 MG/DL    CREATININE 0.8 (L) 0.9 - 1.3 MG/DL    Glucose 149 (H) 70 - 99 MG/DL    Calcium 8.9 8.3 - 10.6 MG/DL    Albumin 3.4 3.4 - 5.0 GM/DL    Total Protein 6.7 6.4 - 8.2 GM/DL    Total Bilirubin 0.9 0.0 - 1.0 MG/DL     (H) 10 - 40 U/L    AST 80 (H) 15 - 37 IU/L    Alkaline Phosphatase 181 (H) 40 - 128 IU/L    GFR Non-African American >60 >60 mL/min/1.73m2    GFR African American >60 >60 mL/min/1.73m2    Anion Gap 19 (H) 4 - 16   CBC auto differential   Result Value Ref Range    WBC 15.6 (H) 4.0 - 10.5 K/CU MM    RBC 5.70 4.6 - 6.2 M/CU MM    Hemoglobin 16.0 13.5 - 18.0 GM/DL    Hematocrit 50.2 42 - 52 %    MCV 88.1 78 - 100 FL    MCH 28.1 27 - 31 PG    MCHC 31.9 (L) 32.0 - 36.0 %    RDW 15.2 (H) 11.7 - 14.9 %    Platelets 369 262 - 136 K/CU MM    MPV 14.0 (H) 7.5 - 11.1 FL    Bands Relative 3 (L) 5 - 11 %    Segs Relative 90.0 (H) 36 - 66 %    Lymphocytes % 3.0 (L) 24 - 44 %    Monocytes % 4.0 0 - 4 %    Bands Absolute 0.47 K/CU MM    Segs Absolute 14.0 K/CU MM    Lymphocytes Absolute 0.5 K/CU MM    Monocytes Absolute 0.6 K/CU MM    Differential Type MANUAL DIFFERENTIAL     PLT Morphology SEVERAL LARGE PLATELETS    Protime-INR   Result Value Ref Range    Protime 12.2 11.7 - 14.5 SECONDS    INR 0.95 INDEX   Comprehensive metabolic panel   Result Value Ref Range    Sodium 136 135 - 145 MMOL/L Potassium 5.5 (HH) 3.5 - 5.1 MMOL/L    Chloride 99 99 - 110 mMol/L    CO2 21 21 - 32 MMOL/L    BUN 38 (H) 6 - 23 MG/DL    CREATININE 0.7 (L) 0.9 - 1.3 MG/DL    Glucose 171 (H) 70 - 99 MG/DL    Calcium 9.1 8.3 - 10.6 MG/DL    Albumin 3.4 3.4 - 5.0 GM/DL    Total Protein 6.6 6.4 - 8.2 GM/DL    Total Bilirubin 0.9 0.0 - 1.0 MG/DL     (H) 10 - 40 U/L    AST 81 (H) 15 - 37 IU/L    Alkaline Phosphatase 186 (H) 40 - 128 IU/L    GFR Non-African American >60 >60 mL/min/1.73m2    GFR African American >60 >60 mL/min/1.73m2    Anion Gap 16 4 - 16   CBC auto differential   Result Value Ref Range    WBC 17.5 (H) 4.0 - 10.5 K/CU MM    RBC 5.61 4.6 - 6.2 M/CU MM    Hemoglobin 15.7 13.5 - 18.0 GM/DL    Hematocrit 50.0 42 - 52 %    MCV 89.1 78 - 100 FL    MCH 28.0 27 - 31 PG    MCHC 31.4 (L) 32.0 - 36.0 %    RDW 14.9 11.7 - 14.9 %    Platelets 433 263 - 038 K/CU MM    MPV 13.6 (H) 7.5 - 11.1 FL    Differential Type AUTOMATED DIFFERENTIAL     Segs Relative 91.5 (H) 36 - 66 %    Lymphocytes % 2.9 (L) 24 - 44 %    Monocytes % 2.6 0 - 4 %    Eosinophils % 0.1 0 - 3 %    Basophils % 0.2 0 - 1 %    Segs Absolute 16.0 K/CU MM    Lymphocytes Absolute 0.5 K/CU MM    Monocytes Absolute 0.5 K/CU MM    Eosinophils Absolute 0.0 K/CU MM    Basophils Absolute 0.0 K/CU MM    Nucleated RBC % 0.0 %    Total Nucleated RBC 0.0 K/CU MM    Total Immature Neutrophil 0.47 K/CU MM    Immature Neutrophil % 2.7 (H) 0 - 0.43 %   Brain Natriuretic Peptide   Result Value Ref Range    Pro-.9 (H) <300 PG/ML   Comprehensive metabolic panel   Result Value Ref Range    Sodium 136 135 - 145 MMOL/L    Potassium 5.0 3.5 - 5.1 MMOL/L    Chloride 99 99 - 110 mMol/L    CO2 22 21 - 32 MMOL/L    BUN 51 (H) 6 - 23 MG/DL    CREATININE 1.1 0.9 - 1.3 MG/DL    Glucose 122 (H) 70 - 99 MG/DL    Calcium 9.1 8.3 - 10.6 MG/DL    Albumin 3.3 (L) 3.4 - 5.0 GM/DL    Total Protein 6.6 6.4 - 8.2 GM/DL    Total Bilirubin 1.0 0.0 - 1.0 MG/DL     (H) 10 - 40 U/L    AST 93 (H) 15 - 37 IU/L    Alkaline Phosphatase 192 (H) 40 - 128 IU/L    GFR Non-African American >60 >60 mL/min/1.73m2    GFR African American >60 >60 mL/min/1.73m2    Anion Gap 15 4 - 16   CBC auto differential   Result Value Ref Range    WBC 18.3 (H) 4.0 - 10.5 K/CU MM    RBC 5.30 4.6 - 6.2 M/CU MM    Hemoglobin 15.0 13.5 - 18.0 GM/DL    Hematocrit 46.7 42 - 52 %    MCV 88.1 78 - 100 FL    MCH 28.3 27 - 31 PG    MCHC 32.1 32.0 - 36.0 %    RDW 14.9 11.7 - 14.9 %    Platelets 313 734 - 418 K/CU MM    MPV 13.7 (H) 7.5 - 11.1 FL    Bands Relative 2 (L) 5 - 11 %    Segs Relative 94.0 (H) 36 - 66 %    Lymphocytes % 3.0 (L) 24 - 44 %    Monocytes % 1.0 0 - 4 %    Bands Absolute 0.37 K/CU MM    Segs Absolute 17.2 K/CU MM    Lymphocytes Absolute 0.5 K/CU MM    Monocytes Absolute 0.2 K/CU MM    Differential Type MANUAL DIFFERENTIAL     PLT Morphology MANY LARGE PLATELETS    Comprehensive metabolic panel   Result Value Ref Range    Sodium 134 (L) 135 - 145 MMOL/L    Potassium 5.0 3.5 - 5.1 MMOL/L    Chloride 97 (L) 99 - 110 mMol/L    CO2 23 21 - 32 MMOL/L    BUN 56 (H) 6 - 23 MG/DL    CREATININE 0.8 (L) 0.9 - 1.3 MG/DL    Glucose 132 (H) 70 - 99 MG/DL    Calcium 8.9 8.3 - 10.6 MG/DL    Albumin 3.1 (L) 3.4 - 5.0 GM/DL    Total Protein 6.2 (L) 6.4 - 8.2 GM/DL    Total Bilirubin 0.8 0.0 - 1.0 MG/DL     (H) 10 - 40 U/L    AST 85 (H) 15 - 37 IU/L    Alkaline Phosphatase 182 (H) 40 - 128 IU/L    GFR Non-African American >60 >60 mL/min/1.73m2    GFR African American >60 >60 mL/min/1.73m2    Anion Gap 14 4 - 16   CBC auto differential   Result Value Ref Range    WBC 13.6 (H) 4.0 - 10.5 K/CU MM    RBC 5.14 4.6 - 6.2 M/CU MM    Hemoglobin 14.4 13.5 - 18.0 GM/DL    Hematocrit 45.1 42 - 52 %    MCV 87.7 78 - 100 FL    MCH 28.0 27 - 31 PG    MCHC 31.9 (L) 32.0 - 36.0 %    RDW 15.0 (H) 11.7 - 14.9 %    Platelets 643 234 - 727 K/CU MM    MPV 14.0 (H) 7.5 - 11.1 FL    Differential Type AUTOMATED DIFFERENTIAL     Segs Relative 91.8 (H) 36 - 66 %    Lymphocytes % 3.5 (L) 24 - 44 %    Monocytes % 2.7 0 - 4 %    Eosinophils % 0.1 0 - 3 %    Basophils % 0.1 0 - 1 %    Segs Absolute 12.5 K/CU MM    Lymphocytes Absolute 0.5 K/CU MM    Monocytes Absolute 0.4 K/CU MM    Eosinophils Absolute 0.0 K/CU MM    Basophils Absolute 0.0 K/CU MM    Nucleated RBC % 0.0 %    Total Nucleated RBC 0.0 K/CU MM    Total Immature Neutrophil 0.25 K/CU MM    Immature Neutrophil % 1.8 (H) 0 - 0.43 %   Comprehensive metabolic panel   Result Value Ref Range    Sodium 137 135 - 145 MMOL/L    Potassium 5.1 3.5 - 5.1 MMOL/L    Chloride 97 (L) 99 - 110 mMol/L    CO2 22 21 - 32 MMOL/L    BUN 59 (H) 6 - 23 MG/DL    CREATININE 1.3 0.9 - 1.3 MG/DL    Glucose 170 (H) 70 - 99 MG/DL    Calcium 9.2 8.3 - 10.6 MG/DL    Albumin 3.3 (L) 3.4 - 5.0 GM/DL    Total Protein 6.4 6.4 - 8.2 GM/DL    Total Bilirubin 0.8 0.0 - 1.0 MG/DL     (H) 10 - 40 U/L    AST 95 (H) 15 - 37 IU/L    Alkaline Phosphatase 197 (H) 40 - 128 IU/L    GFR Non- 56 (L) >60 mL/min/1.73m2    GFR African American >60 >60 mL/min/1.73m2    Anion Gap 18 (H) 4 - 16   CBC auto differential   Result Value Ref Range    WBC 15.9 (H) 4.0 - 10.5 K/CU MM    RBC 5.20 4.6 - 6.2 M/CU MM    Hemoglobin 14.6 13.5 - 18.0 GM/DL    Hematocrit 45.8 42 - 52 %    MCV 88.1 78 - 100 FL    MCH 28.1 27 - 31 PG    MCHC 31.9 (L) 32.0 - 36.0 %    RDW 14.8 11.7 - 14.9 %    Platelets 578 769 - 346 K/CU MM    MPV 14.3 (H) 7.5 - 11.1 FL    Differential Type AUTOMATED DIFFERENTIAL     Segs Relative 93.0 (H) 36 - 66 %    Lymphocytes % 2.4 (L) 24 - 44 %    Monocytes % 2.8 0 - 4 %    Eosinophils % 0.1 0 - 3 %    Basophils % 0.1 0 - 1 %    Segs Absolute 14.8 K/CU MM    Lymphocytes Absolute 0.4 K/CU MM    Monocytes Absolute 0.4 K/CU MM    Eosinophils Absolute 0.0 K/CU MM    Basophils Absolute 0.0 K/CU MM    Nucleated RBC % 0.0 %    Total Nucleated RBC 0.0 K/CU MM    Total Immature Neutrophil 0.26 K/CU MM    Immature Neutrophil % 1.6 (H) 0 - 0.43 % Magnesium   Result Value Ref Range    Magnesium 2.2 1.8 - 2.4 mg/dl   D-dimer, quantitative   Result Value Ref Range    D-Dimer, Quant 615 (H) <230 NG/mL(DDU)   Comprehensive metabolic panel   Result Value Ref Range    Sodium 135 135 - 145 MMOL/L    Potassium 4.9 3.5 - 5.1 MMOL/L    Chloride 96 (L) 99 - 110 mMol/L    CO2 24 21 - 32 MMOL/L    BUN 42 (H) 6 - 23 MG/DL    CREATININE 0.8 (L) 0.9 - 1.3 MG/DL    Glucose 169 (H) 70 - 99 MG/DL    Calcium 9.1 8.3 - 10.6 MG/DL    Albumin 3.3 (L) 3.4 - 5.0 GM/DL    Total Protein 6.3 (L) 6.4 - 8.2 GM/DL    Total Bilirubin 1.0 0.0 - 1.0 MG/DL     (H) 10 - 40 U/L     (H) 15 - 37 IU/L    Alkaline Phosphatase 190 (H) 40 - 128 IU/L    GFR Non-African American >60 >60 mL/min/1.73m2    GFR African American >60 >60 mL/min/1.73m2    Anion Gap 15 4 - 16   CBC auto differential   Result Value Ref Range    WBC 15.2 (H) 4.0 - 10.5 K/CU MM    RBC 5.22 4.6 - 6.2 M/CU MM    Hemoglobin 15.0 13.5 - 18.0 GM/DL    Hematocrit 45.9 42 - 52 %    MCV 87.9 78 - 100 FL    MCH 28.7 27 - 31 PG    MCHC 32.7 32.0 - 36.0 %    RDW 14.7 11.7 - 14.9 %    Platelets 282 314 - 002 K/CU MM    MPV 13.6 (H) 7.5 - 11.1 FL    Differential Type AUTOMATED DIFFERENTIAL     Segs Relative 92.1 (H) 36 - 66 %    Lymphocytes % 2.8 (L) 24 - 44 %    Monocytes % 3.0 0 - 4 %    Eosinophils % 0.3 0 - 3 %    Basophils % 0.2 0 - 1 %    Segs Absolute 14.0 K/CU MM    Lymphocytes Absolute 0.4 K/CU MM    Monocytes Absolute 0.5 K/CU MM    Eosinophils Absolute 0.0 K/CU MM    Basophils Absolute 0.0 K/CU MM    Nucleated RBC % 0.0 %    Total Nucleated RBC 0.0 K/CU MM    Total Immature Neutrophil 0.25 K/CU MM    Immature Neutrophil % 1.6 (H) 0 - 0.43 %   Magnesium   Result Value Ref Range    Magnesium 2.1 1.8 - 2.4 mg/dl   Comprehensive metabolic panel   Result Value Ref Range    Sodium 134 (L) 135 - 145 MMOL/L    Potassium 4.8 3.5 - 5.1 MMOL/L    Chloride 95 (L) 99 - 110 mMol/L    CO2 25 21 - 32 MMOL/L    BUN 48 (H) 6 - 23 MG/DL    CREATININE 0.7 (L) 0.9 - 1.3 MG/DL    Glucose 148 (H) 70 - 99 MG/DL    Calcium 9.2 8.3 - 10.6 MG/DL    Albumin 3.3 (L) 3.4 - 5.0 GM/DL    Total Protein 6.3 (L) 6.4 - 8.2 GM/DL    Total Bilirubin 1.3 (H) 0.0 - 1.0 MG/DL     (H) 10 - 40 U/L     (H) 15 - 37 IU/L    Alkaline Phosphatase 177 (H) 40 - 128 IU/L    GFR Non-African American >60 >60 mL/min/1.73m2    GFR African American >60 >60 mL/min/1.73m2    Anion Gap 14 4 - 16   CBC auto differential   Result Value Ref Range    WBC 15.9 (H) 4.0 - 10.5 K/CU MM    RBC 5.35 4.6 - 6.2 M/CU MM    Hemoglobin 14.9 13.5 - 18.0 GM/DL    Hematocrit 47.0 42 - 52 %    MCV 87.9 78 - 100 FL    MCH 27.9 27 - 31 PG    MCHC 31.7 (L) 32.0 - 36.0 %    RDW 14.8 11.7 - 14.9 %    Platelets 958 258 - 579 K/CU MM    MPV 13.4 (H) 7.5 - 11.1 FL    Differential Type AUTOMATED DIFFERENTIAL     Segs Relative 91.3 (H) 36 - 66 %    Lymphocytes % 4.2 (L) 24 - 44 %    Monocytes % 2.5 0 - 4 %    Eosinophils % 0.2 0 - 3 %    Basophils % 0.2 0 - 1 %    Segs Absolute 14.5 K/CU MM    Lymphocytes Absolute 0.7 K/CU MM    Monocytes Absolute 0.4 K/CU MM    Eosinophils Absolute 0.0 K/CU MM    Basophils Absolute 0.0 K/CU MM    Nucleated RBC % 0.0 %    Total Nucleated RBC 0.0 K/CU MM    Total Immature Neutrophil 0.25 K/CU MM    Immature Neutrophil % 1.6 (H) 0 - 0.43 %   Magnesium   Result Value Ref Range    Magnesium 2.1 1.8 - 2.4 mg/dl   Comprehensive metabolic panel   Result Value Ref Range    Sodium 135 135 - 145 MMOL/L    Potassium 4.5 3.5 - 5.1 MMOL/L    Chloride 94 (L) 99 - 110 mMol/L    CO2 27 21 - 32 MMOL/L    BUN 39 (H) 6 - 23 MG/DL    CREATININE 0.7 (L) 0.9 - 1.3 MG/DL    Glucose 154 (H) 70 - 99 MG/DL    Calcium 9.2 8.3 - 10.6 MG/DL    Albumin 3.3 (L) 3.4 - 5.0 GM/DL    Total Protein 6.4 6.4 - 8.2 GM/DL    Total Bilirubin 1.5 (H) 0.0 - 1.0 MG/DL     (H) 10 - 40 U/L     (H) 15 - 37 IU/L    Alkaline Phosphatase 174 (H) 40 - 128 IU/L    GFR Non- >60 >60 mL/min/1.73m2    GFR African American >60 >60 mL/min/1.73m2    Anion Gap 14 4 - 16   CBC auto differential   Result Value Ref Range    WBC 17.2 (H) 4.0 - 10.5 K/CU MM    RBC 5.34 4.6 - 6.2 M/CU MM    Hemoglobin 15.3 13.5 - 18.0 GM/DL    Hematocrit 46.7 42 - 52 %    MCV 87.5 78 - 100 FL    MCH 28.7 27 - 31 PG    MCHC 32.8 32.0 - 36.0 %    RDW 14.6 11.7 - 14.9 %    Platelets 556 582 - 130 K/CU MM    MPV 13.2 (H) 7.5 - 11.1 FL    Differential Type AUTOMATED DIFFERENTIAL     Segs Relative 89.3 (H) 36 - 66 %    Lymphocytes % 5.6 (L) 24 - 44 %    Monocytes % 1.7 0 - 4 %    Eosinophils % 1.5 0 - 3 %    Basophils % 0.2 0 - 1 %    Segs Absolute 15.4 K/CU MM    Lymphocytes Absolute 1.0 K/CU MM    Monocytes Absolute 0.3 K/CU MM    Eosinophils Absolute 0.3 K/CU MM    Basophils Absolute 0.0 K/CU MM    Nucleated RBC % 0.0 %    Total Nucleated RBC 0.0 K/CU MM    Total Immature Neutrophil 0.30 K/CU MM    Immature Neutrophil % 1.7 (H) 0 - 0.43 %   Magnesium   Result Value Ref Range    Magnesium 1.9 1.8 - 2.4 mg/dl   CBC auto differential   Result Value Ref Range    WBC 15.3 (H) 4.0 - 10.5 K/CU MM    RBC 5.18 4.6 - 6.2 M/CU MM    Hemoglobin 14.8 13.5 - 18.0 GM/DL    Hematocrit 46.2 42 - 52 %    MCV 89.2 78 - 100 FL    MCH 28.6 27 - 31 PG    MCHC 32.0 32.0 - 36.0 %    RDW 14.8 11.7 - 14.9 %    Platelets 662 377 - 122 K/CU MM    MPV 15.4 (H) 7.5 - 11.1 FL    Differential Type AUTOMATED DIFFERENTIAL     Segs Relative 92.9 (H) 36 - 66 %    Lymphocytes % 3.1 (L) 24 - 44 %    Monocytes % 1.6 0 - 4 %    Eosinophils % 0.4 0 - 3 %    Basophils % 0.2 0 - 1 %    Segs Absolute 14.2 K/CU MM    Lymphocytes Absolute 0.5 K/CU MM    Monocytes Absolute 0.3 K/CU MM    Eosinophils Absolute 0.1 K/CU MM    Basophils Absolute 0.0 K/CU MM    Nucleated RBC % 0.0 %    Total Nucleated RBC 0.0 K/CU MM    Total Immature Neutrophil 0.28 K/CU MM    Immature Neutrophil % 1.8 (H) 0 - 0.43 %   D-dimer, quantitative   Result Value Ref Range    D-Dimer, Quant 686 (H) <230 NG/mL(DDU)   Comprehensive Metabolic Panel   Result Value Ref Range    Sodium 129 (L) 135 - 145 MMOL/L    Potassium 5.2 (H) 3.5 - 5.1 MMOL/L    Chloride 89 (L) 99 - 110 mMol/L    CO2 24 21 - 32 MMOL/L    BUN 34 (H) 6 - 23 MG/DL    CREATININE 0.5 (L) 0.9 - 1.3 MG/DL    Glucose 158 (H) 70 - 99 MG/DL    Calcium 8.7 8.3 - 10.6 MG/DL    Albumin 3.2 (L) 3.4 - 5.0 GM/DL    Total Protein 6.5 6.4 - 8.2 GM/DL    Total Bilirubin 1.8 (H) 0.0 - 1.0 MG/DL     (H) 10 - 40 U/L     (H) 15 - 37 IU/L    Alkaline Phosphatase 180 (H) 40 - 128 IU/L    GFR Non-African American >60 >60 mL/min/1.73m2    GFR African American >60 >60 mL/min/1.73m2    Anion Gap 16 4 - 16   Magnesium   Result Value Ref Range    Magnesium 2.0 1.8 - 2.4 mg/dl   Comprehensive metabolic panel   Result Value Ref Range    Sodium 132 (L) 135 - 145 MMOL/L    Potassium 4.9 3.5 - 5.1 MMOL/L    Chloride 93 (L) 99 - 110 mMol/L    CO2 27 21 - 32 MMOL/L    BUN 31 (H) 6 - 23 MG/DL    CREATININE 0.4 (L) 0.9 - 1.3 MG/DL    Glucose 92 70 - 99 MG/DL    Calcium 9.1 8.3 - 10.6 MG/DL    Albumin 3.4 3.4 - 5.0 GM/DL    Total Protein 6.4 6.4 - 8.2 GM/DL    Total Bilirubin 1.4 (H) 0.0 - 1.0 MG/DL     (H) 10 - 40 U/L     (H) 15 - 37 IU/L    Alkaline Phosphatase 171 (H) 40 - 129 IU/L    GFR Non-African American >60 >60 mL/min/1.73m2    GFR African American >60 >60 mL/min/1.73m2    Anion Gap 12 4 - 16   CBC auto differential   Result Value Ref Range    WBC 13.3 (H) 4.0 - 10.5 K/CU MM    RBC 5.43 4.6 - 6.2 M/CU MM    Hemoglobin 15.5 13.5 - 18.0 GM/DL    Hematocrit 48.2 42 - 52 %    MCV 88.8 78 - 100 FL    MCH 28.5 27 - 31 PG    MCHC 32.2 32.0 - 36.0 %    RDW 14.7 11.7 - 14.9 %    Platelets 395 363 - 883 K/CU MM    MPV 13.6 (H) 7.5 - 11.1 FL    Differential Type AUTOMATED DIFFERENTIAL     Segs Relative 87.0 (H) 36 - 66 %    Lymphocytes % 7.0 (L) 24 - 44 %    Monocytes % 2.3 0 - 4 %    Eosinophils % 1.4 0 - 3 %    Basophils % 0.3 0 - 1 %    Segs Absolute 11.5 K/CU MM    Lymphocytes Absolute 0.9 K/CU MM    Monocytes Absolute 0.3 K/CU MM    Eosinophils Absolute 0.2 K/CU MM    Basophils Absolute 0.0 K/CU MM    Nucleated RBC % 0.0 %    Total Nucleated RBC 0.0 K/CU MM    Total Immature Neutrophil 0.27 K/CU MM    Immature Neutrophil % 2.0 (H) 0 - 0.43 %   Magnesium   Result Value Ref Range    Magnesium 2.0 1.8 - 2.4 mg/dl   C-reactive protein   Result Value Ref Range    CRP, High Sensitivity 100.8 mg/L   Procalcitonin   Result Value Ref Range    Procalcitonin 0.252    Phosphorus   Result Value Ref Range    Phosphorus 3.5 2.5 - 4.9 MG/DL   Blood gas, arterial   Result Value Ref Range    pH, Bld 7.48 (H) 7.34 - 7.45    pCO2, Arterial 37.0 32 - 45 MMHG    pO2, Arterial 46 (L) 75 - 100 MMHG    Base Exc, Mixed 4 (H) 0 - 1.2    HCO3, Arterial 27.6 (H) 18 - 23 MMOL/L    CO2 Content 28.7 (H) 19 - 24 MMOL/L    O2 Sat 85.0 (L) 96 - 97 %    Carbon Monoxide, Blood 2.6 0 - 5 %    Methemoglobin, Arterial 1.3 <1.5 %    Comment 20/6 1.0    Herpes simplex virus (HSV) I/II antibodies IgG & IgM w/ reflex   Result Value Ref Range    HSVI/II Comb IgM 0.38 <=0.89 IV    HSV I/II IgG >22.40 IV   Cytomegalovirus antibody, IgG   Result Value Ref Range    CMV IgG >10.00 U/mL   Cytomegalovirus antibody, IgM   Result Value Ref Range    CMV IgM <8.0 <=29.9 AU/mL   1,3 Beta-D-Glucan   Result Value Ref Range    1,3 Beta-D-Glucan 103 pg/mL    1,3 Beta-D-Glucan Interp POSITIVE (A) Negative   Aspergillus Glacto AG Assay   Result Value Ref Range    Aspergillus Galacto AG Negative Negative    Aspergillus Galacto Index 0.06    Brain Natriuretic Peptide   Result Value Ref Range    Pro-.5 (H) <300 PG/ML   IgG, IgA, IgM   Result Value Ref Range    IgG, Serum 1,082 723 - 1,685 MG/DL    IgA 296 69 - 382 MG/DL    IgM,Serum 66 62 - 277 MG/DL   Vancomycin, trough   Result Value Ref Range    Vancomycin Tr 13.6 10 - 20 UG/ML    DOSE AMOUNT DOSE AMT.  GIVEN - 1500     DOSE TIME DOSE TIME GIVEN - 1400    Comprehensive metabolic panel   Result Value Ref Range    Sodium 133 (L) 135 - 145 MMOL/L    Potassium 4.7 3.5 - 5.1 MMOL/L    Chloride 94 (L) 99 - 110 mMol/L    CO2 23 21 - 32 MMOL/L    BUN 45 (H) 6 - 23 MG/DL    CREATININE 1.6 (H) 0.9 - 1.3 MG/DL    Glucose 126 (H) 70 - 99 MG/DL    Calcium 9.0 8.3 - 10.6 MG/DL    Albumin 3.0 (L) 3.4 - 5.0 GM/DL    Total Protein 6.4 6.4 - 8.2 GM/DL    Total Bilirubin 1.2 (H) 0.0 - 1.0 MG/DL     (H) 10 - 40 U/L     (H) 15 - 37 IU/L    Alkaline Phosphatase 178 (H) 40 - 128 IU/L    GFR Non- 44 (L) >60 mL/min/1.73m2    GFR  53 (L) >60 mL/min/1.73m2    Anion Gap 16 4 - 16   CBC auto differential   Result Value Ref Range    WBC 13.4 (H) 4.0 - 10.5 K/CU MM    RBC 5.16 4.6 - 6.2 M/CU MM    Hemoglobin 14.4 13.5 - 18.0 GM/DL    Hematocrit 44.1 42 - 52 %    MCV 85.5 78 - 100 FL    MCH 27.9 27 - 31 PG    MCHC 32.7 32.0 - 36.0 %    RDW 14.6 11.7 - 14.9 %    Platelets 347 122 - 932 K/CU MM    MPV 13.0 (H) 7.5 - 11.1 FL    Differential Type AUTOMATED DIFFERENTIAL     Segs Relative 87.1 (H) 36 - 66 %    Lymphocytes % 5.8 (L) 24 - 44 %    Monocytes % 2.7 0 - 4 %    Eosinophils % 0.4 0 - 3 %    Basophils % 0.3 0 - 1 %    Segs Absolute 11.7 K/CU MM    Lymphocytes Absolute 0.8 K/CU MM    Monocytes Absolute 0.4 K/CU MM    Eosinophils Absolute 0.1 K/CU MM    Basophils Absolute 0.0 K/CU MM    Nucleated RBC % 0.0 %    Total Nucleated RBC 0.0 K/CU MM    Total Immature Neutrophil 0.49 K/CU MM    Immature Neutrophil % 3.7 (H) 0 - 0.43 %   Magnesium   Result Value Ref Range    Magnesium 2.0 1.8 - 2.4 mg/dl   Comprehensive metabolic panel   Result Value Ref Range    Sodium 132 (L) 135 - 145 MMOL/L    Potassium 4.4 3.5 - 5.1 MMOL/L    Chloride 97 (L) 99 - 110 mMol/L    CO2 22 21 - 32 MMOL/L    BUN 37 (H) 6 - 23 MG/DL    CREATININE 0.5 (L) 0.9 - 1.3 MG/DL    Glucose 135 (H) 70 - 99 MG/DL    Calcium 9.1 8.3 - 10.6 MG/DL    Albumin 3.4 3.4 - 5.0 GM/DL    Total Protein 6.6 6.4 - 8.2 GM/DL    Total Bilirubin 1.0 0.0 - 1.0 MG/DL     (H) 10 - 40 U/L     (H) 15 - 37 IU/L    Alkaline Phosphatase 179 (H) 40 - 129 IU/L    GFR Non-African American >60 >60 mL/min/1.73m2    GFR African American >60 >60 mL/min/1.73m2    Anion Gap 13 4 - 16   CBC auto differential   Result Value Ref Range    WBC 14.9 (H) 4.0 - 10.5 K/CU MM    RBC 5.17 4.6 - 6.2 M/CU MM    Hemoglobin 14.6 13.5 - 18.0 GM/DL    Hematocrit 45.3 42 - 52 %    MCV 87.6 78 - 100 FL    MCH 28.2 27 - 31 PG    MCHC 32.2 32.0 - 36.0 %    RDW 14.8 11.7 - 14.9 %    Platelets 187 089 - 242 K/CU MM    MPV 13.2 (H) 7.5 - 11.1 FL    Bands Relative 9 5 - 11 %    Segs Relative 87.0 (H) 36 - 66 %    Lymphocytes % 4.0 (L) 24 - 44 %    Bands Absolute 1.34 K/CU MM    Segs Absolute 13.0 K/CU MM    Lymphocytes Absolute 0.6 K/CU MM    Differential Type MANUAL DIFFERENTIAL     RBC Morphology RBC MORPHOLOGY NORMAL    Magnesium   Result Value Ref Range    Magnesium 2.0 1.8 - 2.4 mg/dl   Vancomycin, random   Result Value Ref Range    Vancomycin Rm 6.5 UG/ML    DOSE AMOUNT DOSE AMT. GIVEN - 1500     DOSE TIME DOSE TIME GIVEN - 10/5/21    Comprehensive metabolic panel   Result Value Ref Range    Sodium 133 (L) 135 - 145 MMOL/L    Potassium 4.4 3.5 - 5.1 MMOL/L    Chloride 97 (L) 99 - 110 mMol/L    CO2 22 21 - 32 MMOL/L    BUN 34 (H) 6 - 23 MG/DL    CREATININE 0.6 (L) 0.9 - 1.3 MG/DL    Glucose 101 (H) 70 - 99 MG/DL    Calcium 9.3 8.3 - 10.6 MG/DL    Albumin 3.4 3.4 - 5.0 GM/DL    Total Protein 6.9 6.4 - 8.2 GM/DL    Total Bilirubin 1.0 0.0 - 1.0 MG/DL    ALT 1,024 (H) 10 - 40 U/L     (H) 15 - 37 IU/L    Alkaline Phosphatase 195 (H) 40 - 128 IU/L    GFR Non-African American >60 >60 mL/min/1.73m2    GFR African American >60 >60 mL/min/1.73m2    Anion Gap 14 4 - 16     *Note: Due to a large number of results and/or encounters for the requested time period, some results have not been displayed. A complete set of results can be found in Results Review.

## 2021-11-03 NOTE — PROGRESS NOTES
Physician Progress Note      PATIENT:               Olivia Henson  CSN #:                  [id number]  :                       1955  ADMIT DATE:       2021 1:13 PM  [address] [place] DATE:        10/15/2021 10:01 AM  RESPONDING  PROVIDER #:        Alex Sifuentes          QUERY TEXT:    Attending,    Pt admitted with Covid-19. Per attending progress note on 10/7, \"Sepsis-POA   due to COVID PNA with acute organ dysfunction as evidenced by Acute Hypoxic   Respiratory Failure. \" If possible, please document in progress notes and   discharge summary the present on admission status of sepsis:    The medical record reflects the following:  Risk Factors: Covid-19, pneumonia  Clinical Indicators: WBC 14.7/16.2/18.2,procal 0.839, Tmax 100.7, HR 92, resp   24  Treatment: IVF, IV Zithromax, IV Maxipime, IV Vancomycin    Thank you,  Karleen Boas, BSN, RN  Clinical   [phone]  Options provided:  -- Yes, sepsis was present at the time of the order to admit to the hospital  -- No, sepsis was not present on admission and developed during the inpatient   stay  -- Other - I will add my own diagnosis  -- Disagree - Not applicable / Not valid  -- Disagree - Clinically unable to determine / Unknown  -- Refer to Clinical Documentation Reviewer    PROVIDER RESPONSE TEXT:    Yes, sepsis was present at the time of the order to admit to the hospital.    Query created by: Maribell Stanton on 10/19/2021 10:10 AM      Electronically signed by:  Alex Sifuentes 11/3/2021 1:28 PM

## 2023-10-26 NOTE — PROGRESS NOTES
Progress Note  Date:2021       Room:-A  Patient Name:Camilo Maki     YOB: 1955     Age:65 y.o. Subjective    Subjective:  Symptoms:  He reports shortness of breath and cough. No chest pain, chest pressure or anxiety. Diet:  Poor intake. Activity level: Impaired due to weakness.    : Patient seen and examined at bedside; oriented x3. on high flow nasal cannula at 15 L with RR in 30s and O2 sat 86%. able to talk  Review of Systems   Constitutional: Negative for fever. Respiratory: Positive for cough, shortness of breath and wheezing. Cardiovascular: Negative for chest pain and leg swelling. Gastrointestinal: Negative for abdominal distention. Genitourinary: Negative for dysuria, flank pain and frequency. Musculoskeletal: Negative for arthralgias. Neurological: Negative for dizziness and headaches. Psychiatric/Behavioral: Negative for agitation and confusion. Objective         Vitals Last 24 Hours:  TEMPERATURE:  Temp  Av.4 °F (36.9 °C)  Min: 98.2 °F (36.8 °C)  Max: 98.9 °F (37.2 °C)  RESPIRATIONS RANGE: Resp  Av.6  Min: 19  Max: 36  PULSE OXIMETRY RANGE: SpO2  Av %  Min: 86 %  Max: 90 %  PULSE RANGE: Pulse  Av.4  Min: 74  Max: 100  BLOOD PRESSURE RANGE: Systolic (42OAH), SFE:576 , Min:117 , WDO:435   ; Diastolic (33ITL), RH, Min:61, Max:91    I/O (24Hr): Intake/Output Summary (Last 24 hours) at 2021 1423  Last data filed at 2021 0723  Gross per 24 hour   Intake --   Output 750 ml   Net -750 ml     Objective:  General Appearance:  Uncomfortable. Vital signs: (most recent): Blood pressure 130/85, pulse 84, temperature 98.2 °F (36.8 °C), temperature source Oral, resp. rate 26, height 5' 9.02\" (1.753 m), weight 271 lb 6.2 oz (123.1 kg), SpO2 (!) 88 %. No fever. Output: Producing urine. Lungs: Tachypnea. He is not in respiratory distress. There are decreased breath sounds, wheezes and rhonchi.     Heart: S1 normal and S2 normal.    Abdomen: Abdomen is soft and non-distended. Neurological: Patient is alert and oriented to person, place and time. Labs/Imaging/Diagnostics    Labs:  CBC:  Recent Labs     09/15/21  0300 09/16/21  1449   WBC 3.6* 6.0   RBC 5.54 5.54   HGB 15.7 15.5   HCT 48.3 48.2   MCV 87.2 87.0   RDW 15.1* 15.3*    200     CHEMISTRIES:  Recent Labs     09/15/21  0300 09/17/21  1309   * 141   K 4.3 4.3   CL 98* 104   CO2 23 22   BUN 13 21   CREATININE 0.6* 0.8*   GLUCOSE 151* 168*     PT/INR:No results for input(s): PROTIME, INR in the last 72 hours. APTT:No results for input(s): APTT in the last 72 hours. LIVER PROFILE:  Recent Labs     09/15/21  0300 09/17/21  1309   AST 39* 53*   ALT 19 30   BILITOT 0.3 0.5   ALKPHOS 106 118       Imaging Last 24 Hours:  No results found. Assessment//Plan           Hospital Problems         Last Modified POA    Obstructive sleep apnea syndrome 9/14/2021 Yes    COVID-19 9/14/2021 Yes    Tobacco dependence in remission 9/14/2021 Yes    Obesity 9/14/2021 Yes    Hyperlipidemia 9/14/2021 Yes        Assessment:  (Assessment and plan   1. Acute hypoxic respiratory failure secondary to severe COVID-19 pneumonia  CXR and CT chest with bilateral lung infiltrates  Needing 15 L at rest; RR 25-32; sat 8690 %  Monitor respiratory status; wean FiO2 as able  Continue decadron and baricitinib. Continue Lovenox 30 mg twice daily  prn bronchodilators  ; monitor every other day  CTPA negative for PE  ID on board; appreciated    2. COPD  Likely exacerbation due to Covid  Continue steroids and bronchodilators  Monitor clinical status     3. BEV   Nightly cpap).        Electronically signed by Olivia Jovel MD on 9/17/21 at 2:23 PM EDT Mucosal Advancement Flap Text: Given the location of the defect, shape of the defect and the proximity to free margins a mucosal advancement flap was deemed most appropriate. Incisions were made with a 15 blade scalpel in the appropriate fashion along the cutaneous vermilion border and the mucosal lip. The remaining actinically damaged mucosal tissue was excised.  The mucosal advancement flap was then elevated to the gingival sulcus with care taken to preserve the neurovascular structures and advanced into the primary defect. Care was taken to ensure that precise realignment of the vermilion border was achieved.